# Patient Record
Sex: MALE | Race: WHITE | NOT HISPANIC OR LATINO | Employment: OTHER | URBAN - METROPOLITAN AREA
[De-identification: names, ages, dates, MRNs, and addresses within clinical notes are randomized per-mention and may not be internally consistent; named-entity substitution may affect disease eponyms.]

---

## 2017-11-30 ENCOUNTER — OFFICE VISIT (OUTPATIENT)
Dept: ORTHOPEDICS | Facility: CLINIC | Age: 47
End: 2017-11-30
Payer: COMMERCIAL

## 2017-11-30 VITALS
HEIGHT: 70 IN | SYSTOLIC BLOOD PRESSURE: 123 MMHG | WEIGHT: 190 LBS | DIASTOLIC BLOOD PRESSURE: 81 MMHG | BODY MASS INDEX: 27.2 KG/M2

## 2017-11-30 DIAGNOSIS — M25.552 LEFT HIP PAIN: Primary | ICD-10-CM

## 2017-11-30 PROCEDURE — 99203 OFFICE O/P NEW LOW 30 MIN: CPT | Performed by: FAMILY MEDICINE

## 2017-11-30 RX ORDER — TAMSULOSIN HYDROCHLORIDE 0.4 MG/1
0.4 CAPSULE ORAL DAILY
COMMUNITY
Start: 2017-02-08 | End: 2019-10-02

## 2017-11-30 NOTE — MR AVS SNAPSHOT
"              After Visit Summary   11/30/2017    Rigo Joyner    MRN: [id number]           Patient Information     Date Of Birth          1970        Visit Information        Provider Department      11/30/2017 10:40 AM Delfino Hernandez, DO [facility]        Today's Diagnoses     Left hip pain    -  1      Care Instructions    Assessment:  1. Left hip pain        Plan:  Discussed the assessment with the patient.  Rehab: [facility] - [phone].  Recommend scheduling with Mary Dinh or Susana Joseph.  Follow up: 6 - 8 weeks after starting physical therapy if not improving            Follow-ups after your visit        Additional Services     PHYSICAL THERAPY REFERRAL       *This therapy referral will be filtered to a centralized scheduling office at [facility] and the patient will receive a call to schedule an appointment at a [facility] location most convenient for them. *     [facility] provides Physical Therapy evaluation and treatment and many specialty services across the [facility] system.  If requesting a specialty program, please choose from the list below.    If you have not heard from the scheduling office within 2 business days, please call [phone] for all locations, with the exception of [place], please call [phone].  Treatment: Evaluation & Treatment  Special Instructions/Modalities: per therapist evaluation  Special Programs: None    Please be aware that coverage of these services is subject to the terms and limitations of your health insurance plan.  Call member services at your health plan with any benefit or coverage questions.      **Note to Provider:  If you are referring outside of [facility] for the therapy appointment, please list the name of the location in the \"special instructions\" above, print the referral and give to the patient to schedule the appointment.      " "            Who to contact     If you have questions or need follow up information about today's clinic visit or your schedule please contact [facility] directly at [phone].  Normal or non-critical lab and imaging results will be communicated to you by MyChart, letter or phone within 4 business days after the clinic has received the results. If you do not hear from us within 7 days, please contact the clinic through MyChart or phone. If you have a critical or abnormal lab result, we will notify you by phone as soon as possible.  Submit refill requests through [facility] or call your pharmacy and they will forward the refill request to us. Please allow 3 business days for your refill to be completed.          Additional Information About Your Visit        [facility] Information     [facility] lets you send messages to your doctor, view your test results, renew your prescriptions, schedule appointments and more. To sign up, go to [email or web address] . Click on \"Log in\" on the left side of the screen, which will take you to the Welcome page. Then click on \"Sign up Now\" on the right side of the page.     You will be asked to enter the access code listed below, as well as some personal information. Please follow the directions to create your username and password.     Your access code is: [email or web address]  Expires: 2018 11:37 AM     Your access code will  in 90 days. If you need help or a new code, please call your [facility] clinic or [phone].        Care EveryWhere ID     This is your Care EveryWhere ID. This could be used by other organizations to access your [facility] medical records  [id number]        Your Vitals Were     Height BMI (Body Mass Index)                5' 9.5\" (1.765 m) 27.66 kg/m2           Blood Pressure from Last 3 Encounters:   17 123/81   16 122/77    Weight from Last 3 Encounters:   17 190 lb (86.2 kg)              We Performed the " Following     PHYSICAL THERAPY REFERRAL          Today's Medication Changes          These changes are accurate as of: 11/30/17 11:37 AM.  If you have any questions, ask your nurse or doctor.               Stop taking these medicines if you haven't already. Please contact your care team if you have questions.     predniSONE 20 MG tablet   Commonly known as:  DELTASONE   Stopped by:  Delfino Hernandez DO                    Primary Care Provider Office Phone # Fax #    [facility] [phone]       [address] [place] [id number]        Equal Access to Services     [facility]: Hadii chano mejia hadasho Soomaali, waaxda luqadaha, qaybta kaalmada adeeneida, rut contreras . So [facility] [phone].    ATENCIÓN: Si habla español, tiene a deluna disposición servicios gratuitos de asistencia lingüística. [facility] [phone].    We comply with applicable federal civil rights laws and [place] laws. We do not discriminate on the basis of race, color, national origin, age, disability, sex, sexual orientation, or gender identity.            Thank you!     Thank you for choosing [facility]  for your care. Our goal is always to provide you with excellent care. Hearing back from our patients is one way we can continue to improve our services. Please take a few minutes to complete the written survey that you may receive in the mail after your visit with us. Thank you!             Your Updated Medication List - Protect others around you: Learn how to safely use, store and throw away your medicines at www.disposemymeds.org.          This list is accurate as of: 11/30/17 11:37 AM.  Always use your most recent med list.                   Brand Name Dispense Instructions for use Diagnosis    tamsulosin 0.4 MG capsule    FLOMAX     Take 0.4 mg by mouth

## 2017-11-30 NOTE — LETTER
"    2017         RE: Rigo Joyner  [address] [place] [address]        Dear Colleague,    Thank you for referring your patient, Rigo Joyner, to the [facility]. Please see a copy of my visit note below.    Rigo Joyner  :  1970  DOS: 2017  MRN: [id number]    Sports Medicine Clinic Visit    PCP: rBi, [facility]    Rigo Joyner is a 47 year old male who is seen as a self referral presenting with chronic left hip pain.    Injury: Chronic left hip pain over the last 30+ years, pain worse over the last ~ 1 year.  Pain located over left deep lateral and anterior hip, radiating to anterior thigh. Additional Features:  Positive: popping and weakness.  Symptoms are better with Ibuprofen and Rest.  Symptoms are worse with: prolonged sitting/driving, going from sit to stand, wearing his gun belt.  Other evaluation and/or treatments so far consists of: Ibuprofen, Rest [facility]  [facility] completed[facility] - negative for fracture and OA per patient.  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  Has noted intermittent pain since that time.    Social History: works as [employer] for [employer]     Review of Systems  Musculoskeletal: as above  Remainder of review of systems is negative including constitutional, CV, pulmonary, GI, Skin and Neurologic except as noted in HPI or medical history.    No past medical history on file.  No past surgical history on file.    Objective  /81  Ht 5' 9.5\" (1.765 m)  Wt 190 lb (86.2 kg)  BMI 27.66 kg/m2    General: healthy, alert and in no distress    HEENT: no scleral icterus or conjunctival erythema   Skin: no suspicious lesions or rash. No jaundice.   CV: regular rhythm by palpation, 2+ distal pulses, no pedal edema    Resp: " "normal respiratory effort without conversational dyspnea   Psych: normal mood and affect    Gait: nonantalgic, appropriate coordination and balance   Neuro: normal light touch sensory exam of the extremities. Motor strength as noted below     Right hip exam    Inspection:        no edema or ecchymosis in hip area    ROM:       Flexion 130       internal rotation 15      external rotation 30      Range of motion limited by pain, mild, anterior hip    Strength:        flexion 4/5       extension 5/5       abduction 5/5       adduction 5/5    Tender:        Anterior hip joint mild    Non Tender:        remainder of hip area       illiac crest       ASIS       pubis    Sensation:        grossly intact in hip and thigh    Skin:       well perfused       capillary refill brisk    Special Tests:        neg (-) BRADLEY       positive (+) FADIR       Negative scour       neg (-) Malachi       Neg SLR and slump    Radiology  No imaging to review today, per patient [facility]\"    Assessment:  1. Left hip pain        Plan:  Discussed the assessment with the patient.  Follow up: prn based on relief  Start PT for presumed mild hip OA vs compensatory musculoskeletal dysfunction  Consider MR vs diagnostic CSI in future based on relief of sx  Obtain outside imaging if possible for review  PT ordered, HEP and acitivity modification principles reviewed  We discussed modified progressive pain-free activity as tolerated  Home handouts provided and supportive care reviewed  All questions were answered today  Contact us with additional questions or concerns  Signs and sx of concern reviewed      Delfino Hernandez DO, BRITTANI  Primary Care Sports Medicine  [facility]             Disclaimer: This note consists of symbols derived from keyboarding, dictation and/or voice recognition software. As a result, there may be errors in the script that have gone undetected. Please consider this when " interpreting information found in this chart.      Again, thank you for allowing me to participate in the care of your patient.        Sincerely,        Delfino Hernandez, DO

## 2017-11-30 NOTE — PROGRESS NOTES
"Rigo Joyner  :  1970  DOS: 2017  MRN: [id number]    Sports Medicine Clinic Visit    [facility]    Rigo Joyner is a 47 year old male who is seen as a self referral presenting with chronic left hip pain.    Injury: Chronic left hip pain over the last 30+ years, pain worse over the last ~ 1 year.  Pain located over left deep lateral and anterior hip, radiating to anterior thigh. Additional Features:  Positive: popping and weakness.  Symptoms are better with Ibuprofen and Rest.  Symptoms are worse with: prolonged sitting/driving, going from sit to stand, wearing his gun belt.  Other evaluation and/or treatments so far consists of: Ibuprofen, Rest and [facility].  Recent imaging completed: [facility] - negative for fracture and OA per patient.  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  Has noted intermittent pain since that time.    Social History: works as [employer] for [employer]     Review of Systems  Musculoskeletal: as above  Remainder of review of systems is negative including constitutional, CV, pulmonary, GI, Skin and Neurologic except as noted in HPI or medical history.    No past medical history on file.  No past surgical history on file.    Objective  /81  Ht 5' 9.5\" (1.765 m)  Wt 190 lb (86.2 kg)  BMI 27.66 kg/m2    General: healthy, alert and in no distress    HEENT: no scleral icterus or conjunctival erythema   Skin: no suspicious lesions or rash. No jaundice.   CV: regular rhythm by palpation, 2+ distal pulses, no pedal edema    Resp: normal respiratory effort without conversational dyspnea   Psych: normal mood and affect    Gait: nonantalgic, appropriate coordination and balance   Neuro: normal light touch sensory exam of the extremities. Motor strength as noted below     Right hip exam    Inspection:     " "   no edema or ecchymosis in hip area    ROM:       Flexion 130       internal rotation 15      external rotation 30      Range of motion limited by pain, mild, anterior hip    Strength:        flexion 4/5       extension 5/5       abduction 5/5       adduction 5/5    Tender:        Anterior hip joint mild    Non Tender:        remainder of hip area       illiac crest       ASIS       pubis    Sensation:        grossly intact in hip and thigh    Skin:       well perfused       capillary refill brisk    Special Tests:        neg (-) BRADLEY       positive (+) FADIR       Negative scour       neg (-) Malachi       Neg SLR and slump    Radiology  No imaging to review today, per patient [facility] \"[facility]\"    Assessment:  1. Left hip pain        Plan:  Discussed the assessment with the patient.  Follow up: prn based on relief  Start PT for presumed mild hip OA vs compensatory musculoskeletal dysfunction  Consider MR vs diagnostic CSI in future based on relief of sx  Obtain outside imaging if possible for review  PT ordered, HEP and acitivity modification principles reviewed  We discussed modified progressive pain-free activity as tolerated  Home handouts provided and supportive care reviewed  All questions were answered today  Contact us with additional questions or concerns  Signs and sx of concern reviewed      Delfino Hernandez DO, BRITTANI  Primary Care Sports Medicine  [facility]             Disclaimer: This note consists of symbols derived from keyboarding, dictation and/or voice recognition software. As a result, there may be errors in the script that have gone undetected. Please consider this when interpreting information found in this chart.    "

## 2017-11-30 NOTE — PATIENT INSTRUCTIONS
Assessment:  1. Left hip pain        Plan:  Discussed the assessment with the patient.  Rehab[facility] [phone].  Recommend scheduling with Mary Dinh or Susana Joseph.  Follow up: 6 - 8 weeks after starting physical therapy if not improving

## 2017-12-11 ENCOUNTER — HOSPITAL ENCOUNTER (OUTPATIENT)
Dept: PHYSICAL THERAPY | Facility: CLINIC | Age: 47
Setting detail: THERAPIES SERIES
End: 2017-12-11
Attending: FAMILY MEDICINE
Payer: COMMERCIAL

## 2017-12-11 PROCEDURE — 97110 THERAPEUTIC EXERCISES: CPT | Mod: GP | Performed by: PHYSICAL THERAPIST

## 2017-12-11 PROCEDURE — 97161 PT EVAL LOW COMPLEX 20 MIN: CPT | Mod: GP | Performed by: PHYSICAL THERAPIST

## 2017-12-11 PROCEDURE — 40000718 ZZHC STATISTIC PT DEPARTMENT ORTHO VISIT: Performed by: PHYSICAL THERAPIST

## 2017-12-11 NOTE — PROGRESS NOTES
PHYSICAL THERAPY INITIAL EVALUATION  12/11/17 0800   General Information   Type of Visit Initial OP Ortho PT Evaluation   Start of Care Date 12/11/17   Referring Physician Dr. Hernandez   Patient/Family Goals Statement pain management    Orders Evaluate and Treat   Date of Order 11/30/17   Insurance Type Health [employer]   Insurance Comments/Visits Authorized no limits   Medical Diagnosis Left hip pain    Surgical/Medical history reviewed Yes   Precautions/Limitations no known precautions/limitations   Body Part(s)   Body Part(s) Hip   Presentation and Etiology   Pertinent history of current problem (include personal factors and/or comorbidities that impact the POC) Patient reports pain in hip since since the . [facility] but [facility]'t tell him much. Prolonged sitting makes it worse.    Impairments A. Pain;F. Decreased strength and endurance;J. Burning;K. Numbness;L. Tingling   Functional Limitations perform activities of daily living;perform required work activities;perform desired leisure / sports activities   Symptom Location anterior-lateral left hip    How/Where did it occur With repetition/overuse   Chronicity Chronic   Pain rating (0-10 point scale) Best (/10);Worst (/10)   Best (/10) 3   Worst (/10) 8   Pain quality B. Dull;C. Aching;D. Burning;E. Shooting   Frequency of pain/symptoms A. Constant   Pain/symptoms exacerbated by A. Sitting;E. Rest;G. Certain positions   Pain/symptoms eased by B. Walking;E. Changing positions;I. OTC medication(s)   Progression of symptoms since onset: Worsened   Prior Level of Function   Prior Level of Function-Mobility independent   Prior Level of Function-ADLs independent   Current Level of Function   Patient role/employment history A. Employed   Employment Comments [employer]    Current equipment-Gait/Locomotion None   Fall Risk Screen   Fall screen completed by PT   Have you fallen 2 or more times in the past year? No   Have you fallen and had an injury in  the past year? No   Is patient a fall risk? No   Functional Scales   Functional Scales Other   Other Scales  LEFS: 60/80   Hip Objective Findings   Side (if bilateral, select both right and left) Right;Left   Gait/Locomotion normal   Balance/Proprioception (Single Leg Stance) decreased on the L, increased sway   Hip ROM Comments '   Lumbar ROM Flexion-fingertips to ankles w/mild inc pain in hip, Extension-50% stiffness, SB fingertips to superior pole of patella mild discomfort L hip but worse with L SB   Pelvic Screen - supine to sit, - SI provocation testing    Femoral Nerve Stretch Test -   Gluteal Tendopathy Test -   Resisted Hip Adduction Test -   Straight Leg Raise Test -   Scour Test -   BRADLEY Test + L   FADIR Test + L   Palpation tender L hip flexor, TFL, QL   Right Hip Flexion PROM WNL   Right Hip ER PROM 45   Right Hip IR PROM 30   Right Hip Ext PROM WNL   Right Hip Flexion Strength 5/5   Right Hip Abduction Strength 4+/5   Right Hip IR Strength 5/5   Right Hip ER Strength 5/5   Right Knee Flexion Strength 5/5   Right Knee Extension Strength 5/5   Byron Flexibility Test + B   Obers/ITB Flexibility mild tight L   Right Hamstring Flexibility mod tight   Right Piriformis Flexibility mild tight   Right Prone Quad Flexibility mod tight   Left Hip Flexion PROM WNL, mild pain   Left Hip ER PROM 45   Left Hip IR PROM 20, pain   Left Hip Ext PROM mildly limited compared to L and causes pain from thigh radiating up to groin   Left Hip Flexion Strength 5/5   Left Hip Abduction Strength 4/5   Left Hip IR Strength 5/5, discomfort    Left Hip ER Strength 5/5   Left Knee Flexion Strength 5/5   Left Knee Extension Strength 5/5   Left Hamstring Flexibility mod tight   Left Piriformis Flexibility mod tight, pain    Left Prone Quad Flexibility mod tight   Planned Therapy Interventions   Planned Therapy Interventions balance training;joint mobilization;manual therapy;neuromuscular re-education;ROM;strengthening;stretching    Clinical Impression   Criteria for Skilled Therapeutic Interventions Met yes, treatment indicated   PT Diagnosis L hip pain   Influenced by the following impairments pain, decreased ROM, decreased strength, decreased flexibility   Functional limitations due to impairments prolonged sitting, rolling in bed, lifting   Clinical Presentation Stable/Uncomplicated   Clinical Presentation Rationale decent ROM and low pain    Clinical Decision Making (Complexity) Low complexity   Therapy Frequency 1 time/week   Predicted Duration of Therapy Intervention (days/wks) 4 weeks, 1x every other week for 4 weeks   Risk & Benefits of therapy have been explained Yes   Patient, Family & other staff in agreement with plan of care Yes   Clinical Impression Comments Patient presenting with signs and symptoms consistent with possible mild OA.   Education Assessment   Preferred Learning Style Listening;Demonstration;Pictures/video   Barriers to Learning No barriers   ORTHO GOALS   PT Ortho Eval Goals 1;2;3;4   Ortho Goal 1   Goal Identifier 1   Goal Description Patient will be able to sit for 1 hour with only minimal difficulty.   Target Date 02/05/18   Ortho Goal 2   Goal Identifier 2   Goal Description Patient will be able to roll in bed with only minimal difficulty.   Target Date 02/05/18   Ortho Goal 3   Goal Identifier 3   Goal Description Patient will report no difficulty with ascending/descending stairs.   Target Date 02/05/18   Ortho Goal 4   Goal Identifier 4   Goal Description Patient will be independent and compliant with HEP to aid functional recovery.   Target Date 02/05/18   Total Evaluation Time   Total Evaluation Time 30       Please contact me with any questions or concerns.  Thank you for your referral.    Susana Joseph, PT, DPT, OCS  Physical Therapist, Orthopedic Certified Specialist  [facility]  [phone]

## 2017-12-18 ENCOUNTER — HOSPITAL ENCOUNTER (OUTPATIENT)
Dept: PHYSICAL THERAPY | Facility: CLINIC | Age: 47
Setting detail: THERAPIES SERIES
End: 2017-12-18
Attending: FAMILY MEDICINE
Payer: COMMERCIAL

## 2017-12-18 PROCEDURE — 40000718 ZZHC STATISTIC PT DEPARTMENT ORTHO VISIT: Performed by: PHYSICAL THERAPIST

## 2017-12-18 PROCEDURE — 97140 MANUAL THERAPY 1/> REGIONS: CPT | Mod: GP | Performed by: PHYSICAL THERAPIST

## 2017-12-18 PROCEDURE — 97110 THERAPEUTIC EXERCISES: CPT | Mod: GP | Performed by: PHYSICAL THERAPIST

## 2018-01-02 ENCOUNTER — TELEPHONE (OUTPATIENT)
Dept: ORTHOPEDICS | Facility: CLINIC | Age: 48
End: 2018-01-02

## 2018-01-02 ENCOUNTER — HOSPITAL ENCOUNTER (OUTPATIENT)
Dept: PHYSICAL THERAPY | Facility: CLINIC | Age: 48
Setting detail: THERAPIES SERIES
End: 2018-01-02
Attending: FAMILY MEDICINE
Payer: COMMERCIAL

## 2018-01-02 DIAGNOSIS — M25.552 LEFT HIP PAIN: Primary | ICD-10-CM

## 2018-01-02 PROCEDURE — 97110 THERAPEUTIC EXERCISES: CPT | Mod: GP | Performed by: PHYSICAL THERAPIST

## 2018-01-02 PROCEDURE — 40000718 ZZHC STATISTIC PT DEPARTMENT ORTHO VISIT: Performed by: PHYSICAL THERAPIST

## 2018-01-02 NOTE — TELEPHONE ENCOUNTER
Received message from patient's physical therapist the he is having only mild relief of left hip pain with physical therapy.  Patient is desiring to pursue MRI at this time to rule out any further structural issue.  MRI order pended.  Dr Hernandez please advise.    Rolly Astudillo ATC

## 2018-01-02 NOTE — TELEPHONE ENCOUNTER
Spoke to patient and notified that MRI order completed.  Advanced imaging is done by appointment. Some insurance require a prior authorization to be completed which may delay the time until you are able to schedule your appointment. You should be receiving a call from the scheduling department, if you have not heard from them in 24-48 hours.   Please call [place] [place] [facility] [place]: [phone] to schedule your Left hip MRI.  Depending on your availability you can usually schedule within the next 1-2 days.    The clinic will call you with results, if you have not heard from the clinic within 3-4 days following your MRI please contact us at the number listed below.     Rolly Astudillo ATC

## 2018-01-04 ENCOUNTER — HOSPITAL ENCOUNTER (OUTPATIENT)
Dept: MRI IMAGING | Facility: CLINIC | Age: 48
Discharge: HOME | End: 2018-01-04
Attending: FAMILY MEDICINE | Admitting: FAMILY MEDICINE
Payer: COMMERCIAL

## 2018-01-04 DIAGNOSIS — M25.552 LEFT HIP PAIN: ICD-10-CM

## 2018-01-04 PROCEDURE — 73721 MRI JNT OF LWR EXTRE W/O DYE: CPT | Mod: LT

## 2018-01-08 ENCOUNTER — TELEPHONE (OUTPATIENT)
Dept: ORTHOPEDICS | Facility: CLINIC | Age: 48
End: 2018-01-08

## 2018-01-09 ENCOUNTER — HOSPITAL ENCOUNTER (OUTPATIENT)
Dept: PHYSICAL THERAPY | Facility: CLINIC | Age: 48
Setting detail: THERAPIES SERIES
End: 2018-01-09
Attending: FAMILY MEDICINE
Payer: COMMERCIAL

## 2018-01-09 PROCEDURE — 97110 THERAPEUTIC EXERCISES: CPT | Mod: GP | Performed by: PHYSICAL THERAPIST

## 2018-01-09 PROCEDURE — 40000718 ZZHC STATISTIC PT DEPARTMENT ORTHO VISIT: Performed by: PHYSICAL THERAPIST

## 2018-01-09 NOTE — TELEPHONE ENCOUNTER
Called to discuss MR results.  Signs of YAMILKA which will be reviewed with him.  No AVN, no stress fracture, no significant OA, though YAMILKA can predispose to early OA.  Can try CSI to prove pain is coming from hip joint and assess as a tx to calm inflammation, vs continued PT.      Will continue to try to reach

## 2018-01-17 ENCOUNTER — HOSPITAL ENCOUNTER (OUTPATIENT)
Dept: PHYSICAL THERAPY | Facility: CLINIC | Age: 48
Setting detail: THERAPIES SERIES
End: 2018-01-17
Attending: FAMILY MEDICINE
Payer: COMMERCIAL

## 2018-01-17 PROCEDURE — 40000718 ZZHC STATISTIC PT DEPARTMENT ORTHO VISIT: Performed by: PHYSICAL THERAPIST

## 2018-01-17 PROCEDURE — 97110 THERAPEUTIC EXERCISES: CPT | Mod: GP | Performed by: PHYSICAL THERAPIST

## 2018-01-25 ENCOUNTER — OFFICE VISIT (OUTPATIENT)
Dept: ORTHOPEDICS | Facility: CLINIC | Age: 48
End: 2018-01-25
Payer: COMMERCIAL

## 2018-01-25 VITALS — HEIGHT: 70 IN | BODY MASS INDEX: 27.35 KG/M2 | WEIGHT: 191 LBS

## 2018-01-25 DIAGNOSIS — M25.552 LEFT HIP PAIN: Primary | ICD-10-CM

## 2018-01-25 PROCEDURE — 99213 OFFICE O/P EST LOW 20 MIN: CPT | Performed by: FAMILY MEDICINE

## 2018-01-25 NOTE — LETTER
2018         RE: Rigo Joyner  [id number]  [address]  [facility] [id number]        Dear Colleague,    Thank you for referring your patient, Rigo Joyner, to the [facility]. Please see a copy of my visit note below.    Rigo Joyner  :  1970  DOS: 2018  MRN: [id number]    Sports Medicine Clinic Visit    PCP: Bri, [facility]    Rigo Joyner is a 47 year old male who is seen as a self referral presenting with chronic left hip pain.    Injury: Chronic left hip pain over the last 30+ years, pain worse over the last ~ 1 year.  Pain located over left deep lateral and anterior hip, radiating to anterior thigh. Additional Features:  Positive: popping and weakness.  Symptoms are better with Ibuprofen and Rest.  Symptoms are worse with: prolonged sitting/driving, going from sit to stand, wearing his gun belt.  Other evaluation and/or treatments so far consists of: Ibuprofen, Rest [facility]  [facility] completed[facility] - negative for fracture and OA per patient.  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  Has noted intermittent pain since that time.    Social History: works as [employer] for [employer]     Interim History - 2018  Since last visit on 2018 patient has mild-moderate left hip pain.  Notes intermittent relief with physical therapy.  Continues have discomfort mainly over anterior and lateral hip.  Here to review MRI results and discussion injection options.  No interim injury.       Review of Systems  Musculoskeletal: as above  Remainder of review of systems is negative including constitutional, CV, pulmonary, GI, Skin and Neurologic except as noted in HPI or medical history.    Past Medical History:   Diagnosis Date     Hyperlipemia      No past surgical  "history on file.    Objective  Ht 5' 9.5\" (1.765 m)  Wt 191 lb (86.6 kg)  BMI 27.8 kg/m2    General: healthy, alert and in no distress    HEENT: no scleral icterus or conjunctival erythema   Skin: no suspicious lesions or rash. No jaundice.   CV: regular rhythm by palpation, 2+ distal pulses, no pedal edema    Resp: normal respiratory effort without conversational dyspnea   Psych: normal mood and affect    Gait: nonantalgic, appropriate coordination and balance   Neuro: normal light touch sensory exam of the extremities. Motor strength as noted below     Right hip exam    Inspection:        no edema or ecchymosis in hip area    ROM:       Flexion 130       internal rotation 15      external rotation 30      Range of motion limited by pain, mild, anterior hip    Strength:        flexion 4/5       extension 5/5       abduction 5/5       adduction 5/5    Tender:        Anterior hip joint minimal      TFL, mild    Non Tender:        remainder of hip area       illiac crest       ASIS       pubis    Sensation:        grossly intact in hip and thigh    Skin:       well perfused       capillary refill brisk    Special Tests:        neg (-) BRADLEY       positive (+) FADIR, improved       Negative scour       neg (-) Malachi       Neg SLR and slump    Radiology  Results for orders placed or performed during the hospital encounter of 01/04/18   MR Hip Left w/o Contrast    Narrative    MR HIP LEFT WITHOUT CONTRAST 1/4/2018 9:07 AM    HISTORY:  Left hip pain.    TECHNIQUE:  Multiplanar, multisequence without contrast.    FINDINGS:   Osseous and Cartilaginous Structures:  No fracture or destructive bone  lesion.  No femoral head osteonecrosis. Mild subchondral cystic change  of the left anterior acetabulum, which could relate to subtle  underlying chondromalacia. There is a left femoral neck synovial  herniation pit. These have been described as a normal variant, but  also in association with femoroacetabular " impingement.    Acetabular Labrum: No juxtaacetabular cyst.  No obvious labral tear is  appreciated, allowing for the large FOV technique.  If indicated  clinically, MR arthrography would be considered the study of choice in  this regard.    Hip joint space:  No significant overall joint effusion.     Trochanteric and Iliopsoas Bursae: Trace right iliopsoas bursitis.    Common Hamstring Tendon: No evidence of tear or significant  tendinosis.    Additional Findings:  The gluteus medius and minimus tendons appear  unremarkable. Incidentally noted colonic diverticulosis.      Impression    IMPRESSION:  1. Mild left acetabular subchondral cystic change, which could relate  to subtle underlying chondromalacia. Left femoral neck synovial  herniation pit. These have been described as a normal variant, but  also in association with femoroacetabular impingement.  2. Additional findings discussed above.    MALLORY TURNER MD       Assessment:  1. Left hip pain        Plan:  Discussed the assessment with the patient.  Follow up: prn based on relief  Continue HEP for mild hip OA/YAMILKA with compensatory musculoskeletal dysfunction  Reviewed MR images and report today with patient  Consider diagnostic/therapeutic CSI in future for labile or worsening pain, but will try to hold off if possible  Low impact activity, wt loss strategies reviewed  We discussed modified progressive pain-free activity as tolerated  Home handouts provided and supportive care reviewed  All questions were answered today  Contact us with additional questions or concerns  Signs and sx of concern reviewed      Delfino Hernandez DO, BRITTANI  Primary Care Sports Medicine  [facility]             Disclaimer: This note consists of symbols derived from keyboarding, dictation and/or voice recognition software. As a result, there may be errors in the script that have gone undetected. Please consider this when interpreting information found in this  chart.      Again, thank you for allowing me to participate in the care of your patient.        Sincerely,        Delfino Hernandez, DO

## 2018-01-25 NOTE — PROGRESS NOTES
"Rigo Joyner  :  1970  DOS: 2018  MRN: [id number]    Sports Medicine Clinic Visit    PCP: Bri, [facility]    Rigo Joyner is a 47 year old male who is seen as a self referral presenting with chronic left hip pain.    Injury: Chronic left hip pain over the last 30+ years, pain worse over the last ~ 1 year.  Pain located over left deep lateral and anterior hip, radiating to anterior thigh. Additional Features:  Positive: popping and weakness.  Symptoms are better with Ibuprofen and Rest.  Symptoms are worse with: prolonged sitting/driving, going from sit to stand, wearing his gun belt.  Other evaluation and/or treatments so far consists of: Ibuprofen, Rest and [facility].  [facility] completed: [facility] - negative for fracture and OA per patient.  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  Has noted intermittent pain since that time.    Social History: works as [employer] for [employer]     Interim History - 2018  Since last visit on 2018 patient has mild-moderate left hip pain.  Notes intermittent relief with physical therapy.  Continues have discomfort mainly over anterior and lateral hip.  Here to review MRI results and discussion injection options.  No interim injury.       Review of Systems  Musculoskeletal: as above  Remainder of review of systems is negative including constitutional, CV, pulmonary, GI, Skin and Neurologic except as noted in HPI or medical history.    Past Medical History:   Diagnosis Date     Hyperlipemia      No past surgical history on file.    Objective  Ht 5' 9.5\" (1.765 m)  Wt 191 lb (86.6 kg)  BMI 27.8 kg/m2    General: healthy, alert and in no distress    HEENT: no scleral icterus or conjunctival erythema   Skin: no suspicious lesions or rash. No jaundice.   CV: regular rhythm by " palpation, 2+ distal pulses, no pedal edema    Resp: normal respiratory effort without conversational dyspnea   Psych: normal mood and affect    Gait: nonantalgic, appropriate coordination and balance   Neuro: normal light touch sensory exam of the extremities. Motor strength as noted below     Right hip exam    Inspection:        no edema or ecchymosis in hip area    ROM:       Flexion 130       internal rotation 15      external rotation 30      Range of motion limited by pain, mild, anterior hip    Strength:        flexion 4/5       extension 5/5       abduction 5/5       adduction 5/5    Tender:        Anterior hip joint minimal      TFL, mild    Non Tender:        remainder of hip area       illiac crest       ASIS       pubis    Sensation:        grossly intact in hip and thigh    Skin:       well perfused       capillary refill brisk    Special Tests:        neg (-) BRADLEY       positive (+) FADIR, improved       Negative scour       neg (-) Malachi       Neg SLR and slump    Radiology  Results for orders placed or performed during the hospital encounter of 01/04/18   MR Hip Left w/o Contrast    Narrative    MR HIP LEFT WITHOUT CONTRAST 1/4/2018 9:07 AM    HISTORY:  Left hip pain.    TECHNIQUE:  Multiplanar, multisequence without contrast.    FINDINGS:   Osseous and Cartilaginous Structures:  No fracture or destructive bone  lesion.  No femoral head osteonecrosis. Mild subchondral cystic change  of the left anterior acetabulum, which could relate to subtle  underlying chondromalacia. There is a left femoral neck synovial  herniation pit. These have been described as a normal variant, but  also in association with femoroacetabular impingement.    Acetabular Labrum: No juxtaacetabular cyst.  No obvious labral tear is  appreciated, allowing for the large FOV technique.  If indicated  clinically, MR arthrography would be considered the study of choice in  this regard.    Hip joint space:  No significant overall joint  effusion.     Trochanteric and Iliopsoas Bursae: Trace right iliopsoas bursitis.    Common Hamstring Tendon: No evidence of tear or significant  tendinosis.    Additional Findings:  The gluteus medius and minimus tendons appear  unremarkable. Incidentally noted colonic diverticulosis.      Impression    IMPRESSION:  1. Mild left acetabular subchondral cystic change, which could relate  to subtle underlying chondromalacia. Left femoral neck synovial  herniation pit. These have been described as a normal variant, but  also in association with femoroacetabular impingement.  2. Additional findings discussed above.    MALLORY TURNER MD       Assessment:  1. Left hip pain        Plan:  Discussed the assessment with the patient.  Follow up: prn based on relief  Continue HEP for mild hip OA/YAMILKA with compensatory musculoskeletal dysfunction  Reviewed MR images and report today with patient  Consider diagnostic/therapeutic CSI in future for labile or worsening pain, but will try to hold off if possible  Low impact activity, wt loss strategies reviewed  We discussed modified progressive pain-free activity as tolerated  Home handouts provided and supportive care reviewed  All questions were answered today  Contact us with additional questions or concerns  Signs and sx of concern reviewed      Delfino Hernandez DO, BRITTANI  Primary Care Sports Medicine  [facility]             Disclaimer: This note consists of symbols derived from keyboarding, dictation and/or voice recognition software. As a result, there may be errors in the script that have gone undetected. Please consider this when interpreting information found in this chart.

## 2018-01-25 NOTE — MR AVS SNAPSHOT
"              After Visit Summary   2018    Rigo Joyner    MRN: [id number]           Patient Information     Date Of Birth          1970        Visit Information        Provider Department      2018 8:00 AM Delfino Hernandez,  [facility]        Today's Diagnoses     Left hip pain    -  1       Follow-ups after your visit        Who to contact     If you have questions or need follow up information about today's clinic visit or your schedule please contact [facility] directly at [phone].  Normal or non-critical lab and imaging results will be communicated to you by Roomishhart, letter or phone within 4 business days after the clinic has received the results. If you do not hear from us within 7 days, please contact the clinic through Roomishhart or phone. If you have a critical or abnormal lab result, we will notify you by phone as soon as possible.  Submit refill requests through Netvibes or call your pharmacy and they will forward the refill request to us. Please allow 3 business days for your refill to be completed.          Additional Information About Your Visit        MyChart Information     Netvibes lets you send messages to your doctor, view your test results, renew your prescriptions, schedule appointments and more. To sign up, go to [email or web address] . Click on \"Log in\" on the left side of the screen, which will take you to the Welcome page. Then click on \"Sign up Now\" on the right side of the page.     You will be asked to enter the access code listed below, as well as some personal information. Please follow the directions to create your username and password.     Your access code is: [email or web address]  Expires: 2018 11:37 AM     Your access code will  in 90 days. If you need help or a new code, please call your [place] clinic or [phone].        Care EveryWhere ID     This is your Care EveryWhere " "ID. This could be used by other organizations to access your [facility] medical records  [id number]        Your Vitals Were     Height BMI (Body Mass Index)                5' 9.5\" (1.765 m) 27.8 kg/m2           Blood Pressure from Last 3 Encounters:   11/30/17 123/81   09/02/16 122/77    Weight from Last 3 Encounters:   01/25/18 191 lb (86.6 kg)   11/30/17 190 lb (86.2 kg)              Today, you had the following     No orders found for display       Primary Care Provider Office Phone # Fax #    [facility] [phone]       [address] [facility] [address]        Equal Access to Services     GABRIEL FLORES : Kymberly Camarnea, waaisf cavazos, qaybta kaalmada lenorayada, rut talavera. So [facility] [phone].    ATENCIÓN: Si habla español, tiene a deluna disposición servicios gratuitos de asistencia lingüística. [facility] [phone].    We comply with applicable federal civil rights laws and [place] laws. We do not discriminate on the basis of race, color, national origin, age, disability, sex, sexual orientation, or gender identity.            Thank you!     Thank you for choosing [facility]  for your care. Our goal is always to provide you with excellent care. Hearing back from our patients is one way we can continue to improve our services. Please take a few minutes to complete the written survey that you may receive in the mail after your visit with us. Thank you!             Your Updated Medication List - Protect others around you: Learn how to safely use, store and throw away your medicines at www.disposemymeds.org.          This list is accurate as of 1/25/18  8:49 AM.  Always use your most recent med list.                   Brand Name Dispense Instructions for use Diagnosis    tamsulosin 0.4 MG capsule    FLOMAX     Take 0.4 mg by mouth          "

## 2018-04-09 ENCOUNTER — APPOINTMENT (OUTPATIENT)
Dept: CT IMAGING | Facility: CLINIC | Age: 48
End: 2018-04-09
Attending: EMERGENCY MEDICINE
Payer: COMMERCIAL

## 2018-04-09 ENCOUNTER — HOSPITAL ENCOUNTER (EMERGENCY)
Facility: CLINIC | Age: 48
Discharge: HOME | End: 2018-04-09
Attending: EMERGENCY MEDICINE | Admitting: EMERGENCY MEDICINE
Payer: COMMERCIAL

## 2018-04-09 VITALS
OXYGEN SATURATION: 94 % | BODY MASS INDEX: 28.14 KG/M2 | DIASTOLIC BLOOD PRESSURE: 101 MMHG | RESPIRATION RATE: 18 BRPM | SYSTOLIC BLOOD PRESSURE: 132 MMHG | TEMPERATURE: 97.9 F | HEIGHT: 69 IN | WEIGHT: 190 LBS

## 2018-04-09 DIAGNOSIS — K57.92 ACUTE DIVERTICULITIS: ICD-10-CM

## 2018-04-09 LAB
ALBUMIN SERPL-MCNC: 4 G/DL (ref 3.4–5)
ALBUMIN UR-MCNC: NEGATIVE MG/DL
ALP SERPL-CCNC: 93 U/L (ref 40–150)
ALT SERPL W P-5'-P-CCNC: 23 U/L (ref 0–70)
ANION GAP SERPL CALCULATED.3IONS-SCNC: 9 MMOL/L (ref 3–14)
APPEARANCE UR: CLEAR
AST SERPL W P-5'-P-CCNC: 7 U/L (ref 0–45)
BASOPHILS # BLD AUTO: 0 10E9/L (ref 0–0.2)
BASOPHILS NFR BLD AUTO: 0.2 %
BILIRUB SERPL-MCNC: 1.4 MG/DL (ref 0.2–1.3)
BILIRUB UR QL STRIP: NEGATIVE
BUN SERPL-MCNC: 12 MG/DL (ref 7–30)
CALCIUM SERPL-MCNC: 8.5 MG/DL (ref 8.5–10.1)
CHLORIDE SERPL-SCNC: 103 MMOL/L (ref 94–109)
CO2 SERPL-SCNC: 23 MMOL/L (ref 20–32)
COLOR UR AUTO: NORMAL
CREAT SERPL-MCNC: 0.72 MG/DL (ref 0.66–1.25)
CRP SERPL-MCNC: 44.5 MG/L (ref 0–8)
DIFFERENTIAL METHOD BLD: ABNORMAL
EOSINOPHIL # BLD AUTO: 0 10E9/L (ref 0–0.7)
EOSINOPHIL NFR BLD AUTO: 0 %
ERYTHROCYTE [DISTWIDTH] IN BLOOD BY AUTOMATED COUNT: 12.1 % (ref 10–15)
GFR SERPL CREATININE-BSD FRML MDRD: >90 ML/MIN/1.7M2
GLUCOSE SERPL-MCNC: 137 MG/DL (ref 70–99)
GLUCOSE UR STRIP-MCNC: NEGATIVE MG/DL
HCT VFR BLD AUTO: 44.7 % (ref 40–53)
HGB BLD-MCNC: 15.8 G/DL (ref 13.3–17.7)
HGB UR QL STRIP: NEGATIVE
IMM GRANULOCYTES # BLD: 0.1 10E9/L (ref 0–0.4)
IMM GRANULOCYTES NFR BLD: 0.3 %
KETONES UR STRIP-MCNC: NEGATIVE MG/DL
LEUKOCYTE ESTERASE UR QL STRIP: NEGATIVE
LIPASE SERPL-CCNC: 132 U/L (ref 73–393)
LYMPHOCYTES # BLD AUTO: 1.2 10E9/L (ref 0.8–5.3)
LYMPHOCYTES NFR BLD AUTO: 6.9 %
MCH RBC QN AUTO: 32.3 PG (ref 26.5–33)
MCHC RBC AUTO-ENTMCNC: 35.3 G/DL (ref 31.5–36.5)
MCV RBC AUTO: 91 FL (ref 78–100)
MONOCYTES # BLD AUTO: 0.9 10E9/L (ref 0–1.3)
MONOCYTES NFR BLD AUTO: 5.2 %
NEUTROPHILS # BLD AUTO: 14.9 10E9/L (ref 1.6–8.3)
NEUTROPHILS NFR BLD AUTO: 87.4 %
NITRATE UR QL: NEGATIVE
PH UR STRIP: 6 PH (ref 5–7)
PLATELET # BLD AUTO: 264 10E9/L (ref 150–450)
POTASSIUM SERPL-SCNC: 3.5 MMOL/L (ref 3.4–5.3)
PROT SERPL-MCNC: 8 G/DL (ref 6.8–8.8)
RBC # BLD AUTO: 4.89 10E12/L (ref 4.4–5.9)
SODIUM SERPL-SCNC: 135 MMOL/L (ref 133–144)
SOURCE: NORMAL
SP GR UR STRIP: 1 (ref 1–1.03)
UROBILINOGEN UR STRIP-MCNC: 0 MG/DL (ref 0–2)
WBC # BLD AUTO: 17 10E9/L (ref 4–11)

## 2018-04-09 PROCEDURE — 83690 ASSAY OF LIPASE: CPT | Performed by: EMERGENCY MEDICINE

## 2018-04-09 PROCEDURE — 74177 CT ABD & PELVIS W/CONTRAST: CPT

## 2018-04-09 PROCEDURE — 85025 COMPLETE CBC W/AUTO DIFF WBC: CPT | Performed by: EMERGENCY MEDICINE

## 2018-04-09 PROCEDURE — 99285 EMERGENCY DEPT VISIT HI MDM: CPT | Mod: 25 | Performed by: EMERGENCY MEDICINE

## 2018-04-09 PROCEDURE — 86140 C-REACTIVE PROTEIN: CPT | Performed by: EMERGENCY MEDICINE

## 2018-04-09 PROCEDURE — 25000128 H RX IP 250 OP 636: Performed by: EMERGENCY MEDICINE

## 2018-04-09 PROCEDURE — 81003 URINALYSIS AUTO W/O SCOPE: CPT | Performed by: EMERGENCY MEDICINE

## 2018-04-09 PROCEDURE — 80053 COMPREHEN METABOLIC PANEL: CPT | Performed by: EMERGENCY MEDICINE

## 2018-04-09 PROCEDURE — 25000125 ZZHC RX 250: Performed by: EMERGENCY MEDICINE

## 2018-04-09 PROCEDURE — 99284 EMERGENCY DEPT VISIT MOD MDM: CPT | Mod: Z6 | Performed by: EMERGENCY MEDICINE

## 2018-04-09 RX ORDER — CIPROFLOXACIN 500 MG/1
500 TABLET, FILM COATED ORAL 2 TIMES DAILY
Qty: 20 TABLET | Refills: 0 | Status: SHIPPED | OUTPATIENT
Start: 2018-04-09 | End: 2018-04-19

## 2018-04-09 RX ORDER — METRONIDAZOLE 500 MG/1
500 TABLET ORAL 3 TIMES DAILY
Qty: 30 TABLET | Refills: 0 | Status: SHIPPED | OUTPATIENT
Start: 2018-04-09 | End: 2018-04-19

## 2018-04-09 RX ORDER — IOPAMIDOL 755 MG/ML
93 INJECTION, SOLUTION INTRAVASCULAR ONCE
Status: COMPLETED | OUTPATIENT
Start: 2018-04-09 | End: 2018-04-09

## 2018-04-09 RX ADMIN — IOPAMIDOL 93 ML: 755 INJECTION, SOLUTION INTRAVENOUS at 11:19

## 2018-04-09 RX ADMIN — SODIUM CHLORIDE 63 ML: 9 INJECTION, SOLUTION INTRAVENOUS at 11:19

## 2018-04-09 NOTE — ED AVS SNAPSHOT
[facility] Emergency Department    [address] [address]    Phone:  [phone]    Fax:  [phone]                                       Rigo Joyner   MRN: [id number]    Department:  [facility] Emergency Department   Date of Visit:  4/9/2018           Patient Information     Date Of Birth          1970        Your diagnoses for this visit were:     Acute diverticulitis        You were seen by Jovi Navarrete DO.        Discharge Instructions       Cipro 5 mg twice daily for 10 days  Metronidazole 500 mg 3 times daily ×10 days        Discharge Instructions for Diverticulitis  You have been diagnosed with diverticulitis. This is a condition in which small pouches form in your colon (large intestine) and become inflamed or infected. Follow the guidelines below for home care.  As you recover  Tips for recovery include:    Eat a low-fiber diet. Your healthcare provider may advise a liquid diet. This gives your bowel a chance to rest so that it can recover.    Foods to include: flake cereal, mashed potatoes, pancakes, waffles, pasta, white bread, rice, applesauce, bananas, eggs, fish, poultry, tofu, and well-cooked vegetables    Take your medicines as directed. Do not stop taking the medicines, even if you feel better.    Monitor your temperature and report any rising temperature to your healthcare provider.    Take antibiotics exactly as directed. Do not miss any and keep taking them even if you feel better.     Drink 6 to 8 glasses of water every day, unless directed otherwise.    Use a heating pad or hot water bottle to reduce abdominal cramping or pain.  Preventing diverticulitis in the future  Tips for prevention include:    Eat a high-fiber diet. Fiber adds bulk to the stool so that it passes through the large intestine more easily.    Keep drinking 6 to 8 glasses of water every day, unless directed otherwise.    Begin an exercise program. Ask your healthcare  provider how to get started. You can benefit from simple activities such as walking or gardening.    Treat diarrhea with a bland diet. Start with liquids only, then slowly add fiber over time.    Watch for changes in your bowel movements (constipation to diarrhea).    Avoid constipation with fiber and add a stool softener if needed.     Get plenty of rest and sleep.  Follow-up care  Make a follow-up appointment as directed by our staff.  When to call your healthcare provider  Call your healthcare provider immediately if you have any of the following:    Fever of 100.4 F (38.0 C) or higher, or as directed by your healthcare provider    Chills    Severe cramps in the belly, most commonly the lower left side    Tenderness in the belly, most commonly the lower left side    Nausea and vomiting    Bleeding from your rectum   Date Last Reviewed: 7/1/2016 2000-2017 [employer]. [address] [place] [address]. All rights reserved. This information is not intended as a substitute for professional medical care. Always follow your healthcare professional's instructions.          24 Hour Appointment Hotline       To make an appointment at any [facility] (formerly [facility]), call [phone] ([phone]). If you don't have a family doctor or clinic, we will help you find one. [facility] are conveniently located to serve the needs of you and your family.             Review of your medicines      START taking        Dose / Directions Last dose taken    ciprofloxacin 500 MG tablet   Commonly known as:  CIPRO   Dose:  500 mg   Quantity:  20 tablet        Take 1 tablet (500 mg) by mouth 2 times daily for 10 days   Refills:  0        metroNIDAZOLE 500 MG tablet   Commonly known as:  FLAGYL   Dose:  500 mg   Quantity:  30 tablet        Take 1 tablet (500 mg) by mouth 3 times daily for 10 days   Refills:  0          Our records show that you are taking the medicines listed below. If these are incorrect, please  call your family doctor or clinic.        Dose / Directions Last dose taken    tamsulosin 0.4 MG capsule   Commonly known as:  FLOMAX   Dose:  0.4 mg        Take 0.4 mg by mouth daily   Refills:  0                Prescriptions were sent or printed at these locations (2 Prescriptions)                   [facility] - [place], [place] - [address] AT [address]   [address], [facility] [address]    Telephone:  [phone]   Fax:  [phone]   Hours:                  E-Prescribed (2 of 2)         ciprofloxacin (CIPRO) 500 MG tablet               metroNIDAZOLE (FLAGYL) 500 MG tablet                Procedures and tests performed during your visit     CBC with platelets differential    CRP inflammation    CT Abdomen Pelvis w Contrast    Comprehensive metabolic panel    Give 20 ounces of water 15 minutes before CT of abdomen    Lipase    UA reflex to Microscopic and Culture      Orders Needing Specimen Collection     None      Pending Results     No orders found from 4/7/2018 to 4/10/2018.            Pending Culture Results     No orders found from 4/7/2018 to 4/10/2018.            Pending Results Instructions     If you had any lab results that were not finalized at the time of your Discharge, you can call the ED Lab Result RN at [phone]. You will be contacted by this team for any positive Lab results or changes in treatment. The nurses are available 7 days a week from 10A to 6:30P.  You can leave a message 24 hours per day and they will return your call.        Test Results From Your Hospital Stay        4/9/2018 10:09 AM      Component Results     Component Value Ref Range & Units Status    WBC 17.0 (H) 4.0 - 11.0 10e9/L Final    RBC Count 4.89 4.4 - 5.9 10e12/L Final    Hemoglobin 15.8 13.3 - 17.7 g/dL Final    Hematocrit 44.7 40.0 - 53.0 % Final    MCV 91 78 - 100 fl Final    MCH 32.3 26.5 - 33.0 pg Final    MCHC 35.3 31.5 - 36.5 g/dL Final    RDW 12.1 10.0 -  15.0 % Final    Platelet Count 264 150 - 450 10e9/L Final    Diff Method Automated Method  Final    % Neutrophils 87.4 % Final    % Lymphocytes 6.9 % Final    % Monocytes 5.2 % Final    % Eosinophils 0.0 % Final    % Basophils 0.2 % Final    % Immature Granulocytes 0.3 % Final    Absolute Neutrophil 14.9 (H) 1.6 - 8.3 10e9/L Final    Absolute Lymphocytes 1.2 0.8 - 5.3 10e9/L Final    Absolute Monocytes 0.9 0.0 - 1.3 10e9/L Final    Absolute Eosinophils 0.0 0.0 - 0.7 10e9/L Final    Absolute Basophils 0.0 0.0 - 0.2 10e9/L Final    Abs Immature Granulocytes 0.1 0 - 0.4 10e9/L Final         4/9/2018 10:29 AM      Component Results     Component Value Ref Range & Units Status    Sodium 135 133 - 144 mmol/L Final    Potassium 3.5 3.4 - 5.3 mmol/L Final    Chloride 103 94 - 109 mmol/L Final    Carbon Dioxide 23 20 - 32 mmol/L Final    Anion Gap 9 3 - 14 mmol/L Final    Glucose 137 (H) 70 - 99 mg/dL Final    Urea Nitrogen 12 7 - 30 mg/dL Final    Creatinine 0.72 0.66 - 1.25 mg/dL Final    GFR Estimate >90 >60 mL/min/1.7m2 Final    Non  GFR Calc    GFR Estimate If Black >90 >60 mL/min/1.7m2 Final    African American GFR Calc    Calcium 8.5 8.5 - 10.1 mg/dL Final    Bilirubin Total 1.4 (H) 0.2 - 1.3 mg/dL Final    Albumin 4.0 3.4 - 5.0 g/dL Final    Protein Total 8.0 6.8 - 8.8 g/dL Final    Alkaline Phosphatase 93 40 - 150 U/L Final    ALT 23 0 - 70 U/L Final    AST 7 0 - 45 U/L Final         4/9/2018 10:28 AM      Component Results     Component Value Ref Range & Units Status    Lipase 132 73 - 393 U/L Final         4/9/2018 10:09 AM      Component Results     Component Value Ref Range & Units Status    Color Urine Straw  Final    Appearance Urine Clear  Final    Glucose Urine Negative NEG^Negative mg/dL Final    Bilirubin Urine Negative NEG^Negative Final    Ketones Urine Negative NEG^Negative mg/dL Final    Specific Gravity Urine 1.003 1.003 - 1.035 Final    Blood Urine Negative NEG^Negative Final    pH  Urine 6.0 5.0 - 7.0 pH Final    Protein Albumin Urine Negative NEG^Negative mg/dL Final    Urobilinogen mg/dL 0.0 0.0 - 2.0 mg/dL Final    Nitrite Urine Negative NEG^Negative Final    Leukocyte Esterase Urine Negative NEG^Negative Final    Source Midstream Urine  Final         4/9/2018 11:33 AM      Component Results     Component Value Ref Range & Units Status    CRP Inflammation 44.5 (H) 0.0 - 8.0 mg/L Final         4/9/2018 11:57 AM      Narrative     CT ABDOMEN AND PELVIS WITH CONTRAST  4/9/2018 11:24 AM     HISTORY:  Generalized abdominal pain, leukocytosis, bloody mucousy  stools.    TECHNIQUE: Axial images from the lung bases to the symphysis are  performed with additional coronal reformatted images. 93 mL of Isovue  370 are given intravenously.  Radiation dose for this scan was reduced  using automated exposure control, adjustment of the mA and/or kV  according to patient size, or iterative reconstruction technique.     FINDINGS:  The lung bases are clear with only mild dependent  atelectasis.    Abdomen: The liver, spleen, gallbladder, pancreas, adrenal glands and  kidneys are unremarkable. No hydronephrosis or obvious renal calculi.  No enlarged abdominal lymph nodes. The bowel is normal in caliber  without obstruction. Focal colonic wall thickening is noted in the  sigmoid region with a focally inflamed diverticulum on image 67 of  series 2 consistent with acute diverticulitis. The more proximal colon  and small bowel are within normal limits. Appendix is normal. No  associated abscess or free intraperitoneal air. Patient has an  incidental circumaortic left renal vein. Aorta is unremarkable.    Pelvis: The bladder, prostate and rectum are unremarkable. No enlarged  pelvic lymph nodes or free fluid. Bone window examination is within  normal limits.        Impression     IMPRESSION: Acute diverticulitis sigmoid colon. No bowel obstruction.  No abscess or free intraperitoneal air.    AMILCAR AVITIA MD     "            Thank you for choosing [facility]       Thank you for choosing [facility] for your care. Our goal is always to provide you with excellent care. Hearing back from our patients is one way we can continue to improve our services. Please take a few minutes to complete the written survey that you may receive in the mail after you visit with us. Thank you!        [employer] Information     [employer] lets you send messages to your doctor, view your test results, renew your prescriptions, schedule appointments and more. To sign up, go to [email or web address] . Click on \"Log in\" on the left side of the screen, which will take you to the Welcome page. Then click on \"Sign up Now\" on the right side of the page.     You will be asked to enter the access code listed below, as well as some personal information. Please follow the directions to create your username and password.     Your access code is: [email or web address]  Expires: 2018 12:08 PM     Your access code will  in 90 days. If you need help or a new code, please call your [facility] clinic or [phone].        Care EveryWhere ID     This is your Care EveryWhere ID. This could be used by other organizations to access your [facility] medical records  [id number]        Equal Access to Services     GABRIEL FLORES : Kymberly garciao Nicol, waaxda luqadaha, qaybta kaalmada adechinyada, rut talavera. So [facility] [phone].    ATENCIÓN: Si habla español, tiene a deluna disposición servicios gratuitos de asistencia lingüística. Llame al [phone].    We comply with applicable federal civil rights laws and [place] laws. We do not discriminate on the basis of race, color, national origin, age, disability, sex, sexual orientation, or gender identity.            After Visit Summary       This is your record. Keep this with you and show to your community pharmacist(s) and doctor(s) at your next visit.                  "

## 2018-04-09 NOTE — DISCHARGE INSTRUCTIONS
Cipro 5 mg twice daily for 10 days  Metronidazole 500 mg 3 times daily ×10 days        Discharge Instructions for Diverticulitis  You have been diagnosed with diverticulitis. This is a condition in which small pouches form in your colon (large intestine) and become inflamed or infected. Follow the guidelines below for home care.  As you recover  Tips for recovery include:    Eat a low-fiber diet. Your healthcare provider may advise a liquid diet. This gives your bowel a chance to rest so that it can recover.    Foods to include: flake cereal, mashed potatoes, pancakes, waffles, pasta, white bread, rice, applesauce, bananas, eggs, fish, poultry, tofu, and well-cooked vegetables    Take your medicines as directed. Do not stop taking the medicines, even if you feel better.    Monitor your temperature and report any rising temperature to your healthcare provider.    Take antibiotics exactly as directed. Do not miss any and keep taking them even if you feel better.     Drink 6 to 8 glasses of water every day, unless directed otherwise.    Use a heating pad or hot water bottle to reduce abdominal cramping or pain.  Preventing diverticulitis in the future  Tips for prevention include:    Eat a high-fiber diet. Fiber adds bulk to the stool so that it passes through the large intestine more easily.    Keep drinking 6 to 8 glasses of water every day, unless directed otherwise.    Begin an exercise program. Ask your healthcare provider how to get started. You can benefit from simple activities such as walking or gardening.    Treat diarrhea with a bland diet. Start with liquids only, then slowly add fiber over time.    Watch for changes in your bowel movements (constipation to diarrhea).    Avoid constipation with fiber and add a stool softener if needed.     Get plenty of rest and sleep.  Follow-up care  Make a follow-up appointment as directed by our staff.  When to call your healthcare provider  Call your healthcare  provider immediately if you have any of the following:    Fever of 100.4 F (38.0 C) or higher, or as directed by your healthcare provider    Chills    Severe cramps in the belly, most commonly the lower left side    Tenderness in the belly, most commonly the lower left side    Nausea and vomiting    Bleeding from your rectum   Date Last Reviewed: 7/1/2016 2000-2017 [employer]. [address] [place] [address], [place] [address]. All rights reserved. This information is not intended as a substitute for professional medical care. Always follow your healthcare professional's instructions.

## 2018-04-09 NOTE — LETTER
April 9, 2018      To Whom It May Concern:      Rigo Joyner was seen in our Emergency Department today, 04/09/18.  Patient was medically advised to not return to work until Friday, April 13.  When he returns he may return without any work restrictions.      Sincerely,        Jovi Navarrete Dr. [facility] ED Staff Physician

## 2018-04-09 NOTE — ED NOTES
Pt c/o periumbilical discomfort with bloody,mucous stool - states had hemorrhoid clipping a few weeks ago - states has been a little constipated although normal BM yesterday evening. Patient denies any vomiting although admits to some nausea. Patient does feel distended and bowel sounds are high pitched and minimal.

## 2018-04-09 NOTE — ED PROVIDER NOTES
History     Chief Complaint   Patient presents with     Abdominal Pain     did eat out yesterday and is worried it was bad food, he's not sure     Constipation     normal BM yesterday     HPI  Rigo Joyner is a 47 year old male presents with generalized abdominal discomfort, recent constipation transitioning to recent stools described as mucus-like with blood.  Patient reports a normal colonoscopy approximately 1 month ago with exception of internal hemorrhoids.  2 weeks ago underwent single banding for internal hemorrhoid.  Has had no rectal irritation, pain or bleeding from the rectum.  Reports the patient since undergoing his internal hemorrhoid procedure.  Now having stools that he describes as containing mucous and blood along with small clots.  Describes blood is bright red.  Abdominal discomfort 5/10 intensity.  Only questionable food was [employer] consumed a few days ago.  No travel.   Denies fever or chills.  Has had no bleeding per rectum(blood only),      Problem List:    There are no active problems to display for this patient.       Past Medical History:    Past Medical History:   Diagnosis Date     Hyperlipemia        Past Surgical History:    No past surgical history on file.    Family History:    Family History   Problem Relation Age of Onset     Coronary Artery Disease Mother      Hypertension Mother      Depression Mother      Thyroid Disease Mother      Coronary Artery Disease Father      Colon Cancer Father      Thyroid Disease Father        Social History:  Marital Status:  Single [1]  Social History   Substance Use Topics     Smoking status: Never Smoker     Smokeless tobacco: Former User     Types: Chew     Alcohol use Not on file        Medications:      tamsulosin (FLOMAX) 0.4 MG capsule         Review of Systems  All pertinent positives and negatives are documented in the HPI.  All others reviewed and are negative .    Physical Exam   BP: (!) 141/98  Heart Rate: 110  Temp: 97.9  F  "(36.6  C)  Resp: 18  Height: 175.3 cm (5' 9\")  Weight: 86.2 kg (190 lb)  SpO2: 97 %      Physical Exam  Vital signs reviewed  He appears mildly anxious  Skin color is normal with no jaundice  Mucous membranes moist  Abdomen is somewhat firm on examination.  Bowel sounds are present and hyperactive.  Generalized discomfort on palpation with discomfort actually being low but more right sided than left sided during deep palpation.  There is no guarding or rebound.  No palpable mass or hernia.  Rectal examination revealed normal external rectum with no hemorrhoids visible.  Normal sphincter tone.  The rectal canal was not tender.  Did not feel any bands on internal hemorrhoids that are still retained.  There was no blood on the examination glove and there was no stool in the rectum.  Did not appear to have any source for bleeding coming from the rectum.    ED Course     ED Course     Procedures                   Results for orders placed or performed during the hospital encounter of 04/09/18 (from the past 24 hour(s))   UA reflex to Microscopic and Culture   Result Value Ref Range    Color Urine Straw     Appearance Urine Clear     Glucose Urine Negative NEG^Negative mg/dL    Bilirubin Urine Negative NEG^Negative    Ketones Urine Negative NEG^Negative mg/dL    Specific Gravity Urine 1.003 1.003 - 1.035    Blood Urine Negative NEG^Negative    pH Urine 6.0 5.0 - 7.0 pH    Protein Albumin Urine Negative NEG^Negative mg/dL    Urobilinogen mg/dL 0.0 0.0 - 2.0 mg/dL    Nitrite Urine Negative NEG^Negative    Leukocyte Esterase Urine Negative NEG^Negative    Source Midstream Urine    CBC with platelets differential   Result Value Ref Range    WBC 17.0 (H) 4.0 - 11.0 10e9/L    RBC Count 4.89 4.4 - 5.9 10e12/L    Hemoglobin 15.8 13.3 - 17.7 g/dL    Hematocrit 44.7 40.0 - 53.0 %    MCV 91 78 - 100 fl    MCH 32.3 26.5 - 33.0 pg    MCHC 35.3 31.5 - 36.5 g/dL    RDW 12.1 10.0 - 15.0 %    Platelet Count 264 150 - 450 10e9/L    Diff " Method Automated Method     % Neutrophils 87.4 %    % Lymphocytes 6.9 %    % Monocytes 5.2 %    % Eosinophils 0.0 %    % Basophils 0.2 %    % Immature Granulocytes 0.3 %    Absolute Neutrophil 14.9 (H) 1.6 - 8.3 10e9/L    Absolute Lymphocytes 1.2 0.8 - 5.3 10e9/L    Absolute Monocytes 0.9 0.0 - 1.3 10e9/L    Absolute Eosinophils 0.0 0.0 - 0.7 10e9/L    Absolute Basophils 0.0 0.0 - 0.2 10e9/L    Abs Immature Granulocytes 0.1 0 - 0.4 10e9/L   Comprehensive metabolic panel   Result Value Ref Range    Sodium 135 133 - 144 mmol/L    Potassium 3.5 3.4 - 5.3 mmol/L    Chloride 103 94 - 109 mmol/L    Carbon Dioxide 23 20 - 32 mmol/L    Anion Gap 9 3 - 14 mmol/L    Glucose 137 (H) 70 - 99 mg/dL    Urea Nitrogen 12 7 - 30 mg/dL    Creatinine 0.72 0.66 - 1.25 mg/dL    GFR Estimate >90 >60 mL/min/1.7m2    GFR Estimate If Black >90 >60 mL/min/1.7m2    Calcium 8.5 8.5 - 10.1 mg/dL    Bilirubin Total 1.4 (H) 0.2 - 1.3 mg/dL    Albumin 4.0 3.4 - 5.0 g/dL    Protein Total 8.0 6.8 - 8.8 g/dL    Alkaline Phosphatase 93 40 - 150 U/L    ALT 23 0 - 70 U/L    AST 7 0 - 45 U/L   Lipase   Result Value Ref Range    Lipase 132 73 - 393 U/L       Medications - No data to display    Assessments & Plan (with Medical Decision Making) 47-year-old male presents with abdominal pain, blood in mucus stools for less than 24 hours.  Preceding this he had constipation.  2 weeks ago had internal hemorrhoid banded and 4 weeks ago had a normal colonoscopy.  States he ate questionable food a few days ago from [employer] restaurant.  Reports he has had no blood per rectum after his internal hemorrhoid banding.  Reports stool being more mucousy with blood tinged throughout the stool.  Still believes he is constipated.  Examination of the abdomen noted to be slightly distended and firm though no acute abdominal findings.  Somewhat hyperactive bowel sounds pain that was generalized slightly more right than left-sided.   Differential diagnosis includes nonspecific  colitis, diverticulitis.  Unlikely that this is coming from internal hemorrhoid and that he typically would not have abdominal pain with internal hemorrhoid bleeding and examination which showed blood on the examination glove coming from the rectal canal.  11:50 AM  CT scan confirms acute sigmoid diverticulitis.  No complicating factors such as abscess or perforation.  Patient does not appear systemically ill and therefore does not need IV antibiotics or hospitalization.  Initiate outpatient treatment with ciprofloxacin and metronidazole.       I have reviewed the nursing notes.    I have reviewed the findings, diagnosis, plan and need for follow up with the patient.      New Prescriptions    No medications on file       Final diagnoses:   Acute diverticulitis       4/9/2018   [facility] EMERGENCY DEPARTMENT     Jovi Navarrete DO  04/09/18 1201

## 2018-04-09 NOTE — ED AVS SNAPSHOT
[facility] Emergency Department    [address] [address]    Phone:  [phone]    Fax:  [phone]                                       Rigo Joyner   MRN: [id number]    Department:  [facility] Emergency Department   Date of Visit:  4/9/2018           After Visit Summary Signature Page     I have received my discharge instructions, and my questions have been answered. I have discussed any challenges I see with this plan with the nurse or doctor.    ..........................................................................................................................................  Patient/Patient Representative Signature      ..........................................................................................................................................  Patient Representative Print Name and Relationship to Patient    ..................................................               ................................................  Date                                            Time    ..........................................................................................................................................  Reviewed by Signature/Title    ...................................................              ..............................................  Date                                                            Time

## 2018-05-07 NOTE — ADDENDUM NOTE
Encounter addended by: Susana Joseph PT on: 5/7/2018  2:02 PM<BR>     Actions taken: Episode resolved

## 2018-05-07 NOTE — ADDENDUM NOTE
Encounter addended by: Susana Joseph, PT on: 5/7/2018  2:02 PM<BR>     Actions taken: Sign clinical note, Flowsheet accepted

## 2018-05-07 NOTE — PROGRESS NOTES
PHYSICAL THERAPY DISCHARGE NOTE  01/17/18 1100   Signing Clinician's Name / Credentials   Signing clinician's name / credentials Susana Joseph, PT, DPT, OCS   Session Number   Session Number 5 HP   Progress Note/Recertification   Progress Note Due Date 02/05/18   Adult Goals   PT Ortho Eval Goals 1;2;3;4   Ortho Goal 1   Goal Identifier 1   Goal Description Patient will be able to sit for 1 hour with only minimal difficulty.   Target Date 02/05/18   Ortho Goal 2   Goal Identifier 2   Goal Description Patient will be able to roll in bed with only minimal difficulty.   Target Date 02/05/18   Ortho Goal 3   Goal Identifier 3   Goal Description Patient will report no difficulty with ascending/descending stairs.   Target Date 02/05/18   Ortho Goal 4   Goal Identifier 4   Goal Description Patient will be independent and compliant with HEP to aid functional recovery.   Target Date 02/05/18   Subjective Report   Subjective Report Patient reports that the exercises do help, but still always has the discomfort.   Objective Measures   Objective Measures Objective Measure 1   Objective Measure 1   Objective Measure Palpation   Details very tender outside portion of L ASIS and iliac crest at TFL origin   Treatment Interventions   Interventions Therapeutic Procedure/Exercise;Manual Therapy   Therapeutic Procedure/exercise   Minutes 30   Skilled Intervention HEP instruction, flexibility , strength to decrease pain and improve function   Patient Response better at doing the exercises this past week but still requiring cuing   Treatment Detail prog SL clamshell to addition of RTB x 20 B, supine adductor stretch 30' x 2 (butterfly position), SL ITB stretch leg hanging over bed 30' x 2 pt reports this ex feels the best, figure 4 with lumbar rotation for hip extenal rotators stretch 30' x 2 B cuing for which direction to rotate legs,  kneeling HF stretch cuing to keep foot underneath knee and keep trunk straight, single leg bridge  3' holds x 10 B .   Education   Learner Patient   Readiness Acceptance   Method Booklet/handout;Explanation;Demonstration   Response Verbalizes Understanding;Demonstrates Understanding   Plan   Home program as above, needs to be more consistent with HEP   Plan for next session f/u with Dr. Hernandez regarding MRI results and possible injection, continue PT ex at home, f/u with PT if needed   Total Session Time   Timed Code Treatment Minutes 30   Total Treatment Time (sum of timed and untimed services) 30, te2       Please contact me with any questions or concerns.  Thank you for your referral.    Susana Joseph, PT, DPT, OCS  Physical Therapist, Orthopedic Certified Specialist  [facility]  [phone]

## 2018-07-27 ENCOUNTER — HOSPITAL ENCOUNTER (EMERGENCY)
Facility: CLINIC | Age: 48
Discharge: HOME | End: 2018-07-27
Attending: EMERGENCY MEDICINE | Admitting: EMERGENCY MEDICINE
Payer: COMMERCIAL

## 2018-07-27 VITALS
TEMPERATURE: 98.1 F | HEIGHT: 69 IN | RESPIRATION RATE: 16 BRPM | WEIGHT: 210 LBS | DIASTOLIC BLOOD PRESSURE: 70 MMHG | SYSTOLIC BLOOD PRESSURE: 113 MMHG | OXYGEN SATURATION: 96 % | BODY MASS INDEX: 31.1 KG/M2

## 2018-07-27 DIAGNOSIS — R10.32 ABDOMINAL PAIN, LEFT LOWER QUADRANT: ICD-10-CM

## 2018-07-27 PROBLEM — K57.32 SIGMOID DIVERTICULITIS: Status: ACTIVE | Noted: 2018-07-27

## 2018-07-27 LAB
ALBUMIN SERPL-MCNC: 4.1 G/DL (ref 3.4–5)
ALP SERPL-CCNC: 74 U/L (ref 40–150)
ALT SERPL W P-5'-P-CCNC: 40 U/L (ref 0–70)
ANION GAP SERPL CALCULATED.3IONS-SCNC: 7 MMOL/L (ref 3–14)
AST SERPL W P-5'-P-CCNC: 18 U/L (ref 0–45)
BASOPHILS # BLD AUTO: 0.1 10E9/L (ref 0–0.2)
BASOPHILS NFR BLD AUTO: 1 %
BILIRUB SERPL-MCNC: 0.8 MG/DL (ref 0.2–1.3)
BUN SERPL-MCNC: 11 MG/DL (ref 7–30)
CALCIUM SERPL-MCNC: 8.7 MG/DL (ref 8.5–10.1)
CHLORIDE SERPL-SCNC: 109 MMOL/L (ref 94–109)
CO2 SERPL-SCNC: 24 MMOL/L (ref 20–32)
CREAT SERPL-MCNC: 0.8 MG/DL (ref 0.66–1.25)
DIFFERENTIAL METHOD BLD: NORMAL
EOSINOPHIL # BLD AUTO: 0.1 10E9/L (ref 0–0.7)
EOSINOPHIL NFR BLD AUTO: 1.8 %
ERYTHROCYTE [DISTWIDTH] IN BLOOD BY AUTOMATED COUNT: 11.7 % (ref 10–15)
GFR SERPL CREATININE-BSD FRML MDRD: >90 ML/MIN/1.7M2
GLUCOSE SERPL-MCNC: 102 MG/DL (ref 70–99)
HCT VFR BLD AUTO: 47.3 % (ref 40–53)
HGB BLD-MCNC: 16.3 G/DL (ref 13.3–17.7)
IMM GRANULOCYTES # BLD: 0 10E9/L (ref 0–0.4)
IMM GRANULOCYTES NFR BLD: 0.1 %
LIPASE SERPL-CCNC: 251 U/L (ref 73–393)
LYMPHOCYTES # BLD AUTO: 2 10E9/L (ref 0.8–5.3)
LYMPHOCYTES NFR BLD AUTO: 30.5 %
MCH RBC QN AUTO: 32.1 PG (ref 26.5–33)
MCHC RBC AUTO-ENTMCNC: 34.5 G/DL (ref 31.5–36.5)
MCV RBC AUTO: 93 FL (ref 78–100)
MONOCYTES # BLD AUTO: 0.6 10E9/L (ref 0–1.3)
MONOCYTES NFR BLD AUTO: 8.7 %
NEUTROPHILS # BLD AUTO: 3.9 10E9/L (ref 1.6–8.3)
NEUTROPHILS NFR BLD AUTO: 57.9 %
NRBC # BLD AUTO: 0 10*3/UL
NRBC BLD AUTO-RTO: 0 /100
PLATELET # BLD AUTO: 285 10E9/L (ref 150–450)
POTASSIUM SERPL-SCNC: 3.8 MMOL/L (ref 3.4–5.3)
PROT SERPL-MCNC: 8 G/DL (ref 6.8–8.8)
RBC # BLD AUTO: 5.07 10E12/L (ref 4.4–5.9)
SODIUM SERPL-SCNC: 140 MMOL/L (ref 133–144)
WBC # BLD AUTO: 6.7 10E9/L (ref 4–11)

## 2018-07-27 PROCEDURE — 99284 EMERGENCY DEPT VISIT MOD MDM: CPT | Mod: Z6 | Performed by: EMERGENCY MEDICINE

## 2018-07-27 PROCEDURE — 85025 COMPLETE CBC W/AUTO DIFF WBC: CPT | Performed by: EMERGENCY MEDICINE

## 2018-07-27 PROCEDURE — 99284 EMERGENCY DEPT VISIT MOD MDM: CPT

## 2018-07-27 PROCEDURE — 83690 ASSAY OF LIPASE: CPT | Performed by: EMERGENCY MEDICINE

## 2018-07-27 PROCEDURE — 80053 COMPREHEN METABOLIC PANEL: CPT | Performed by: EMERGENCY MEDICINE

## 2018-07-27 RX ORDER — METRONIDAZOLE 500 MG/1
500 TABLET ORAL 3 TIMES DAILY
Qty: 30 TABLET | Refills: 0 | Status: SHIPPED | OUTPATIENT
Start: 2018-07-27 | End: 2018-08-06

## 2018-07-27 RX ORDER — CIPROFLOXACIN 500 MG/1
500 TABLET, FILM COATED ORAL 2 TIMES DAILY
Qty: 20 TABLET | Refills: 0 | Status: SHIPPED | OUTPATIENT
Start: 2018-07-27 | End: 2018-08-06

## 2018-07-27 NOTE — ED NOTES
Pt states for the last several weeks he wakes up with low abd pain that is relieved after having a bowel movement.  The last few days pain has increased and bowel movements are not relieving the pain.  No n/v/d.  No fevers.  Pt has a hx of diverticulitis.

## 2018-07-27 NOTE — ED PROVIDER NOTES
History     Chief Complaint   Patient presents with     Abdominal Pain     abd pain increasing over past few weeks. noticed more in am, no diarrhea no blood in stool. hx diverticulitis     HPI  Rigo Joyner is a 47 year old male with hx of sigmoid diverticulitis in April 2018 with recurrence of similar lower abd pain in the past 2 weeks. Pain worse in the past 2 days. Burning pain was insidious in onset, had been intermittent  and improved after BMs in the morning, but  in the past 24 hours pain has become constant.  Pain is mild to moderate in severity, is nonradiating, exacerbated by movement.  Mild pain relief with OTC analgesics.  No constipation or diarrhea.  Last bowel movement this morning was normal.  This morning bowel movement did not improve pain and he came to the ED for evaluation.  No signs or symptoms of GI bleeding.  No fever chills.  No UTI signs or symptoms, hematuria or back or flank pain.  No other acute complaints or concerns.    Previous Records Reviewed:  CT ABDOMEN AND PELVIS WITH CONTRAST  4/9/2018 11:24 AM      HISTORY:  Generalized abdominal pain, leukocytosis, bloody mucousy stools.      IMPRESSION: Acute diverticulitis sigmoid colon. No bowel obstruction. No abscess or free intraperitoneal air.       Problem List: Sigmoid diverticulitis  Patient Active Problem List    Diagnosis Date Noted     Sigmoid diverticulitis 07/27/2018     Priority: Medium        Past Medical History:    Past Medical History:   Diagnosis Date     Hyperlipemia        Past Surgical History:    No past surgical history on file.    Family History:    Family History   Problem Relation Age of Onset     Coronary Artery Disease Mother      Hypertension Mother      Depression Mother      Thyroid Disease Mother      Coronary Artery Disease Father      Colon Cancer Father      Thyroid Disease Father        Social History:  Marital Status:  Single [1]  Social History   Substance Use Topics     Smoking status: Never  "Smoker     Smokeless tobacco: Former User     Types: Chew     Alcohol use Not on file        Medications:      ciprofloxacin (CIPRO) 500 MG tablet   metroNIDAZOLE (FLAGYL) 500 MG tablet   tamsulosin (FLOMAX) 0.4 MG capsule     Allergies:   No Known Allergies    Review of Systems  As mentioned above in the history present illness.  All other systems were reviewed and are negative.    Physical Exam   BP: (!) 138/94  Heart Rate: 78  Temp: 98.1  F (36.7  C)  Resp: 16  Height: 175.3 cm (5' 9\")  Weight: 95.3 kg (210 lb)  SpO2: 93 %      Physical Exam   Constitutional: He is oriented to person, place, and time. He appears well-developed and well-nourished. No distress.   HENT:   Head: Normocephalic and atraumatic.   Mouth/Throat: Oropharynx is clear and moist.   Eyes: Conjunctivae and EOM are normal. No scleral icterus.   Neck: Normal range of motion. Neck supple. No tracheal deviation present.   Cardiovascular: Normal rate, regular rhythm, normal heart sounds and intact distal pulses.    Pulmonary/Chest: Effort normal and breath sounds normal. No respiratory distress.   Abdominal: Soft. Normal appearance and bowel sounds are normal. He exhibits no distension, no abdominal bruit, no pulsatile midline mass and no mass. There is tenderness ( Mild lower mid and left lower quadrant abdominal pain). There is no rigidity, no rebound, no guarding, no CVA tenderness, no tenderness at McBurney's point and negative Hill's sign.       Musculoskeletal: Normal range of motion. He exhibits no edema or tenderness.   Neurological: He is alert and oriented to person, place, and time. No cranial nerve deficit. Coordination normal.   Skin: Skin is warm and dry. No rash noted. He is not diaphoretic. No erythema. No pallor.   Psychiatric: He has a normal mood and affect. His behavior is normal.   Nursing note and vitals reviewed.      ED Course     ED Course     Procedures                   Results for orders placed or performed during the " hospital encounter of 07/27/18 (from the past 24 hour(s))   CBC with platelets, differential   Result Value Ref Range    WBC 6.7 4.0 - 11.0 10e9/L    RBC Count 5.07 4.4 - 5.9 10e12/L    Hemoglobin 16.3 13.3 - 17.7 g/dL    Hematocrit 47.3 40.0 - 53.0 %    MCV 93 78 - 100 fl    MCH 32.1 26.5 - 33.0 pg    MCHC 34.5 31.5 - 36.5 g/dL    RDW 11.7 10.0 - 15.0 %    Platelet Count 285 150 - 450 10e9/L    Diff Method Automated Method     % Neutrophils 57.9 %    % Lymphocytes 30.5 %    % Monocytes 8.7 %    % Eosinophils 1.8 %    % Basophils 1.0 %    % Immature Granulocytes 0.1 %    Nucleated RBCs 0 0 /100    Absolute Neutrophil 3.9 1.6 - 8.3 10e9/L    Absolute Lymphocytes 2.0 0.8 - 5.3 10e9/L    Absolute Monocytes 0.6 0.0 - 1.3 10e9/L    Absolute Eosinophils 0.1 0.0 - 0.7 10e9/L    Absolute Basophils 0.1 0.0 - 0.2 10e9/L    Abs Immature Granulocytes 0.0 0 - 0.4 10e9/L    Absolute Nucleated RBC 0.0        Medications - No data to display      After discussion of the risks and benefits of CT scanning he wished to defer CT scanning and avoid radiation exposure.    He initially consented to abdominal films, but then later decided that he would like to defer these as well.    Assessments & Plan (with Medical Decision Making)   47-year-old male with CT confirmed sigmoid diverticulitis in April 2018, several months ago, with recurrence of similar lower abdominal pain primarily in the left lower quadrant approximately 2 weeks ago.  Abdomen is benign, nonsurgical and he has no fever, elevation of WBC or signs or symptoms of GI bleeding.  We discussed imaging evaluation he elected to defer this, which appears clinically acceptable at present time.  Will treat presumptively for recurrent sigmoid diverticulitis with plan for him to return for reevaluation if symptoms fail to improve over the next 48 hours, sooner if symptoms worsen or if he develops fever or signs or symptoms of GI bleeding.  I recommended he follow-up with his  gastroenterologist to have follow-up colonoscopy or flex sig study.  He declined Rx for an opiate analgesic and will continue to use NSAID as needed.  He was prescribed ciprofloxacin and Flagyl which worked well for previous CT documented sigmoid diverticulitis in April 2018, his only other episode of this.    I have reviewed the nursing notes.    I have reviewed the findings, diagnosis, plan and need for follow up with the patient.    New Prescriptions    CIPROFLOXACIN (CIPRO) 500 MG TABLET    Take 1 tablet (500 mg) by mouth 2 times daily for 10 days    METRONIDAZOLE (FLAGYL) 500 MG TABLET    Take 1 tablet (500 mg) by mouth 3 times daily for 10 days       Final diagnoses:   Abdominal pain, left lower quadrant - Presumed secondary to diverticulitis       7/27/2018   [facility] EMERGENCY DEPARTMENT          Rigo Wall MD  07/27/18 4353

## 2018-07-27 NOTE — ED AVS SNAPSHOT
[facility] Emergency Department    [address] [address]    Phone:  [phone]    Fax:  [phone]                                       Rigo Joyner   MRN: [id number]    Department:  [facility] Emergency Department   Date of Visit:  7/27/2018           After Visit Summary Signature Page     I have received my discharge instructions, and my questions have been answered. I have discussed any challenges I see with this plan with the nurse or doctor.    ..........................................................................................................................................  Patient/Patient Representative Signature      ..........................................................................................................................................  Patient Representative Print Name and Relationship to Patient    ..................................................               ................................................  Date                                            Time    ..........................................................................................................................................  Reviewed by Signature/Title    ...................................................              ..............................................  Date                                                            Time

## 2018-07-27 NOTE — ED AVS SNAPSHOT
[facility] Emergency Department    [address] [address]    Phone:  [phone]    Fax:  [phone]                                       Rigo Joyner   MRN: [id number]    Department:  [facility] Emergency Department   Date of Visit:  7/27/2018           Patient Information     Date Of Birth          1970        Your diagnoses for this visit were:     Abdominal pain, left lower quadrant Presumed secondary to diverticulitis       You were seen by Rigo Wall MD.      Follow-up Information     Schedule an appointment as soon as possible for a visit with Your GI specialist.        Follow up with [facility] Emergency Department.    Specialty:  EMERGENCY MEDICINE    Why:  If symptoms worsen    Contact information:    [address] [place] [address] [phone]    Additional information:    The medical center is located at   [address]. (between [address] in [place], [place] miles north   of [place]).      Discharge References/Attachments     DIVERTICULOSIS AND DIVERTICULITIS, UNDERSTANDING (ENGLISH)    DIVERTICULITIS, DISCHARGE INSTRUCTIONS FOR (ENGLISH)      24 Hour Appointment Hotline       To make an appointment at any [facility] clinic, call [phone] ([phone]). If you don't have a family doctor or clinic, we will help you find one. [facility] clinics are conveniently located to serve the needs of you and your family.             Review of your medicines      START taking        Dose / Directions Last dose taken    ciprofloxacin 500 MG tablet   Commonly known as:  CIPRO   Dose:  500 mg   Quantity:  20 tablet        Take 1 tablet (500 mg) by mouth 2 times daily for 10 days   Refills:  0        metroNIDAZOLE 500 MG tablet   Commonly known as:  FLAGYL   Dose:  500 mg   Quantity:  30 tablet        Take 1 tablet (500 mg) by mouth 3 times daily for 10 days   Refills:  0          Our records show that you are taking the  medicines listed below. If these are incorrect, please call your family doctor or clinic.        Dose / Directions Last dose taken    tamsulosin 0.4 MG capsule   Commonly known as:  FLOMAX   Dose:  0.4 mg        Take 0.4 mg by mouth daily   Refills:  0                Prescriptions were sent or printed at these locations (2 Prescriptions)                   [id number] - [place], [place] - [address]   [address], [facility] [address]    Telephone:  [phone]   Fax:  [phone]   Hours:                  E-Prescribed (2 of 2)         ciprofloxacin (CIPRO) 500 MG tablet               metroNIDAZOLE (FLAGYL) 500 MG tablet                Procedures and tests performed during your visit     CBC with platelets, differential    Comprehensive metabolic panel    Lipase      Orders Needing Specimen Collection     None      Pending Results     Date and Time Order Name Status Description    7/27/2018 0800 Lipase In process     7/27/2018 0800 Comprehensive metabolic panel In process             Pending Culture Results     No orders found from 7/25/2018 to 7/28/2018.            Pending Results Instructions     If you had any lab results that were not finalized at the time of your Discharge, you can call the ED Lab Result RN at [phone]. You will be contacted by this team for any positive Lab results or changes in treatment. The nurses are available 7 days a week from 10A to 6:30P.  You can leave a message 24 hours per day and they will return your call.        Test Results From Your Hospital Stay        7/27/2018  9:31 AM      Component Results     Component Value Ref Range & Units Status    WBC 6.7 4.0 - 11.0 10e9/L Final    RBC Count 5.07 4.4 - 5.9 10e12/L Final    Hemoglobin 16.3 13.3 - 17.7 g/dL Final    Hematocrit 47.3 40.0 - 53.0 % Final    MCV 93 78 - 100 fl Final    MCH 32.1 26.5 - 33.0 pg Final    MCHC 34.5 31.5 - 36.5 g/dL Final    RDW 11.7 10.0 - 15.0  "% Final    Platelet Count 285 150 - 450 10e9/L Final    Diff Method Automated Method  Final    % Neutrophils 57.9 % Final    % Lymphocytes 30.5 % Final    % Monocytes 8.7 % Final    % Eosinophils 1.8 % Final    % Basophils 1.0 % Final    % Immature Granulocytes 0.1 % Final    Nucleated RBCs 0 0 /100 Final    Absolute Neutrophil 3.9 1.6 - 8.3 10e9/L Final    Absolute Lymphocytes 2.0 0.8 - 5.3 10e9/L Final    Absolute Monocytes 0.6 0.0 - 1.3 10e9/L Final    Absolute Eosinophils 0.1 0.0 - 0.7 10e9/L Final    Absolute Basophils 0.1 0.0 - 0.2 10e9/L Final    Abs Immature Granulocytes 0.0 0 - 0.4 10e9/L Final    Absolute Nucleated RBC 0.0  Final         2018  9:28 AM         2018  9:28 AM                Thank you for choosing [facility]       Thank you for choosing [facility] for your care. Our goal is always to provide you with excellent care. Hearing back from our patients is one way we can continue to improve our services. Please take a few minutes to complete the written survey that you may receive in the mail after you visit with us. Thank you!        [employer] Information     [employer] lets you send messages to your doctor, view your test results, renew your prescriptions, schedule appointments and more. To sign up, go to [email or web address] . Click on \"Log in\" on the left side of the screen, which will take you to the Welcome page. Then click on \"Sign up Now\" on the right side of the page.     You will be asked to enter the access code listed below, as well as some personal information. Please follow the directions to create your username and password.     Your access code is: [email or web address]  Expires: 10/25/2018  9:42 AM     Your access code will  in 90 days. If you need help or a new code, please call your [facility] clinic or [phone].        Care EveryWhere ID     This is your Care EveryWhere ID. This could be used by other organizations to access your [facility] medical records  [id number]      "   Equal Access to Services     GABRIEL FLORES : Kymberly Camarena, vicente cavazos, [address] lawrence. So [facility] [phone].    ATENCIÓN: Si habla español, tiene a deluna disposición servicios gratuitos de asistencia lingüística. Llame al [phone].    We comply with applicable federal civil rights laws and [place] laws. We do not discriminate on the basis of race, color, national origin, age, disability, sex, sexual orientation, or gender identity.            After Visit Summary       This is your record. Keep this with you and show to your community pharmacist(s) and doctor(s) at your next visit.

## 2018-10-02 ENCOUNTER — HOSPITAL ENCOUNTER (EMERGENCY)
Facility: CLINIC | Age: 48
Discharge: HOME | End: 2018-10-02
Attending: PHYSICIAN ASSISTANT | Admitting: PHYSICIAN ASSISTANT
Payer: COMMERCIAL

## 2018-10-02 ENCOUNTER — APPOINTMENT (OUTPATIENT)
Dept: ULTRASOUND IMAGING | Facility: CLINIC | Age: 48
End: 2018-10-02
Attending: PHYSICIAN ASSISTANT
Payer: COMMERCIAL

## 2018-10-02 VITALS
WEIGHT: 210 LBS | DIASTOLIC BLOOD PRESSURE: 77 MMHG | TEMPERATURE: 98.2 F | OXYGEN SATURATION: 97 % | HEIGHT: 68 IN | HEART RATE: 94 BPM | SYSTOLIC BLOOD PRESSURE: 112 MMHG | BODY MASS INDEX: 31.83 KG/M2

## 2018-10-02 DIAGNOSIS — R10.11 ABDOMINAL PAIN, RIGHT UPPER QUADRANT: ICD-10-CM

## 2018-10-02 LAB
ALBUMIN SERPL-MCNC: 3.9 G/DL (ref 3.4–5)
ALBUMIN UR-MCNC: NEGATIVE MG/DL
ALP SERPL-CCNC: 62 U/L (ref 40–150)
ALT SERPL W P-5'-P-CCNC: 46 U/L (ref 0–70)
ANION GAP SERPL CALCULATED.3IONS-SCNC: 6 MMOL/L (ref 3–14)
APPEARANCE UR: ABNORMAL
AST SERPL W P-5'-P-CCNC: 13 U/L (ref 0–45)
BACTERIA #/AREA URNS HPF: ABNORMAL /HPF
BASOPHILS # BLD AUTO: 0.1 10E9/L (ref 0–0.2)
BASOPHILS NFR BLD AUTO: 0.9 %
BILIRUB SERPL-MCNC: 0.6 MG/DL (ref 0.2–1.3)
BILIRUB UR QL STRIP: NEGATIVE
BUN SERPL-MCNC: 14 MG/DL (ref 7–30)
CALCIUM SERPL-MCNC: 8.6 MG/DL (ref 8.5–10.1)
CHLORIDE SERPL-SCNC: 108 MMOL/L (ref 94–109)
CO2 SERPL-SCNC: 29 MMOL/L (ref 20–32)
COLOR UR AUTO: YELLOW
CREAT SERPL-MCNC: 0.96 MG/DL (ref 0.66–1.25)
DIFFERENTIAL METHOD BLD: NORMAL
EOSINOPHIL # BLD AUTO: 0 10E9/L (ref 0–0.7)
EOSINOPHIL NFR BLD AUTO: 0.2 %
ERYTHROCYTE [DISTWIDTH] IN BLOOD BY AUTOMATED COUNT: 11.7 % (ref 10–15)
GFR SERPL CREATININE-BSD FRML MDRD: 83 ML/MIN/1.7M2
GLUCOSE SERPL-MCNC: 109 MG/DL (ref 70–99)
GLUCOSE UR STRIP-MCNC: NEGATIVE MG/DL
HCT VFR BLD AUTO: 46 % (ref 40–53)
HGB BLD-MCNC: 15.8 G/DL (ref 13.3–17.7)
HGB UR QL STRIP: NEGATIVE
IMM GRANULOCYTES # BLD: 0 10E9/L (ref 0–0.4)
IMM GRANULOCYTES NFR BLD: 0.2 %
KETONES UR STRIP-MCNC: NEGATIVE MG/DL
LEUKOCYTE ESTERASE UR QL STRIP: NEGATIVE
LIPASE SERPL-CCNC: 181 U/L (ref 73–393)
LYMPHOCYTES # BLD AUTO: 1.9 10E9/L (ref 0.8–5.3)
LYMPHOCYTES NFR BLD AUTO: 33.7 %
MCH RBC QN AUTO: 32.5 PG (ref 26.5–33)
MCHC RBC AUTO-ENTMCNC: 34.3 G/DL (ref 31.5–36.5)
MCV RBC AUTO: 95 FL (ref 78–100)
MONOCYTES # BLD AUTO: 0.3 10E9/L (ref 0–1.3)
MONOCYTES NFR BLD AUTO: 5.8 %
NEUTROPHILS # BLD AUTO: 3.4 10E9/L (ref 1.6–8.3)
NEUTROPHILS NFR BLD AUTO: 59.2 %
NITRATE UR QL: NEGATIVE
NRBC # BLD AUTO: 0 10*3/UL
NRBC BLD AUTO-RTO: 0 /100
PH UR STRIP: 7 PH (ref 5–7)
PLATELET # BLD AUTO: 275 10E9/L (ref 150–450)
POTASSIUM SERPL-SCNC: 3.8 MMOL/L (ref 3.4–5.3)
PROT SERPL-MCNC: 7.8 G/DL (ref 6.8–8.8)
RBC # BLD AUTO: 4.86 10E12/L (ref 4.4–5.9)
RBC #/AREA URNS AUTO: 0 /HPF (ref 0–2)
SODIUM SERPL-SCNC: 143 MMOL/L (ref 133–144)
SOURCE: ABNORMAL
SP GR UR STRIP: 1.02 (ref 1–1.03)
UROBILINOGEN UR STRIP-MCNC: 0 MG/DL (ref 0–2)
WBC # BLD AUTO: 5.7 10E9/L (ref 4–11)
WBC #/AREA URNS AUTO: 0 /HPF (ref 0–5)

## 2018-10-02 PROCEDURE — 99284 EMERGENCY DEPT VISIT MOD MDM: CPT | Mod: Z6 | Performed by: PHYSICIAN ASSISTANT

## 2018-10-02 PROCEDURE — 99285 EMERGENCY DEPT VISIT HI MDM: CPT | Mod: 25 | Performed by: PHYSICIAN ASSISTANT

## 2018-10-02 PROCEDURE — 76705 ECHO EXAM OF ABDOMEN: CPT

## 2018-10-02 PROCEDURE — 83690 ASSAY OF LIPASE: CPT | Performed by: FAMILY MEDICINE

## 2018-10-02 PROCEDURE — 25000125 ZZHC RX 250: Performed by: PHYSICIAN ASSISTANT

## 2018-10-02 PROCEDURE — 25000132 ZZH RX MED GY IP 250 OP 250 PS 637: Performed by: PHYSICIAN ASSISTANT

## 2018-10-02 PROCEDURE — 96374 THER/PROPH/DIAG INJ IV PUSH: CPT | Performed by: PHYSICIAN ASSISTANT

## 2018-10-02 PROCEDURE — 80053 COMPREHEN METABOLIC PANEL: CPT | Performed by: FAMILY MEDICINE

## 2018-10-02 PROCEDURE — 25000128 H RX IP 250 OP 636: Performed by: PHYSICIAN ASSISTANT

## 2018-10-02 PROCEDURE — 85025 COMPLETE CBC W/AUTO DIFF WBC: CPT | Performed by: FAMILY MEDICINE

## 2018-10-02 PROCEDURE — 81001 URINALYSIS AUTO W/SCOPE: CPT | Performed by: PHYSICIAN ASSISTANT

## 2018-10-02 RX ORDER — ONDANSETRON 2 MG/ML
4 INJECTION INTRAMUSCULAR; INTRAVENOUS ONCE
Status: COMPLETED | OUTPATIENT
Start: 2018-10-02 | End: 2018-10-02

## 2018-10-02 RX ADMIN — ONDANSETRON 4 MG: 2 INJECTION INTRAMUSCULAR; INTRAVENOUS at 10:47

## 2018-10-02 RX ADMIN — LIDOCAINE HYDROCHLORIDE 30 ML: 20 SOLUTION ORAL; TOPICAL at 10:46

## 2018-10-02 NOTE — ED NOTES
Pt presents to the ER with c/o abd pain.    Pt reports the pain to be R sided and epigastric in nature.  He is nauseated without emesis and afebrile.  Pt has a h/o diverticulitis April 2018, states this pain is somewhat different - he also states at that time he had diarrhea with mucous and blood which he doesn't currently have.  Pt returned to the ER in August and was treated for diverticulitis but states no imaging was done.  Pt finished the anbx but has cont to have this abd pain.  Pt has a f/u apt with [place] [facility] 10/17/18 for a sigmoidoscopy.  He is in no acute distress currently.

## 2018-10-02 NOTE — ED AVS SNAPSHOT
[facility] Emergency Department    [address] [address]    Phone:  [phone]    Fax:  [phone]                                       Rigo Joyner   MRN: [id number]    Department:  [facility] Emergency Department   Date of Visit:  10/2/2018           Patient Information     Date Of Birth          1970        Your diagnoses for this visit were:     Abdominal pain, right upper quadrant        You were seen by Dai Hightower PA-C.      Follow-up Information     Follow up with [facility], [facility] In 1 week.    Why:  for recheck or sooner if new or worsening symptoms     Contact information:    [facility] [address]  [phone]          Discharge Instructions       Zantac 150 mg twice daily for the next ten days.  Follow up with PCP for recheck within the next week or return to ER sooner if new or worsening symptoms develop     24 Hour Appointment Hotline       To make an appointment at any [facility], call [phone] ([phone]). If you don't have a family doctor or clinic, we will help you find one. [facility] are conveniently located to serve the needs of you and your family.             Review of your medicines      Our records show that you are taking the medicines listed below. If these are incorrect, please call your family doctor or clinic.        Dose / Directions Last dose taken    tamsulosin 0.4 MG capsule   Commonly known as:  FLOMAX   Dose:  0.4 mg        Take 0.4 mg by mouth daily   Refills:  0                Procedures and tests performed during your visit     CBC with platelets differential    Comprehensive metabolic panel    Lipase    Peripheral IV catheter    UA reflex to Microscopic    US Abdomen Limited      Orders Needing Specimen Collection     None      Pending Results     No orders found from 9/30/2018 to 10/3/2018.            Pending Culture Results     No orders found from 9/30/2018 to 10/3/2018.             Pending Results Instructions     If you had any lab results that were not finalized at the time of your Discharge, you can call the ED Lab Result RN at [phone]. You will be contacted by this team for any positive Lab results or changes in treatment. The nurses are available 7 days a week from 10A to 6:30P.  You can leave a message 24 hours per day and they will return your call.        Test Results From Your Hospital Stay        10/2/2018  9:38 AM      Component Results     Component Value Ref Range & Units Status    Sodium 143 133 - 144 mmol/L Final    Potassium 3.8 3.4 - 5.3 mmol/L Final    Chloride 108 94 - 109 mmol/L Final    Carbon Dioxide 29 20 - 32 mmol/L Final    Anion Gap 6 3 - 14 mmol/L Final    Glucose 109 (H) 70 - 99 mg/dL Final    Urea Nitrogen 14 7 - 30 mg/dL Final    Creatinine 0.96 0.66 - 1.25 mg/dL Final    GFR Estimate 83 >60 mL/min/1.7m2 Final    Non  GFR Calc    GFR Estimate If Black >90 >60 mL/min/1.7m2 Final    African American GFR Calc    Calcium 8.6 8.5 - 10.1 mg/dL Final    Bilirubin Total 0.6 0.2 - 1.3 mg/dL Final    Albumin 3.9 3.4 - 5.0 g/dL Final    Protein Total 7.8 6.8 - 8.8 g/dL Final    Alkaline Phosphatase 62 40 - 150 U/L Final    ALT 46 0 - 70 U/L Final    AST 13 0 - 45 U/L Final         10/2/2018  9:17 AM      Component Results     Component Value Ref Range & Units Status    WBC 5.7 4.0 - 11.0 10e9/L Final    RBC Count 4.86 4.4 - 5.9 10e12/L Final    Hemoglobin 15.8 13.3 - 17.7 g/dL Final    Hematocrit 46.0 40.0 - 53.0 % Final    MCV 95 78 - 100 fl Final    MCH 32.5 26.5 - 33.0 pg Final    MCHC 34.3 31.5 - 36.5 g/dL Final    RDW 11.7 10.0 - 15.0 % Final    Platelet Count 275 150 - 450 10e9/L Final    Diff Method Automated Method  Final    % Neutrophils 59.2 % Final    % Lymphocytes 33.7 % Final    % Monocytes 5.8 % Final    % Eosinophils 0.2 % Final    % Basophils 0.9 % Final    % Immature Granulocytes 0.2 % Final    Nucleated RBCs 0 0 /100 Final     Absolute Neutrophil 3.4 1.6 - 8.3 10e9/L Final    Absolute Lymphocytes 1.9 0.8 - 5.3 10e9/L Final    Absolute Monocytes 0.3 0.0 - 1.3 10e9/L Final    Absolute Eosinophils 0.0 0.0 - 0.7 10e9/L Final    Absolute Basophils 0.1 0.0 - 0.2 10e9/L Final    Abs Immature Granulocytes 0.0 0 - 0.4 10e9/L Final    Absolute Nucleated RBC 0.0  Final         10/2/2018  9:36 AM      Component Results     Component Value Ref Range & Units Status    Lipase 181 73 - 393 U/L Final         10/2/2018 10:40 AM      Component Results     Component Value Ref Range & Units Status    Color Urine Yellow  Final    Appearance Urine Slightly Cloudy  Final    Glucose Urine Negative NEG^Negative mg/dL Final    Bilirubin Urine Negative NEG^Negative Final    Ketones Urine Negative NEG^Negative mg/dL Final    Specific Gravity Urine 1.017 1.003 - 1.035 Final    Blood Urine Negative NEG^Negative Final    pH Urine 7.0 5.0 - 7.0 pH Final    Protein Albumin Urine Negative NEG^Negative mg/dL Final    Urobilinogen mg/dL 0.0 0.0 - 2.0 mg/dL Final    Nitrite Urine Negative NEG^Negative Final    Leukocyte Esterase Urine Negative NEG^Negative Final    Source Unspecified Urine  Final    RBC Urine 0 0 - 2 /HPF Final    WBC Urine 0 0 - 5 /HPF Final    Bacteria Urine Few (A) NEG^Negative /HPF Final         10/2/2018 11:53 AM      Narrative     LIMITED ABDOMINAL (RIGHT UPPER QUADRANT) ULTRASOUND  10/2/2018 11:40  AM    HISTORY: Right upper quadrant pain.    COMPARISON: None.    FINDINGS: The liver is normal in size. It demonstrates normal  echogenicity without focal lesions. The gallbladder is contracted,  likely due to the patient eating at 7:30 AM. While the gallbladder  wall measures 0.4 cm in thickness, this is likely due to the  nondistended state of the gallbladder rather than pathologic  thickening. No gallstone is seen and no pericholecystic fluid is  identified. The common bile duct is normal measuring 0.4 cm in  diameter. The visualized portion of the  "pancreas is normal. The right  kidney is normal with no hydronephrosis or abnormal mass.        Impression     IMPRESSION: Unremarkable right upper quadrant ultrasound. The  gallbladder is contracted, likely due to a recent meal.    WAQAS HAQ MD                Thank you for choosing [facility]       Thank you for choosing [facility] for your care. Our goal is always to provide you with excellent care. Hearing back from our patients is one way we can continue to improve our services. Please take a few minutes to complete the written survey that you may receive in the mail after you visit with us. Thank you!        [employer] Information     [employer] lets you send messages to your doctor, view your test results, renew your prescriptions, schedule appointments and more. To sign up, go to [email or web address] . Click on \"Log in\" on the left side of the screen, which will take you to the Welcome page. Then click on \"Sign up Now\" on the right side of the page.     You will be asked to enter the access code listed below, as well as some personal information. Please follow the directions to create your username and password.     Your access code is: [email or web address]  Expires: 10/25/2018  9:42 AM     Your access code will  in 90 days. If you need help or a new code, please call your [facility] clinic or [phone].        Care EveryWhere ID     This is your Care EveryWhere ID. This could be used by other organizations to access your [facility] medical records  [id number]        Equal Access to Services     GABRIEL FLORES : Hadii chano Camarena, waaxda luqadaha, qaybta kaalmada adechinyada, rut talavera. So [facility] [phone].    ATENCIÓN: Si habla español, tiene a deluna disposición servicios gratuitos de asistencia lingüística. Llame al [phone].    We comply with applicable federal civil rights laws and [place] laws. We do not discriminate on the basis of race, color, national " origin, age, disability, sex, sexual orientation, or gender identity.            After Visit Summary       This is your record. Keep this with you and show to your community pharmacist(s) and doctor(s) at your next visit.

## 2018-10-02 NOTE — ED AVS SNAPSHOT
[facility] Emergency Department    [address] [address]    Phone:  [phone]    Fax:  [phone]                                       Rigo Joyner   MRN: [id number]    Department:  [facility] Emergency Department   Date of Visit:  10/2/2018           After Visit Summary Signature Page     I have received my discharge instructions, and my questions have been answered. I have discussed any challenges I see with this plan with the nurse or doctor.    ..........................................................................................................................................  Patient/Patient Representative Signature      ..........................................................................................................................................  Patient Representative Print Name and Relationship to Patient    ..................................................               ................................................  Date                                   Time    ..........................................................................................................................................  Reviewed by Signature/Title    ...................................................              ..............................................  Date                                               Time          22EPIC Rev 08/18

## 2018-10-02 NOTE — ED PROVIDER NOTES
History     Chief Complaint   Patient presents with     Abdominal Pain     HPI  Rigo Joyner is a 47 year old male who comes to the emergency department with concern over right upper quadrant of the which been ongoing for approximately last month.  He describes pain as burning, throbbing and radiates to the mid part of his abdomen.  He has had some associated nausea.  He denies any actual vomiting.  He has not had any fever, chills, myalgias, cough, chest pain, dyspnea, wheezing, melena, hematochezia, urinary complaints.  He has attempted to treat intermittently with ibuprofen with improvement of his symptoms.  He also states that pain is typically relieved after he has a bowel movement in the morning.  It is exacerbated by sitting straight upright.  It does not seem to change with eating or drinking.  Incidentally patient was diagnosed with sigmoid diverticulitis in April and in August of this year and was treated with antibiotics after developing similar symptoms which are different that what he currently has.  His past medical history is significant for slight BPH per his report and history of internal hemorrhoids treated with banding procedure.  He denies any other intra-abdominal surgery.  He denies frequent NSAID use, however states he has taken several times weekly over the last month.  He has infrequent alcohol use.      Problem List:    Patient Active Problem List    Diagnosis Date Noted     Sigmoid diverticulitis 07/27/2018     Priority: Medium        Past Medical History:    Past Medical History:   Diagnosis Date     Hyperlipemia        Past Surgical History:    No past surgical history on file.    Family History:    Family History   Problem Relation Age of Onset     Coronary Artery Disease Mother      Hypertension Mother      Depression Mother      Thyroid Disease Mother      Coronary Artery Disease Father      Colon Cancer Father      Thyroid Disease Father        Social History:  Marital  "Status:  Single [1]  Social History   Substance Use Topics     Smoking status: Never Smoker     Smokeless tobacco: Former User     Types: Chew     Alcohol use Not on file        Medications:      tamsulosin (FLOMAX) 0.4 MG capsule     Review of Systems   Constitutional: Negative for activity change, appetite change, chills and fever.   HENT: Negative for congestion, ear pain and sore throat.    Respiratory: Negative for cough, shortness of breath and wheezing.    Cardiovascular: Negative for chest pain, palpitations and leg swelling.   Gastrointestinal: Positive for abdominal pain and nausea. Negative for anal bleeding, blood in stool, constipation, diarrhea and vomiting.   Genitourinary: Negative for dysuria, flank pain, frequency, hematuria and urgency.   Musculoskeletal: Negative for back pain and neck pain.   Skin: Negative for color change, rash and wound.   Neurological: Negative for dizziness, weakness, light-headedness, numbness and headaches.   All other systems reviewed and are negative.  Physical Exam   BP: 137/82  Pulse: 94  Temp: 98.2  F (36.8  C)  Height: 172.7 cm (5' 8\")  Weight: 95.3 kg (210 lb)  SpO2: 98 %    Physical Exam  Constitutional: He is oriented to person, place, and time. He appears well-developed and well-nourished. No distress.   HENT:   Head: Normocephalic and atraumatic.   Mouth/Throat: No oropharyngeal exudate.   Eyes: Conjunctivae and EOM are normal. Pupils are equal, round, and reactive to light.   Cardiovascular: Normal rate, regular rhythm and normal heart sounds.  Exam reveals no gallop and no friction rub.    No murmur heard.  Pulmonary/Chest: Effort normal and breath sounds normal. No respiratory distress. He has no wheezes. He has no rales.   Abdominal: Soft. Bowel sounds are normal. He exhibits no mass. There is tenderness (right upper quadrant and epigastric region). There is no rebound, no guarding, no CVA tenderness and negative Hill's sign.   Neurological: He is alert " and oriented to person, place, and time.   Skin: Skin is warm and dry. No rash noted. No erythema.   Psychiatric: He has a normal mood and affect.   ED Course     ED Course     Procedures        Critical Care time:  none        Results for orders placed or performed during the hospital encounter of 10/02/18 (from the past 24 hour(s))   Comprehensive metabolic panel   Result Value Ref Range    Sodium 143 133 - 144 mmol/L    Potassium 3.8 3.4 - 5.3 mmol/L    Chloride 108 94 - 109 mmol/L    Carbon Dioxide 29 20 - 32 mmol/L    Anion Gap 6 3 - 14 mmol/L    Glucose 109 (H) 70 - 99 mg/dL    Urea Nitrogen 14 7 - 30 mg/dL    Creatinine 0.96 0.66 - 1.25 mg/dL    GFR Estimate 83 >60 mL/min/1.7m2    GFR Estimate If Black >90 >60 mL/min/1.7m2    Calcium 8.6 8.5 - 10.1 mg/dL    Bilirubin Total 0.6 0.2 - 1.3 mg/dL    Albumin 3.9 3.4 - 5.0 g/dL    Protein Total 7.8 6.8 - 8.8 g/dL    Alkaline Phosphatase 62 40 - 150 U/L    ALT 46 0 - 70 U/L    AST 13 0 - 45 U/L   CBC with platelets differential   Result Value Ref Range    WBC 5.7 4.0 - 11.0 10e9/L    RBC Count 4.86 4.4 - 5.9 10e12/L    Hemoglobin 15.8 13.3 - 17.7 g/dL    Hematocrit 46.0 40.0 - 53.0 %    MCV 95 78 - 100 fl    MCH 32.5 26.5 - 33.0 pg    MCHC 34.3 31.5 - 36.5 g/dL    RDW 11.7 10.0 - 15.0 %    Platelet Count 275 150 - 450 10e9/L    Diff Method Automated Method     % Neutrophils 59.2 %    % Lymphocytes 33.7 %    % Monocytes 5.8 %    % Eosinophils 0.2 %    % Basophils 0.9 %    % Immature Granulocytes 0.2 %    Nucleated RBCs 0 0 /100    Absolute Neutrophil 3.4 1.6 - 8.3 10e9/L    Absolute Lymphocytes 1.9 0.8 - 5.3 10e9/L    Absolute Monocytes 0.3 0.0 - 1.3 10e9/L    Absolute Eosinophils 0.0 0.0 - 0.7 10e9/L    Absolute Basophils 0.1 0.0 - 0.2 10e9/L    Abs Immature Granulocytes 0.0 0 - 0.4 10e9/L    Absolute Nucleated RBC 0.0    Lipase   Result Value Ref Range    Lipase 181 73 - 393 U/L     Results for orders placed or performed during the hospital encounter of 10/02/18    US Abdomen Limited    Narrative    LIMITED ABDOMINAL (RIGHT UPPER QUADRANT) ULTRASOUND  10/2/2018 11:40  AM    HISTORY: Right upper quadrant pain.    COMPARISON: None.    FINDINGS: The liver is normal in size. It demonstrates normal  echogenicity without focal lesions. The gallbladder is contracted,  likely due to the patient eating at 7:30 AM. While the gallbladder  wall measures 0.4 cm in thickness, this is likely due to the  nondistended state of the gallbladder rather than pathologic  thickening. No gallstone is seen and no pericholecystic fluid is  identified. The common bile duct is normal measuring 0.4 cm in  diameter. The visualized portion of the pancreas is normal. The right  kidney is normal with no hydronephrosis or abnormal mass.      Impression    IMPRESSION: Unremarkable right upper quadrant ultrasound. The  gallbladder is contracted, likely due to a recent meal.    WAQAS HAQ MD     Medications - No data to display    Assessments & Plan (with Medical Decision Making)     I have reviewed the nursing notes.    I have reviewed the findings, diagnosis, plan and need for follow up with the patient.     Discharge Medication List as of 10/2/2018 12:17 PM        Final diagnoses:   Abdominal pain, right upper quadrant     47-year-old male presents to the emergency department with concern over 1 month history of intermittent right upper quadrant/epigastric abdominal pain accompanied by nausea.  Patient had stable vital signs upon arrival.  Physical exam findings as described above were significant for mild tenderness palpation in the epigastric and right upper quadrant without rebound, guarding, organomegaly and normal bowel sounds.  He did have evaluation including CBC, CMP, lipase, urinalysis which were all within normal limits.  I discussed possibility of gallbladder contributing to symptoms and patient agreed to proceed with ultrasound which did not demonstrate any evidence of acute  cholecystitis but stated gallbladder was contracted likely due to recent meal, would still consider possibility of biliary colic, may pursue HIDA scan in future.  He did tolerate GI cocktail with some improvement of his symptoms.  Differential for his symptoms would include GERD/esophageal reflux, gastritis, lower concern for PUD.  He did not have any evidence of pancreatitis, pyelonephritis, nephrolithiasis.  I do not suspect right lower lobe pneumonia, PE, MI/ACS.  Patient was discharged home stable with instructions to follow up with PCP for recheck within the next week. Worrisome reasons to return to the ER sooner discussed.      Disclaimer: This note consists of symbols derived from keyboarding, dictation, and/or voice recognition software. As a result, there may be errors in the script that have gone undetected.  Please consider this when interpreting information found in the chart.    10/2/2018   [facility] EMERGENCY DEPARTMENT     Dai Hightower PA-C  10/04/18 [id number]

## 2018-10-02 NOTE — DISCHARGE INSTRUCTIONS
Zantac 150 mg twice daily for the next ten days.  Follow up with PCP for recheck within the next week or return to ER sooner if new or worsening symptoms develop

## 2019-02-19 ENCOUNTER — OFFICE VISIT (OUTPATIENT)
Dept: ORTHOPEDICS | Facility: CLINIC | Age: 49
End: 2019-02-19
Payer: OTHER MISCELLANEOUS

## 2019-02-19 ENCOUNTER — ANCILLARY PROCEDURE (OUTPATIENT)
Dept: GENERAL RADIOLOGY | Facility: CLINIC | Age: 49
End: 2019-02-19
Attending: FAMILY MEDICINE
Payer: COMMERCIAL

## 2019-02-19 VITALS
WEIGHT: 216 LBS | BODY MASS INDEX: 32.74 KG/M2 | SYSTOLIC BLOOD PRESSURE: 137 MMHG | DIASTOLIC BLOOD PRESSURE: 86 MMHG | HEIGHT: 68 IN
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
## 2019-02-19 DIAGNOSIS — M25.552 LEFT HIP PAIN: Primary | ICD-10-CM

## 2019-02-19 DIAGNOSIS — M25.552 LEFT HIP PAIN: ICD-10-CM

## 2019-02-19 DIAGNOSIS — G57.12 MERALGIA PARESTHETICA OF LEFT SIDE: ICD-10-CM

## 2019-02-19 PROCEDURE — 73502 X-RAY EXAM HIP UNI 2-3 VIEWS: CPT

## 2019-02-19 PROCEDURE — 99203 OFFICE O/P NEW LOW 30 MIN: CPT | Mod: 25 | Performed by: FAMILY MEDICINE

## 2019-02-19 PROCEDURE — 64450 NJX AA&/STRD OTHER PN/BRANCH: CPT | Mod: LT | Performed by: FAMILY MEDICINE

## 2019-02-19 RX ORDER — TRIAMCINOLONE ACETONIDE 40 MG/ML
40 INJECTION, SUSPENSION INTRA-ARTICULAR; INTRAMUSCULAR ONCE
Qty: 1 ML | Refills: 0 | OUTPATIENT
Start: 2019-02-19 | End: 2019-02-19

## 2019-02-19 ASSESSMENT — MIFFLIN-ST. JEOR: SCORE: 1814.27

## 2019-02-19 NOTE — PROGRESS NOTES
"Rigo Joyner  :  1970  DOS: 2019  MRN: [id number]    Sports Medicine Clinic Visit    PCP: Bri, [facility]    Rigo Joyner is a 48 year old male who is seen as a self referral presenting with acute left hip pain.    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  Hip pain is significantly worse since that time.  Pain located over left deep anterior, posterior, and lateral hip, radiating to thigh and SI joint.  Reports constant radiating, burning pain to anterior thigh/knee.  Additional Features:  Positive: grinding and weakness.  Symptoms are better with Rest.  Symptoms are worse with: sitting on left buttocks, going from sit to stand, wearing gun belt, lying on left hip.  Other evaluation and/or treatments so far consists of: Tylenol, Ibuprofen, Rest and PCP/Occupation Med consult, work restrictions.  Recent imaging completed: No recent imaging completed, MRI completed in 2018.  Prior History of related problems: H/o chronic left hip pain over the last ~ 3+ years.  Pain is significantly worse since his work injury.  Notes that he moderate relief with physical therapy in 2017/2018.    Social History: works [employer]    Review of Systems  Musculoskeletal: as above  Remainder of review of systems is negative including constitutional, CV, pulmonary, GI, Skin and Neurologic except as noted in HPI or medical history.    Past Medical History:   Diagnosis Date     Hyperlipemia      No past surgical history on file.    Objective  /86   Ht 1.727 m (5' 8\")   Wt 98 kg (216 lb)   BMI 32.84 kg/m      General: healthy, alert and in no distress    HEENT: no scleral icterus or conjunctival erythema   Skin: no suspicious lesions or rash. No jaundice.   CV: regular rhythm by palpation, 2+ distal pulses, no pedal edema    Resp: normal respiratory effort without " conversational dyspnea   Psych: normal mood and affect    Gait: mildly antalgic, appropriate coordination and balance   Neuro: normal light touch sensory exam of the extremities. Motor strength as noted below     Left hip exam    Inspection:        no edema or ecchymosis in hip area    ROM:       Decreased active and passive ROM in flexion due to pain    Strength:        flexion 5/5       extension 5/5       abduction 5/5       adduction 5/5    Tender:        ASIS       greater trochanter       SI joint       Over the LFCN just medial to the ASIS    Non Tender:        remainder of hip area       illiac crest       pubis    Sensation:        grossly intact in hip and thigh    Skin:       well perfused       capillary refill brisk    Special Tests:        neg (-) BRADLEY       positive (+) FADIR       positive (+) scour       neg (-) Malachi      Sports Medicine Clinic Procedure   Ultrasound Guided Left Lateral Femoral Cutaneous Nerve Block   The left thigh was prepped and draped in a sterile manner.  Ultrasound identification of lateral femoral cutaneous nerve in both long and short axis.  The location of adjacent neurovascular structures were noted.  The probe was placed in short axis to the right side thigh.  A 2 inch 25 gauge needle was placed under ultrasound guidance into the right thigh.  A mixture of 2 ml's 1% lidocaine, 2 ml's 0.5% bupivacaine, and 1 ml kenalog (40mg/ml) was injected without difficulty on the short axis view around the lateral femoral cutaneous nerve and surrounding tissue.  The needle was removed and there was good hemostasis without complications.  There was ultrasound documentation of needle placement and injection.  Pre-procedure pain 6/10. Post-procedure pain 1/10 for his lateral/anterior thigh pain.  Continued to have pain over the TFL to palpation     Radiology  Recent Results (from the past 744 hour(s))   XR Pelvis w Hip Left 1 View    Narrative    XR PELVIS AND HIP LEFT 1 VIEW 2/19/2019  9:14 AM    COMPARISON: MRI dated 1/4/2018.    HISTORY: LEFT hip pain.      Impression    IMPRESSION: No fractures are seen. Joint spaces are preserved.    EDUARD BLAS MD     Prior MR imaging reviewed in detail again today    Assessment:  1. Left hip pain    2. Work related injury    3. Meralgia paresthetica of left side        Plan:  Discussed the assessment with the patient.  Follow up: 2 weeks  Trial of guided LFCN block/hydrodissection today  Consider hip joint injection in future if minimal relief, or if LFCN sx improve but deeper pain persists  Reviewed old imaging  Letter for work provided  Agree with Pt as recommended from PCP  Advised no tool belt for work, limiting positions that increase pain  XR images independently visualized and reviewed with patient today in clinic  Expectations and goals of CSI and nerve block/hydrodissection reviewed  Often 2-3 days for steroid effect, and can take up to two weeks for maximum effect  We discussed modified progressive pain-free activity as tolerated  Do not overuse in first two weeks if feeling better due to concern for vulnerability while steroid is working  Supportive care reviewed  All questions were answered today  Contact us with additional questions or concerns  Signs and sx of concern reviewed      Delfino Hernandez DO, CAKULDEEP  Primary Care Sports Medicine  [facility]       Copy:  Dr Martinez Deshpande MD          Disclaimer: This note consists of symbols derived from keyboarding, dictation and/or voice recognition software. As a result, there may be errors in the script that have gone undetected. Please consider this when interpreting information found in this chart.

## 2019-02-19 NOTE — LETTER
"    2019         RE: Rigo Joyner  [address] [facility] [place] [address]        Dear Colleague,    Thank you for referring your patient, Rigo Joyner, to the [facility]. Please see a copy of my visit note below.    Rigo Joyner  :  1970  DOS: 2019  MRN: [id number]    Sports Medicine Clinic Visit    PCP: Bri, [facility]    Rigo Joyner is a 48 year old male who is seen as a self referral presenting with acute left hip pain.    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  Hip pain is significantly worse since that time.  Pain located over left deep anterior, posterior, and lateral hip, radiating to thigh and SI joint.  Reports constant radiating, burning pain to anterior thigh/knee.  Additional Features:  Positive: grinding and weakness.  Symptoms are better with Rest.  Symptoms are worse with: sitting on left buttocks, going from sit to stand, wearing gun belt, lying on left hip.  Other evaluation and/or treatments so far consists of: Tylenol, Ibuprofen, Rest and PCP/Occupation Med consult, work restrictions.  Recent imaging completed: No recent imaging completed, MRI completed in 2018.  Prior History of related problems: H/o chronic left hip pain over the last ~ 3+ years.  Pain is significantly worse since his work injury.  Notes that he moderate relief with physical therapy in 2017/2018.    Social History: works [employer]    Review of Systems  Musculoskeletal: as above  Remainder of review of systems is negative including constitutional, CV, pulmonary, GI, Skin and Neurologic except as noted in HPI or medical history.    Past Medical History:   Diagnosis Date     Hyperlipemia      No past surgical history on file.    Objective  /86   Ht 1.727 m (5' 8\")   Wt 98 kg (216 lb)   BMI 32.84 " kg/m       General: healthy, alert and in no distress    HEENT: no scleral icterus or conjunctival erythema   Skin: no suspicious lesions or rash. No jaundice.   CV: regular rhythm by palpation, 2+ distal pulses, no pedal edema    Resp: normal respiratory effort without conversational dyspnea   Psych: normal mood and affect    Gait: mildly antalgic, appropriate coordination and balance   Neuro: normal light touch sensory exam of the extremities. Motor strength as noted below     Left hip exam    Inspection:        no edema or ecchymosis in hip area    ROM:       Decreased active and passive ROM in flexion due to pain    Strength:        flexion 5/5       extension 5/5       abduction 5/5       adduction 5/5    Tender:        ASIS       greater trochanter       SI joint       Over the LFCN just medial to the ASIS    Non Tender:        remainder of hip area       illiac crest       pubis    Sensation:        grossly intact in hip and thigh    Skin:       well perfused       capillary refill brisk    Special Tests:        neg (-) BRADLEY       positive (+) FADIR       positive (+) scour       neg (-) Malachi      Sports Medicine Clinic Procedure   Ultrasound Guided Left Lateral Femoral Cutaneous Nerve Block   The left thigh was prepped and draped in a sterile manner.  Ultrasound identification of lateral femoral cutaneous nerve in both long and short axis.  The location of adjacent neurovascular structures were noted.  The probe was placed in short axis to the right side thigh.  A 2 inch 25 gauge needle was placed under ultrasound guidance into the right thigh.  A mixture of 2 ml's 1% lidocaine, 2 ml's 0.5% bupivacaine, and 1 ml kenalog (40mg/ml) was injected without difficulty on the short axis view around the lateral femoral cutaneous nerve and surrounding tissue.  The needle was removed and there was good hemostasis without complications.  There was ultrasound documentation of needle placement and injection.   Pre-procedure pain 6/10. Post-procedure pain 1/10 for his lateral/anterior thigh pain.  Continued to have pain over the TFL to palpation     Radiology  Recent Results (from the past 744 hour(s))   XR Pelvis w Hip Left 1 View    Narrative    XR PELVIS AND HIP LEFT 1 VIEW 2/19/2019 9:14 AM    COMPARISON: MRI dated 1/4/2018.    HISTORY: LEFT hip pain.      Impression    IMPRESSION: No fractures are seen. Joint spaces are preserved.    EDUARD BLAS MD     Prior MR imaging reviewed in detail again today    Assessment:  1. Left hip pain    2. Work related injury    3. Meralgia paresthetica of left side        Plan:  Discussed the assessment with the patient.  Follow up: 2 weeks  Trial of guided LFCN block/hydrodissection today  Consider hip joint injection in future if minimal relief, or if LFCN sx improve but deeper pain persists  Reviewed old imaging  Letter for work provided  Agree with Pt as recommended from PCP  Advised no tool belt for work, limiting positions that increase pain  XR images independently visualized and reviewed with patient today in clinic  Expectations and goals of CSI and nerve block/hydrodissection reviewed  Often 2-3 days for steroid effect, and can take up to two weeks for maximum effect  We discussed modified progressive pain-free activity as tolerated  Do not overuse in first two weeks if feeling better due to concern for vulnerability while steroid is working  Supportive care reviewed  All questions were answered today  Contact us with additional questions or concerns  Signs and sx of concern reviewed      Delfino Hernandez DO, BRITTANI  Primary Care Sports Medicine  [facility]       Copy:  Dr Martinez Deshpande MD          Disclaimer: This note consists of symbols derived from keyboarding, dictation and/or voice recognition software. As a result, there may be errors in the script that have gone undetected. Please consider this when interpreting information found in this  chart.    Again, thank you for allowing me to participate in the care of your patient.        Sincerely,        Delfino Hernandez, DO

## 2019-02-19 NOTE — LETTER
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   He has had hip pain in the past, and this chronic pain was worsened with the injury.  In addition, he is displaying signs of compression of the lateral femoral cutaneous nerve, or Meralgia Paresthetica.  We discussed physical therapy and not using a tool/gun belt at this time as primary treatments for Meralgia Paresthetica.  We also discussed a trial of a diagnostic LFCN block under US guidance, and we will try this today.  For his acute worsening of chronic hip joint pain, I also recommend physical therapy, and consideration of a hip joint injection based on the results of the nerve block procedure today, physical therapy, and decreasing pressure on the LFCN nerve.  It is extremely important that he does not wear a gun/tool belt at this time to treat meralgia paresthetica.  Avoiding deep hip flexion with prolonged sitting is also recommended, please allow him to adjust work positions as needed to avoid pain.  These recommendations are outlined appropriately in the workability form provided for Dr Deshpande, and I agree they should be in place until 3/18/2019.  Updated recommendations will be provided at his next follow up in 2 weeks.  He should be medically excused from work for today.      Delfino Wells, , CAKULDEEP  Primary Care Sports Medicine  [facility]

## 2019-02-19 NOTE — LETTER
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   He has had hip pain in the past, and this chronic pain was worsened with the injury.  In addition, he is displaying signs of compression of the lateral femoral cutaneous nerve, or Meralgia Paresthetica.  We discussed physical therapy and not using a tool/gun belt at this time as primary treatments for Meralgia Paresthetica.  We also discussed a trial of a diagnostic LFCN block under US guidance, and we will try this today.  For his acute worsening of chronic hip joint pain, I also recommend physical therapy, and consideration of a hip joint injection based on the results of the nerve block procedure today, physical therapy, and decreasing pressure on the LFCN nerve.  It is extremely important that he does not wear a gun/tool belt at this time to treat meralgia paresthetica.  Avoiding deep hip flexion with prolonged sitting is also recommended, please allow him to adjust work positions as needed to avoid pain.  These recommendations are outlined appropriately in the workability form provided for Dr Deshpande, and I agree they should be in place until 3/18/2019.  Updated recommendations will be provided at his next follow up in 2 weeks.  He should be medically from work for today.      Delfino Wells, , CAQ  Primary Care Sports Medicine  [facility]

## 2019-03-05 ENCOUNTER — OFFICE VISIT (OUTPATIENT)
Dept: ORTHOPEDICS | Facility: CLINIC | Age: 49
End: 2019-03-05
Payer: OTHER MISCELLANEOUS

## 2019-03-05 VITALS
WEIGHT: 216 LBS | BODY MASS INDEX: 32.74 KG/M2 | SYSTOLIC BLOOD PRESSURE: 125 MMHG | HEIGHT: 68 IN | DIASTOLIC BLOOD PRESSURE: 86 MMHG

## 2019-03-05 DIAGNOSIS — M25.552 LEFT HIP PAIN: Primary | ICD-10-CM
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
## 2019-03-05 DIAGNOSIS — G57.12 MERALGIA PARESTHETICA OF LEFT SIDE: ICD-10-CM

## 2019-03-05 PROCEDURE — 99213 OFFICE O/P EST LOW 20 MIN: CPT | Mod: 25 | Performed by: FAMILY MEDICINE

## 2019-03-05 PROCEDURE — 20611 DRAIN/INJ JOINT/BURSA W/US: CPT | Mod: LT | Performed by: FAMILY MEDICINE

## 2019-03-05 RX ORDER — TRIAMCINOLONE ACETONIDE 40 MG/ML
40 INJECTION, SUSPENSION INTRA-ARTICULAR; INTRAMUSCULAR
Status: DISCONTINUED | OUTPATIENT
Start: 2019-03-05 | End: 2022-06-02

## 2019-03-05 RX ORDER — ROPIVACAINE HYDROCHLORIDE 5 MG/ML
3 INJECTION, SOLUTION EPIDURAL; INFILTRATION; PERINEURAL
Status: DISCONTINUED | OUTPATIENT
Start: 2019-03-05 | End: 2022-06-02

## 2019-03-05 RX ADMIN — TRIAMCINOLONE ACETONIDE 40 MG: 40 INJECTION, SUSPENSION INTRA-ARTICULAR; INTRAMUSCULAR at 09:00

## 2019-03-05 RX ADMIN — ROPIVACAINE HYDROCHLORIDE 3 ML: 5 INJECTION, SOLUTION EPIDURAL; INFILTRATION; PERINEURAL at 09:00

## 2019-03-05 ASSESSMENT — MIFFLIN-ST. JEOR: SCORE: 1814.27

## 2019-03-05 NOTE — LETTER
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   He has had hip pain in the past, and this chronic pain was worsened with the injury.  In addition, he is still displaying mild signs of compression of the lateral femoral cutaneous nerve, or Meralgia Paresthetica.  We discussed physical therapy and not using a tool/gun belt at this time as primary treatments for Meralgia Paresthetica.  We tried a diagnostic LFCN block under US guidance, and this gave brief relief from the burning pain in his anterior thigh.  For his acute worsening of chronic hip joint pain, I recommend continuing physical therapy, and we will try an US guided hip joint injection today.  It is extremely important that he does not wear a gun/tool belt at this time to treat meralgia paresthetica.  Avoiding deep hip flexion with prolonged sitting is also recommended, please allow him to adjust work positions as needed to avoid pain.  These recommendations are outlined appropriately in the prior workability form provided for Dr Deshpande, and I agree they should be in place until 3/26/2019.  Updated recommendations will be provided at his next follow up in 3 weeks.  He should be medically excused from work for today.      Delfino Wells, DO, CAQ  Primary Care Sports Medicine  [facility]

## 2019-03-05 NOTE — PROGRESS NOTES
Rigo Joyner  :  1970  DOS: 19  MRN: [id number]    Sports Medicine Clinic Visit    PCP: Bri, [facility]    Rigo Joyner is a 48 year old male who is seen as a self referral presenting with acute left hip pain.    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  Hip pain is significantly worse since that time.  Pain located over left deep anterior, posterior, and lateral hip, radiating to thigh and SI joint.  Reports constant radiating, burning pain to anterior thigh/knee.  Additional Features:  Positive: grinding and weakness.  Symptoms are better with Rest.  Symptoms are worse with: sitting on left buttocks, going from sit to stand, wearing gun belt, lying on left hip.  Other evaluation and/or treatments so far consists of: Tylenol, Ibuprofen, Rest and PCP/Occupation Med consult, work restrictions.  Recent imaging completed: No recent imaging completed, MRI completed in 2018.  Prior History of related problems: H/o chronic left hip pain over the last ~ 3+ years.  Pain is significantly worse since his work injury.  Notes that he moderate relief with physical therapy in 2017/.    Social History: works [employer]    Interim History - 2019  Since last visit on 2019 patient has moderate radiating left hip pain.  Left LFCN steroid injection completed on  provided moderate relief.  Continues to have radiating aching sensation to anterior thigh and knee.  Notes some relief with left leg/hip traction at physical therapy.  Continues to have pain with prolonged sitting/driving.  No new injury in the interim.    Review of Systems  Musculoskeletal: as above  Remainder of review of systems is negative including constitutional, CV, pulmonary, GI, Skin and Neurologic except as noted in HPI or medical history.    Past Medical History:   Diagnosis Date  "    Hyperlipemia      No past surgical history on file.    Objective  /86   Ht 1.727 m (5' 8\")   Wt 98 kg (216 lb)   BMI 32.84 kg/m      General: healthy, alert and in no distress    HEENT: no scleral icterus or conjunctival erythema   Skin: no suspicious lesions or rash. No jaundice.   CV: regular rhythm by palpation, 2+ distal pulses, no pedal edema    Resp: normal respiratory effort without conversational dyspnea   Psych: normal mood and affect    Gait: mildly antalgic, appropriate coordination and balance   Neuro: normal light touch sensory exam of the extremities. Motor strength as noted below     Left hip exam    Inspection:        no edema or ecchymosis in hip area    ROM:       Decreased active and passive ROM in flexion due to pain, mildly improved    Strength:        flexion 5/5       extension 5/5       abduction 5/5       adduction 5/5    Tender:        ASIS       greater trochanter       SI joint       Over the LFCN just medial to the ASIS improved, mild    Non Tender:        remainder of hip area       illiac crest       pubis    Sensation:        grossly intact in hip and thigh    Skin:       well perfused       capillary refill brisk    Special Tests:        neg (-) BRADLEY       positive (+) FADIR       positive (+) scour       neg (-) Malachi     Radiology  Recent Results (from the past 744 hour(s))   XR Pelvis w Hip Left 1 View    Narrative    XR PELVIS AND HIP LEFT 1 VIEW 2/19/2019 9:14 AM    COMPARISON: MRI dated 1/4/2018.    HISTORY: LEFT hip pain.      Impression    IMPRESSION: No fractures are seen. Joint spaces are preserved.    EDUARD BLAS MD     Prior MR imaging reviewed in detail again today    Large Joint Injection/Arthocentesis: L hip joint  Date/Time: 3/5/2019 9:00 AM  Performed by: Delfino Hernandez DO  Authorized by: Delfino Hernandez DO     Indications:  Pain and diagnostic evaluation  Needle Size:  22 G  Guidance: ultrasound    Approach:  Anterior  Location:  " Hip  Site:  L hip joint  Medications:  40 mg triamcinolone 40 MG/ML; 3 mL ropivacaine 5 MG/ML  Outcome:  Tolerated well, no immediate complications  Procedure discussed: discussed risks, benefits, and alternatives    Consent Given by:  Patient  Prep: patient was prepped and draped in usual sterile fashion     4 ml's of 1% lidocaine was used as local anesthetic prior to injection          Assessment:  1. Left hip pain    2. Work related injury    3. Meralgia paresthetica of left side        Plan:  Discussed the assessment with the patient.  Follow up: 3 weeks  Trial of guided LFCN block/hydrodissection was helpful temporarily for the anterior and lateral thigh pain, still less than previous  Most focal pain seems more related to the hip joint, trial of CSI in this location today  Reviewed old imaging again today  Letter for work updated  Continue with PT  Advised no tool belt for work, limiting positions that increase pain  XR images independently visualized and reviewed with patient today in clinic  Expectations and goals of CSI and nerve block/hydrodissection reviewed  Often 2-3 days for steroid effect, and can take up to two weeks for maximum effect  We discussed modified progressive pain-free activity as tolerated  Do not overuse in first two weeks if feeling better due to concern for vulnerability while steroid is working  Supportive care reviewed  All questions were answered today  Contact us with additional questions or concerns  Signs and sx of concern reviewed      Delfino Hernandez DO, CAQ  Primary Care Sports Medicine  [facility]       Copy:  Dr Martinez Deshpande MD          Disclaimer: This note consists of symbols derived from keyboarding, dictation and/or voice recognition software. As a result, there may be errors in the script that have gone undetected. Please consider this when interpreting information found in this chart.

## 2019-03-05 NOTE — LETTER
3/5/2019         RE: Rigo Joyner  [id number]  [facility] [id number]        Dear Colleague,    Thank you for referring your patient, Rigo Joyner, to the [facility]. Please see a copy of my visit note below.    Rigo Joyner  :  1970  DOS: 19  MRN: [id number]    Sports Medicine Clinic Visit    PCP: Bri, [facility]    Rigo Joyner is a 48 year old male who is seen as a self referral presenting with acute left hip pain.    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  Hip pain is significantly worse since that time.  Pain located over left deep anterior, posterior, and lateral hip, radiating to thigh and SI joint.  Reports constant radiating, burning pain to anterior thigh/knee.  Additional Features:  Positive: grinding and weakness.  Symptoms are better with Rest.  Symptoms are worse with: sitting on left buttocks, going from sit to stand, wearing gun belt, lying on left hip.  Other evaluation and/or treatments so far consists of: Tylenol, Ibuprofen, Rest and PCP/Occupation Med consult, work restrictions.  Recent imaging completed: No recent imaging completed, MRI completed in 2018.  Prior History of related problems: H/o chronic left hip pain over the last ~ 3+ years.  Pain is significantly worse since his work injury.  Notes that he moderate relief with physical therapy in 2017/.    Social History: works [employer]    Interim History - 2019  Since last visit on 2019 patient has moderate radiating left hip pain.  Left LFCN steroid injection completed on  provided moderate relief.  Continues to have radiating aching sensation to anterior thigh and knee.  Notes some relief with left leg/hip traction at physical therapy.  Continues to have pain with prolonged sitting/driving.   "No new injury in the interim.    Review of Systems  Musculoskeletal: as above  Remainder of review of systems is negative including constitutional, CV, pulmonary, GI, Skin and Neurologic except as noted in HPI or medical history.    Past Medical History:   Diagnosis Date     Hyperlipemia      No past surgical history on file.    Objective  /86   Ht 1.727 m (5' 8\")   Wt 98 kg (216 lb)   BMI 32.84 kg/m       General: healthy, alert and in no distress    HEENT: no scleral icterus or conjunctival erythema   Skin: no suspicious lesions or rash. No jaundice.   CV: regular rhythm by palpation, 2+ distal pulses, no pedal edema    Resp: normal respiratory effort without conversational dyspnea   Psych: normal mood and affect    Gait: mildly antalgic, appropriate coordination and balance   Neuro: normal light touch sensory exam of the extremities. Motor strength as noted below     Left hip exam    Inspection:        no edema or ecchymosis in hip area    ROM:       Decreased active and passive ROM in flexion due to pain, mildly improved    Strength:        flexion 5/5       extension 5/5       abduction 5/5       adduction 5/5    Tender:        ASIS       greater trochanter       SI joint       Over the LFCN just medial to the ASIS improved, mild    Non Tender:        remainder of hip area       illiac crest       pubis    Sensation:        grossly intact in hip and thigh    Skin:       well perfused       capillary refill brisk    Special Tests:        neg (-) BRADLEY       positive (+) FADIR       positive (+) scour       neg (-) Malachi     Radiology  Recent Results (from the past 744 hour(s))   XR Pelvis w Hip Left 1 View    Narrative    XR PELVIS AND HIP LEFT 1 VIEW 2/19/2019 9:14 AM    COMPARISON: MRI dated 1/4/2018.    HISTORY: LEFT hip pain.      Impression    IMPRESSION: No fractures are seen. Joint spaces are preserved.    EDUARD BLAS MD     Prior MR imaging reviewed in detail again today    Large Joint " Injection/Arthocentesis: L hip joint  Date/Time: 3/5/2019 9:00 AM  Performed by: Delfino Hernandez DO  Authorized by: Delfino Hernandez DO     Indications:  Pain and diagnostic evaluation  Needle Size:  22 G  Guidance: ultrasound    Approach:  Anterior  Location:  Hip  Site:  L hip joint  Medications:  40 mg triamcinolone 40 MG/ML; 3 mL ropivacaine 5 MG/ML  Outcome:  Tolerated well, no immediate complications  Procedure discussed: discussed risks, benefits, and alternatives    Consent Given by:  Patient  Prep: patient was prepped and draped in usual sterile fashion     4 ml's of 1% lidocaine was used as local anesthetic prior to injection          Assessment:  1. Left hip pain    2. Work related injury    3. Meralgia paresthetica of left side        Plan:  Discussed the assessment with the patient.  Follow up: 3 weeks  Trial of guided LFCN block/hydrodissection was helpful temporarily for the anterior and lateral thigh pain, still less than previous  Most focal pain seems more related to the hip joint, trial of CSI in this location today  Reviewed old imaging again today  Letter for work updated  Continue with PT  Advised no tool belt for work, limiting positions that increase pain  XR images independently visualized and reviewed with patient today in clinic  Expectations and goals of CSI and nerve block/hydrodissection reviewed  Often 2-3 days for steroid effect, and can take up to two weeks for maximum effect  We discussed modified progressive pain-free activity as tolerated  Do not overuse in first two weeks if feeling better due to concern for vulnerability while steroid is working  Supportive care reviewed  All questions were answered today  Contact us with additional questions or concerns  Signs and sx of concern reviewed      Delfino Hernandez DO, CAQ  Primary Care Sports Medicine  [facility]       Copy:  Dr Martinez Deshpande MD          Disclaimer: This note consists of symbols  derived from keyboarding, dictation and/or voice recognition software. As a result, there may be errors in the script that have gone undetected. Please consider this when interpreting information found in this chart.    Again, thank you for allowing me to participate in the care of your patient.        Sincerely,        Delfino Hernandez, DO

## 2019-03-05 NOTE — LETTER
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   He has had hip pain in the past, and this chronic pain was worsened with the injury.  In addition, he is still displaying mild signs of compression of the lateral femoral cutaneous nerve, or Meralgia Paresthetica.  We discussed physical therapy and not using a tool/gun belt at this time as primary treatments for Meralgia Paresthetica.  We tried a diagnostic LFCN block under US guidance, and this gave brief relief from the burning pain in his anterior thigh.  For his acute worsening of chronic hip joint pain, I recommend continuing physical therapy, and we will try an US guided hip joint injection today.  It is extremely important that he does not wear a gun/tool belt at this time to treat meralgia paresthetica.  Avoiding deep hip flexion with prolonged sitting is also recommended, please allow him to adjust work positions as needed to avoid pain.  These recommendations are outlined appropriately in the prior workability form provided for Dr Deshpande, and I agree they should be in place until 3/26/2019.  Updated recommendations will be provided at his next follow up in 3 weeks.        Delfino Wells DO, BRITTANI  Primary Care Sports Medicine  [facility]

## 2019-03-21 ENCOUNTER — TELEPHONE (OUTPATIENT)
Dept: ORTHOPEDICS | Facility: CLINIC | Age: 49
End: 2019-03-21

## 2019-03-26 ENCOUNTER — OFFICE VISIT (OUTPATIENT)
Dept: ORTHOPEDICS | Facility: CLINIC | Age: 49
End: 2019-03-26
Payer: OTHER MISCELLANEOUS

## 2019-03-26 VITALS
SYSTOLIC BLOOD PRESSURE: 111 MMHG | BODY MASS INDEX: 32.58 KG/M2 | WEIGHT: 215 LBS | HEIGHT: 68 IN | DIASTOLIC BLOOD PRESSURE: 75 MMHG

## 2019-03-26 DIAGNOSIS — M25.552 LEFT HIP PAIN: Primary | ICD-10-CM

## 2019-03-26 DIAGNOSIS — G57.12 MERALGIA PARESTHETICA OF LEFT SIDE: ICD-10-CM

## 2019-03-26 DIAGNOSIS — M94.252 CHONDROMALACIA OF HIP, LEFT: ICD-10-CM

## 2019-03-26 DIAGNOSIS — M25.852 FEMOROACETABULAR IMPINGEMENT OF LEFT HIP: ICD-10-CM
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
## 2019-03-26 PROCEDURE — 99214 OFFICE O/P EST MOD 30 MIN: CPT | Performed by: FAMILY MEDICINE

## 2019-03-26 ASSESSMENT — MIFFLIN-ST. JEOR: SCORE: 1809.73

## 2019-03-26 NOTE — LETTER
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   He has had hip pain in the past, and this chronic pain was worsened with the injury.  In addition, he is still displaying mild signs of compression of the lateral femoral cutaneous nerve, or Meralgia Paresthetica.  We discussed ongoing physical therapy and not using a tool/gun belt at this time as primary treatments for Meralgia Paresthetica.  We tried a diagnostic LFCN block under US guidance, and this gave brief relief from the burning pain in his anterior thigh.  For his ongoing flare of hip joint pain, I recommend continuing physical therapy.  US guided hip injection gave modest, temporarily relief.  Avoiding deep hip flexion with prolonged sitting is still recommended, please allow him to adjust work positions as needed to avoid pain.  I am also sending him for a consultation with an orthopedic surgeon to further discuss diagnostic and treatment options.  Updated recommendations will be provided at his next follow up in 4 weeks.        Delfino Wells DO, CAKULDEEP  Primary Care Sports Medicine  [facility]

## 2019-03-26 NOTE — LETTER
3/26/2019         RE: Rigo Joyner  [id number]  [facility] [address]        Dear Colleague,    Thank you for referring your patient, Rigo Joyner, to the [facility]. Please see a copy of my visit note below.    Rigo Joyner  :  1970  DOS: 19  MRN: [id number]    Sports Medicine Clinic Visit    PCP: Bri, [facility]    Rigo Joyner is a 48 year old male who is seen as a self referral presenting with acute left hip pain.    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  Hip pain is significantly worse since that time.  Pain located over left deep anterior, posterior, and lateral hip, radiating to thigh and SI joint.  Reports constant radiating, burning pain to anterior thigh/knee.  Additional Features:  Positive: grinding and weakness.  Symptoms are better with Rest.  Symptoms are worse with: sitting on left buttocks, going from sit to stand, wearing gun belt, lying on left hip.  Other evaluation and/or treatments so far consists of: Tylenol, Ibuprofen, Rest and PCP/Occupation Med consult, work restrictions.  Recent imaging completed: No recent imaging completed, MRI completed in 2018.  Prior History of related problems: H/o chronic left hip pain over the last ~ 3+ years.  Pain is significantly worse since his work injury.  Notes that he moderate relief with physical therapy in 2017/.    Social History: works [employer]    Interim History - 2019  Since last visit on 2019 patient has moderate radiating left hip pain.  Left LFCN steroid injection completed on  provided moderate relief.  Continues to have radiating aching sensation to anterior thigh and knee.  Notes some relief with left leg/hip traction at physical therapy.  Continues to have pain with prolonged sitting/driving.   "No new injury in the interim.    Interim History - March 26, 2019  Since last visit on 3/5/2019 patient has moderate left hip pain.  Left hip intra-articular injection completed on 3/5 provided only mild relief for ~ 1 - 2 weeks.  Patient notes that he continues to have intermittent left groin pain, notes constant radiating pain/numbness to anterior thigh.  Patient is also now having pain in left buttocks and left lower lumbar spine.  No new injury in the interim.    Review of Systems  Musculoskeletal: as above  Remainder of review of systems is negative including constitutional, CV, pulmonary, GI, Skin and Neurologic except as noted in HPI or medical history.    Past Medical History:   Diagnosis Date     Hyperlipemia      No past surgical history on file.    Objective  /75   Ht 1.727 m (5' 8\")   Wt 97.5 kg (215 lb)   BMI 32.69 kg/m       General: healthy, alert and in no distress    HEENT: no scleral icterus or conjunctival erythema   Skin: no suspicious lesions or rash. No jaundice.   CV: regular rhythm by palpation, 2+ distal pulses, no pedal edema    Resp: normal respiratory effort without conversational dyspnea   Psych: normal mood and affect    Gait: mildly antalgic, appropriate coordination and balance   Neuro: normal light touch sensory exam of the extremities. Motor strength as noted below     Left hip exam    Inspection:        no edema or ecchymosis in hip area    ROM:       Decreased active and passive ROM in flexion due to pain, mildly improved overall    Strength:        flexion 5/5       extension 5/5       abduction 5/5       adduction 5/5    Tender:        ASIS mild       greater trochanter mild       SI joint mild on left       Over the LFCN just medial to the ASIS improved but still present    Non Tender:        remainder of hip area       illiac crest       pubis    Sensation:        grossly intact in hip and thigh    Skin:       well perfused       capillary refill brisk    Special " Tests:        neg (-) BRADLEY       positive (+) FADIR       positive (+) scour, mild, improved compared to previous       neg (-) Malachi     Radiology  Recent Results (from the past 744 hour(s))   XR Pelvis w Hip Left 1 View    Narrative    XR PELVIS AND HIP LEFT 1 VIEW 2/19/2019 9:14 AM    COMPARISON: MRI dated 1/4/2018.    HISTORY: LEFT hip pain.      Impression    IMPRESSION: No fractures are seen. Joint spaces are preserved.    EDUARD BLAS MD     Prior MR imaging reviewed in detail again today    Procedures  Assessment:  1. Left hip pain    2. Work related injury    3. Meralgia paresthetica of left side    4. Femoroacetabular impingement of left hip    5. Chondromalacia of hip, left        Plan:  Discussed the assessment with the patient.  Follow up: 4 weeks, prn, based on outcome of ortho surgery referral  Trial of guided LFCN block/hydrodissection was helpful temporarily for the anterior and lateral thigh pain, still less than previous, and intermittent  Most focal pain still seems more related to the hip joint, trial of CSI in this location was modestly and temporarily helpful over 1st week  Reviewed prior imaging again today  Ortho referral placed for further evaluation given plateau in progress and ongoing significant sx requiring work accommodations  Consider occ med referral if needed in the future if more permanent restrictions are needed  Letter for work updated  Continue with PT  Advised no tool belt for work, limiting positions that increase pain  XR and MR images independently visualized and reviewed with patient today in clinic  Continue PT/HEP as directed  Supportive care reviewed  All questions were answered today  Contact us with additional questions or concerns  Signs and sx of concern reviewed      Delfino Hernandez DO, CAKULDEEP  Primary Care Sports Medicine  [facility]       Time spent in one-on-one evaluation and discussion with patient regarding nature of problem, course, prior  treatments, and therapeutic options, at least 50% of which was spent in counseling and coordination of care: 29 minutes      Copy:  Dr Martinez Deshpande MD          Disclaimer: This note consists of symbols derived from keyboarding, dictation and/or voice recognition software. As a result, there may be errors in the script that have gone undetected. Please consider this when interpreting information found in this chart.    Again, thank you for allowing me to participate in the care of your patient.        Sincerely,        Delfino Hernandez, DO

## 2019-03-26 NOTE — PROGRESS NOTES
Rigo Joyner  :  1970  DOS: 19  MRN: [id number]    Sports Medicine Clinic Visit    PCP: Bri, [facility]    Rigo Joyner is a 48 year old male who is seen as a self referral presenting with acute left hip pain.    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  Hip pain is significantly worse since that time.  Pain located over left deep anterior, posterior, and lateral hip, radiating to thigh and SI joint.  Reports constant radiating, burning pain to anterior thigh/knee.  Additional Features:  Positive: grinding and weakness.  Symptoms are better with Rest.  Symptoms are worse with: sitting on left buttocks, going from sit to stand, wearing gun belt, lying on left hip.  Other evaluation and/or treatments so far consists of: Tylenol, Ibuprofen, Rest and PCP/Occupation Med consult, work restrictions.  Recent imaging completed: No recent imaging completed, MRI completed in 2018.  Prior History of related problems: H/o chronic left hip pain over the last ~ 3+ years.  Pain is significantly worse since his work injury.  Notes that he moderate relief with physical therapy in 2017/.    Social History: works [employer]    Interim History - 2019  Since last visit on 2019 patient has moderate radiating left hip pain.  Left LFCN steroid injection completed on  provided moderate relief.  Continues to have radiating aching sensation to anterior thigh and knee.  Notes some relief with left leg/hip traction at physical therapy.  Continues to have pain with prolonged sitting/driving.  No new injury in the interim.    Interim History - 2019  Since last visit on 3/5/2019 patient has moderate left hip pain.  Left hip intra-articular injection completed on 3/5 provided only mild relief for ~ 1 - 2 weeks.  Patient notes that he continues to have  "intermittent left groin pain, notes constant radiating pain/numbness to anterior thigh.  Patient is also now having pain in left buttocks and left lower lumbar spine.  No new injury in the interim.    Review of Systems  Musculoskeletal: as above  Remainder of review of systems is negative including constitutional, CV, pulmonary, GI, Skin and Neurologic except as noted in HPI or medical history.    Past Medical History:   Diagnosis Date     Hyperlipemia      No past surgical history on file.    Objective  /75   Ht 1.727 m (5' 8\")   Wt 97.5 kg (215 lb)   BMI 32.69 kg/m      General: healthy, alert and in no distress    HEENT: no scleral icterus or conjunctival erythema   Skin: no suspicious lesions or rash. No jaundice.   CV: regular rhythm by palpation, 2+ distal pulses, no pedal edema    Resp: normal respiratory effort without conversational dyspnea   Psych: normal mood and affect    Gait: mildly antalgic, appropriate coordination and balance   Neuro: normal light touch sensory exam of the extremities. Motor strength as noted below     Left hip exam    Inspection:        no edema or ecchymosis in hip area    ROM:       Decreased active and passive ROM in flexion due to pain, mildly improved overall    Strength:        flexion 5/5       extension 5/5       abduction 5/5       adduction 5/5    Tender:        ASIS mild       greater trochanter mild       SI joint mild on left       Over the LFCN just medial to the ASIS improved but still present    Non Tender:        remainder of hip area       illiac crest       pubis    Sensation:        grossly intact in hip and thigh    Skin:       well perfused       capillary refill brisk    Special Tests:        neg (-) BRADLEY       positive (+) FADIR       positive (+) scour, mild, improved compared to previous       neg (-) Malachi     Radiology  Recent Results (from the past 744 hour(s))   XR Pelvis w Hip Left 1 View    Narrative    XR PELVIS AND HIP LEFT 1 VIEW " 2/19/2019 9:14 AM    COMPARISON: MRI dated 1/4/2018.    HISTORY: LEFT hip pain.      Impression    IMPRESSION: No fractures are seen. Joint spaces are preserved.    EDUARD BLAS MD     Prior MR imaging reviewed in detail again today    Procedures  Assessment:  1. Left hip pain    2. Work related injury    3. Meralgia paresthetica of left side    4. Femoroacetabular impingement of left hip    5. Chondromalacia of hip, left        Plan:  Discussed the assessment with the patient.  Follow up: 4 weeks, prn, based on outcome of ortho surgery referral  Trial of guided LFCN block/hydrodissection was helpful temporarily for the anterior and lateral thigh pain, still less than previous, and intermittent  Most focal pain still seems more related to the hip joint, trial of CSI in this location was modestly and temporarily helpful over 1st week  Reviewed prior imaging again today  Ortho referral placed for further evaluation given plateau in progress and ongoing significant sx requiring work accommodations  Consider occ med referral if needed in the future if more permanent restrictions are needed  Letter for work updated  Continue with PT  Advised no tool belt for work, limiting positions that increase pain  XR and MR images independently visualized and reviewed with patient today in clinic  Continue PT/HEP as directed  Supportive care reviewed  All questions were answered today  Contact us with additional questions or concerns  Signs and sx of concern reviewed      Delfino Hernandez DO, CAQ  Primary Care Sports Medicine  [facility]       Time spent in one-on-one evaluation and discussion with patient regarding nature of problem, course, prior treatments, and therapeutic options, at least 50% of which was spent in counseling and coordination of care: 29 minutes      Copy:  Dr Martinez Deshpande MD          Disclaimer: This note consists of symbols derived from keyboarding, dictation and/or voice recognition  software. As a result, there may be errors in the script that have gone undetected. Please consider this when interpreting information found in this chart.

## 2019-04-04 ENCOUNTER — TELEPHONE (OUTPATIENT)
Dept: ORTHOPEDICS | Facility: CLINIC | Age: 49
End: 2019-04-04

## 2019-04-04 NOTE — TELEPHONE ENCOUNTER
Rigo Joyner is a 48 year old male who calls with questions regarding disability pension forms that he left with Dr Hernandez to review last week. Discussed that with patient that I was unsure, if Dr Hernandez was going to complete form at this time, may be more appropriate for Ortho Surgeon - Dr Prasad's team to complete, if moving forward with total hip arthroplasty.  Patient has repeat CT scan scheduled for later this week at direction of Dr Prasad.  I was unable to locate form at [place], possible the Dr Hernandez has at this time.  Advised that Dr Hernandez is currently out of office.  Will notify patient on 4/9/19 if form was going to be completed.  Patient requests that if form is not completed that be left at [facility]  to .  Patient expressed understanding and had no further questions.    Patient expecting call back: YES      Preferred contact number:  [phone] (home)   Can we leave a detailed message on this number: YES     Rolly Astudillo ATC

## 2019-04-09 NOTE — TELEPHONE ENCOUNTER
Spoke with Dr Hernandez.  Will not be completing forms at this time.  Forms placed at [facility]  for patient to .    Spoke to patient discussed above.  He had no further questions.    Rolly Astudillo ATC

## 2019-04-24 ENCOUNTER — HOSPITAL ENCOUNTER (OUTPATIENT)
Dept: GENERAL RADIOLOGY | Facility: CLINIC | Age: 49
Discharge: HOME | End: 2019-04-24
Attending: ORTHOPAEDIC SURGERY | Admitting: ORTHOPAEDIC SURGERY
Payer: OTHER MISCELLANEOUS

## 2019-04-24 ENCOUNTER — HOSPITAL ENCOUNTER (OUTPATIENT)
Dept: MRI IMAGING | Facility: CLINIC | Age: 49
End: 2019-04-24
Attending: ORTHOPAEDIC SURGERY
Payer: OTHER MISCELLANEOUS

## 2019-04-24 DIAGNOSIS — M25.559 HIP PAIN: ICD-10-CM

## 2019-04-24 PROCEDURE — A9579 GAD-BASE MR CONTRAST NOS,1ML: HCPCS | Performed by: RADIOLOGY

## 2019-04-24 PROCEDURE — 25000125 ZZHC RX 250: Performed by: RADIOLOGY

## 2019-04-24 PROCEDURE — 73722 MRI JOINT OF LWR EXTR W/DYE: CPT | Mod: LT

## 2019-04-24 PROCEDURE — 25000128 H RX IP 250 OP 636: Performed by: RADIOLOGY

## 2019-04-24 PROCEDURE — 25500064 ZZH RX 255 OP 636: Performed by: RADIOLOGY

## 2019-04-24 PROCEDURE — 27211110 XR HIP CONTRAST CT/MR INJECTION

## 2019-04-24 RX ORDER — EPINEPHRINE 1 MG/ML
0.1 INJECTION, SOLUTION, CONCENTRATE INTRAVENOUS ONCE
Status: COMPLETED | OUTPATIENT
Start: 2019-04-24 | End: 2019-04-24

## 2019-04-24 RX ORDER — ACYCLOVIR 200 MG/1
10 CAPSULE ORAL ONCE
Status: DISCONTINUED | OUTPATIENT
Start: 2019-04-24 | End: 2019-04-24 | Stop reason: CLARIF

## 2019-04-24 RX ORDER — LIDOCAINE HYDROCHLORIDE 10 MG/ML
5 INJECTION, SOLUTION EPIDURAL; INFILTRATION; INTRACAUDAL; PERINEURAL ONCE
Status: COMPLETED | OUTPATIENT
Start: 2019-04-24 | End: 2019-04-24

## 2019-04-24 RX ORDER — IOPAMIDOL 408 MG/ML
10 INJECTION, SOLUTION INTRATHECAL ONCE
Status: COMPLETED | OUTPATIENT
Start: 2019-04-24 | End: 2019-04-24

## 2019-04-24 RX ADMIN — GADOPENTETATE DIMEGLUMINE 0.05 ML: 469.01 INJECTION INTRAVENOUS at 14:32

## 2019-04-24 RX ADMIN — EPINEPHRINE 0.05 MG: 1 INJECTION, SOLUTION, CONCENTRATE INTRAVENOUS at 14:33

## 2019-04-24 RX ADMIN — LIDOCAINE HYDROCHLORIDE 5 ML: 10 INJECTION, SOLUTION EPIDURAL; INFILTRATION; INTRACAUDAL; PERINEURAL at 14:43

## 2019-04-24 RX ADMIN — IOPAMIDOL 1 ML: 408 INJECTION, SOLUTION INTRATHECAL at 14:32

## 2019-04-24 NOTE — PROGRESS NOTES
RADIOLOGY PROCEDURE NOTE  Patient name: Rigo Joyner  MRN: [id number]  : 1970    Pre-procedure diagnosis: Pain.  Post-procedure diagnosis: Same    Procedure Date/Time: 2019  2:23 PM  Procedure: Left hip intraarticular ASHLYN injection.  Estimated blood loss: None  Specimen(s) collected with description:  None.  The patient tolerated the procedure well with no immediate complications.    See imaging dictation for procedural details.    Provider name: Arsenio Baez  Assistant(s):None

## 2019-07-24 ENCOUNTER — HOSPITAL ENCOUNTER (EMERGENCY)
Facility: CLINIC | Age: 49
Discharge: HOME | End: 2019-07-24
Attending: FAMILY MEDICINE | Admitting: FAMILY MEDICINE
Payer: COMMERCIAL

## 2019-07-24 ENCOUNTER — APPOINTMENT (OUTPATIENT)
Dept: CT IMAGING | Facility: CLINIC | Age: 49
End: 2019-07-24
Attending: FAMILY MEDICINE
Payer: COMMERCIAL

## 2019-07-24 VITALS
OXYGEN SATURATION: 95 % | BODY MASS INDEX: 32.69 KG/M2 | TEMPERATURE: 97.7 F | SYSTOLIC BLOOD PRESSURE: 134 MMHG | RESPIRATION RATE: 18 BRPM | HEART RATE: 71 BPM | HEIGHT: 68 IN | DIASTOLIC BLOOD PRESSURE: 90 MMHG

## 2019-07-24 DIAGNOSIS — K57.32 DIVERTICULITIS OF COLON: ICD-10-CM

## 2019-07-24 LAB
ALBUMIN SERPL-MCNC: 3.9 G/DL (ref 3.4–5)
ALBUMIN UR-MCNC: NEGATIVE MG/DL
ALP SERPL-CCNC: 78 U/L (ref 40–150)
ALT SERPL W P-5'-P-CCNC: 31 U/L (ref 0–70)
ANION GAP SERPL CALCULATED.3IONS-SCNC: 6 MMOL/L (ref 3–14)
APPEARANCE UR: CLEAR
AST SERPL W P-5'-P-CCNC: 21 U/L (ref 0–45)
BASOPHILS # BLD AUTO: 0.1 10E9/L (ref 0–0.2)
BASOPHILS NFR BLD AUTO: 0.7 %
BILIRUB DIRECT SERPL-MCNC: 0.1 MG/DL (ref 0–0.2)
BILIRUB SERPL-MCNC: 0.7 MG/DL (ref 0.2–1.3)
BILIRUB UR QL STRIP: NEGATIVE
BUN SERPL-MCNC: 15 MG/DL (ref 7–30)
CALCIUM SERPL-MCNC: 8.4 MG/DL (ref 8.5–10.1)
CHLORIDE SERPL-SCNC: 108 MMOL/L (ref 94–109)
CO2 SERPL-SCNC: 28 MMOL/L (ref 20–32)
COLOR UR AUTO: YELLOW
CREAT SERPL-MCNC: 0.74 MG/DL (ref 0.66–1.25)
DIFFERENTIAL METHOD BLD: NORMAL
EOSINOPHIL # BLD AUTO: 0 10E9/L (ref 0–0.7)
EOSINOPHIL NFR BLD AUTO: 0.1 %
ERYTHROCYTE [DISTWIDTH] IN BLOOD BY AUTOMATED COUNT: 11.7 % (ref 10–15)
GFR SERPL CREATININE-BSD FRML MDRD: >90 ML/MIN/{1.73_M2}
GLUCOSE SERPL-MCNC: 105 MG/DL (ref 70–99)
GLUCOSE UR STRIP-MCNC: NEGATIVE MG/DL
HCT VFR BLD AUTO: 45.1 % (ref 40–53)
HGB BLD-MCNC: 14.9 G/DL (ref 13.3–17.7)
HGB UR QL STRIP: NEGATIVE
IMM GRANULOCYTES # BLD: 0 10E9/L (ref 0–0.4)
IMM GRANULOCYTES NFR BLD: 0.2 %
KETONES UR STRIP-MCNC: NEGATIVE MG/DL
LEUKOCYTE ESTERASE UR QL STRIP: NEGATIVE
LIPASE SERPL-CCNC: 119 U/L (ref 73–393)
LYMPHOCYTES # BLD AUTO: 1.6 10E9/L (ref 0.8–5.3)
LYMPHOCYTES NFR BLD AUTO: 15.9 %
MCH RBC QN AUTO: 31.9 PG (ref 26.5–33)
MCHC RBC AUTO-ENTMCNC: 33 G/DL (ref 31.5–36.5)
MCV RBC AUTO: 97 FL (ref 78–100)
MONOCYTES # BLD AUTO: 0.6 10E9/L (ref 0–1.3)
MONOCYTES NFR BLD AUTO: 5.7 %
NEUTROPHILS # BLD AUTO: 7.8 10E9/L (ref 1.6–8.3)
NEUTROPHILS NFR BLD AUTO: 77.4 %
NITRATE UR QL: NEGATIVE
NRBC # BLD AUTO: 0 10*3/UL
NRBC BLD AUTO-RTO: 0 /100
PH UR STRIP: 7 PH (ref 5–7)
PLATELET # BLD AUTO: 278 10E9/L (ref 150–450)
POTASSIUM SERPL-SCNC: 3.9 MMOL/L (ref 3.4–5.3)
PROT SERPL-MCNC: 7.2 G/DL (ref 6.8–8.8)
RBC # BLD AUTO: 4.67 10E12/L (ref 4.4–5.9)
SODIUM SERPL-SCNC: 142 MMOL/L (ref 133–144)
SOURCE: NORMAL
SP GR UR STRIP: 1.01 (ref 1–1.03)
UROBILINOGEN UR STRIP-MCNC: 0 MG/DL (ref 0–2)
WBC # BLD AUTO: 10 10E9/L (ref 4–11)

## 2019-07-24 PROCEDURE — 80076 HEPATIC FUNCTION PANEL: CPT | Performed by: FAMILY MEDICINE

## 2019-07-24 PROCEDURE — 74177 CT ABD & PELVIS W/CONTRAST: CPT

## 2019-07-24 PROCEDURE — 99284 EMERGENCY DEPT VISIT MOD MDM: CPT | Mod: Z6 | Performed by: FAMILY MEDICINE

## 2019-07-24 PROCEDURE — 85025 COMPLETE CBC W/AUTO DIFF WBC: CPT | Performed by: FAMILY MEDICINE

## 2019-07-24 PROCEDURE — 99285 EMERGENCY DEPT VISIT HI MDM: CPT | Mod: 25 | Performed by: FAMILY MEDICINE

## 2019-07-24 PROCEDURE — 81003 URINALYSIS AUTO W/O SCOPE: CPT | Performed by: FAMILY MEDICINE

## 2019-07-24 PROCEDURE — 80048 BASIC METABOLIC PNL TOTAL CA: CPT | Performed by: FAMILY MEDICINE

## 2019-07-24 PROCEDURE — 25000125 ZZHC RX 250: Performed by: FAMILY MEDICINE

## 2019-07-24 PROCEDURE — 83690 ASSAY OF LIPASE: CPT | Performed by: FAMILY MEDICINE

## 2019-07-24 PROCEDURE — 25000128 H RX IP 250 OP 636: Performed by: FAMILY MEDICINE

## 2019-07-24 RX ORDER — IOPAMIDOL 755 MG/ML
100 INJECTION, SOLUTION INTRAVASCULAR ONCE
Status: COMPLETED | OUTPATIENT
Start: 2019-07-24 | End: 2019-07-24

## 2019-07-24 RX ORDER — METRONIDAZOLE 500 MG/1
500 TABLET ORAL 3 TIMES DAILY
Qty: 42 TABLET | Refills: 0 | Status: SHIPPED | OUTPATIENT
Start: 2019-07-24 | End: 2019-10-02

## 2019-07-24 RX ORDER — CIPROFLOXACIN 500 MG/1
500 TABLET, FILM COATED ORAL 2 TIMES DAILY
Qty: 28 TABLET | Refills: 0 | Status: SHIPPED | OUTPATIENT
Start: 2019-07-24 | End: 2019-10-02

## 2019-07-24 RX ADMIN — IOPAMIDOL 100 ML: 755 INJECTION, SOLUTION INTRAVENOUS at 09:47

## 2019-07-24 RX ADMIN — SODIUM CHLORIDE 66 ML: 9 INJECTION, SOLUTION INTRAVENOUS at 09:47

## 2019-07-24 ASSESSMENT — ENCOUNTER SYMPTOMS
SHORTNESS OF BREATH: 0
DIARRHEA: 0
SORE THROAT: 0
SINUS PRESSURE: 0
NAUSEA: 1
FEVER: 0
COUGH: 0
BLOOD IN STOOL: 0
PALPITATIONS: 0
CHILLS: 0
DIAPHORESIS: 0
HEADACHES: 0
FREQUENCY: 0
DYSURIA: 0
CONSTIPATION: 0
VOMITING: 0
WHEEZING: 0
ABDOMINAL PAIN: 1

## 2019-07-24 NOTE — ED PROVIDER NOTES
History     Chief Complaint   Patient presents with     Abdominal Pain     lower middle abdominal pain started 0100 today.     HPI  Rigo Joyner is a 48 year old male who presents with left lower quadrant abdominal pain and history of prior diverticulitis.  Also status post cholecystectomy.  Onset of symptoms overnight, and worse after 1 AM.  Does have a history of chronic left lower quadrant abdominal discomfort which is typically relieved by his morning bowel movement.  Did take milk of magnesia this morning and had some relief of his pain after stooling but it was minimal.  No blood in the stool or black tarry stools.  Mild nausea without vomiting.  No fever.  No abdominal injury.  History of BPH with some double voiding and other urinary obstructive symptoms but no acute change.      Allergies:  No Known Allergies    Problem List:    Patient Active Problem List    Diagnosis Date Noted     Sigmoid diverticulitis 07/27/2018     Priority: Medium        Past Medical History:    Past Medical History:   Diagnosis Date     Hyperlipemia        Past Surgical History:    No past surgical history on file.    Family History:    Family History   Problem Relation Age of Onset     Coronary Artery Disease Mother      Hypertension Mother      Depression Mother      Thyroid Disease Mother      Coronary Artery Disease Father      Colon Cancer Father      Thyroid Disease Father        Social History:  Marital Status:  Single [1]  Social History     Tobacco Use     Smoking status: Never Smoker     Smokeless tobacco: Former User     Types: Chew   Substance Use Topics     Alcohol use: Not on file     Drug use: Not on file        Medications:      tamsulosin (FLOMAX) 0.4 MG capsule         Review of Systems   Constitutional: Negative for chills, diaphoresis and fever.   HENT: Negative for ear pain, sinus pressure and sore throat.    Eyes: Negative for visual disturbance.   Respiratory: Negative for cough, shortness of breath  "and wheezing.    Cardiovascular: Negative for chest pain and palpitations.   Gastrointestinal: Positive for abdominal pain and nausea. Negative for blood in stool, constipation, diarrhea and vomiting.   Genitourinary: Negative for dysuria, frequency and urgency.   Skin: Negative for rash.   Neurological: Negative for headaches.   All other systems reviewed and are negative.      Physical Exam   BP: 144/90  Pulse: 75  Temp: 97.7  F (36.5  C)  Resp: 18  Height: 172.7 cm (5' 8\")  SpO2: 97 %      Physical Exam   Constitutional: He appears distressed.   HENT:   Mouth/Throat: Oropharynx is clear and moist.   Neck: Neck supple.   Cardiovascular: Normal rate, regular rhythm and normal heart sounds. Exam reveals no friction rub.   No murmur heard.  Pulmonary/Chest: Effort normal and breath sounds normal. No stridor. No respiratory distress. He has no wheezes.   Abdominal: Soft. Bowel sounds are normal. He exhibits no distension and no mass. There is no hepatosplenomegaly. There is tenderness in the left lower quadrant. There is guarding. There is no rebound.   Musculoskeletal: He exhibits no edema.   Neurological: He exhibits normal muscle tone.   Skin: No rash noted. No pallor.       ED Course        Procedures               Critical Care time:  none               Results for orders placed or performed during the hospital encounter of 07/24/19 (from the past 24 hour(s))   CBC with platelets differential   Result Value Ref Range    WBC 10.0 4.0 - 11.0 10e9/L    RBC Count 4.67 4.4 - 5.9 10e12/L    Hemoglobin 14.9 13.3 - 17.7 g/dL    Hematocrit 45.1 40.0 - 53.0 %    MCV 97 78 - 100 fl    MCH 31.9 26.5 - 33.0 pg    MCHC 33.0 31.5 - 36.5 g/dL    RDW 11.7 10.0 - 15.0 %    Platelet Count 278 150 - 450 10e9/L    Diff Method Automated Method     % Neutrophils 77.4 %    % Lymphocytes 15.9 %    % Monocytes 5.7 %    % Eosinophils 0.1 %    % Basophils 0.7 %    % Immature Granulocytes 0.2 %    Nucleated RBCs 0 0 /100    Absolute " Neutrophil 7.8 1.6 - 8.3 10e9/L    Absolute Lymphocytes 1.6 0.8 - 5.3 10e9/L    Absolute Monocytes 0.6 0.0 - 1.3 10e9/L    Absolute Eosinophils 0.0 0.0 - 0.7 10e9/L    Absolute Basophils 0.1 0.0 - 0.2 10e9/L    Abs Immature Granulocytes 0.0 0 - 0.4 10e9/L    Absolute Nucleated RBC 0.0    Basic metabolic panel   Result Value Ref Range    Sodium 142 133 - 144 mmol/L    Potassium 3.9 3.4 - 5.3 mmol/L    Chloride 108 94 - 109 mmol/L    Carbon Dioxide 28 20 - 32 mmol/L    Anion Gap 6 3 - 14 mmol/L    Glucose 105 (H) 70 - 99 mg/dL    Urea Nitrogen 15 7 - 30 mg/dL    Creatinine 0.74 0.66 - 1.25 mg/dL    GFR Estimate >90 >60 mL/min/[1.73_m2]    GFR Estimate If Black >90 >60 mL/min/[1.73_m2]    Calcium 8.4 (L) 8.5 - 10.1 mg/dL   Hepatic panel   Result Value Ref Range    Bilirubin Direct 0.1 0.0 - 0.2 mg/dL    Bilirubin Total 0.7 0.2 - 1.3 mg/dL    Albumin 3.9 3.4 - 5.0 g/dL    Protein Total 7.2 6.8 - 8.8 g/dL    Alkaline Phosphatase 78 40 - 150 U/L    ALT 31 0 - 70 U/L    AST 21 0 - 45 U/L   Lipase   Result Value Ref Range    Lipase 119 73 - 393 U/L   UA reflex to Microscopic   Result Value Ref Range    Color Urine Yellow     Appearance Urine Clear     Glucose Urine Negative NEG^Negative mg/dL    Bilirubin Urine Negative NEG^Negative    Ketones Urine Negative NEG^Negative mg/dL    Specific Gravity Urine 1.013 1.003 - 1.035    Blood Urine Negative NEG^Negative    pH Urine 7.0 5.0 - 7.0 pH    Protein Albumin Urine Negative NEG^Negative mg/dL    Urobilinogen mg/dL 0.0 0.0 - 2.0 mg/dL    Nitrite Urine Negative NEG^Negative    Leukocyte Esterase Urine Negative NEG^Negative    Source Midstream Urine    CT Abdomen Pelvis w Contrast    Narrative    CT ABDOMEN/PELVIS WITH CONTRAST July 24, 2019 9:59 AM     HISTORY: Left lower quadrant abdominal pain and history of  diverticulitis.     COMPARISON: CT abdomen/pelvis 4/9/2018.    TECHNIQUE: Axial images from the lung bases to the symphysis are  performed with additional coronal  reformatted images. 100 mL of Isovue  370 are given intravenously. Radiation dose for this scan was reduced  using automated exposure control, adjustment of the mA and/or kV  according to patient size, or iterative reconstruction technique.    FINDINGS: The lung bases are clear with mild atelectasis at the  posterior costophrenic angles.    Abdomen: Cholecystectomy changes. The liver, spleen, pancreas, adrenal  glands and kidneys are unremarkable. No hydronephrosis or renal  calculi. No enlarged abdominal lymph nodes. Patient has an incidental  circumaortic left renal vein. The bowel is normal in caliber without  obstruction or appendicitis. Focal colonic wall thickening is noted in  the sigmoid region with adjacent inflammation consistent with acute  diverticulitis best seen on series 2, image 66. No associated abscess  or free intraperitoneal air.    Pelvis: The bladder, prostate and rectum are unremarkable. No enlarged  pelvic lymph nodes or free fluid. Bone window examination is within  normal limits.      Impression    IMPRESSION:  1. Acute diverticulitis. No associated abscess or free intraperitoneal  air.  2. Prior cholecystectomy.    AMILCAR AVITIA MD       Medications - No data to display    Assessments & Plan (with Medical Decision Making)     MDM: Rigo Joyner is a 48 year old male who presents with a history of diverticulitis in the past and with recurrent left lower quadrant abdominal pain onset at 1 AM this morning.  He presented with persistent pain not relieved with bowel movement and with tenderness palpation left lower quadrant today without systemic symptoms and with normal vital signs including afebrile.  His CT of the abdomen demonstrates a diverticulitis of the sigmoid without perforation without abscess.  His laboratory testing is reassuring.  Precautions are given for return will discharge home with Flagyl and Cipro with instructions as below.  He has had a sigmoidoscopy with GI after his  last diverticulitis 1 year ago.  Follow-up with primary provider    I have reviewed the nursing notes.    I have reviewed the findings, diagnosis, plan and need for follow up with the patient.             Medication List      There are no discharge medications for this visit.         Final diagnoses:   Diverticulitis of colon - take cipro and flagyl for 14 days. take if muscle, nerve, tendon pain, stop and return. return for fever, increasing pain. stay hydrated.  avoid high residue food       7/24/2019   [facility] EMERGENCY DEPARTMENT     Delfino Palomo MD  07/24/19 1111

## 2019-07-24 NOTE — ED AVS SNAPSHOT
[facility]  [address] [address]  Phone:  [phone]  Fax:  [phone]                                    Rigo Joyner   MRN: [id number]    Department:  [facility]   Date of Visit:  7/24/2019           After Visit Summary Signature Page    I have received my discharge instructions, and my questions have been answered. I have discussed any challenges I see with this plan with the nurse or doctor.    ..........................................................................................................................................  Patient/Patient Representative Signature      ..........................................................................................................................................  Patient Representative Print Name and Relationship to Patient    ..................................................               ................................................  Date                                   Time    ..........................................................................................................................................  Reviewed by Signature/Title    ...................................................              ..............................................  Date                                               Time          22EPIC Rev 08/18

## 2019-07-24 NOTE — DISCHARGE INSTRUCTIONS
ICD-10-CM    1. Diverticulitis of colon K57.32     take cipro and flagyl for 14 days. take if muscle, nerve, tendon pain, stop and return. return for fever, increasing pain. stay hydrated.  avoid high residue food

## 2019-07-24 NOTE — LETTER
July 24, 2019      To Whom It May Concern:      Rigo Joyner was seen in our Emergency Department today, 07/24/19.  I expect his condition to improve over the next 5 days.  He may return to work/school when improved.    Sincerely,        Delfino Palomo MD

## 2019-07-24 NOTE — ED TRIAGE NOTES
Pt here with lower mid abdominal pain started 0100 this am. Pt reports hx of diverticulitis. Pt reports eating pistachios this past week which he hasn't eating in 1 year.

## 2019-10-02 ENCOUNTER — OFFICE VISIT (OUTPATIENT)
Dept: SURGERY | Facility: CLINIC | Age: 49
End: 2019-10-02
Payer: COMMERCIAL

## 2019-10-02 VITALS
WEIGHT: 210 LBS | HEART RATE: 69 BPM | SYSTOLIC BLOOD PRESSURE: 143 MMHG | HEIGHT: 68 IN | DIASTOLIC BLOOD PRESSURE: 52 MMHG | BODY MASS INDEX: 31.83 KG/M2 | TEMPERATURE: 97.6 F

## 2019-10-02 DIAGNOSIS — K57.92 DIVERTICULITIS: Primary | ICD-10-CM

## 2019-10-02 PROCEDURE — 99203 OFFICE O/P NEW LOW 30 MIN: CPT | Performed by: SURGERY

## 2019-10-02 RX ORDER — ATORVASTATIN CALCIUM 10 MG/1
10 TABLET, FILM COATED ORAL
COMMUNITY
Start: 2019-02-18 | End: 2023-06-30

## 2019-10-02 RX ORDER — NAPROXEN 500 MG/1
500 TABLET ORAL
COMMUNITY
Start: 2019-03-18 | End: 2021-06-10

## 2019-10-02 ASSESSMENT — MIFFLIN-ST. JEOR: SCORE: 1787.05

## 2019-10-02 NOTE — NURSING NOTE
"Initial BP (!) 143/52 (BP Location: Right arm, Patient Position: Sitting, Cuff Size: Adult Regular)   Pulse 69   Temp 97.6  F (36.4  C) (Oral)   Ht 1.727 m (5' 8\")   Wt 95.3 kg (210 lb)   BMI 31.93 kg/m   Estimated body mass index is 31.93 kg/m  as calculated from the following:    Height as of this encounter: 1.727 m (5' 8\").    Weight as of this encounter: 95.3 kg (210 lb). .    Teresa Ghosh MA    "

## 2019-10-02 NOTE — LETTER
10/2/2019         RE: Rigo Joyner  [address] [facility] [address]        Dear Colleague,    Thank you for referring your patient, Rigo Joyner, to the [facility]. Please see a copy of my visit note below.    48-year-old male seen in clinic having had 2 episodes of diverticulitis in the past 18 months.  Patient was seen in the emergency room both times and started on oral Cipro and Flagyl which he tolerated.  He reports the pain has gone away.  He had a recent colonoscopy which showed scattered diverticula throughout the colon.  He reports normal bowel movements and takes Metamucil daily.  He also has occasional mid abdominal pain crampy in nature that can come on at any time.  This usually happens in the morning.  Sometimes the pain is worse after bowel movements.  But this subsides.    Patient Active Problem List   Diagnosis     Sigmoid diverticulitis       Past Medical History:   Diagnosis Date     Hyperlipemia        History reviewed. No pertinent surgical history.    Family History   Problem Relation Age of Onset     Coronary Artery Disease Mother      Hypertension Mother      Depression Mother      Thyroid Disease Mother      Coronary Artery Disease Father      Colon Cancer Father      Thyroid Disease Father        Social History     Tobacco Use     Smoking status: Never Smoker     Smokeless tobacco: Former User     Types: Chew   Substance Use Topics     Alcohol use: Not on file        History   Drug Use Not on file       Current Outpatient Medications   Medication Sig Dispense Refill     aspirin (ASA) 81 MG tablet        atorvastatin (LIPITOR) 10 MG tablet Take 10 mg by mouth       naproxen (NAPROSYN) 500 MG tablet Take 500 mg by mouth         No Known Allergies     CBC  Recent Labs   Lab Test 07/24/19  0806   WBC 10.0   RBC 4.67   HGB 14.9   HCT 45.1   MCV 97   MCH 31.9   MCHC 33.0   RDW 11.7          BMP  Recent Labs   Lab Test 07/24/19  0806     "  POTASSIUM 3.9   JOSE 8.4*   CHLORIDE 108   CO2 28   BUN 15   CR 0.74   *       LFTs  Recent Labs   Lab Test 07/24/19  0806   PROTTOTAL 7.2   ALBUMIN 3.9   BILITOTAL 0.7   ALKPHOS 78   AST 21   ALT 31     ROS  Constitutional - Denies fevers, weight loss, malaise, lethargy  Neuro - Denies tremors or seizures  Pulmon - Denies SOB, dyspnea, hemoptysis, chronic cough or use of an inhaler  CV - Denies CP, SOB, lower extremity edema, difficulty w/ stairs, has never used NTG  GI - Denies hematemesis, BRBPR, melena, chronic diarrhea or epigastric pain   - Denies hematuria, difficulty voiding, h/o STDs  Hematology - Denies blood clotting disorders, chronic anemias  Dermatology - No melanomas or skin cancers  Rheumatology - No h/o RA  Pysch - Denies depression, bipolar d/o or schizophrenia    Exam:BP (!) 143/52 (BP Location: Right arm, Patient Position: Sitting, Cuff Size: Adult Regular)   Pulse 69   Temp 97.6  F (36.4  C) (Oral)   Ht 1.727 m (5' 8\")   Wt 95.3 kg (210 lb)   BMI 31.93 kg/m       General - Alert and Oriented X4, NAD, well nourished  HEENT - Normocephalic, atraumatic, PERRLA, Nose midline, Throat without lesions  Neck - supple, no LAD, Thyroid normal, Carotids without bruits  Lungs - Clear to auscultation bilaterally with good inspiratory effort, no tactile fremitus  CV - Heart RRR, no lift's, thrills, murmurs, rubs, or gallops. Carotid, radial, and femoral pulses 2+ bilaterally  Abdomen - Soft, non-tender, +BS, no hepatosplenomegaly, no palpable masses  Neuro - Full ROM, Strength 5/5 and major muscle groups, sensation intact  Extremities - No cyanosis, clubbing or edema    A/P: 48-year-old male with several episodes of acute diverticulitis.  The pathophysiology of diverticulitis formation and the importance of fiber in the diet.  Fortunately, this patient is already on a high-fiber diet.  I explained that at this time we do not recommend surgery to remove that segment of colon as he is not having " that many attacks and each 1 can be managed with oral antibiotics.  Currently, risks of surgery outweigh any potential benefit.  Advised patient to keep on his high-fiber diet and return to clinic if he has increasing frequency of symptoms.    Casper Staley MD     Again, thank you for allowing me to participate in the care of your patient.        Sincerely,        Casper Staley MD

## 2019-10-02 NOTE — PROGRESS NOTES
48-year-old male seen in clinic having had 2 episodes of diverticulitis in the past 18 months.  Patient was seen in the emergency room both times and started on oral Cipro and Flagyl which he tolerated.  He reports the pain has gone away.  He had a recent colonoscopy which showed scattered diverticula throughout the colon.  He reports normal bowel movements and takes Metamucil daily.  He also has occasional mid abdominal pain crampy in nature that can come on at any time.  This usually happens in the morning.  Sometimes the pain is worse after bowel movements.  But this subsides.    Patient Active Problem List   Diagnosis     Sigmoid diverticulitis       Past Medical History:   Diagnosis Date     Hyperlipemia        History reviewed. No pertinent surgical history.    Family History   Problem Relation Age of Onset     Coronary Artery Disease Mother      Hypertension Mother      Depression Mother      Thyroid Disease Mother      Coronary Artery Disease Father      Colon Cancer Father      Thyroid Disease Father        Social History     Tobacco Use     Smoking status: Never Smoker     Smokeless tobacco: Former User     Types: Chew   Substance Use Topics     Alcohol use: Not on file        History   Drug Use Not on file       Current Outpatient Medications   Medication Sig Dispense Refill     aspirin (ASA) 81 MG tablet        atorvastatin (LIPITOR) 10 MG tablet Take 10 mg by mouth       naproxen (NAPROSYN) 500 MG tablet Take 500 mg by mouth         No Known Allergies     CBC  Recent Labs   Lab Test 07/24/19  0806   WBC 10.0   RBC 4.67   HGB 14.9   HCT 45.1   MCV 97   MCH 31.9   MCHC 33.0   RDW 11.7          BMP  Recent Labs   Lab Test 07/24/19  0806      POTASSIUM 3.9   JOSE 8.4*   CHLORIDE 108   CO2 28   BUN 15   CR 0.74   *       LFTs  Recent Labs   Lab Test 07/24/19  0806   PROTTOTAL 7.2   ALBUMIN 3.9   BILITOTAL 0.7   ALKPHOS 78   AST 21   ALT 31     ROS  Constitutional - Denies fevers, weight  "loss, malaise, lethargy  Neuro - Denies tremors or seizures  Pulmon - Denies SOB, dyspnea, hemoptysis, chronic cough or use of an inhaler  CV - Denies CP, SOB, lower extremity edema, difficulty w/ stairs, has never used NTG  GI - Denies hematemesis, BRBPR, melena, chronic diarrhea or epigastric pain   - Denies hematuria, difficulty voiding, h/o STDs  Hematology - Denies blood clotting disorders, chronic anemias  Dermatology - No melanomas or skin cancers  Rheumatology - No h/o RA  Pysch - Denies depression, bipolar d/o or schizophrenia    Exam:BP (!) 143/52 (BP Location: Right arm, Patient Position: Sitting, Cuff Size: Adult Regular)   Pulse 69   Temp 97.6  F (36.4  C) (Oral)   Ht 1.727 m (5' 8\")   Wt 95.3 kg (210 lb)   BMI 31.93 kg/m      General - Alert and Oriented X4, NAD, well nourished  HEENT - Normocephalic, atraumatic, PERRLA, Nose midline, Throat without lesions  Neck - supple, no LAD, Thyroid normal, Carotids without bruits  Lungs - Clear to auscultation bilaterally with good inspiratory effort, no tactile fremitus  CV - Heart RRR, no lift's, thrills, murmurs, rubs, or gallops. Carotid, radial, and femoral pulses 2+ bilaterally  Abdomen - Soft, non-tender, +BS, no hepatosplenomegaly, no palpable masses  Neuro - Full ROM, Strength 5/5 and major muscle groups, sensation intact  Extremities - No cyanosis, clubbing or edema    A/P: 48-year-old male with several episodes of acute diverticulitis.  The pathophysiology of diverticulitis formation and the importance of fiber in the diet.  Fortunately, this patient is already on a high-fiber diet.  I explained that at this time we do not recommend surgery to remove that segment of colon as he is not having that many attacks and each 1 can be managed with oral antibiotics.  Currently, risks of surgery outweigh any potential benefit.  Advised patient to keep on his high-fiber diet and return to clinic if he has increasing frequency of symptoms.    Casper Staley " MD

## 2019-11-04 ENCOUNTER — HEALTH MAINTENANCE LETTER (OUTPATIENT)
Age: 49
End: 2019-11-04

## 2020-11-22 ENCOUNTER — HEALTH MAINTENANCE LETTER (OUTPATIENT)
Age: 50
End: 2020-11-22

## 2021-01-07 ENCOUNTER — HOSPITAL ENCOUNTER (EMERGENCY)
Facility: CLINIC | Age: 51
Discharge: HOME | End: 2021-01-07
Attending: PHYSICIAN ASSISTANT | Admitting: PHYSICIAN ASSISTANT
Payer: COMMERCIAL

## 2021-01-07 ENCOUNTER — APPOINTMENT (OUTPATIENT)
Dept: CT IMAGING | Facility: CLINIC | Age: 51
End: 2021-01-07
Attending: PHYSICIAN ASSISTANT
Payer: COMMERCIAL

## 2021-01-07 VITALS
OXYGEN SATURATION: 96 % | RESPIRATION RATE: 18 BRPM | HEART RATE: 73 BPM | DIASTOLIC BLOOD PRESSURE: 89 MMHG | SYSTOLIC BLOOD PRESSURE: 135 MMHG | TEMPERATURE: 98.9 F

## 2021-01-07 DIAGNOSIS — K57.32 SIGMOID DIVERTICULITIS: ICD-10-CM

## 2021-01-07 LAB
ALBUMIN SERPL-MCNC: 4.2 G/DL (ref 3.4–5)
ALP SERPL-CCNC: 90 U/L (ref 40–150)
ALT SERPL W P-5'-P-CCNC: 32 U/L (ref 0–70)
ANION GAP SERPL CALCULATED.3IONS-SCNC: 3 MMOL/L (ref 3–14)
AST SERPL W P-5'-P-CCNC: 13 U/L (ref 0–45)
BASOPHILS # BLD AUTO: 0.1 10E9/L (ref 0–0.2)
BASOPHILS NFR BLD AUTO: 0.7 %
BILIRUB SERPL-MCNC: 0.4 MG/DL (ref 0.2–1.3)
BUN SERPL-MCNC: 14 MG/DL (ref 7–30)
CALCIUM SERPL-MCNC: 9.2 MG/DL (ref 8.5–10.1)
CHLORIDE SERPL-SCNC: 108 MMOL/L (ref 94–109)
CO2 SERPL-SCNC: 28 MMOL/L (ref 20–32)
CREAT SERPL-MCNC: 0.77 MG/DL (ref 0.66–1.25)
DIFFERENTIAL METHOD BLD: ABNORMAL
EOSINOPHIL # BLD AUTO: 0 10E9/L (ref 0–0.7)
EOSINOPHIL NFR BLD AUTO: 0.1 %
ERYTHROCYTE [DISTWIDTH] IN BLOOD BY AUTOMATED COUNT: 11.8 % (ref 10–15)
GFR SERPL CREATININE-BSD FRML MDRD: >90 ML/MIN/{1.73_M2}
GLUCOSE SERPL-MCNC: 94 MG/DL (ref 70–99)
HCT VFR BLD AUTO: 48.7 % (ref 40–53)
HGB BLD-MCNC: 16.4 G/DL (ref 13.3–17.7)
IMM GRANULOCYTES # BLD: 0 10E9/L (ref 0–0.4)
IMM GRANULOCYTES NFR BLD: 0.2 %
LYMPHOCYTES # BLD AUTO: 2.8 10E9/L (ref 0.8–5.3)
LYMPHOCYTES NFR BLD AUTO: 23.9 %
MCH RBC QN AUTO: 31.7 PG (ref 26.5–33)
MCHC RBC AUTO-ENTMCNC: 33.7 G/DL (ref 31.5–36.5)
MCV RBC AUTO: 94 FL (ref 78–100)
MONOCYTES # BLD AUTO: 0.8 10E9/L (ref 0–1.3)
MONOCYTES NFR BLD AUTO: 7.1 %
NEUTROPHILS # BLD AUTO: 7.9 10E9/L (ref 1.6–8.3)
NEUTROPHILS NFR BLD AUTO: 68 %
NRBC # BLD AUTO: 0 10*3/UL
NRBC BLD AUTO-RTO: 0 /100
PLATELET # BLD AUTO: 287 10E9/L (ref 150–450)
POTASSIUM SERPL-SCNC: 3.8 MMOL/L (ref 3.4–5.3)
PROT SERPL-MCNC: 8 G/DL (ref 6.8–8.8)
RBC # BLD AUTO: 5.18 10E12/L (ref 4.4–5.9)
SODIUM SERPL-SCNC: 139 MMOL/L (ref 133–144)
WBC # BLD AUTO: 11.6 10E9/L (ref 4–11)

## 2021-01-07 PROCEDURE — 80053 COMPREHEN METABOLIC PANEL: CPT | Performed by: PHYSICIAN ASSISTANT

## 2021-01-07 PROCEDURE — 250N000011 HC RX IP 250 OP 636: Performed by: PHYSICIAN ASSISTANT

## 2021-01-07 PROCEDURE — 99285 EMERGENCY DEPT VISIT HI MDM: CPT | Mod: 25 | Performed by: PHYSICIAN ASSISTANT

## 2021-01-07 PROCEDURE — 96374 THER/PROPH/DIAG INJ IV PUSH: CPT | Mod: 59 | Performed by: PHYSICIAN ASSISTANT

## 2021-01-07 PROCEDURE — 250N000009 HC RX 250: Performed by: PHYSICIAN ASSISTANT

## 2021-01-07 PROCEDURE — 74177 CT ABD & PELVIS W/CONTRAST: CPT

## 2021-01-07 PROCEDURE — 99283 EMERGENCY DEPT VISIT LOW MDM: CPT | Performed by: PHYSICIAN ASSISTANT

## 2021-01-07 PROCEDURE — 85025 COMPLETE CBC W/AUTO DIFF WBC: CPT | Performed by: PHYSICIAN ASSISTANT

## 2021-01-07 RX ORDER — IOPAMIDOL 755 MG/ML
100 INJECTION, SOLUTION INTRAVASCULAR ONCE
Status: COMPLETED | OUTPATIENT
Start: 2021-01-07 | End: 2021-01-07

## 2021-01-07 RX ORDER — METRONIDAZOLE 500 MG/1
500 TABLET ORAL 3 TIMES DAILY
Qty: 30 TABLET | Refills: 0 | Status: SHIPPED | OUTPATIENT
Start: 2021-01-07 | End: 2021-01-17

## 2021-01-07 RX ORDER — ONDANSETRON 2 MG/ML
4 INJECTION INTRAMUSCULAR; INTRAVENOUS EVERY 30 MIN PRN
Status: DISCONTINUED | OUTPATIENT
Start: 2021-01-07 | End: 2021-01-07 | Stop reason: HOSPADM

## 2021-01-07 RX ORDER — CIPROFLOXACIN 500 MG/1
500 TABLET, FILM COATED ORAL 2 TIMES DAILY
Qty: 20 TABLET | Refills: 0 | Status: SHIPPED | OUTPATIENT
Start: 2021-01-07 | End: 2021-01-17

## 2021-01-07 RX ADMIN — ONDANSETRON 4 MG: 2 INJECTION INTRAMUSCULAR; INTRAVENOUS at 18:53

## 2021-01-07 RX ADMIN — SODIUM CHLORIDE 66 ML: 9 INJECTION, SOLUTION INTRAVENOUS at 19:05

## 2021-01-07 RX ADMIN — IOPAMIDOL 100 ML: 755 INJECTION, SOLUTION INTRAVENOUS at 19:05

## 2021-01-07 ASSESSMENT — ENCOUNTER SYMPTOMS
ABDOMINAL PAIN: 1
MUSCULOSKELETAL NEGATIVE: 1
BLOOD IN STOOL: 1
CONSTITUTIONAL NEGATIVE: 1

## 2021-01-07 NOTE — ED NOTES
Pt presents to ED with bowel concerns that started this am. Hx of diverticulitis, this feels similar. Notes he had some constipation, today had been having lots of bowel movements throughout the day. Increasing rectal bleeding throughout the day. Also notes some pus with his last bowel movement this afternoon.

## 2021-01-07 NOTE — ED TRIAGE NOTES
Patient had recent  Spinal fusion by  [facility]  Ortho was at  [facility]   Still having some symptoms  approx  3 month check up

## 2021-01-07 NOTE — ED PROVIDER NOTES
History     Chief Complaint   Patient presents with     Rectal Bleeding     Patient has HX of diverticulum        Flank Pain     left  groin and flank pain       HPI  Rigo Joyner is a 50 year old male who presents with complaints of possible diverticulitis.  Patient states he developed blood in his stool today as well as noticing pus in his stool which he states are both consistent with his past episodes of diverticulitis.  He has had some very mild discomfort to his left lower abdomen/groin region but denies any significant abdominal pain.  He is also noticed some urinary frequency and suprapubic abdominal pressure earlier in the day.  Patient states he has had 3 prior episodes of uncomplicated diverticulitis.  Pt states he has had a colonoscopy in the past that appeared otherwise clear.  Patient has history of cholecystectomy but no other abdominal surgeries.  Denies fevers, chills, vomiting, diarrhea, dysuria, or hematuria.      Allergies:  No Known Allergies    Problem List:    Patient Active Problem List    Diagnosis Date Noted     Sigmoid diverticulitis 07/27/2018     Priority: Medium        Past Medical History:    Past Medical History:   Diagnosis Date     Hyperlipemia        Past Surgical History:    No past surgical history on file.    Family History:    Family History   Problem Relation Age of Onset     Coronary Artery Disease Mother      Hypertension Mother      Depression Mother      Thyroid Disease Mother      Coronary Artery Disease Father      Colon Cancer Father      Thyroid Disease Father        Social History:  Marital Status:   [2]  Social History     Tobacco Use     Smoking status: Never Smoker     Smokeless tobacco: Former User     Types: Chew   Substance Use Topics     Alcohol use: Not on file     Drug use: Not on file        Medications:         ciprofloxacin (CIPRO) 500 MG tablet       metroNIDAZOLE (FLAGYL) 500 MG tablet       aspirin (ASA) 81 MG tablet       atorvastatin  (LIPITOR) 10 MG tablet       naproxen (NAPROSYN) 500 MG tablet          Review of Systems   Constitutional: Negative.    Gastrointestinal: Positive for abdominal pain and blood in stool.   Genitourinary: Positive for frequency. Negative for dysuria and hematuria.   Musculoskeletal: Negative.    Skin: Negative.    All other systems reviewed and are negative.      Physical Exam   BP: (!) 145/97  Pulse: 73  Temp: 98.9  F (37.2  C)  Resp: 18  SpO2: 96 %      Physical Exam  Constitutional:       General: He is not in acute distress.     Appearance: Normal appearance. He is well-developed. He is not ill-appearing, toxic-appearing or diaphoretic.   HENT:      Head: Normocephalic and atraumatic.      Nose: Nose normal.      Mouth/Throat:      Mouth: Mucous membranes are moist.   Eyes:      Conjunctiva/sclera: Conjunctivae normal.   Neck:      Musculoskeletal: Normal range of motion and neck supple.   Cardiovascular:      Rate and Rhythm: Normal rate and regular rhythm.      Pulses: Normal pulses.   Pulmonary:      Effort: Pulmonary effort is normal.      Breath sounds: Normal breath sounds.   Abdominal:      General: There is no distension.      Palpations: Abdomen is soft.      Tenderness: There is abdominal tenderness in the left lower quadrant. There is no right CVA tenderness, left CVA tenderness, guarding or rebound.   Musculoskeletal: Normal range of motion.   Skin:     General: Skin is warm and dry.      Capillary Refill: Capillary refill takes less than 2 seconds.   Neurological:      General: No focal deficit present.      Mental Status: He is alert and oriented to person, place, and time.      Sensory: Sensation is intact.      Motor: Motor function is intact.         ED Course        Procedures      Results for orders placed or performed during the hospital encounter of 01/07/21 (from the past 24 hour(s))   CBC with platelets differential   Result Value Ref Range    WBC 11.6 (H) 4.0 - 11.0 10e9/L    RBC Count  5.18 4.4 - 5.9 10e12/L    Hemoglobin 16.4 13.3 - 17.7 g/dL    Hematocrit 48.7 40.0 - 53.0 %    MCV 94 78 - 100 fl    MCH 31.7 26.5 - 33.0 pg    MCHC 33.7 31.5 - 36.5 g/dL    RDW 11.8 10.0 - 15.0 %    Platelet Count 287 150 - 450 10e9/L    Diff Method Automated Method     % Neutrophils 68.0 %    % Lymphocytes 23.9 %    % Monocytes 7.1 %    % Eosinophils 0.1 %    % Basophils 0.7 %    % Immature Granulocytes 0.2 %    Nucleated RBCs 0 0 /100    Absolute Neutrophil 7.9 1.6 - 8.3 10e9/L    Absolute Lymphocytes 2.8 0.8 - 5.3 10e9/L    Absolute Monocytes 0.8 0.0 - 1.3 10e9/L    Absolute Eosinophils 0.0 0.0 - 0.7 10e9/L    Absolute Basophils 0.1 0.0 - 0.2 10e9/L    Abs Immature Granulocytes 0.0 0 - 0.4 10e9/L    Absolute Nucleated RBC 0.0    Comprehensive metabolic panel   Result Value Ref Range    Sodium 139 133 - 144 mmol/L    Potassium 3.8 3.4 - 5.3 mmol/L    Chloride 108 94 - 109 mmol/L    Carbon Dioxide 28 20 - 32 mmol/L    Anion Gap 3 3 - 14 mmol/L    Glucose 94 70 - 99 mg/dL    Urea Nitrogen 14 7 - 30 mg/dL    Creatinine 0.77 0.66 - 1.25 mg/dL    GFR Estimate >90 >60 mL/min/[1.73_m2]    GFR Estimate If Black >90 >60 mL/min/[1.73_m2]    Calcium 9.2 8.5 - 10.1 mg/dL    Bilirubin Total 0.4 0.2 - 1.3 mg/dL    Albumin 4.2 3.4 - 5.0 g/dL    Protein Total 8.0 6.8 - 8.8 g/dL    Alkaline Phosphatase 90 40 - 150 U/L    ALT 32 0 - 70 U/L    AST 13 0 - 45 U/L   CT Abdomen Pelvis w Contrast    Narrative    EXAM: CT ABDOMEN PELVIS W CONTRAST  LOCATION: [facility]  DATE/TIME: 1/7/2021 7:03 PM    INDICATION: Blood in stool.  COMPARISON: 07/24/2019.  TECHNIQUE: CT scan of the abdomen and pelvis was performed following injection of IV contrast. Multiplanar reformats were obtained. Dose reduction techniques were used.  CONTRAST: 100 mL Isovue 370    FINDINGS:   LOWER CHEST: Dependent atelectasis.    HEPATOBILIARY: Cholecystectomy.    PANCREAS: Normal.    SPLEEN: Normal.    ADRENAL GLANDS: Normal.    KIDNEYS/BLADDER:  Normal.    BOWEL: Mild changes of acute diverticulitis involving the sigmoid colon in a similar location to the prior exam. No abscess.    LYMPH NODES: Normal.    VASCULATURE: Unremarkable.    PELVIC ORGANS: Normal.    MUSCULOSKELETAL: Postsurgical changes in the lumbar spine.      Impression    IMPRESSION:   1.  Acute uncomplicated sigmoid diverticulitis.       Medications   iopamidol (ISOVUE-370) solution 100 mL (100 mLs Intravenous Given 1/7/21 1905)   sodium chloride 0.9 % bag 500mL for CT scan flush use (66 mLs Intravenous Given 1/7/21 1905)       Assessments & Plan (with Medical Decision Making)     Pt is a 50 year old male who presents with complaints of possible diverticulitis.  Patient states he developed blood in his stool today as well as noticing pus in his stool which he states are both consistent with his past episodes of diverticulitis.  He has had some very mild discomfort to his left lower abdomen/groin region but denies any significant abdominal pain.  He is also noticed some urinary frequency and suprapubic abdominal pressure earlier in the day.  Patient states he has had 3 prior episodes of uncomplicated diverticulitis.  Pt states he has had a colonoscopy in the past that appeared otherwise clear.  Patient has history of cholecystectomy but no other abdominal surgeries.      Pt is afebrile on arrival.  Exam as above.  White count of 11,600.  Normal hemoglobin.  Comprehensive metabolic panel is unremarkable.  CT abd/pelvis shows acute uncomplicated sigmoid diverticulitis without evidence of abscess or perforation.  Discussed results with patient.  Pt is tolerating oral intake without difficulties.  He appears non-toxic.  Will treat as outpatient.  Return precautions were reviewed.  Hand-outs were provided.    Patient was sent with Ciprofloxacin and Flagyl and was instructed to follow-up with PCP in 3-5 days for continued care and management or sooner if new or worsening symptoms.  He is to return  to the ED for persistent and/or worsening symptoms.  Patient expressed understanding of the diagnosis and plan and was discharged home in good condition.    I have reviewed the nursing notes.    I have reviewed the findings, diagnosis, plan and need for follow up with the patient.    Discharge Medication List as of 1/7/2021  8:12 PM      START taking these medications    Details   ciprofloxacin (CIPRO) 500 MG tablet Take 1 tablet (500 mg) by mouth 2 times daily for 10 days, Disp-20 tablet, R-0, E-Prescribe      metroNIDAZOLE (FLAGYL) 500 MG tablet Take 1 tablet (500 mg) by mouth 3 times daily for 10 days, Disp-30 tablet, R-0, E-PrescribeEat yogurt or cottage cheese daily to prevent diarrhea that can be caused by taking this antibiotic.             Final diagnoses:   Sigmoid diverticulitis       1/7/2021   [facility] EMERGENCY DEPT      Disclaimer:  This note consists of symbols derived from keyboarding, dictation and/or voice recognition software.  As a result, there may be errors in the script that have gone undetected.  Please consider this when interpreting information found in this chart.     Susan Foster PA-C  01/08/21 8091

## 2021-01-07 NOTE — LETTER
January 7, 2021      To Whom It May Concern:      Rigo LOZANO Ar was seen in our Emergency Department today, 01/07/21.  Please excuse from work.  Thank you.      Sincerely,        Susan Foster PA-C

## 2021-01-07 NOTE — ED AVS SNAPSHOT
[facility] Emergency Dept  [address] [facility] [address]  Phone: [phone]  Fax: [phone]                                    Rigo Joyner   MRN: [id number]    Department: [facility] Emergency Dept   Date of Visit: 1/7/2021           After Visit Summary Signature Page    I have received my discharge instructions, and my questions have been answered. I have discussed any challenges I see with this plan with the nurse or doctor.    ..........................................................................................................................................  Patient/Patient Representative Signature      ..........................................................................................................................................  Patient Representative Print Name and Relationship to Patient    ..................................................               ................................................  Date                                   Time    ..........................................................................................................................................  Reviewed by Signature/Title    ...................................................              ..............................................  Date                                               Time          22EPIC Rev 08/18

## 2021-01-08 ASSESSMENT — ENCOUNTER SYMPTOMS
HEMATURIA: 0
FREQUENCY: 1
DYSURIA: 0

## 2021-03-21 ENCOUNTER — HOSPITAL ENCOUNTER (EMERGENCY)
Facility: CLINIC | Age: 51
Discharge: HOME | End: 2021-03-21
Attending: NURSE PRACTITIONER | Admitting: NURSE PRACTITIONER
Payer: COMMERCIAL

## 2021-03-21 VITALS
OXYGEN SATURATION: 98 % | BODY MASS INDEX: 31.93 KG/M2 | SYSTOLIC BLOOD PRESSURE: 126 MMHG | RESPIRATION RATE: 18 BRPM | WEIGHT: 210 LBS | DIASTOLIC BLOOD PRESSURE: 84 MMHG | HEART RATE: 85 BPM | TEMPERATURE: 97.9 F

## 2021-03-21 DIAGNOSIS — R10.32 ABDOMINAL PAIN, LEFT LOWER QUADRANT: ICD-10-CM

## 2021-03-21 PROCEDURE — 99282 EMERGENCY DEPT VISIT SF MDM: CPT | Performed by: EMERGENCY MEDICINE

## 2021-03-21 PROCEDURE — 99283 EMERGENCY DEPT VISIT LOW MDM: CPT | Performed by: EMERGENCY MEDICINE

## 2021-03-21 RX ORDER — METRONIDAZOLE 500 MG/1
500 TABLET ORAL 3 TIMES DAILY
Qty: 21 TABLET | Refills: 0 | Status: SHIPPED | OUTPATIENT
Start: 2021-03-21 | End: 2021-03-28

## 2021-03-21 RX ORDER — CIPROFLOXACIN 500 MG/1
500 TABLET, FILM COATED ORAL 2 TIMES DAILY
Qty: 14 TABLET | Refills: 0 | Status: SHIPPED | OUTPATIENT
Start: 2021-03-21 | End: 2021-03-28

## 2021-03-21 NOTE — ED PROVIDER NOTES
History     Chief Complaint   Patient presents with     Abdominal Pain     Pt here with pain in the abdomen and has a hx of diverticulitis and has seen a surgeon for it and states that he has a flare up now.     HPI  Rigo Joyner is a 50 year old male who presents with left lower quadrant pain that began over the last 24 hours.  No inciting trauma or strain.  Discomfort is reminiscent of prior episodes of diverticulitis.  Denies nausea vomiting fever malaise anorexia.  Tells me that he urinates a little more frequently over the last 24 hours denies actual dysuria or gross hematuria.  No prior abdominal surgery.  History of sigmoid diverticulitis, last episode January this year.    Allergies:  No Known Allergies    Problem List:    Patient Active Problem List    Diagnosis Date Noted     Sigmoid diverticulitis 07/27/2018     Priority: Medium        Past Medical History:    Past Medical History:   Diagnosis Date     Hyperlipemia        Past Surgical History:    History reviewed. No pertinent surgical history.    Family History:    Family History   Problem Relation Age of Onset     Coronary Artery Disease Mother      Hypertension Mother      Depression Mother      Thyroid Disease Mother      Coronary Artery Disease Father      Colon Cancer Father      Thyroid Disease Father        Social History:  Marital Status:   [2]  Social History     Tobacco Use     Smoking status: Never Smoker     Smokeless tobacco: Former User     Types: Chew   Substance Use Topics     Alcohol use: None     Drug use: None        Medications:    ciprofloxacin (CIPRO) 500 MG tablet  metroNIDAZOLE (FLAGYL) 500 MG tablet  aspirin (ASA) 81 MG tablet  atorvastatin (LIPITOR) 10 MG tablet  naproxen (NAPROSYN) 500 MG tablet          Review of Systems  All other systems reviewed and are negative.    Physical Exam   BP: 126/84  Pulse: 85  Temp: 97.9  F (36.6  C)  Resp: 18  Weight: 95.3 kg (210 lb)  SpO2: 98 %      Physical Exam  Nontoxic  appearing no respiratory distress alert and oriented ×3  Head atraumatic normocephalic   Neck supple full active painless range of motion  Lungs clear to auscultation  Heart regular no murmur  Abdomen soft mild tenderness suprapubic and left lower quadrant without guarding or rebound, abdomen obese  Strength and sensation grossly intact throughout the extremities, gait and station normal  Speech is fluent, good eye contact, thought processes are rational      ED Course        Procedures               Critical Care time:  none               No results found for this or any previous visit (from the past 24 hour(s)).    Medications - No data to display    Assessments & Plan (with Medical Decision Making)  50-year-old male left lower quadrant pain and tenderness history diverticulitis.  Requests no work-up.  Discussed risks of same patient expressed understanding.  Prescription for Cipro and Flagyl.  Follow-up primary care.  Return criteria reviewed     I have reviewed the nursing notes.    I have reviewed the findings, diagnosis, plan and need for follow up with the patient.          Discharge Medication List as of 3/21/2021 10:47 AM      START taking these medications    Details   ciprofloxacin (CIPRO) 500 MG tablet Take 1 tablet (500 mg) by mouth 2 times daily for 7 days, Disp-14 tablet, R-0, E-Prescribe      metroNIDAZOLE (FLAGYL) 500 MG tablet Take 1 tablet (500 mg) by mouth 3 times daily for 7 days, Disp-21 tablet, R-0, E-Prescribe             Final diagnoses:   Abdominal pain, left lower quadrant       3/21/2021   [facility] EMERGENCY DEPT     Delfino Navarrete MD  03/21/21 1311

## 2021-03-21 NOTE — DISCHARGE INSTRUCTIONS
Tylenol and ibuprofen for discomfort    Drink plenty of fluids    Cipro and Flagyl as prescribed    Return here for progressive pain, fever, vomiting or any other concern

## 2021-06-10 ENCOUNTER — HOSPITAL ENCOUNTER (EMERGENCY)
Facility: CLINIC | Age: 51
Discharge: HOME | End: 2021-06-10
Attending: EMERGENCY MEDICINE | Admitting: EMERGENCY MEDICINE
Payer: COMMERCIAL

## 2021-06-10 VITALS
OXYGEN SATURATION: 97 % | HEART RATE: 64 BPM | HEIGHT: 69 IN | TEMPERATURE: 97.9 F | BODY MASS INDEX: 31.1 KG/M2 | RESPIRATION RATE: 18 BRPM | WEIGHT: 210 LBS | DIASTOLIC BLOOD PRESSURE: 85 MMHG | SYSTOLIC BLOOD PRESSURE: 129 MMHG

## 2021-06-10 DIAGNOSIS — K57.92 ACUTE DIVERTICULITIS: ICD-10-CM

## 2021-06-10 LAB
ALBUMIN SERPL-MCNC: 3.8 G/DL (ref 3.4–5)
ALP SERPL-CCNC: 70 U/L (ref 40–150)
ALT SERPL W P-5'-P-CCNC: 26 U/L (ref 0–70)
ANION GAP SERPL CALCULATED.3IONS-SCNC: 5 MMOL/L (ref 3–14)
AST SERPL W P-5'-P-CCNC: 17 U/L (ref 0–45)
BASOPHILS # BLD AUTO: 0.1 10E9/L (ref 0–0.2)
BASOPHILS NFR BLD AUTO: 1.4 %
BILIRUB SERPL-MCNC: 0.5 MG/DL (ref 0.2–1.3)
BUN SERPL-MCNC: 9 MG/DL (ref 7–30)
CALCIUM SERPL-MCNC: 8.4 MG/DL (ref 8.5–10.1)
CHLORIDE SERPL-SCNC: 110 MMOL/L (ref 94–109)
CO2 SERPL-SCNC: 28 MMOL/L (ref 20–32)
CREAT SERPL-MCNC: 0.8 MG/DL (ref 0.66–1.25)
DIFFERENTIAL METHOD BLD: NORMAL
EOSINOPHIL # BLD AUTO: 0.1 10E9/L (ref 0–0.7)
EOSINOPHIL NFR BLD AUTO: 2.8 %
ERYTHROCYTE [DISTWIDTH] IN BLOOD BY AUTOMATED COUNT: 12.2 % (ref 10–15)
GFR SERPL CREATININE-BSD FRML MDRD: >90 ML/MIN/{1.73_M2}
GLUCOSE SERPL-MCNC: 118 MG/DL (ref 70–99)
HCT VFR BLD AUTO: 44.6 % (ref 40–53)
HGB BLD-MCNC: 15.2 G/DL (ref 13.3–17.7)
IMM GRANULOCYTES # BLD: 0 10E9/L (ref 0–0.4)
IMM GRANULOCYTES NFR BLD: 0.2 %
LABORATORY COMMENT REPORT: NORMAL
LACTATE BLD-SCNC: 1.6 MMOL/L (ref 0.7–2)
LYMPHOCYTES # BLD AUTO: 1.3 10E9/L (ref 0.8–5.3)
LYMPHOCYTES NFR BLD AUTO: 26 %
MCH RBC QN AUTO: 32.7 PG (ref 26.5–33)
MCHC RBC AUTO-ENTMCNC: 34.1 G/DL (ref 31.5–36.5)
MCV RBC AUTO: 96 FL (ref 78–100)
MONOCYTES # BLD AUTO: 0.5 10E9/L (ref 0–1.3)
MONOCYTES NFR BLD AUTO: 10.2 %
NEUTROPHILS # BLD AUTO: 3 10E9/L (ref 1.6–8.3)
NEUTROPHILS NFR BLD AUTO: 59.4 %
NRBC # BLD AUTO: 0 10*3/UL
NRBC BLD AUTO-RTO: 0 /100
PLATELET # BLD AUTO: 223 10E9/L (ref 150–450)
POTASSIUM SERPL-SCNC: 3.8 MMOL/L (ref 3.4–5.3)
PROT SERPL-MCNC: 6.9 G/DL (ref 6.8–8.8)
RBC # BLD AUTO: 4.65 10E12/L (ref 4.4–5.9)
SARS-COV-2 RNA RESP QL NAA+PROBE: NEGATIVE
SODIUM SERPL-SCNC: 143 MMOL/L (ref 133–144)
SPECIMEN SOURCE: NORMAL
WBC # BLD AUTO: 5 10E9/L (ref 4–11)

## 2021-06-10 PROCEDURE — 83605 ASSAY OF LACTIC ACID: CPT | Performed by: EMERGENCY MEDICINE

## 2021-06-10 PROCEDURE — 99283 EMERGENCY DEPT VISIT LOW MDM: CPT | Performed by: EMERGENCY MEDICINE

## 2021-06-10 PROCEDURE — C9803 HOPD COVID-19 SPEC COLLECT: HCPCS | Performed by: EMERGENCY MEDICINE

## 2021-06-10 PROCEDURE — 85025 COMPLETE CBC W/AUTO DIFF WBC: CPT | Performed by: EMERGENCY MEDICINE

## 2021-06-10 PROCEDURE — 80053 COMPREHEN METABOLIC PANEL: CPT | Performed by: EMERGENCY MEDICINE

## 2021-06-10 PROCEDURE — 87635 SARS-COV-2 COVID-19 AMP PRB: CPT | Performed by: EMERGENCY MEDICINE

## 2021-06-10 RX ORDER — GABAPENTIN 300 MG/1
300 CAPSULE ORAL 3 TIMES DAILY
COMMUNITY
End: 2021-07-20

## 2021-06-10 RX ORDER — IOPAMIDOL 755 MG/ML
100 INJECTION, SOLUTION INTRAVASCULAR ONCE
Status: DISCONTINUED | OUTPATIENT
Start: 2021-06-10 | End: 2021-06-10

## 2021-06-10 ASSESSMENT — MIFFLIN-ST. JEOR: SCORE: 1792.93

## 2021-06-10 NOTE — DISCHARGE INSTRUCTIONS
Return to the emergency department for fevers, repeated vomiting, worsening symptoms, or other concerns.  Otherwise follow-up with your clinic if not feeling better in the next 2 to 3 days.

## 2021-07-03 ENCOUNTER — TRANSFERRED RECORDS (OUTPATIENT)
Dept: HEALTH INFORMATION MANAGEMENT | Facility: CLINIC | Age: 51
End: 2021-07-03
Payer: COMMERCIAL

## 2021-07-04 ENCOUNTER — HOSPITAL ENCOUNTER (EMERGENCY)
Facility: CLINIC | Age: 51
Discharge: HOME | End: 2021-07-04
Attending: EMERGENCY MEDICINE | Admitting: EMERGENCY MEDICINE
Payer: COMMERCIAL

## 2021-07-04 VITALS
DIASTOLIC BLOOD PRESSURE: 88 MMHG | SYSTOLIC BLOOD PRESSURE: 126 MMHG | TEMPERATURE: 97.6 F | HEART RATE: 67 BPM | OXYGEN SATURATION: 99 % | HEIGHT: 69 IN | BODY MASS INDEX: 31.1 KG/M2 | RESPIRATION RATE: 16 BRPM | WEIGHT: 210 LBS

## 2021-07-04 DIAGNOSIS — K57.92 ACUTE DIVERTICULITIS: ICD-10-CM

## 2021-07-04 LAB
ALBUMIN SERPL-MCNC: 3.7 G/DL (ref 3.4–5)
ALP SERPL-CCNC: 60 U/L (ref 40–150)
ALT SERPL W P-5'-P-CCNC: 24 U/L (ref 0–70)
ANION GAP SERPL CALCULATED.3IONS-SCNC: 6 MMOL/L (ref 3–14)
AST SERPL W P-5'-P-CCNC: 10 U/L (ref 0–45)
BASOPHILS # BLD AUTO: 0.1 10E9/L (ref 0–0.2)
BASOPHILS NFR BLD AUTO: 1 %
BILIRUB SERPL-MCNC: 0.7 MG/DL (ref 0.2–1.3)
BUN SERPL-MCNC: 14 MG/DL (ref 7–30)
CALCIUM SERPL-MCNC: 8.6 MG/DL (ref 8.5–10.1)
CHLORIDE SERPL-SCNC: 107 MMOL/L (ref 94–109)
CO2 SERPL-SCNC: 28 MMOL/L (ref 20–32)
CREAT SERPL-MCNC: 0.84 MG/DL (ref 0.66–1.25)
DIFFERENTIAL METHOD BLD: NORMAL
EOSINOPHIL # BLD AUTO: 0 10E9/L (ref 0–0.7)
EOSINOPHIL NFR BLD AUTO: 0.1 %
ERYTHROCYTE [DISTWIDTH] IN BLOOD BY AUTOMATED COUNT: 12.6 % (ref 10–15)
GFR SERPL CREATININE-BSD FRML MDRD: >90 ML/MIN/{1.73_M2}
GLUCOSE SERPL-MCNC: 116 MG/DL (ref 70–99)
HCT VFR BLD AUTO: 48.3 % (ref 40–53)
HGB BLD-MCNC: 16.2 G/DL (ref 13.3–17.7)
IMM GRANULOCYTES # BLD: 0 10E9/L (ref 0–0.4)
IMM GRANULOCYTES NFR BLD: 0.5 %
LYMPHOCYTES # BLD AUTO: 3.7 10E9/L (ref 0.8–5.3)
LYMPHOCYTES NFR BLD AUTO: 44.1 %
MCH RBC QN AUTO: 32.3 PG (ref 26.5–33)
MCHC RBC AUTO-ENTMCNC: 33.5 G/DL (ref 31.5–36.5)
MCV RBC AUTO: 96 FL (ref 78–100)
MONOCYTES # BLD AUTO: 0.5 10E9/L (ref 0–1.3)
MONOCYTES NFR BLD AUTO: 6.3 %
NEUTROPHILS # BLD AUTO: 4 10E9/L (ref 1.6–8.3)
NEUTROPHILS NFR BLD AUTO: 48 %
NRBC # BLD AUTO: 0 10*3/UL
NRBC BLD AUTO-RTO: 0 /100
PLATELET # BLD AUTO: 289 10E9/L (ref 150–450)
POTASSIUM SERPL-SCNC: 3.8 MMOL/L (ref 3.4–5.3)
PROT SERPL-MCNC: 7 G/DL (ref 6.8–8.8)
RBC # BLD AUTO: 5.02 10E12/L (ref 4.4–5.9)
SODIUM SERPL-SCNC: 141 MMOL/L (ref 133–144)
WBC # BLD AUTO: 8.3 10E9/L (ref 4–11)

## 2021-07-04 PROCEDURE — 85025 COMPLETE CBC W/AUTO DIFF WBC: CPT | Performed by: EMERGENCY MEDICINE

## 2021-07-04 PROCEDURE — 99284 EMERGENCY DEPT VISIT MOD MDM: CPT | Performed by: EMERGENCY MEDICINE

## 2021-07-04 PROCEDURE — 99283 EMERGENCY DEPT VISIT LOW MDM: CPT | Performed by: EMERGENCY MEDICINE

## 2021-07-04 PROCEDURE — 80053 COMPREHEN METABOLIC PANEL: CPT | Performed by: EMERGENCY MEDICINE

## 2021-07-04 RX ORDER — METRONIDAZOLE 500 MG/1
500 TABLET ORAL 2 TIMES DAILY
Qty: 14 TABLET | Refills: 0 | Status: SHIPPED | OUTPATIENT
Start: 2021-07-04 | End: 2021-07-04

## 2021-07-04 RX ORDER — CIPROFLOXACIN 500 MG/1
500 TABLET, FILM COATED ORAL 2 TIMES DAILY
Qty: 14 TABLET | Refills: 0 | Status: SHIPPED | OUTPATIENT
Start: 2021-07-04 | End: 2022-04-07

## 2021-07-04 RX ORDER — METRONIDAZOLE 500 MG/1
500 TABLET ORAL 2 TIMES DAILY
Qty: 14 TABLET | Refills: 0 | Status: SHIPPED | OUTPATIENT
Start: 2021-07-04 | End: 2022-04-07

## 2021-07-04 RX ORDER — CIPROFLOXACIN 500 MG/1
500 TABLET, FILM COATED ORAL 2 TIMES DAILY
Qty: 14 TABLET | Refills: 0 | Status: SHIPPED | OUTPATIENT
Start: 2021-07-04 | End: 2021-07-04

## 2021-07-04 ASSESSMENT — MIFFLIN-ST. JEOR: SCORE: 1792.93

## 2021-07-04 NOTE — DISCHARGE INSTRUCTIONS
Take the antibiotics as prescribed.  Return to the emergency department for worsening symptoms, difficulty breathing, repeated vomiting, or other concerns.  Otherwise follow-up in clinic for recheck if not better over the next 5 to 6 days.

## 2021-07-04 NOTE — ED PROVIDER NOTES
"  History     Chief Complaint   Patient presents with     Abdominal Pain     HPI  Rigo Joyner is a 50 year old male with a history of prior diverticulitis who presents for left lower quadrant abdominal pain consistent with his prior episodes of diverticulitis.  Pain is mild to moderate, burning in the suprapubic region and left lower quadrant.  No fever, chills, nausea, vomiting, diarrhea, bloody stools, dysuria, urinary frequency, or rash.    Allergies:  No Known Allergies    Problem List:    Patient Active Problem List    Diagnosis Date Noted     Sigmoid diverticulitis 07/27/2018     Priority: Medium        Past Medical History:    Past Medical History:   Diagnosis Date     Hyperlipemia        Past Surgical History:    No past surgical history on file.    Family History:    Family History   Problem Relation Age of Onset     Coronary Artery Disease Mother      Hypertension Mother      Depression Mother      Thyroid Disease Mother      Coronary Artery Disease Father      Colon Cancer Father      Thyroid Disease Father        Social History:  Marital Status:   [2]  Social History     Tobacco Use     Smoking status: Never Smoker     Smokeless tobacco: Former User     Types: Chew   Substance Use Topics     Alcohol use: Not on file     Drug use: Not on file        Medications:    ciprofloxacin (CIPRO) 500 MG tablet  metroNIDAZOLE (FLAGYL) 500 MG tablet  aspirin (ASA) 81 MG tablet  atorvastatin (LIPITOR) 10 MG tablet  gabapentin (NEURONTIN) 300 MG capsule          Review of Systems  Pertinent positives and negatives listed in the HPI, all other systems reviewed and are negative.    Physical Exam   BP: (!) 131/96  Pulse: 72  Temp: 97.6  F (36.4  C)  Resp: 16  Height: 175.3 cm (5' 9\")  Weight: 95.3 kg (210 lb)  SpO2: 99 %      Physical Exam  Vitals signs and nursing note reviewed.   Constitutional:       General: He is in acute distress.      Appearance: He is well-developed. He is not diaphoretic.   HENT: "      Head: Normocephalic and atraumatic.      Right Ear: External ear normal.      Left Ear: External ear normal.      Nose: Nose normal.   Eyes:      General: No scleral icterus.     Conjunctiva/sclera: Conjunctivae normal.   Neck:      Musculoskeletal: Normal range of motion.   Cardiovascular:      Rate and Rhythm: Normal rate and regular rhythm.   Pulmonary:      Effort: Pulmonary effort is normal. No respiratory distress.      Breath sounds: No stridor.   Abdominal:      General: There is no distension.      Palpations: Abdomen is soft.      Tenderness: There is abdominal tenderness in the suprapubic area. There is no guarding or rebound.   Skin:     General: Skin is warm and dry.   Neurological:      Mental Status: He is alert and oriented to person, place, and time.   Psychiatric:         Behavior: Behavior normal.         ED Course        Procedures               Critical Care time:  none               Results for orders placed or performed during the hospital encounter of 07/04/21 (from the past 24 hour(s))   CBC with platelets differential   Result Value Ref Range    WBC 8.3 4.0 - 11.0 10e9/L    RBC Count 5.02 4.4 - 5.9 10e12/L    Hemoglobin 16.2 13.3 - 17.7 g/dL    Hematocrit 48.3 40.0 - 53.0 %    MCV 96 78 - 100 fl    MCH 32.3 26.5 - 33.0 pg    MCHC 33.5 31.5 - 36.5 g/dL    RDW 12.6 10.0 - 15.0 %    Platelet Count 289 150 - 450 10e9/L    Diff Method Automated Method     % Neutrophils 48.0 %    % Lymphocytes 44.1 %    % Monocytes 6.3 %    % Eosinophils 0.1 %    % Basophils 1.0 %    % Immature Granulocytes 0.5 %    Nucleated RBCs 0 0 /100    Absolute Neutrophil 4.0 1.6 - 8.3 10e9/L    Absolute Lymphocytes 3.7 0.8 - 5.3 10e9/L    Absolute Monocytes 0.5 0.0 - 1.3 10e9/L    Absolute Eosinophils 0.0 0.0 - 0.7 10e9/L    Absolute Basophils 0.1 0.0 - 0.2 10e9/L    Abs Immature Granulocytes 0.0 0 - 0.4 10e9/L    Absolute Nucleated RBC 0.0    Comprehensive metabolic panel   Result Value Ref Range    Sodium 141 133 -  144 mmol/L    Potassium 3.8 3.4 - 5.3 mmol/L    Chloride 107 94 - 109 mmol/L    Carbon Dioxide PENDING 20 - 32 mmol/L    Anion Gap PENDING 3 - 14 mmol/L    Glucose PENDING 70 - 99 mg/dL    Urea Nitrogen PENDING 7 - 30 mg/dL    Creatinine PENDING 0.66 - 1.25 mg/dL    GFR Estimate PENDING >60 mL/min/[1.73_m2]    GFR Estimate If Black PENDING >60 mL/min/[1.73_m2]    Calcium PENDING 8.5 - 10.1 mg/dL    Bilirubin Total PENDING 0.2 - 1.3 mg/dL    Albumin PENDING 3.4 - 5.0 g/dL    Protein Total PENDING 6.8 - 8.8 g/dL    Alkaline Phosphatase PENDING 40 - 150 U/L    ALT PENDING 0 - 70 U/L    AST PENDING 0 - 45 U/L       Medications - No data to display    Assessments & Plan (with Medical Decision Making)   50-year-old male who presents for suprapubic abdominal pain consistent with prior episodes of diverticulitis.  Blood pressure is 126/88, temperature is 36.4  C, heart 67, SPO2 is 99% on room air.  White blood cell count is 8.3 which is reassuring and hemoglobin 16.2.  No signs of anemia.  Abdominal exam is benign and not concerning for an acute surgical process such as obstruction or perforated diverticula or abscess.  We discussed treating with antibiotics empirically and avoiding the CT scan the patient felt comfortable with this and was started on ciprofloxacin and metronidazole as he feels the Augmentin does not treat his symptoms as effectively as this.  He was discharged with 7 days course of these antibiotics and told to return if he has worsening symptoms or other concerns, otherwise follow-up in clinic.  The patient is in agreement to this plan.    I have reviewed the nursing notes.    I have reviewed the findings, diagnosis, plan and need for follow up with the patient.       Discharge Medication List as of 7/4/2021  6:10 AM      START taking these medications    Details   ciprofloxacin (CIPRO) 500 MG tablet Take 1 tablet (500 mg) by mouth 2 times daily for 7 days, Disp-14 tablet, R-0, E-Prescribe       metroNIDAZOLE (FLAGYL) 500 MG tablet Take 1 tablet (500 mg) by mouth 2 times daily for 7 days, Disp-14 tablet, R-0, E-PrescribeEat yogurt or cottage cheese daily to prevent diarrhea that can be caused by taking this medication.             Final diagnoses:   Acute diverticulitis       7/4/2021   [facility] EMERGENCY DEPT     Darian Bai MD  07/04/21 0606

## 2021-07-04 NOTE — ED TRIAGE NOTES
Pt reports 2 day history of abdominal pain. Long standing hx of diverticulitis and pt states this feels the same as other episodes he's had. Is doctoring at [facility] and has follow up appointment July 16 th to discuss surgery.

## 2021-07-20 ENCOUNTER — APPOINTMENT (OUTPATIENT)
Dept: CT IMAGING | Facility: CLINIC | Age: 51
End: 2021-07-20
Attending: EMERGENCY MEDICINE
Payer: COMMERCIAL

## 2021-07-20 ENCOUNTER — HOSPITAL ENCOUNTER (EMERGENCY)
Facility: CLINIC | Age: 51
Discharge: HOME | End: 2021-07-20
Attending: EMERGENCY MEDICINE | Admitting: EMERGENCY MEDICINE
Payer: COMMERCIAL

## 2021-07-20 VITALS
WEIGHT: 210 LBS | HEART RATE: 73 BPM | TEMPERATURE: 97.8 F | DIASTOLIC BLOOD PRESSURE: 77 MMHG | RESPIRATION RATE: 16 BRPM | SYSTOLIC BLOOD PRESSURE: 126 MMHG | OXYGEN SATURATION: 94 % | BODY MASS INDEX: 31.01 KG/M2

## 2021-07-20 DIAGNOSIS — R10.32 ABDOMINAL PAIN, LEFT LOWER QUADRANT: ICD-10-CM

## 2021-07-20 LAB
ALBUMIN SERPL-MCNC: 4 G/DL (ref 3.4–5)
ALP SERPL-CCNC: 48 U/L (ref 40–150)
ALT SERPL W P-5'-P-CCNC: 52 U/L (ref 0–70)
ANION GAP SERPL CALCULATED.3IONS-SCNC: 2 MMOL/L (ref 3–14)
AST SERPL W P-5'-P-CCNC: 34 U/L (ref 0–45)
BASOPHILS # BLD AUTO: 0.1 10E3/UL (ref 0–0.2)
BASOPHILS NFR BLD AUTO: 1 %
BILIRUB SERPL-MCNC: 1 MG/DL (ref 0.2–1.3)
BUN SERPL-MCNC: 10 MG/DL (ref 7–30)
CALCIUM SERPL-MCNC: 8.6 MG/DL (ref 8.5–10.1)
CHLORIDE BLD-SCNC: 109 MMOL/L (ref 94–109)
CO2 SERPL-SCNC: 31 MMOL/L (ref 20–32)
CREAT SERPL-MCNC: 0.91 MG/DL (ref 0.66–1.25)
EOSINOPHIL # BLD AUTO: 0 10E3/UL (ref 0–0.7)
EOSINOPHIL NFR BLD AUTO: 0 %
ERYTHROCYTE [DISTWIDTH] IN BLOOD BY AUTOMATED COUNT: 11.9 % (ref 10–15)
GFR SERPL CREATININE-BSD FRML MDRD: >90 ML/MIN/1.73M2
GLUCOSE BLD-MCNC: 91 MG/DL (ref 70–99)
HCT VFR BLD AUTO: 44.8 % (ref 40–53)
HGB BLD-MCNC: 15.2 G/DL (ref 13.3–17.7)
IMM GRANULOCYTES # BLD: 0 10E3/UL
IMM GRANULOCYTES NFR BLD: 0 %
LYMPHOCYTES # BLD AUTO: 2.6 10E3/UL (ref 0.8–5.3)
LYMPHOCYTES NFR BLD AUTO: 48 %
MCH RBC QN AUTO: 32.4 PG (ref 26.5–33)
MCHC RBC AUTO-ENTMCNC: 33.9 G/DL (ref 31.5–36.5)
MCV RBC AUTO: 96 FL (ref 78–100)
MONOCYTES # BLD AUTO: 0.5 10E3/UL (ref 0–1.3)
MONOCYTES NFR BLD AUTO: 8 %
NEUTROPHILS # BLD AUTO: 2.4 10E3/UL (ref 1.6–8.3)
NEUTROPHILS NFR BLD AUTO: 43 %
NRBC # BLD AUTO: 0 10E3/UL
NRBC BLD AUTO-RTO: 0 /100
PLATELET # BLD AUTO: 283 10E3/UL (ref 150–450)
POTASSIUM BLD-SCNC: 3.7 MMOL/L (ref 3.4–5.3)
PROT SERPL-MCNC: 7 G/DL (ref 6.8–8.8)
RBC # BLD AUTO: 4.69 10E6/UL (ref 4.4–5.9)
SODIUM SERPL-SCNC: 142 MMOL/L (ref 133–144)
WBC # BLD AUTO: 5.6 10E3/UL (ref 4–11)

## 2021-07-20 PROCEDURE — 250N000011 HC RX IP 250 OP 636: Performed by: EMERGENCY MEDICINE

## 2021-07-20 PROCEDURE — 74177 CT ABD & PELVIS W/CONTRAST: CPT

## 2021-07-20 PROCEDURE — 36592 COLLECT BLOOD FROM PICC: CPT | Performed by: EMERGENCY MEDICINE

## 2021-07-20 PROCEDURE — 80053 COMPREHEN METABOLIC PANEL: CPT | Performed by: EMERGENCY MEDICINE

## 2021-07-20 PROCEDURE — 99284 EMERGENCY DEPT VISIT MOD MDM: CPT | Performed by: EMERGENCY MEDICINE

## 2021-07-20 PROCEDURE — 250N000009 HC RX 250: Performed by: EMERGENCY MEDICINE

## 2021-07-20 PROCEDURE — 85004 AUTOMATED DIFF WBC COUNT: CPT | Performed by: EMERGENCY MEDICINE

## 2021-07-20 PROCEDURE — 99285 EMERGENCY DEPT VISIT HI MDM: CPT | Mod: 25 | Performed by: EMERGENCY MEDICINE

## 2021-07-20 PROCEDURE — 96374 THER/PROPH/DIAG INJ IV PUSH: CPT | Mod: 59 | Performed by: EMERGENCY MEDICINE

## 2021-07-20 PROCEDURE — 36415 COLL VENOUS BLD VENIPUNCTURE: CPT | Performed by: EMERGENCY MEDICINE

## 2021-07-20 RX ORDER — ACETAMINOPHEN 500 MG
1000 TABLET ORAL EVERY 6 HOURS PRN
COMMUNITY
Start: 2020-10-01

## 2021-07-20 RX ORDER — POLYETHYLENE GLYCOL 3350 17 G/17G
POWDER, FOR SOLUTION ORAL
COMMUNITY
Start: 2021-07-16 | End: 2022-04-07

## 2021-07-20 RX ORDER — NEOMYCIN SULFATE 500 MG/1
TABLET ORAL
COMMUNITY
Start: 2021-07-16 | End: 2022-04-07

## 2021-07-20 RX ORDER — ONDANSETRON 4 MG/1
TABLET, FILM COATED ORAL
COMMUNITY
Start: 2021-07-16 | End: 2022-04-07

## 2021-07-20 RX ORDER — BISACODYL 5 MG
TABLET, DELAYED RELEASE (ENTERIC COATED) ORAL
COMMUNITY
Start: 2021-07-16 | End: 2022-04-07

## 2021-07-20 RX ORDER — IOPAMIDOL 755 MG/ML
100 INJECTION, SOLUTION INTRAVASCULAR ONCE
Status: COMPLETED | OUTPATIENT
Start: 2021-07-20 | End: 2021-07-20

## 2021-07-20 RX ORDER — FLUTICASONE PROPIONATE 50 MCG
1 SPRAY, SUSPENSION (ML) NASAL DAILY PRN
COMMUNITY

## 2021-07-20 RX ORDER — ONDANSETRON 2 MG/ML
4 INJECTION INTRAMUSCULAR; INTRAVENOUS ONCE
Status: COMPLETED | OUTPATIENT
Start: 2021-07-20 | End: 2021-07-20

## 2021-07-20 RX ADMIN — SODIUM CHLORIDE 75 ML: 9 INJECTION, SOLUTION INTRAVENOUS at 19:17

## 2021-07-20 RX ADMIN — ONDANSETRON 4 MG: 2 INJECTION INTRAMUSCULAR; INTRAVENOUS at 19:10

## 2021-07-20 RX ADMIN — IOPAMIDOL 100 ML: 755 INJECTION, SOLUTION INTRAVENOUS at 19:16

## 2021-07-20 ASSESSMENT — ENCOUNTER SYMPTOMS
NAUSEA: 1
SORE THROAT: 0
DIARRHEA: 0
MYALGIAS: 0
SHORTNESS OF BREATH: 0
ABDOMINAL PAIN: 1
DYSURIA: 0
FEVER: 0
COUGH: 0
CHILLS: 0
VOMITING: 0

## 2021-07-20 NOTE — ED PROVIDER NOTES
History     Chief Complaint   Patient presents with     Abdominal Pain     diverticulitis flare     HPI  Rigo Joyner is a 50 year old male with history significant for diverticulitis, hemorrhoids, status post laparoscopic cholecystectomy, who presents emergency department for evaluation of abdominal pain.  Patient says his pain in his left lower quadrant feels similar to prior flares of diverticulitis.  Patient was treated with Cipro and Flagyl in early June of this year and most recently on the fourth of this month.  With a 7-day course of Cipro and Flagyl.  He had an appointment with a surgeon at the [facility] with plans for a bowel resection this coming month who restarted him on a 7-day course of Cipro and Flagyl.  Patient says his symptoms have not really worsened but they have not improved.  He is concerned because the event lingering that something more sinister may be going on.  He declined CT scans on his last 2 emergency department visits due to concern for radiation as well as expense.  Denies any fevers, chills, vomiting or diarrhea.  Occasionally has nausea.  Pain is described as a burning, nonradiating, mild to moderate severity, seems to be worse in the morning and sometimes improves with bowel movement.    The patient's PMHx, Surgical Hx, Allergies, and Medications were all reviewed with the patient.    Allergies:  No Known Allergies    Problem List:    Patient Active Problem List    Diagnosis Date Noted     Sigmoid diverticulitis 07/27/2018     Priority: Medium        Past Medical History:    Past Medical History:   Diagnosis Date     Hyperlipemia        Past Surgical History:    No past surgical history on file.    Family History:    Family History   Problem Relation Age of Onset     Coronary Artery Disease Mother      Hypertension Mother      Depression Mother      Thyroid Disease Mother      Coronary Artery Disease Father      Colon Cancer Father      Thyroid Disease Father         Social History:  Marital Status:   [2]  Social History     Tobacco Use     Smoking status: Never Smoker     Smokeless tobacco: Former User     Types: Chew   Substance Use Topics     Alcohol use: Not on file     Drug use: Not on file        Medications:    acetaminophen (TYLENOL) 500 MG tablet  aspirin (ASA) 81 MG tablet  atorvastatin (LIPITOR) 10 MG tablet  ciprofloxacin (CIPRO) 500 MG tablet  fluticasone (FLONASE) 50 MCG/ACT nasal spray  metroNIDAZOLE (FLAGYL) 500 MG tablet  psyllium (METAMUCIL/KONSYL) 58.6 % powder  bisacodyl (DULCOLAX) 5 MG EC tablet  neomycin (MYCIFRADIN) 500 MG tablet  ondansetron (ZOFRAN) 4 MG tablet  polyethylene glycol (MIRALAX) 17 GM/Dose powder          Review of Systems   Constitutional: Negative for chills and fever.   HENT: Negative for sore throat.    Eyes: Negative for visual disturbance.   Respiratory: Negative for cough and shortness of breath.    Cardiovascular: Negative for chest pain.   Gastrointestinal: Positive for abdominal pain and nausea. Negative for diarrhea and vomiting.   Genitourinary: Negative for dysuria.   Musculoskeletal: Negative for myalgias.   Skin: Negative for rash.   Neurological: Negative for syncope.       Physical Exam   BP: 119/71  Pulse: 86  Temp: 97.8  F (36.6  C)  Resp: 16  Weight: 95.3 kg (210 lb)  SpO2: 97 %    Physical Exam  GEN: Awake, alert, and cooperative.  Appears distressed but nontoxic  HENT: MMM. External ears and nose normal bilaterally.  EYES: EOM intact. Conjunctiva clear. No discharge.   NECK: Symmetric, freely mobile.   CV : Regular rate and rhythm.  PULM: Normal effort. No wheezes, rales, or rhonchi bilaterally.  ABD: Soft nondistended.  Mild left lower quadrant tenderness to palpation.  No involuntary guarding or rebound tenderness.  NEURO: Normal speech. Following commands. CN II-XII grossly intact. Answering questions and interacting appropriately.   EXT: No gross deformity. Warm and well perfused.  INT: Warm. No  diaphoresis. Normal color.        ED Course        Procedures          Critical Care time:  none               Results for orders placed or performed during the hospital encounter of 07/20/21 (from the past 24 hour(s))   CBC with platelets differential    Narrative    The following orders were created for panel order CBC with platelets differential.  Procedure                               Abnormality         Status                     ---------                               -----------         ------                     CBC with platelets and d...[[id number]]                      Final result                 Please view results for these tests on the individual orders.   Comprehensive metabolic panel   Result Value Ref Range    Sodium 142 133 - 144 mmol/L    Potassium 3.7 3.4 - 5.3 mmol/L    Chloride 109 94 - 109 mmol/L    Carbon Dioxide (CO2) 31 20 - 32 mmol/L    Anion Gap 2 (L) 3 - 14 mmol/L    Urea Nitrogen 10 7 - 30 mg/dL    Creatinine 0.91 0.66 - 1.25 mg/dL    Calcium 8.6 8.5 - 10.1 mg/dL    Glucose 91 70 - 99 mg/dL    Alkaline Phosphatase 48 40 - 150 U/L    AST 34 0 - 45 U/L    ALT 52 0 - 70 U/L    Protein Total 7.0 6.8 - 8.8 g/dL    Albumin 4.0 3.4 - 5.0 g/dL    Bilirubin Total 1.0 0.2 - 1.3 mg/dL    GFR Estimate >90 >60 mL/min/1.73m2   CBC with platelets and differential   Result Value Ref Range    WBC Count 5.6 4.0 - 11.0 10e3/uL    RBC Count 4.69 4.40 - 5.90 10e6/uL    Hemoglobin 15.2 13.3 - 17.7 g/dL    Hematocrit 44.8 40.0 - 53.0 %    MCV 96 78 - 100 fL    MCH 32.4 26.5 - 33.0 pg    MCHC 33.9 31.5 - 36.5 g/dL    RDW 11.9 10.0 - 15.0 %    Platelet Count 283 150 - 450 10e3/uL    % Neutrophils 43 %    % Lymphocytes 48 %    % Monocytes 8 %    % Eosinophils 0 %    % Basophils 1 %    % Immature Granulocytes 0 %    NRBCs per 100 WBC 0 <1 /100    Absolute Neutrophils 2.4 1.6 - 8.3 10e3/uL    Absolute Lymphocytes 2.6 0.8 - 5.3 10e3/uL    Absolute Monocytes 0.5 0.0 - 1.3 10e3/uL    Absolute Eosinophils 0.0 0.0 -  0.7 10e3/uL    Absolute Basophils 0.1 0.0 - 0.2 10e3/uL    Absolute Immature Granulocytes 0.0 <=0.0 10e3/uL    Absolute NRBCs 0.0 10e3/uL   CT Abdomen Pelvis w Contrast    Narrative    EXAM: CT ABDOMEN PELVIS W CONTRAST  LOCATION: [facility]   DATE/TIME: 7/20/2021 7:14 PM    INDICATION: Left lower quadrant pain.  COMPARISON: 01/07/2021 and older studies.  TECHNIQUE: CT scan of the abdomen and pelvis was performed following injection of IV contrast. Multiplanar reformats were obtained. Dose reduction techniques were used.  CONTRAST: 100mL Isovue-370    FINDINGS:   LOWER CHEST: Minimal dependent density.    HEPATOBILIARY: Prior cholecystectomy. Mild, diffuse fatty infiltration of the liver.    PANCREAS: Normal.    SPLEEN: Normal.    ADRENAL GLANDS: Normal.    KIDNEYS/BLADDER: Normal.    BOWEL: Redundant colon with large stool burden in the right and transverse colon. Scattered descending and sigmoid diverticuli. No inflammatory changes or free fluid. Previously noted inflammation about the proximal sigmoid colon has resolved. Appendix   is normal.    LYMPH NODES: Normal.    VASCULATURE: Circumaortic left renal vein.    PELVIC ORGANS: Normal.    MUSCULOSKELETAL: There is a very small umbilical and left inguinal hernias containing only fat.5 L3-L4 posterior lumbar spinal fusion apparatus.      Impression    IMPRESSION:   1.  No abnormalities are seen to explain pain.  2.  Interval resolution of the previously noted mild diverticulitis.  3.  Scattered, distal colonic diverticulosis.  4.  Hepatic steatosis.  5.  Prior cholecystectomy.       Medications   ondansetron (ZOFRAN) injection 4 mg (4 mg Intravenous Given 7/20/21 1910)   iopamidol (ISOVUE-370) solution 100 mL (100 mLs Intravenous Given 7/20/21 1916)   sodium chloride 0.9 % bag 500mL for CT scan flush use (75 mLs As instructed Given 7/20/21 1917)       Assessments & Plan (with Medical Decision Making)   50 year old male with past medical  history significant for sigmoid diverticulitis, with continued LLQ discomfort in setting of recent treatment with Cipro/Flagyl.  Patient has been on multiple courses of antibiotics over the past few months.  No reported acute change in pain but rather symptoms not completely resolving.  Has not had CT scan on most recent ED admissions as he is concerned about radiation.  Examination today with reassuring abdominal exam and abdomen was nontender.  Patient is very concerned about complication related to his diverticular disease.  Lab work was obtained and CBCs were normal.  CMP was grossly normal.  CT scan of abdomen and pelvis without abnormalities to explain his pain.  There is interval resolution of previously noted mild diverticulitis.  Scattered distal colonic diverticulosis, hepatic steatosis and prior cholecystectomy.  Patient was offered pain medication on the emergency department however declined.  He did request Zofran prior to the CT scan as he says the contrast sometimes makes him feel nauseated.  He tolerated the scan without difficulty.  There was a very small per umbilical hernia and left inguinal hernias containing only fat noted.  Unlikely to be the cause of his symptoms.  This incidental finding was mentioned to him.  He did have specific questions related to management which I deferred to his surgeon.    Given his reassuring evaluation, I feel he is appropriate to discharge the emergency department.  He notes that he has seen a surgeon at the [facility] who is planning on a partial colectomy.  I am unsure of the specific details.  I recommend that he follows up with his physician at the [facility] as previously planned.  ED return precautions discussed.    I have reviewed the nursing notes.         New Prescriptions    No medications on file       Final diagnoses:   Abdominal pain, left lower quadrant     Joseph Cabral MD    7/20/2021   [facility] EMERGENCY DEPT    Disclaimer:  This note consists of words and symbols derived from keyboarding and dictation using voice recognition software.  As a result, there may be errors that have gone undetected.  Please consider this when interpreting information found in this note.             Joseph Cabral MD  07/22/21 0906

## 2021-07-21 NOTE — DISCHARGE INSTRUCTIONS
Your CT scan was reassuring and no signs of acute diverticulitis or complications of diverticulitis.    Keep your existing appointment with the [facility] for follow-up.    If you develop worsening pain, persistent nausea vomiting, fever, or other new concerning symptoms, you should return to the emergency department for further evaluation and treatment.

## 2021-09-13 ENCOUNTER — TRANSFERRED RECORDS (OUTPATIENT)
Dept: HEALTH INFORMATION MANAGEMENT | Facility: CLINIC | Age: 51
End: 2021-09-13
Payer: COMMERCIAL

## 2021-09-19 ENCOUNTER — HEALTH MAINTENANCE LETTER (OUTPATIENT)
Age: 51
End: 2021-09-19

## 2021-09-29 ENCOUNTER — TRANSFERRED RECORDS (OUTPATIENT)
Dept: HEALTH INFORMATION MANAGEMENT | Facility: CLINIC | Age: 51
End: 2021-09-29
Payer: COMMERCIAL

## 2021-11-08 ENCOUNTER — TRANSFERRED RECORDS (OUTPATIENT)
Dept: HEALTH INFORMATION MANAGEMENT | Facility: CLINIC | Age: 51
End: 2021-11-08
Payer: COMMERCIAL

## 2021-12-09 ENCOUNTER — TRANSFERRED RECORDS (OUTPATIENT)
Dept: HEALTH INFORMATION MANAGEMENT | Facility: CLINIC | Age: 51
End: 2021-12-09
Payer: COMMERCIAL

## 2021-12-14 ENCOUNTER — TRANSFERRED RECORDS (OUTPATIENT)
Dept: HEALTH INFORMATION MANAGEMENT | Facility: CLINIC | Age: 51
End: 2021-12-14
Payer: COMMERCIAL

## 2022-01-08 ENCOUNTER — HEALTH MAINTENANCE LETTER (OUTPATIENT)
Age: 52
End: 2022-01-08

## 2022-04-04 ENCOUNTER — MEDICAL CORRESPONDENCE (OUTPATIENT)
Dept: NEUROSURGERY | Facility: CLINIC | Age: 52
End: 2022-04-04
Payer: COMMERCIAL

## 2022-04-07 ENCOUNTER — LAB (OUTPATIENT)
Dept: LAB | Facility: HOSPITAL | Age: 52
End: 2022-04-07
Payer: COMMERCIAL

## 2022-04-07 ENCOUNTER — OFFICE VISIT (OUTPATIENT)
Dept: NEUROSURGERY | Facility: CLINIC | Age: 52
End: 2022-04-07
Payer: COMMERCIAL

## 2022-04-07 ENCOUNTER — MYC MEDICAL ADVICE (OUTPATIENT)
Dept: NEUROSURGERY | Facility: CLINIC | Age: 52
End: 2022-04-07

## 2022-04-07 VITALS
WEIGHT: 210 LBS | DIASTOLIC BLOOD PRESSURE: 52 MMHG | HEIGHT: 69 IN | OXYGEN SATURATION: 94 % | BODY MASS INDEX: 31.1 KG/M2 | HEART RATE: 63 BPM | SYSTOLIC BLOOD PRESSURE: 146 MMHG

## 2022-04-07 DIAGNOSIS — M54.12 CERVICAL RADICULOPATHY: Primary | ICD-10-CM

## 2022-04-07 DIAGNOSIS — M54.12 BRACHIAL NEURITIS: Primary | ICD-10-CM

## 2022-04-07 PROCEDURE — 99204 OFFICE O/P NEW MOD 45 MIN: CPT | Performed by: SURGERY

## 2022-04-07 PROCEDURE — 82306 VITAMIN D 25 HYDROXY: CPT

## 2022-04-07 PROCEDURE — 36415 COLL VENOUS BLD VENIPUNCTURE: CPT

## 2022-04-07 RX ORDER — DULOXETIN HYDROCHLORIDE 60 MG/1
60 CAPSULE, DELAYED RELEASE ORAL DAILY
COMMUNITY
End: 2022-06-02

## 2022-04-07 RX ORDER — PREGABALIN 50 MG/1
50 CAPSULE ORAL 3 TIMES DAILY
COMMUNITY
End: 2022-05-19

## 2022-04-07 NOTE — LETTER
4/7/2022         RE: Rigo Joyner  [id number] [id number]        Dear Colleague,    Thank you for referring your patient, Rigo Joyner, to the [facility]. Please see a copy of my visit note below.    Error duplicate      NEUROSURGERY CONSULTATION NOTE    CONSULTATION ASSESSMENT AND PLAN:    50 yo male who has a h/o L3-4 instrumented fusion 1 year ago for spondylolisthesis with Dr Tobias with ongoing anterior thigh pain and to a lesser extent low back pain.  Reviewed patient's lumbar CT which shows no interbody fusion as well as no posterior lateral bone fusion at his level of his previous instrumented fusion.  He also did not appear to have any significant residual stenosis at this level.  Recommend vitamin D levels as well as a DEXA given failed fusion.  Also recommend starting a bone stimulator. Consider lower extremity EMG if no relief.     In regards to his cervical spine, upper extremity EMG of his left arm showed a mild left carpal tunnel syndrome without any significant cervical radiculopathy.  Given the pattern of his upper extremity symptoms difficult to say if it is the C5, C6, and/or C7 nerve root involved.  Imaging of his cervical spine shows severe foraminal narrowing on the left at cervical 4-5, cervical 5-6, and cervical 6-7.  Recommend diagnostic and potentially therapeutic left cervical 4-5 followed by left cervical 6-7 transforaminal epidural steroid injection.  He will follow up after transforaminal epidural steroid injection. We discussed possible surgery pending result of TFESI.     I spent more than 45 minutes in this apt, examining the pt, reviewing the scans, reviewing notes from chart, discussing treatment options with risks and benefits and coordinating care.     Sandrita Purdy MD      HPI:  50 yo male who has a h/o L3-4 instrumented fusion 1 year ago for spondylolisthesis with Dr Tobias. Leg pain came back in  anterior thigh pain L>R. Currently less back pain. Denies leg weakness, bowel or bladder dysfunction, gait imbalance, paresthesias in lower extremity.     He also more recently developed neck and left arm symptoms. Shoulder pain on left which radiates down his arm to his wrist. [facility] and then EMG. No significant relief with epidural. Also has started physical therapy which has improved his neck stiffness but not his radicular am pain. Sporadic numbness and tingling down the arm as well. No arm weakness currently. Recently started a trial of lyrica. On Cymbalta.      Past medical history:  BPH without obstruction/lower urinary tract symptoms     Diverticulitis     HLD (hyperlipidemia)     Lower back injury     No Significant Past Medical History     Osteoarthritis     Past surgical history:    HIP ARTHROSCOPY W/ LABRAL DEBRIDEMENT Left     hip labral tear Right 11/2021     LAPAROSCOPIC CHOLECYSTECTOMY 11/01/2018     LUMBAR FUSION 09/29/2020   L3-4     RIGHT COLECTOMY 2021       REVIEW OF SYSTEMS:  Denies history of a anesthetic reactions.     MEDICATIONS:  Current Outpatient Medications   Medication Sig Dispense Refill     acetaminophen (TYLENOL) 500 MG tablet Take 500 mg by mouth every 6 hours as needed       aspirin (ASA) 81 MG tablet Take 81 mg by mouth daily        atorvastatin (LIPITOR) 10 MG tablet Take 10 mg by mouth daily        DULoxetine (CYMBALTA) 60 MG capsule Take 60 mg by mouth daily       fluticasone (FLONASE) 50 MCG/ACT nasal spray Spray 1 spray in nostril daily as needed       pregabalin (LYRICA) 50 MG capsule Take 50 mg by mouth 3 times daily       psyllium (METAMUCIL/KONSYL) 58.6 % powder Take 2 Tablespoonful by mouth daily           ALLERGIES/SENSITIVITIES:     No Known Allergies    PERTINENT SOCIAL HISTORY:   Social History     Socioeconomic History     Marital status:      Spouse name: None     Number of children: None     Years of education: None  "    Highest education level: None   Occupational History     None   Tobacco Use     Smoking status: Never Smoker     Smokeless tobacco: Former User     Types: Chew   Substance and Sexual Activity     Alcohol use: Never     Drug use: Never     Sexual activity: None   Other Topics Concern     Parent/sibling w/ CABG, MI or angioplasty before 65F 55M? Not Asked   Social History Narrative     None     Social Determinants of Health     Financial Resource Strain: Not on file   Food Insecurity: Not on file   Transportation Needs: Not on file   Physical Activity: Not on file   Stress: Not on file   Social Connections: Not on file   Intimate Partner Violence: Not on file   Housing Stability: Not on file         FAMILY HISTORY:  Family History   Problem Relation Age of Onset     Coronary Artery Disease Mother      Hypertension Mother      Depression Mother      Thyroid Disease Mother      Coronary Artery Disease Father      Colon Cancer Father      Thyroid Disease Father         PHYSICAL EXAM:   Constitutional: BP (!) 146/52   Pulse 63   Ht 5' 9\" (1.753 m)   Wt 210 lb (95.3 kg)   SpO2 94%   BMI 31.01 kg/m       Mental Status: A & O in no acute distress.  Affect is appropriate.  Speech is fluent.  Recent and remote memory are intact.  Attention span and concentration are normal.     Motor: Normal bulk and tone all muscle groups of upper and lower extremities.    Strength: full strength x 4      Sensory: Sensation intact bilaterally to light touch.     Coordination:  Heel/toe/tandem gait intact.  Normal gait and station.     Reflexes: supinator, biceps, triceps, knee/ ankle jerk intact. No martinez's/babinski/ clonus.    IMAGING:  I personally reviewed all radiographic images         Cc:   Semmler, Steven Duane               Again, thank you for allowing me to participate in the care of your patient.        Sincerely,        Sandrita Purdy MD    "

## 2022-04-07 NOTE — NURSING NOTE
Neurosurgery consultation was requested by:    Pain: neck   Radicular Pain is present: neck and left arm   Lhermitte sign: no   Motor complaints: left arm   Sensory complaints: no   Gait and balance issues: no   Bowel or bladder issues: no   Duration of SX is: September of last year   The symptoms are worse with: movement and regular activities   The symptoms are better with: heat ice and medication   Injury: no   Severity is: mild   Patient has tried the following conservative measures: injection and pt   NDI score is : 44%  Julio Cesar Davies MA

## 2022-04-08 DIAGNOSIS — S32.009K PSEUDOARTHROSIS OF LUMBAR SPINE: Primary | ICD-10-CM

## 2022-04-08 LAB — DEPRECATED CALCIDIOL+CALCIFEROL SERPL-MC: 17 UG/L (ref 20–75)

## 2022-04-11 ENCOUNTER — TELEPHONE (OUTPATIENT)
Dept: NEUROSURGERY | Facility: CLINIC | Age: 52
End: 2022-04-11
Payer: COMMERCIAL

## 2022-04-11 DIAGNOSIS — S32.009K PSEUDOARTHROSIS OF LUMBAR SPINE: Primary | ICD-10-CM

## 2022-04-11 NOTE — PROGRESS NOTES
NEUROSURGERY CONSULTATION NOTE    CONSULTATION ASSESSMENT AND PLAN:    50 yo male who has a h/o L3-4 instrumented fusion 1 year ago for spondylolisthesis with Dr Tobias with ongoing anterior thigh pain and to a lesser extent low back pain.  Reviewed patient's lumbar CT which shows no interbody fusion as well as no posterior lateral bone fusion at his level of his previous instrumented fusion.  He also did not appear to have any significant residual stenosis at this level.  Recommend vitamin D levels as well as a DEXA given failed fusion.  Also recommend starting a bone stimulator. Consider lower extremity EMG if no relief.     In regards to his cervical spine, upper extremity EMG of his left arm showed a mild left carpal tunnel syndrome without any significant cervical radiculopathy.  Given the pattern of his upper extremity symptoms difficult to say if it is the C5, C6, and/or C7 nerve root involved.  Imaging of his cervical spine shows severe foraminal narrowing on the left at cervical 4-5, cervical 5-6, and cervical 6-7.  Recommend diagnostic and potentially therapeutic left cervical 4-5 followed by left cervical 6-7 transforaminal epidural steroid injection.  He will follow up after transforaminal epidural steroid injection. We discussed possible surgery pending result of TFESI.     I spent more than 45 minutes in this apt, examining the pt, reviewing the scans, reviewing notes from chart, discussing treatment options with risks and benefits and coordinating care.     Sandrita Purdy MD      HPI:  50 yo male who has a h/o L3-4 instrumented fusion 1 year ago for spondylolisthesis with Dr Tobias. Leg pain came back in anterior thigh pain L>R. Currently less back pain. Denies leg weakness, bowel or bladder dysfunction, gait imbalance, paresthesias in lower extremity.     He also more recently developed neck and left arm symptoms. Shoulder pain on left which radiates down his arm to his wrist. He underwent an  [facility] at C7-T1 and then EMG. No significant relief with epidural. Also has started physical therapy which has improved his neck stiffness but not his radicular am pain. Sporadic numbness and tingling down the arm as well. No arm weakness currently. Recently started a trial of lyrica. On Cymbalta.      Past medical history:  BPH without obstruction/lower urinary tract symptoms     Diverticulitis     HLD (hyperlipidemia)     Lower back injury     No Significant Past Medical History     Osteoarthritis     Past surgical history:    HIP ARTHROSCOPY W/ LABRAL DEBRIDEMENT Left     hip labral tear Right 11/2021     LAPAROSCOPIC CHOLECYSTECTOMY 11/01/2018     LUMBAR FUSION 09/29/2020   L3-4     RIGHT COLECTOMY 2021       REVIEW OF SYSTEMS:  Denies history of a anesthetic reactions.     MEDICATIONS:  Current Outpatient Medications   Medication Sig Dispense Refill     acetaminophen (TYLENOL) 500 MG tablet Take 500 mg by mouth every 6 hours as needed       aspirin (ASA) 81 MG tablet Take 81 mg by mouth daily        atorvastatin (LIPITOR) 10 MG tablet Take 10 mg by mouth daily        DULoxetine (CYMBALTA) 60 MG capsule Take 60 mg by mouth daily       fluticasone (FLONASE) 50 MCG/ACT nasal spray Spray 1 spray in nostril daily as needed       pregabalin (LYRICA) 50 MG capsule Take 50 mg by mouth 3 times daily       psyllium (METAMUCIL/KONSYL) 58.6 % powder Take 2 Tablespoonful by mouth daily           ALLERGIES/SENSITIVITIES:     No Known Allergies    PERTINENT SOCIAL HISTORY:   Social History     Socioeconomic History     Marital status:      Spouse name: None     Number of children: None     Years of education: None     Highest education level: None   Occupational History     None   Tobacco Use     Smoking status: Never Smoker     Smokeless tobacco: Former User     Types: Chew   Substance and Sexual Activity     Alcohol use: Never     Drug use: Never     Sexual activity: None   Other Topics Concern      "Parent/sibling w/ CABG, MI or angioplasty before 65F 55M? Not Asked   Social History Narrative     None     Social Determinants of Health     Financial Resource Strain: Not on file   Food Insecurity: Not on file   Transportation Needs: Not on file   Physical Activity: Not on file   Stress: Not on file   Social Connections: Not on file   Intimate Partner Violence: Not on file   Housing Stability: Not on file         FAMILY HISTORY:  Family History   Problem Relation Age of Onset     Coronary Artery Disease Mother      Hypertension Mother      Depression Mother      Thyroid Disease Mother      Coronary Artery Disease Father      Colon Cancer Father      Thyroid Disease Father         PHYSICAL EXAM:   Constitutional: BP (!) 146/52   Pulse 63   Ht 5' 9\" (1.753 m)   Wt 210 lb (95.3 kg)   SpO2 94%   BMI 31.01 kg/m       Mental Status: A & O in no acute distress.  Affect is appropriate.  Speech is fluent.  Recent and remote memory are intact.  Attention span and concentration are normal.     Motor: Normal bulk and tone all muscle groups of upper and lower extremities.    Strength: full strength x 4      Sensory: Sensation intact bilaterally to light touch.     Coordination:  Heel/toe/tandem gait intact.  Normal gait and station.     Reflexes: supinator, biceps, triceps, knee/ ankle jerk intact. No martinez's/babinski/ clonus.    IMAGING:  I personally reviewed all radiographic images         Cc:   Semmler, Steven Duane           "

## 2022-04-11 NOTE — TELEPHONE ENCOUNTER
Called and let Ari know that his vitamin D level came back 17 which is very low.  He has started taking Vit D3 and he will do a Vit D recheck after he is supplemented.  Mira Rios RN, CNRN

## 2022-04-13 NOTE — PROGRESS NOTES
ASSESSMENT: Rigo Joyner is a 51 year old male with past medical history significant for BPH, diverticulitis, hyperlipidemia who presents today for new patient evaluation of 2 areas of pain.  1.  Left neck pain with radiation into the left upper extremity with associated numbness and tingling.  My review of an MRI cervical spine from [facility] dated September 29, 2021 shows multilevel foraminal stenosis which is moderate to severe bilaterally C6-7, severe bilaterally C5-6, moderate to severe right and severe left C4-5. EMG left upper extremity showed mild left carpal tunnel syndrome and no radiculopathy.  Patient had a C7-T1 interlaminar epidural steroid injection November 8, 2021 with minimal improvement.  He is scheduled for a left C4-5 transforaminal epidural steroid injection tomorrow and then a left C6-7 transforaminal epidural steroid injection May 5, 2022 which was recommended by Dr. Purdy.  Plan is for consideration for disc replacement surgery.  2.  Chronic left low back pain with radiation into the left greater than right lower extremity with associated numbness and tingling patient has a history of a left L3-4 instrumented fusion September 2020 with Dr. Tobias.  He has ongoing neuropathic pain down the legs.  My review of the CT lumbar spine from [facility] dated December 9, 2021 shows interbody and instrumented posterior fusion L3-4.  There is no bridging bone seen anteriorly and no posterior fusion mass.  No evidence of hardware loosening.  At L5-S1 there is disc degeneration with a right paracentral disc herniation abutting the traversing S1 nerve roots.  At L4-5 there is a disc bulge without neural impingement.  Leg pain currently follows an L3/L4 pattern.  He had an EMG left lower extremity was normal.  Patient has seen Dr. Purdy who recommended a bone growth stimulator and possible redo fusion if fusion fails.  -Patient is neurologically intact on exam.      PLAN:  A shared  decision making model was used.  The patient's values and choices were respected.  The following represents what was discussed and decided upon by the physician assistant and the patient.      1.  DIAGNOSTIC TESTS:    -I reviewed the CT lumbar spine [facility] December 9, 2021.  -I reviewed the CT cervical spine [facility] December 9, 2021.  -I reviewed the MRI cervical spine from [facility] September 29, 2021.  -I reviewed the EMG left upper extremity [facility] September 13, 2021.  -I also reviewed the report of the MRI right hip from [facility] dated July 3, 2021 which shows a labral tear.  -I reviewed the EMG left lower extremity from [facility] from December 14, 2021 which was normal.    2.  PHYSICAL THERAPY: Patient has had extensive physical therapy.  He completed physical therapy for his neck in January 2022 at [facility].  He completed physical therapy for his lower back in 2021 at [facility].  He does home exercises.    3.  MEDICATIONS:    -Patient had been taking Lyrica 50 mg twice daily with no improvement.  He is weaning off of the medication and can stop it.  He does not wish to pursue a higher dose of Lyrica due to concern for side effects.  -I did prescribe gabapentin.  He had tried this for short period of time postoperatively but he states his nerve pain was actually not as severe then so he stopped taking it.  I provided a prescription for 300 mg titrating up to 900 mg 3 times daily.  -Patient will continue on Cymbalta 60 mg daily for now.  This is somewhat helpful.  We could titrate his dose up to 120 mg if needed.  -Patient takes Tylenol and ibuprofen which are somewhat helpful.  -Patient indicated he would like to avoid opiates.    4.  INTERVENTIONS:    -Patient is scheduled for a left C4-5 transforaminal epidural steroid injection April 15, 2022.  -Patient scheduled for left C6-7 transforaminal epidural steroid injection May 5, 2022.    -In regards to  the low back pain we could consider bilateral L3-4 transforaminal epidural steroid injections.   I would want to discuss this with Dr. Purdy first to make sure she is comfortable with him getting epidural steroid injections while he is using a bone growth stimulator.    -Patient had a C7-T1 interlaminar epidural steroid injection November 8, 2021 at [facility] with slight improvement.  -Patient had a left carpal tunnel injection at [facility] with only 2 weeks of relief.  -Patient had left shoulder injections x2 at [facility] with temporary improvement.      -Patient had bilateral L4-5 transforaminal epidural steroid injections March 16, 2022 at [facility] With no improvement.  -Patient had bilateral L5-S1 transforaminal epidural steroid injections March 31, 2022 at [facility] with no improvement.    5.  PATIENT EDUCATION: Patient is in agreement the above plan.  All questions were answered.    6.  FOLLOW-UP:   Patient will follow up with me in 4 weeks to see how he is responding to the gabapentin.  If he has questions or concerns in the meantime, he should not hesitate to call.        SUBJECTIVE:  Rigo Joyner  Is a 51 year old male who presents today for new patient evaluation of neck pain with radiation into the left upper extremity and low back pain with radiation into left groin right lower extremity.    He has had a long history of intermittent pain in his left outer hip starting in his 20s when he was in the . Following a Worker's Compensation injury in 2019 while working as a  he had severe pain down the anterior left thigh. After lengthy evaluation he had left hip labral tear surgery in 2019. He felt this helped his hip pain some. Unfortunately he noticed increasing pain in his lower back and was seen by Dr. Tobias at [facility]. After failing to get relief through conservative treatments he underwent a posterior interbody fusion at L3-4 in September  2020. This did help his back pain quite a bit, unfortunately more pain into the entirety of the left leg slowly crept back up in about 1 year after surgery he began to experience pain in the right leg as well.  He was found to have a labral tear in the right hip and underwent right hip surgery in November 2021 which helped somewhat with the right hip pain.  Unfortunately, low back and bilateral leg pain persists.  He was seen at [place] [facility] and had bilateral L4-5 transforaminal epidural steroid injections followed by bilateral L5-S1 transforaminal epidural steroid injections last month with no improvement.  He just saw Dr. Purdy to get another opinion on his lumbar spine.  She states that he was the first doctor who told him he has pseudoarthrosis.  She has recommended bone growth stimulator.    Patient planes of left low back pain.  He states he gets a throbbing pain across the low back.  The left-sided low back feels sore.  Sometimes he gets numbness on the left low back and buttock.  He then describes nerve type pain that radiates down the anterior thighs to the knees.  On the left sometimes it extends into the anterior shin.  He states it feels like pins-and-needles and burning.  He denies weakness.  Back pain is aggravated with standing, walking, increase activity.  Lying flat helps alleviate the pain somewhat but he states the pain is constant.  He denies loss of bowel or bladder control.  Denies recent fevers.    Additionally he has into his left arm that he has noticed while preparing for his lumbar spine surgery. He admits to doing substantial yard work and having left shoulder pain just prior to his lumbar fusion. The shoulder pain settled down then developed pain in his left wrist in July 2021. He is seen both shoulder orthopedics and an specialist through [facility]. EMG testing pleated showed a mild amount of carpal tunnel syndrome.  He had a left carpal tunnel injection which only  "provided relief for about 2 weeks.  He had 2 left shoulder injections with temporary relief of his pain.  Due to the ongoing pain an MRI cervical spine was obtained and it showed multilevel foraminal stenosis.  He underwent a C7-T1 interlaminar epidural steroid injection November 8, 2021 which only \"took the edge off.\"  Due to the ongoing pain he was seen by a surgeon.  He was initially offered a 3 level fusion but did not feel comfortable with that plan so he sought out a second opinion.  He saw Dr. Salas at [facility] who recommended two-level disc replacement at C5-6 and C6-7.  He got a third opinion from Dr. Purdy who recommended trying transforaminal epidural steroid injections at C4-5 and C6-7 first.  He plans to pursue this.    Patient complains of left neck pain.  Pain radiates to left shoulder, down the left medial bicep area and into the left ulnar forearm, stopping at the wrist.  He has numbness and tingling in the left forearm.  He denies weakness.  Neck pain is aggravated movement and alleviated with lying flat.    Patient completed physical therapy for his neck January 2022.  He completed physical therapy for his lower back early 2021.  He still does home exercises but feels that some of the exercises exacerbate his pain.  He has had chiropractic treatment.  He just had his last chiropractic treatment yesterday which made his pain worse so he does not plan to return.  Please see above for injection history.  He is currently taking Lyrica 50 mg daily.  He had tried 50 mg twice daily and it was not helpful so he wanted to wean off the medication.  He was concerned about side effects at higher doses.  He is taking Cymbalta 60 mg daily which helps somewhat.  He takes Tylenol and ibuprofen which are helpful.        Current Outpatient Medications   Medication     acetaminophen (TYLENOL) 500 MG tablet     aspirin (ASA) 81 MG tablet     atorvastatin (LIPITOR) 10 MG tablet     DULoxetine (CYMBALTA) 60 MG " capsule     fluticasone (FLONASE) 50 MCG/ACT nasal spray     pregabalin (LYRICA) 50 MG capsule     psyllium (METAMUCIL/KONSYL) 58.6 % powder     Current Facility-Administered Medications   Medication     ropivacaine (NAROPIN) injection 3 mL     triamcinolone (KENALOG-40) injection 40 mg       No Known Allergies    Past Medical History:   . Date     BPH without obstruction/lower urinary tract symptoms     Diverticulitis     HLD (hyperlipidemia)     Lower back injury     No Significant Past Medical History     Osteoarthritis       Past Surgical History:   . Laterality Date     CHOLECYSTECTOMY 2018     HIP ARTHROSCOPY W/ LABRAL DEBRIDEMENT Left     hip labral tear Right 11/2021     LAPAROSCOPIC CHOLECYSTECTOMY 11/01/2018     LUMBAR FUSION 09/29/2020   L3-4     RIGHT COLECTOMY 2021         Family History   Problem Relation Age of Onset     Coronary Artery Disease Mother      Hypertension Mother      Depression Mother      Thyroid Disease Mother      Coronary Artery Disease Father      Colon Cancer Father      Thyroid Disease Father        Social history: Patient is .  He is retired.  He denies tobacco, alcohol, illicit drug use.    ROS: Positive for joint pain, muscle pain, muscle fatigue.  Specifically negative for dysphagia, imbalance, fine motor skill difficulties, bowel/bladder dysfunction, fevers,chills, appetite changes, unexplained weight loss.   Otherwise 13 systems reviewed are negative.  Please see the patient's intake questionnaire from today for details.      OBJECTIVE:  PHYSICAL EXAMINATION:  CONSTITUTIONAL:  Vital signs as above.  No acute distress.  The patient is well nourished and well groomed.  PSYCHIATRIC:  The patient is awake, alert, oriented to person, place, time and answering questions appropriately with clear speech.    HEENT:  Normocephalic, atraumatic.  Sclera clear.    SKIN:  Skin over the face, bilateral upper extremities, and neck is clean, dry, intact without rashes.  LYMPH NODES:   No palpable or tender anterior/posterior cervical, submandibular, or supraclavicular lymph nodes.    MUSCLE STRENGTH:  5/5 strength for the bilateral shoulder abductors, elbow flexors/extensors, wrist extensors, finger flexors/abductors, hip flexors, knee flexors/extensors, ankle dorsi/plantar flexors, EHL.  NEURO:  CN III-XII are grossly intact.  1-2+ symmetric biceps, brachioradialis, triceps, patellar, and Achilles reflexes bilaterally.  Sensation to light touch is intact bilateral upper and lower extremities throughout.  Negative Gill's bilaterally.  Negative Babinski's bilaterally.  No ankle clonus.  VASCULAR:  2/4 radial pulses bilaterally.  Warm upper limbs bilaterally.  Capillary refill in the upper extremities is less than 1 second.  MUSCULOSKELETAL: Tender to palpation left cervical paraspinous muscles and left upper trapezius muscle with hypertonicity.  No significant tenderness palpation bilateral lower lumbar paraspinous muscles.  Lumbar flexion intact.  Lumbar extension mildly restricted.    RESULTS:    I reviewed the MRI cervical spine from [facility] dated September 29, 2021.  At C6-7 there is a small left paracentral disc protrusion with mild overall spinal canal stenosis and moderate to severe bilateral foraminal stenosis.  At C5-6 there is a disc osteophyte complex with mild to moderate spinal canal stenosis and severe bilateral foraminal stenosis.  At C4-5 there is a disc osteophyte complex with mild spinal canal stenosis, moderate to severe right, severe left foraminal stenosis.  Please see report for further details.    I reviewed the CT cervical spine from [facility] dated December 9, 2021.  This shows disc degeneration C4-5 through C6-7.  There is mild to moderate spinal canal stenosis C4-5 C5-6.  There is foraminal stenosis at multiple levels severe on the left C4-5, C5-6, C6-7.    I reviewed the EMG from [facility] dated September 13, 2021.  This shows mild left  median mononeuropathy.  No evidence for radiculopathy, plexopathy.    I reviewed the CT lumbar spine from [facility] dated December 9, 2021.  This shows interbody and instrumented posterior spinal fusion L3-4 with no bridging bone seen anteriorly and no posterior fusion masses.  No hardware loosening.  At L5-S1 there is a right paracentral disc herniation abutting the traversing S1 nerve roots.  At L4-5 there is a dorsal annular bulge but no neural compromise.    I reviewed the report of an MRI right hip from [facility] dated ~third, 2021.  This shows a 1 cm segment of linear tearing involving the far superior aspect of the anterosuperior labrum.    I reviewed the report of an EMG left lower from [facility] dated December 14, 2021 which was normal.

## 2022-04-14 ENCOUNTER — OFFICE VISIT (OUTPATIENT)
Dept: PHYSICAL MEDICINE AND REHAB | Facility: CLINIC | Age: 52
End: 2022-04-14
Payer: COMMERCIAL

## 2022-04-14 VITALS
DIASTOLIC BLOOD PRESSURE: 76 MMHG | BODY MASS INDEX: 32.15 KG/M2 | HEART RATE: 85 BPM | SYSTOLIC BLOOD PRESSURE: 137 MMHG | HEIGHT: 69 IN | TEMPERATURE: 98.2 F | WEIGHT: 217.1 LBS

## 2022-04-14 DIAGNOSIS — Z98.1 HISTORY OF LUMBAR FUSION: ICD-10-CM

## 2022-04-14 DIAGNOSIS — M54.12 CERVICAL RADICULITIS: ICD-10-CM

## 2022-04-14 DIAGNOSIS — M54.16 LUMBAR RADICULITIS: Primary | ICD-10-CM

## 2022-04-14 DIAGNOSIS — S32.009K LUMBAR PSEUDOARTHROSIS: ICD-10-CM

## 2022-04-14 PROCEDURE — 99205 OFFICE O/P NEW HI 60 MIN: CPT | Performed by: PHYSICIAN ASSISTANT

## 2022-04-14 RX ORDER — GABAPENTIN 300 MG/1
CAPSULE ORAL
Qty: 270 CAPSULE | Refills: 1 | Status: SHIPPED | OUTPATIENT
Start: 2022-04-14 | End: 2022-06-27

## 2022-04-14 ASSESSMENT — PAIN SCALES - GENERAL: PAINLEVEL: MODERATE PAIN (4)

## 2022-04-14 NOTE — LETTER
4/14/2022         RE: Rigo Joyner  [id number] [facility] [id number]        Dear Colleague,    Thank you for referring your patient, Rigo Joyner, to the [facility]. Please see a copy of my visit note below.    ASSESSMENT: Rigo Joyner is a 51 year old male with past medical history significant for BPH, diverticulitis, hyperlipidemia who presents today for new patient evaluation of 2 areas of pain.  1.  Left neck pain with radiation into the left upper extremity with associated numbness and tingling.  My review of an MRI cervical spine from [facility] dated September 29, 2021 shows multilevel foraminal stenosis which is moderate to severe bilaterally C6-7, severe bilaterally C5-6, moderate to severe right and severe left C4-5. EMG left upper extremity showed mild left carpal tunnel syndrome and no radiculopathy.  Patient had a C7-T1 interlaminar epidural steroid injection November 8, 2021 with minimal improvement.  He is scheduled for a left C4-5 transforaminal epidural steroid injection tomorrow and then a left C6-7 transforaminal epidural steroid injection May 5, 2022 which was recommended by Dr. Purdy.  Plan is for consideration for disc replacement surgery.  2.  Chronic left low back pain with radiation into the left greater than right lower extremity with associated numbness and tingling patient has a history of a left L3-4 instrumented fusion September 2020 with Dr. Tobias.  He has ongoing neuropathic pain down the legs.  My review of the CT lumbar spine from [facility] dated December 9, 2021 shows interbody and instrumented posterior fusion L3-4.  There is no bridging bone seen anteriorly and no posterior fusion mass.  No evidence of hardware loosening.  At L5-S1 there is disc degeneration with a right paracentral disc herniation abutting the traversing S1 nerve roots.  At L4-5 there is a disc bulge without neural  impingement.  Leg pain currently follows an L3/L4 pattern.  He had an EMG left lower extremity was normal.  Patient has seen Dr. Purdy who recommended a bone growth stimulator and possible redo fusion if fusion fails.  -Patient is neurologically intact on exam.      PLAN:  A shared decision making model was used.  The patient's values and choices were respected.  The following represents what was discussed and decided upon by the physician assistant and the patient.      1.  DIAGNOSTIC TESTS:    -I reviewed the CT lumbar spine [facility] December 9, 2021.  -I reviewed the CT cervical spine [facility] December 9, 2021.  -I reviewed the MRI cervical spine from [facility] September 29, 2021.  -I reviewed the EMG left upper extremity [facility] September 13, 2021.  -I also reviewed the report of the MRI right hip from [facility] dated July 3, 2021 which shows a labral tear.  -I reviewed the EMG left lower extremity from [facility] from December 14, 2021 which was normal.    2.  PHYSICAL THERAPY: Patient has had extensive physical therapy.  He completed physical therapy for his neck in January 2022 at [facility].  He completed physical therapy for his lower back in 2021 at [facility].  He does home exercises.    3.  MEDICATIONS:    -Patient had been taking Lyrica 50 mg twice daily with no improvement.  He is weaning off of the medication and can stop it.  He does not wish to pursue a higher dose of Lyrica due to concern for side effects.  -I did prescribe gabapentin.  He had tried this for short period of time postoperatively but he states his nerve pain was actually not as severe then so he stopped taking it.  I provided a prescription for 300 mg titrating up to 900 mg 3 times daily.  -Patient will continue on Cymbalta 60 mg daily for now.  This is somewhat helpful.  We could titrate his dose up to 120 mg if needed.  -Patient takes Tylenol and ibuprofen which are somewhat  helpful.  -Patient indicated he would like to avoid opiates.    4.  INTERVENTIONS:    -Patient is scheduled for a left C4-5 transforaminal epidural steroid injection April 15, 2022.  -Patient scheduled for left C6-7 transforaminal epidural steroid injection May 5, 2022.    -In regards to the low back pain we could consider bilateral L3-4 transforaminal epidural steroid injections.   I would want to discuss this with Dr. Purdy first to make sure she is comfortable with him getting epidural steroid injections while he is using a bone growth stimulator.    -Patient had a C7-T1 interlaminar epidural steroid injection November 8, 2021 at [facility] with slight improvement.  -Patient had a left carpal tunnel injection at [facility] with only 2 weeks of relief.  -Patient had left shoulder injections x2 at [facility] with temporary improvement.      -Patient had bilateral L4-5 transforaminal epidural steroid injections March 16, 2022 at [facility] With no improvement.  -Patient had bilateral L5-S1 transforaminal epidural steroid injections March 31, 2022 at [facility] with no improvement.    5.  PATIENT EDUCATION: Patient is in agreement the above plan.  All questions were answered.    6.  FOLLOW-UP:   Patient will follow up with me in 4 weeks to see how he is responding to the gabapentin.  If he has questions or concerns in the meantime, he should not hesitate to call.        SUBJECTIVE:  Rigo Joyner  Is a 51 year old male who presents today for new patient evaluation of neck pain with radiation into the left upper extremity and low back pain with radiation into left groin right lower extremity.    He has had a long history of intermittent pain in his left outer hip starting in his 20s when he was in the . Following a Worker's Compensation injury in 2019 while working as a  he had severe pain down the anterior left thigh. After lengthy evaluation he had left hip labral tear  surgery in 2019. He felt this helped his hip pain some. Unfortunately he noticed increasing pain in his lower back and was seen by Dr. Tobias at [facility]. After failing to get relief through conservative treatments he underwent a posterior interbody fusion at L3-4 in September 2020. This did help his back pain quite a bit, unfortunately more pain into the entirety of the left leg slowly crept back up in about 1 year after surgery he began to experience pain in the right leg as well.  He was found to have a labral tear in the right hip and underwent right hip surgery in November 2021 which helped somewhat with the right hip pain.  Unfortunately, low back and bilateral leg pain persists.  He was seen at [facility] and had bilateral L4-5 transforaminal epidural steroid injections followed by bilateral L5-S1 transforaminal epidural steroid injections last month with no improvement.  He just saw Dr. Purdy to get another opinion on his lumbar spine.  She states that he was the first doctor who told him he has pseudoarthrosis.  She has recommended bone growth stimulator.    Patient planes of left low back pain.  He states he gets a throbbing pain across the low back.  The left-sided low back feels sore.  Sometimes he gets numbness on the left low back and buttock.  He then describes nerve type pain that radiates down the anterior thighs to the knees.  On the left sometimes it extends into the anterior shin.  He states it feels like pins-and-needles and burning.  He denies weakness.  Back pain is aggravated with standing, walking, increase activity.  Lying flat helps alleviate the pain somewhat but he states the pain is constant.  He denies loss of bowel or bladder control.  Denies recent fevers.    Additionally he has into his left arm that he has noticed while preparing for his lumbar spine surgery. He admits to doing substantial yard work and having left shoulder pain just prior to his lumbar fusion.  "The shoulder pain settled down then developed pain in his left wrist in July 2021. He is seen both shoulder orthopedics and an specialist through [facility]. EMG testing pleated showed a mild amount of carpal tunnel syndrome.  He had a left carpal tunnel injection which only provided relief for about 2 weeks.  He had 2 left shoulder injections with temporary relief of his pain.  Due to the ongoing pain an MRI cervical spine was obtained and it showed multilevel foraminal stenosis.  He underwent a C7-T1 interlaminar epidural steroid injection November 8, 2021 which only \"took the edge off.\"  Due to the ongoing pain he was seen by a surgeon.  He was initially offered a 3 level fusion but did not feel comfortable with that plan so he sought out a second opinion.  He saw Dr. Salas at [facility] who recommended two-level disc replacement at C5-6 and C6-7.  He got a third opinion from Dr. Purdy who recommended trying transforaminal epidural steroid injections at C4-5 and C6-7 first.  He plans to pursue this.    Patient complains of left neck pain.  Pain radiates to left shoulder, down the left medial bicep area and into the left ulnar forearm, stopping at the wrist.  He has numbness and tingling in the left forearm.  He denies weakness.  Neck pain is aggravated movement and alleviated with lying flat.    Patient completed physical therapy for his neck January 2022.  He completed physical therapy for his lower back early 2021.  He still does home exercises but feels that some of the exercises exacerbate his pain.  He has had chiropractic treatment.  He just had his last chiropractic treatment yesterday which made his pain worse so he does not plan to return.  Please see above for injection history.  He is currently taking Lyrica 50 mg daily.  He had tried 50 mg twice daily and it was not helpful so he wanted to wean off the medication.  He was concerned about side effects at higher doses.  He is taking " Cymbalta 60 mg daily which helps somewhat.  He takes Tylenol and ibuprofen which are helpful.        Current Outpatient Medications   Medication     acetaminophen (TYLENOL) 500 MG tablet     aspirin (ASA) 81 MG tablet     atorvastatin (LIPITOR) 10 MG tablet     DULoxetine (CYMBALTA) 60 MG capsule     fluticasone (FLONASE) 50 MCG/ACT nasal spray     pregabalin (LYRICA) 50 MG capsule     psyllium (METAMUCIL/KONSYL) 58.6 % powder     Current Facility-Administered Medications   Medication     ropivacaine (NAROPIN) injection 3 mL     triamcinolone (KENALOG-40) injection 40 mg       No Known Allergies    Past Medical History:   . Date     BPH without obstruction/lower urinary tract symptoms     Diverticulitis     HLD (hyperlipidemia)     Lower back injury     No Significant Past Medical History     Osteoarthritis       Past Surgical History:   . Laterality Date     CHOLECYSTECTOMY 2018     HIP ARTHROSCOPY W/ LABRAL DEBRIDEMENT Left     hip labral tear Right 11/2021     LAPAROSCOPIC CHOLECYSTECTOMY 11/01/2018     LUMBAR FUSION 09/29/2020   L3-4     RIGHT COLECTOMY 2021         Family History   Problem Relation Age of Onset     Coronary Artery Disease Mother      Hypertension Mother      Depression Mother      Thyroid Disease Mother      Coronary Artery Disease Father      Colon Cancer Father      Thyroid Disease Father        Social history: Patient is .  He is retired.  He denies tobacco, alcohol, illicit drug use.    ROS: Positive for joint pain, muscle pain, muscle fatigue.  Specifically negative for dysphagia, imbalance, fine motor skill difficulties, bowel/bladder dysfunction, fevers,chills, appetite changes, unexplained weight loss.   Otherwise 13 systems reviewed are negative.  Please see the patient's intake questionnaire from today for details.      OBJECTIVE:  PHYSICAL EXAMINATION:  CONSTITUTIONAL:  Vital signs as above.  No acute distress.  The patient is well nourished and well groomed.  PSYCHIATRIC:   The patient is awake, alert, oriented to person, place, time and answering questions appropriately with clear speech.    HEENT:  Normocephalic, atraumatic.  Sclera clear.    SKIN:  Skin over the face, bilateral upper extremities, and neck is clean, dry, intact without rashes.  LYMPH NODES:  No palpable or tender anterior/posterior cervical, submandibular, or supraclavicular lymph nodes.    MUSCLE STRENGTH:  5/5 strength for the bilateral shoulder abductors, elbow flexors/extensors, wrist extensors, finger flexors/abductors, hip flexors, knee flexors/extensors, ankle dorsi/plantar flexors, EHL.  NEURO:  CN III-XII are grossly intact.  1-2+ symmetric biceps, brachioradialis, triceps, patellar, and Achilles reflexes bilaterally.  Sensation to light touch is intact bilateral upper and lower extremities throughout.  Negative Gill's bilaterally.  Negative Babinski's bilaterally.  No ankle clonus.  VASCULAR:  2/4 radial pulses bilaterally.  Warm upper limbs bilaterally.  Capillary refill in the upper extremities is less than 1 second.  MUSCULOSKELETAL: Tender to palpation left cervical paraspinous muscles and left upper trapezius muscle with hypertonicity.  No significant tenderness palpation bilateral lower lumbar paraspinous muscles.  Lumbar flexion intact.  Lumbar extension mildly restricted.    RESULTS:    I reviewed the MRI cervical spine from [facility] dated September 29, 2021.  At C6-7 there is a small left paracentral disc protrusion with mild overall spinal canal stenosis and moderate to severe bilateral foraminal stenosis.  At C5-6 there is a disc osteophyte complex with mild to moderate spinal canal stenosis and severe bilateral foraminal stenosis.  At C4-5 there is a disc osteophyte complex with mild spinal canal stenosis, moderate to severe right, severe left foraminal stenosis.  Please see report for further details.    I reviewed the CT cervical spine from [facility] dated December 9, 2021.   This shows disc degeneration C4-5 through C6-7.  There is mild to moderate spinal canal stenosis C4-5 C5-6.  There is foraminal stenosis at multiple levels severe on the left C4-5, C5-6, C6-7.    I reviewed the EMG from [facility] dated September 13, 2021.  This shows mild left median mononeuropathy.  No evidence for radiculopathy, plexopathy.    I reviewed the CT lumbar spine from [facility] dated December 9, 2021.  This shows interbody and instrumented posterior spinal fusion L3-4 with no bridging bone seen anteriorly and no posterior fusion masses.  No hardware loosening.  At L5-S1 there is a right paracentral disc herniation abutting the traversing S1 nerve roots.  At L4-5 there is a dorsal annular bulge but no neural compromise.    I reviewed the report of an MRI right hip from [facility] dated ~third, 2021.  This shows a 1 cm segment of linear tearing involving the far superior aspect of the anterosuperior labrum.    I reviewed the report of an EMG left lower from [facility] dated December 14, 2021 which was normal.      Again, thank you for allowing me to participate in the care of your patient.        Sincerely,        Dayanna Vogt PA-C

## 2022-04-14 NOTE — PATIENT INSTRUCTIONS
Prescribed Gabapentin today, 300 mg tablets, to be titrated up to 3 tablets 3 times a day as tolerated for your nerve pain. Please follow Gabapentin dosing chart below.    Gabapentin 300mg Dosing Chart    DATE  MORNING AFTERNOON BEDTIME    Day 1 0 0 1    Day 2 0 0 1    Day 3 0 0 1    Day 4 1 0 1    Day 5 1 0 1    Day 6 1 0 1    Day 7 1 1 1    Day 8 1 1 1    Day 9 1 1 1    Day 10 1 1 2    Day 11 1 1 2    Day 12 1 1 2    Day 13 2 1 2    Day 14 2 1 2    Day 15 2 1 2    Day 16 2 2 2    Day 17 2 2 2    Day 18 2 2 2    Day 19 2 2 3    Day 20 2 2 3    Day 21 2 2 3    Day 22 3 2 3    Day 23 3 2 3    Day 24 3 2 3    Day 25 3 3 3    Day 26 3 3 3    Day 27 3 3 3     Continue medication, taking 3 capsules three times daily  Please call if you have any questions regarding how to take your medication      Stop Lyrica.    Continue duloxetine.

## 2022-04-15 ENCOUNTER — RADIOLOGY INJECTION OFFICE VISIT (OUTPATIENT)
Dept: PHYSICAL MEDICINE AND REHAB | Facility: CLINIC | Age: 52
End: 2022-04-15
Attending: SURGERY
Payer: COMMERCIAL

## 2022-04-15 VITALS
OXYGEN SATURATION: 96 % | TEMPERATURE: 98.3 F | SYSTOLIC BLOOD PRESSURE: 122 MMHG | DIASTOLIC BLOOD PRESSURE: 82 MMHG | HEART RATE: 75 BPM

## 2022-04-15 DIAGNOSIS — M54.12 CERVICAL RADICULOPATHY: ICD-10-CM

## 2022-04-15 PROCEDURE — 64479 NJX AA&/STRD TFRM EPI C/T 1: CPT | Mod: LT | Performed by: PHYSICAL MEDICINE & REHABILITATION

## 2022-04-15 RX ORDER — DEXAMETHASONE SODIUM PHOSPHATE 10 MG/ML
INJECTION, SOLUTION INTRAMUSCULAR; INTRAVENOUS
Status: COMPLETED | OUTPATIENT
Start: 2022-04-15 | End: 2022-04-15

## 2022-04-15 RX ORDER — LIDOCAINE HYDROCHLORIDE 10 MG/ML
INJECTION, SOLUTION EPIDURAL; INFILTRATION; INTRACAUDAL; PERINEURAL
Status: COMPLETED | OUTPATIENT
Start: 2022-04-15 | End: 2022-04-15

## 2022-04-15 RX ADMIN — LIDOCAINE HYDROCHLORIDE 2 ML: 10 INJECTION, SOLUTION EPIDURAL; INFILTRATION; INTRACAUDAL; PERINEURAL at 09:52

## 2022-04-15 RX ADMIN — DEXAMETHASONE SODIUM PHOSPHATE 10 MG: 10 INJECTION, SOLUTION INTRAMUSCULAR; INTRAVENOUS at 09:53

## 2022-04-15 ASSESSMENT — PAIN SCALES - GENERAL
PAINLEVEL: MODERATE PAIN (4)
PAINLEVEL: NO PAIN (1)

## 2022-04-15 NOTE — PATIENT INSTRUCTIONS
Follow-up visit in 2 weeks for injection.      DISCHARGE INSTRUCTIONS    During office hours (8:00 a.m.- 4:00 p.m.) questions or concerns may be answered  by calling Spine Center Navigation Nurses at  [phone].  Messages received after hours will be returned the following business day.      In the case of an emergency, please dial 911 or seek assistance at the nearest Emergency Room/Urgent Care facility.     All Patients:    You may experience an increase in your symptoms for the first 2 days (It may take anywhere between 2 days- 2 weeks for the steroid to have maximum effect).    You may use ice on the injection site, as frequently as 20 minutes each hour if needed.    You may take your pain medicine.    You may continue taking your regular medication after your injection. If you have had a Medial Branch Block you may resume pain medication once your pain diary is completed.    You may shower. No swimming, tub bath or hot tub for 48 hours.  You may remove your bandaid/bandage as soon as you are home.    You may resume light activities, as tolerated.    Resume your usual diet as tolerated.    It is strongly advised that you do not drive for 1-3 hours post injection.    If you have had oral sedation:  Do not drive for 8 hours post injection.      If you have had IV sedation:  Do not drive for 24 hours post injection.  Do not operate hazardous machinery or make important personal/business decisions for 24 hours.      POSSIBLE STEROID SIDE EFFECTS (If steroid/cortisone was used for your procedure)    -If you experience these symptoms, it should only last for a short period    Swelling of the legs              Skin redness (flushing)     Mouth (oral) irritation   Blood sugar (glucose) levels            Sweats                    Mood changes  Headache  Sleeplessness  Weakened immune system for up to 14 days, which could increase the risk of rashid the COVID-19 virus and/or experiencing more severe symptoms of  the disease, if exposed.  Decreased effectiveness of the flu vaccine if given within 2 weeks of the steroid.         POSSIBLE PROCEDURE SIDE EFFECTS  -Call the Spine Center if you are concerned  Increased Pain           Increased numbness/tingling      Nausea/Vomiting          Bruising/bleeding at site      Redness or swelling                                              Difficulty walking      Weakness           Fever greater than 100.5    *In the event of a severe headache after an epidural steroid injection that is relieved by lying down, please call the [facility] to speak with a clinical staff member*

## 2022-04-26 ENCOUNTER — ANCILLARY PROCEDURE (OUTPATIENT)
Dept: BONE DENSITY | Facility: CLINIC | Age: 52
End: 2022-04-26
Attending: SURGERY
Payer: COMMERCIAL

## 2022-04-26 DIAGNOSIS — M54.12 CERVICAL RADICULOPATHY: ICD-10-CM

## 2022-04-26 PROCEDURE — 77080 DXA BONE DENSITY AXIAL: CPT | Mod: TC | Performed by: RADIOLOGY

## 2022-05-04 ENCOUNTER — LAB (OUTPATIENT)
Dept: LAB | Facility: HOSPITAL | Age: 52
End: 2022-05-04
Payer: COMMERCIAL

## 2022-05-04 DIAGNOSIS — S32.009A: Primary | ICD-10-CM

## 2022-05-04 LAB — DEPRECATED CALCIDIOL+CALCIFEROL SERPL-MC: 34 UG/L (ref 20–75)

## 2022-05-04 PROCEDURE — 82306 VITAMIN D 25 HYDROXY: CPT

## 2022-05-04 PROCEDURE — 36415 COLL VENOUS BLD VENIPUNCTURE: CPT

## 2022-05-05 ENCOUNTER — RADIOLOGY INJECTION OFFICE VISIT (OUTPATIENT)
Dept: PHYSICAL MEDICINE AND REHAB | Facility: CLINIC | Age: 52
End: 2022-05-05
Attending: SURGERY
Payer: COMMERCIAL

## 2022-05-05 VITALS
SYSTOLIC BLOOD PRESSURE: 112 MMHG | DIASTOLIC BLOOD PRESSURE: 62 MMHG | TEMPERATURE: 98.5 F | OXYGEN SATURATION: 95 % | HEART RATE: 73 BPM

## 2022-05-05 DIAGNOSIS — M54.12 CERVICAL RADICULOPATHY: ICD-10-CM

## 2022-05-05 PROCEDURE — 64479 NJX AA&/STRD TFRM EPI C/T 1: CPT | Mod: LT | Performed by: PHYSICAL MEDICINE & REHABILITATION

## 2022-05-05 RX ORDER — DEXAMETHASONE SODIUM PHOSPHATE 10 MG/ML
INJECTION, SOLUTION INTRAMUSCULAR; INTRAVENOUS
Status: COMPLETED | OUTPATIENT
Start: 2022-05-05 | End: 2022-05-05

## 2022-05-05 RX ORDER — LIDOCAINE HYDROCHLORIDE 10 MG/ML
INJECTION, SOLUTION EPIDURAL; INFILTRATION; INTRACAUDAL; PERINEURAL
Status: COMPLETED | OUTPATIENT
Start: 2022-05-05 | End: 2022-05-05

## 2022-05-05 RX ADMIN — LIDOCAINE HYDROCHLORIDE 1 ML: 10 INJECTION, SOLUTION EPIDURAL; INFILTRATION; INTRACAUDAL; PERINEURAL at 10:17

## 2022-05-05 RX ADMIN — DEXAMETHASONE SODIUM PHOSPHATE 10 MG: 10 INJECTION, SOLUTION INTRAMUSCULAR; INTRAVENOUS at 10:17

## 2022-05-05 ASSESSMENT — PAIN SCALES - GENERAL
PAINLEVEL: MILD PAIN (2)
PAINLEVEL: MODERATE PAIN (4)

## 2022-05-05 NOTE — PATIENT INSTRUCTIONS
DISCHARGE INSTRUCTIONS    During office hours (8:00 a.m.- 4:00 p.m.) questions or concerns may be answered  by calling Sp[facility] Center Navigation Nurses at  [phone].  Messages received after hours will be returned the following business day.      In the case of an emergency, please dial 911 or seek assistance at the nearest Emergency Room/Urgent Care facility.     All Patients:  You may experience an increase in your symptoms for the first 2 days (It may take anywhere between 2 days- 2 weeks for the steroid to have maximum effect).    You may use ice on the injection site, as frequently as 20 minutes each hour if needed.    You may take your pain medicine.    You may continue taking your regular medication.    You may shower. No swimming, tub bath or hot tub for 48 hours.  You may remove your bandaid/bandage as soon as you are home.    You may resume light activities, as tolerated.    Resume your usual diet as tolerated.    It is strongly advised that you do not drive for 1-3 hours post injection.    If you have had oral sedation:  Do not drive for 8 hours post injection.      If you have had IV sedation:  Do not drive for 24 hours post injection.  Do not operate hazardous machinery or make important personal/business decisions for 24 hours.    POSSIBLE STEROID SIDE EFFECTS (If steroid/cortisone was used for your procedure)    -If you experience these symptoms, it should only last for a short period    Swelling of the legs              Skin redness (flushing)     Mouth (oral) irritation   Blood sugar (glucose) levels            Sweats                   Mood changes  Headache  Weakened immune system for up to 14 days, which could increase the risk of rashid the COVID-19 virus and/or experiencing more severe symptoms of the disease, if exposed.         POSSIBLE PROCEDURE SIDE EFFECTS    -Call the Spine Center if you are concerned    Increased Pain           Increased numbness/tingling      Nausea/Vomiting           Bruising/bleeding at site      Redness or swelling                                              Difficulty walking      Weakness          Fever greater than 100.5    *In the event of a severe headache after an epidural steroid injection that is relieved by lying down, please call the [facility] to speak with a clinical staff member*

## 2022-05-18 NOTE — PROGRESS NOTES
Assessment:   Rigo Joyner is a 51 year old y.o. male with past medical history significant for BPH, diverticulitis, hyperlipidemia  who presents today for follow-up regarding 2 areas of pain.  1.  Left neck pain with radiation into the left upper extremity with associated numbness and tingling.  My review of an MRI cervical spine from [facility] dated September 29, 2021 shows multilevel foraminal stenosis which is moderate to severe bilaterally C6-7, severe bilaterally C5-6, moderate to severe right and severe left C4-5. EMG left upper extremity showed mild left carpal tunnel syndrome and no radiculopathy.    -Patient had a C7-T1 interlaminar epidural steroid injection November 8, 2021 with minimal improvement.    - Patient is status post a left C4-5 transforaminal epidural steroid injection April 15, 2022 which provided 30 to 40% relief of his pain.  - Patient then had a left C6-7 transforaminal epidural steroid injection May 5, 2022 which provided 50% relief of his pain.  - Patient notes that no injection has been helpful for his wrist pain and he continues to have numbness and tingling in his hand at night.  He is concerned his carpal tunnel syndrome may have worsened.  2.  Chronic left low back pain with radiation into the left greater than right lower extremity with associated numbness and tingling patient has a history of a left L3-4 instrumented fusion September 2020 with Dr. Tobias.  He has ongoing neuropathic pain down the legs.  My review of the CT lumbar spine from [facility] dated December 9, 2021 shows interbody and instrumented posterior fusion L3-4.  There is no bridging bone seen anteriorly and no posterior fusion mass.  No evidence of hardware loosening.  At L5-S1 there is disc degeneration with a right paracentral disc herniation abutting the traversing S1 nerve roots.  At L4-5 there is a disc bulge without neural impingement.  Leg pain currently follows an L3/L4 pattern.  He  had an EMG left lower extremity was normal.  Patient has seen Dr. Purdy who recommended a bone growth stimulator and possible redo fusion if fusion fails.  Patient notes some improvement in his back and leg pain since wearing the bone growth stimulator.       Plan:     A shared decision making plan was used.  The patient's values and choices were respected.  The following represents what was discussed and decided upon by the physician assistant and the patient.      1.  DIAGNOSTIC TESTS:    -I reviewed the CT lumbar spine [facility] December 9, 2021.  -I reviewed the CT cervical spine [facility] December 9, 2021.  -I reviewed the MRI cervical spine from [facility] September 29, 2021.  -I reviewed the EMG left upper extremity [facility] September 13, 2021.  -I also reviewed the report of the MRI right hip from [facility] dated July 3, 2021 which shows a labral tear.  -I reviewed the EMG left lower extremity from [facility] from December 14, 2021 which was normal.  - I ordered an updated EMG left upper extremity due to persistent hand/wrist symptoms.    2.  PHYSICAL THERAPY: Patient has had extensive physical therapy.  He completed physical therapy for his neck in January 2022 at [facility].  He completed physical therapy for his lower back in 2021 at [facility].  He does home exercises.    3.  MEDICATIONS:    -Lyrica was not helpful.  -Patient will continue on gabapentin 6 mg 3 times daily for now.  He feels like this is helpful.  He could increase up to 9 mg 3 times daily as needed.  -Patient has been taking Cymbalta 60 mg daily.  He does not know if this is helping at all and he would like to try getting off of it.  I provided a prescription for 30 mg capsules that he can wean off of this medication.  -Patient takes Tylenol which is somewhat helpful.  -Patient previously indicated he would like to avoid opiates.    4.  INTERVENTIONS: No additional interventions were  ordered.    -In regards to the low back pain we could consider bilateral L3-4 transforaminal epidural steroid injections.   I would want to discuss this with Dr. Purdy first to make sure she is comfortable with him getting epidural steroid injections while he is using a bone growth stimulator.     -Patient had a C7-T1 interlaminar epidural steroid injection November 8, 2021 at [facility] with slight improvement.  -Patient had a left carpal tunnel injection at [facility] with only 2 weeks of relief.  -Patient had left shoulder injections x2 at [facility] with temporary improvement.       -Patient had bilateral L4-5 transforaminal epidural steroid injections March 16, 2022 at [facility] With no improvement.  -Patient had bilateral L5-S1 transforaminal epidural steroid injections March 31, 2022 at [facility] with no improvement.    5.  PATIENT EDUCATION:  Patient is in agreement the above plan.  All questions were answered.    6.  FOLLOW-UP: Patient is scheduled to see Dr. Purdy later this morning.  I have not scheduled any routine follow-up for the patient with me.  We will see what Dr. Purdy's plan is first.  If he has questions or concerns, he should not hesitate to call.    Subjective:     Rigo Joyner is a 51 year old male who presents today for follow-up regarding neck pain with radiation to the left upper extremity and low back pain with radiation into the left greater than right lower extremity.  Patient is following up after he had a left C4-5 transforaminal epidural steroid injection April 15, 2022.  This injection provided 30 to 40% relief of his pain.  He then had a left C6-7 transforaminal epidural steroid injection May 5, 2022.  This injection provided 50% relief of his pain.  However, he notes that no injection has provided much relief of his wrist/hand pain.    Patient complains of neck pain.  Pain radiates into left shoulder, down the left lateral upper arm, into the left  extensor forearm.  He also has pain at the left wrist.  He has intermittent numbness and tingling in his left hand at night.    Patient complains of bilateral low back pain.  He notes slight improvement in his back pain since wearing the lumbar stimulator.  Pain radiates into the bilateral anterolateral thighs to the knees.  Occasionally he feels pain extend into the left anterior shin and dorsal foot.  He rates his pain today as a 3 out of 10.  Pain is aggravated with increased activity, bending, twisting.  Pain is alleviated with rest, lying flat.    Patient completed physical therapy for his neck in January 2022.  He completed physical therapy for his lower back early 2021.  He also chiropractor.  He is wearing his bone growth stimulator regularly.  He takes gabapentin 6 mg 3 times daily.  This is helpful and he denies side effects.  He take Cymbalta 60 mg daily.  He does not think this is doing anything.  He takes Tylenol as needed.    Review of Systems:  Positive for numbness/tingling, headache.  Negative for weakness, loss of bowel/bladder control, inability to urinate, pain much worse at night, trip/stumble/falls medical D swallowing, difficulty with hand skills, fevers, unintentional weight loss.     Objective:   CONSTITUTIONAL:  Vital signs as above.  No acute distress.  The patient is well nourished and well groomed.    PSYCHIATRIC:  The patient is awake, alert, oriented to person, place and time.  The patient is answering questions appropriately with clear speech.  Normal affect.  HEENT: Normocephalic, atraumatic.  Sclera clear.    LYMPH: No cervical lymphadenopathy  SKIN:  Skin over the face, neck bilateral upper extremities is clean, dry, intact without rashes.  MUSCULOSKELETAL: The patient has 5/5 strength for the bilateral shoulder abductors, elbow flexors/extensors, wrist extensors, finger flexors/abductors, hip flexors, knee flexors/extensors, ankle dorsi/plantar flexors.  Patient is able to  ambulate on toes and heels bilaterally.  NEUROLOGICAL: Negative Gill's bilaterally.  Sensation to light touch is intact bilateral upper and lower extremities throughout.    RESULTS:    I reviewed the MRI cervical spine from [facility] dated September 29, 2021.  At C6-7 there is a small left paracentral disc protrusion with mild overall spinal canal stenosis and moderate to severe bilateral foraminal stenosis.  At C5-6 there is a disc osteophyte complex with mild to moderate spinal canal stenosis and severe bilateral foraminal stenosis.  At C4-5 there is a disc osteophyte complex with mild spinal canal stenosis, moderate to severe right, severe left foraminal stenosis.  Please see report for further details.     I reviewed the CT cervical spine from [facility] dated December 9, 2021.  This shows disc degeneration C4-5 through C6-7.  There is mild to moderate spinal canal stenosis C4-5 C5-6.  There is foraminal stenosis at multiple levels severe on the left C4-5, C5-6, C6-7.     I reviewed the EMG left upper extremity from [facility] dated September 13, 2021.  This shows mild left median mononeuropathy.  No evidence for radiculopathy, plexopathy.     I reviewed the CT lumbar spine from [facility] dated December 9, 2021.  This shows interbody and instrumented posterior spinal fusion L3-4 with no bridging bone seen anteriorly and no posterior fusion masses.  No hardware loosening.  At L5-S1 there is a right paracentral disc herniation abutting the traversing S1 nerve roots.  At L4-5 there is a dorsal annular bulge but no neural compromise.     I reviewed the report of an MRI right hip from [facility] dated ~third, 2021.  This shows a 1 cm segment of linear tearing involving the far superior aspect of the anterosuperior labrum.     I reviewed the report of an EMG left lower from [facility] dated December 14, 2021 which was normal.

## 2022-05-19 ENCOUNTER — OFFICE VISIT (OUTPATIENT)
Dept: PHYSICAL MEDICINE AND REHAB | Facility: CLINIC | Age: 52
End: 2022-05-19

## 2022-05-19 ENCOUNTER — OFFICE VISIT (OUTPATIENT)
Dept: NEUROSURGERY | Facility: CLINIC | Age: 52
End: 2022-05-19
Payer: COMMERCIAL

## 2022-05-19 VITALS
SYSTOLIC BLOOD PRESSURE: 129 MMHG | HEART RATE: 73 BPM | WEIGHT: 208 LBS | OXYGEN SATURATION: 92 % | HEIGHT: 69 IN | DIASTOLIC BLOOD PRESSURE: 73 MMHG | BODY MASS INDEX: 30.81 KG/M2

## 2022-05-19 VITALS
HEART RATE: 84 BPM | SYSTOLIC BLOOD PRESSURE: 137 MMHG | WEIGHT: 217 LBS | HEIGHT: 69 IN | BODY MASS INDEX: 32.14 KG/M2 | DIASTOLIC BLOOD PRESSURE: 73 MMHG

## 2022-05-19 DIAGNOSIS — G56.02 CARPAL TUNNEL SYNDROME OF LEFT WRIST: Primary | ICD-10-CM

## 2022-05-19 DIAGNOSIS — M54.12 CERVICAL RADICULOPATHY: ICD-10-CM

## 2022-05-19 DIAGNOSIS — S32.009K LUMBAR PSEUDOARTHROSIS: ICD-10-CM

## 2022-05-19 DIAGNOSIS — M54.16 LUMBAR RADICULITIS: ICD-10-CM

## 2022-05-19 PROCEDURE — 99214 OFFICE O/P EST MOD 30 MIN: CPT | Performed by: SURGERY

## 2022-05-19 PROCEDURE — 99214 OFFICE O/P EST MOD 30 MIN: CPT | Performed by: PHYSICIAN ASSISTANT

## 2022-05-19 RX ORDER — DULOXETIN HYDROCHLORIDE 30 MG/1
30 CAPSULE, DELAYED RELEASE ORAL DAILY
Qty: 30 CAPSULE | Refills: 0 | Status: SHIPPED | OUTPATIENT
Start: 2022-05-19 | End: 2022-07-19

## 2022-05-19 ASSESSMENT — PAIN SCALES - GENERAL: PAINLEVEL: MILD PAIN (3)

## 2022-05-19 NOTE — LETTER
5/19/2022         RE: Rigo Joyner  [id number] [id number]        Dear Colleague,    Thank you for referring your patient, Rigo Joyner, to the [facility]. Please see a copy of my visit note below.    NEUROSURGERY FOLLOWUP  NOTE    Rigo Joyner comes today in f/u. 50 yo male who has a h/o L3-4 instrumented fusion 1 year ago for spondylolisthesis with Dr Tobias who presents with ongoing anterior thigh pain and low back pain.  Reviewed patient's lumbar CT which shows no interbody fusion as well as no posterior lateral bone fusion at his level of his previous instrumented fusion.  He also did not appear to have any significant residual stenosis at this level.  Since her last visit he has undergone a DEXA which showed normal bone quality as well as a vitamin D level which was low at 17.  Started over-the-counter vitamin D and has obtained his bone stimulator.  His overall his stiffness may be increased but his pain overall has lessened.  We will continue the bone stimulator.  And we will follow-up in 5 months with a new CT of his lumbar spine.     In regards to his cervical spine, upper extremity EMG of his left arm showed a mild left carpal tunnel syndrome without any significant cervical radiculopathy.  Given the pattern of his upper extremity symptoms difficult to say if it is the C5, C6, and/or C7 nerve root involved.  Imaging of his cervical spine shows severe foraminal narrowing on the left at cervical 4-5, cervical 5-6, and cervical 6-7.  Recommend diagnostic and potentially therapeutic left cervical 4-5 followed by left cervical 6-7 transforaminal epidural steroid injection.  He will follow up after transforaminal epidural steroid injection. We discussed possible surgery pending result of TFESI.   He is status post left cervical 4-5 and left cervical 6-7 transforaminal epidural steroid injections in late April and early May.  He has  significantly improved his overall arm pain.  The only thing that remains is a burning sensation in his wrist to his proximal forearm.  He also notes numbness and tingling in his left hand worse at night.  Mild left carpal tunnel syndrome on his last EMG.  We discussed the risks and benefits of undergoing a left carpal tunnel release.  Given he has had improvement from his cervical radiculopathy we will hold on any further cervical surgery at this time.    I spent more than 30 minutes in this apt, examining the pt, reviewing the scans, reviewing notes from chart, discussing treatment options with risks and benefits and coordinating care.     Sandrita Purdy MD      CC:     Martinez Deshpande Duane [facility] [address] [facility] [id number]        Again, thank you for allowing me to participate in the care of your patient.        Sincerely,        Sandrita Purdy MD

## 2022-05-19 NOTE — PATIENT INSTRUCTIONS
Get the EMG of your left arm.    Continue gabapentin two pills three times per day.    You can wean off of the duloxetine if you want - take 30 mg daily x 1 week and then stop.    I have not scheduled any routine follow-up with me at this point.  Lets see what Dr. Purdy's plan is first.  Please send me a [place] message if you have questions or concerns.

## 2022-05-19 NOTE — PROGRESS NOTES
NEUROSURGERY FOLLOWUP  NOTE    Rigo Joyner comes today in f/u. 52 yo male who has a h/o L3-4 instrumented fusion 1 year ago for spondylolisthesis with Dr Tobias who presents with ongoing anterior thigh pain and low back pain.  Reviewed patient's lumbar CT which shows no interbody fusion as well as no posterior lateral bone fusion at his level of his previous instrumented fusion.  He also did not appear to have any significant residual stenosis at this level.  Since her last visit he has undergone a DEXA which showed normal bone quality as well as a vitamin D level which was low at 17.  Started over-the-counter vitamin D and has obtained his bone stimulator.  His overall his stiffness may be increased but his pain overall has lessened.  We will continue the bone stimulator.  And we will follow-up in 5 months with a new CT of his lumbar spine.     In regards to his cervical spine, upper extremity EMG of his left arm showed a mild left carpal tunnel syndrome without any significant cervical radiculopathy.  Given the pattern of his upper extremity symptoms difficult to say if it is the C5, C6, and/or C7 nerve root involved.  Imaging of his cervical spine shows severe foraminal narrowing on the left at cervical 4-5, cervical 5-6, and cervical 6-7.  Recommend diagnostic and potentially therapeutic left cervical 4-5 followed by left cervical 6-7 transforaminal epidural steroid injection.  He will follow up after transforaminal epidural steroid injection. We discussed possible surgery pending result of TFESI.   He is status post left cervical 4-5 and left cervical 6-7 transforaminal epidural steroid injections in late April and early May.  He has significantly improved his overall arm pain.  The only thing that remains is a burning sensation in his wrist to his proximal forearm.  He also notes numbness and tingling in his left hand worse at night.  Mild left carpal tunnel syndrome on his last EMG.  We discussed the  risks and benefits of undergoing a left carpal tunnel release.  Given he has had improvement from his cervical radiculopathy we will hold on any further cervical surgery at this time.    I spent more than 30 minutes in this apt, examining the pt, reviewing the scans, reviewing notes from chart, discussing treatment options with risks and benefits and coordinating care.     Sandrita Purdy MD      CC:     Martinez Deshpande Duane  [facility] [address] [facility] [id number]

## 2022-05-19 NOTE — PATIENT INSTRUCTIONS
Vitamin d was low 17. He started over the counter vitamin d.  Also he started a bone stimulator. Stiffness may be increased but pain is better. Pain in lower left apsect of his incision.   Repeat CT lumbar spine in 5 months.   Left CTR

## 2022-05-19 NOTE — LETTER
5/19/2022         RE: Rigo Joyner  [address] [place] [address] [address]        Dear Colleague,    Thank you for referring your patient, Rigo Joyner, to the [facility]. Please see a copy of my visit note below.    Assessment:   Rigo Joyner is a 51 year old y.o. male with past medical history significant for BPH, diverticulitis, hyperlipidemia  who presents today for follow-up regarding 2 areas of pain.  1.  Left neck pain with radiation into the left upper extremity with associated numbness and tingling.  My review of an MRI cervical spine from [facility] dated September 29, 2021 shows multilevel foraminal stenosis which is moderate to severe bilaterally C6-7, severe bilaterally C5-6, moderate to severe right and severe left C4-5. EMG left upper extremity showed mild left carpal tunnel syndrome and no radiculopathy.    -Patient had a C7-T1 interlaminar epidural steroid injection November 8, 2021 with minimal improvement.    - Patient is status post a left C4-5 transforaminal epidural steroid injection April 15, 2022 which provided 30 to 40% relief of his pain.  - Patient then had a left C6-7 transforaminal epidural steroid injection May 5, 2022 which provided 50% relief of his pain.  - Patient notes that no injection has been helpful for his wrist pain and he continues to have numbness and tingling in his hand at night.  He is concerned his carpal tunnel syndrome may have worsened.  2.  Chronic left low back pain with radiation into the left greater than right lower extremity with associated numbness and tingling patient has a history of a left L3-4 instrumented fusion September 2020 with Dr. Tobias.  He has ongoing neuropathic pain down the legs.  My review of the CT lumbar spine from [facility] dated December 9, 2021 shows interbody and instrumented posterior fusion L3-4.  There is no bridging bone seen anteriorly and no posterior  Usually it is sufficient to simply hold the Eliquis on the day of extraction.  The last dose of Eliquis should be at least 12 hours prior to the extraction.   fusion mass.  No evidence of hardware loosening.  At L5-S1 there is disc degeneration with a right paracentral disc herniation abutting the traversing S1 nerve roots.  At L4-5 there is a disc bulge without neural impingement.  Leg pain currently follows an L3/L4 pattern.  He had an EMG left lower extremity was normal.  Patient has seen Dr. Purdy who recommended a bone growth stimulator and possible redo fusion if fusion fails.  Patient notes some improvement in his back and leg pain since wearing the bone growth stimulator.       Plan:     A shared decision making plan was used.  The patient's values and choices were respected.  The following represents what was discussed and decided upon by the physician assistant and the patient.      1.  DIAGNOSTIC TESTS:    -I reviewed the CT lumbar spine [facility] December 9, 2021.  -I reviewed the CT cervical spine [facility] December 9, 2021.  -I reviewed the MRI cervical spine from [facility] September 29, 2021.  -I reviewed the EMG left upper extremity [facility] September 13, 2021.  -I also reviewed the report of the MRI right hip from [facility] dated July 3, 2021 which shows a labral tear.  -I reviewed the EMG left lower extremity from [facility] from December 14, 2021 which was normal.  - I ordered an updated EMG left upper extremity due to persistent hand/wrist symptoms.    2.  PHYSICAL THERAPY: Patient has had extensive physical therapy.  He completed physical therapy for his neck in January 2022 at [facility].  He completed physical therapy for his lower back in 2021 at [facility].  He does home exercises.    3.  MEDICATIONS:    -Lyrica was not helpful.  -Patient will continue on gabapentin 6 mg 3 times daily for now.  He feels like this is helpful.  He could increase up to 9 mg 3 times daily as needed.  -Patient has been taking Cymbalta 60 mg daily.  He does not know if this is helping at all and he would like to try  getting off of it.  I provided a prescription for 30 mg capsules that he can wean off of this medication.  -Patient takes Tylenol which is somewhat helpful.  -Patient previously indicated he would like to avoid opiates.    4.  INTERVENTIONS: No additional interventions were ordered.    -In regards to the low back pain we could consider bilateral L3-4 transforaminal epidural steroid injections.   I would want to discuss this with Dr. Purdy first to make sure she is comfortable with him getting epidural steroid injections while he is using a bone growth stimulator.     -Patient had a C7-T1 interlaminar epidural steroid injection November 8, 2021 at [facility] with slight improvement.  -Patient had a left carpal tunnel injection at [facility] with only 2 weeks of relief.  -Patient had left shoulder injections x2 at [facility] with temporary improvement.       -Patient had bilateral L4-5 transforaminal epidural steroid injections March 16, 2022 at [facility] With no improvement.  -Patient had bilateral L5-S1 transforaminal epidural steroid injections March 31, 2022 at [facility] with no improvement.    5.  PATIENT EDUCATION:  Patient is in agreement the above plan.  All questions were answered.    6.  FOLLOW-UP: Patient is scheduled to see Dr. Purdy later this morning.  I have not scheduled any routine follow-up for the patient with me.  We will see what Dr. Purdy's plan is first.  If he has questions or concerns, he should not hesitate to call.    Subjective:     Rigo Joyner is a 51 year old male who presents today for follow-up regarding neck pain with radiation to the left upper extremity and low back pain with radiation into the left greater than right lower extremity.  Patient is following up after he had a left C4-5 transforaminal epidural steroid injection April 15, 2022.  This injection provided 30 to 40% relief of his pain.  He then had a left C6-7 transforaminal epidural steroid  injection May 5, 2022.  This injection provided 50% relief of his pain.  However, he notes that no injection has provided much relief of his wrist/hand pain.    Patient complains of neck pain.  Pain radiates into left shoulder, down the left lateral upper arm, into the left extensor forearm.  He also has pain at the left wrist.  He has intermittent numbness and tingling in his left hand at night.    Patient complains of bilateral low back pain.  He notes slight improvement in his back pain since wearing the lumbar stimulator.  Pain radiates into the bilateral anterolateral thighs to the knees.  Occasionally he feels pain extend into the left anterior shin and dorsal foot.  He rates his pain today as a 3 out of 10.  Pain is aggravated with increased activity, bending, twisting.  Pain is alleviated with rest, lying flat.    Patient completed physical therapy for his neck in January 2022.  He completed physical therapy for his lower back early 2021.  He also chiropractor.  He is wearing his bone growth stimulator regularly.  He takes gabapentin 6 mg 3 times daily.  This is helpful and he denies side effects.  He take Cymbalta 60 mg daily.  He does not think this is doing anything.  He takes Tylenol as needed.    Review of Systems:  Positive for numbness/tingling, headache.  Negative for weakness, loss of bowel/bladder control, inability to urinate, pain much worse at night, trip/stumble/falls medical D swallowing, difficulty with hand skills, fevers, unintentional weight loss.     Objective:   CONSTITUTIONAL:  Vital signs as above.  No acute distress.  The patient is well nourished and well groomed.    PSYCHIATRIC:  The patient is awake, alert, oriented to person, place and time.  The patient is answering questions appropriately with clear speech.  Normal affect.  HEENT: Normocephalic, atraumatic.  Sclera clear.    LYMPH: No cervical lymphadenopathy  SKIN:  Skin over the face, neck bilateral upper extremities is clean,  dry, intact without rashes.  MUSCULOSKELETAL: The patient has 5/5 strength for the bilateral shoulder abductors, elbow flexors/extensors, wrist extensors, finger flexors/abductors, hip flexors, knee flexors/extensors, ankle dorsi/plantar flexors.  Patient is able to ambulate on toes and heels bilaterally.  NEUROLOGICAL: Negative Gill's bilaterally.  Sensation to light touch is intact bilateral upper and lower extremities throughout.    RESULTS:    I reviewed the MRI cervical spine from [facility] dated September 29, 2021.  At C6-7 there is a small left paracentral disc protrusion with mild overall spinal canal stenosis and moderate to severe bilateral foraminal stenosis.  At C5-6 there is a disc osteophyte complex with mild to moderate spinal canal stenosis and severe bilateral foraminal stenosis.  At C4-5 there is a disc osteophyte complex with mild spinal canal stenosis, moderate to severe right, severe left foraminal stenosis.  Please see report for further details.     I reviewed the CT cervical spine from [facility] dated December 9, 2021.  This shows disc degeneration C4-5 through C6-7.  There is mild to moderate spinal canal stenosis C4-5 C5-6.  There is foraminal stenosis at multiple levels severe on the left C4-5, C5-6, C6-7.     I reviewed the EMG left upper extremity from [facility] dated September 13, 2021.  This shows mild left median mononeuropathy.  No evidence for radiculopathy, plexopathy.     I reviewed the CT lumbar spine from [facility] dated December 9, 2021.  This shows interbody and instrumented posterior spinal fusion L3-4 with no bridging bone seen anteriorly and no posterior fusion masses.  No hardware loosening.  At L5-S1 there is a right paracentral disc herniation abutting the traversing S1 nerve roots.  At L4-5 there is a dorsal annular bulge but no neural compromise.     I reviewed the report of an MRI right hip from [facility] dated ~third, 2021.   This shows a 1 cm segment of linear tearing involving the far superior aspect of the anterosuperior labrum.     I reviewed the report of an EMG left lower from [facility] dated December 14, 2021 which was normal.           Again, thank you for allowing me to participate in the care of your patient.        Sincerely,        Dayanna Vogt PA-C

## 2022-05-19 NOTE — NURSING NOTE
Pt is here follow up after Dexa/TFSEI.pt states that the 1 injection helped a week or 2 and the 2nd injection helped or gave him relief 40-50%. Pt still has left shoulder pain and stop half of the left arm. Pt has burning sensation on the left arm.   NDI: 28%  Julio Cesar Davies MA

## 2022-05-20 ENCOUNTER — PREP FOR PROCEDURE (OUTPATIENT)
Dept: NEUROSURGERY | Facility: CLINIC | Age: 52
End: 2022-05-20
Payer: COMMERCIAL

## 2022-05-20 ENCOUNTER — TELEPHONE (OUTPATIENT)
Dept: NEUROSURGERY | Facility: CLINIC | Age: 52
End: 2022-05-20
Payer: COMMERCIAL

## 2022-05-20 DIAGNOSIS — G56.00 CARPAL TUNNEL SYNDROME: Primary | ICD-10-CM

## 2022-05-20 DIAGNOSIS — Z01.812 ENCOUNTER FOR PRE-OPERATIVE LABORATORY TESTING: Primary | ICD-10-CM

## 2022-05-20 NOTE — LETTER
Dear Mr. Joyner,    This letter will help in preparation for your upcoming surgery. Please contact us with any additional questions you may have regarding your surgery. Contact information for your surgery scheduler:   Dr. Purdy: [phone] ~ Tricia      You are scheduled for: Left Carpal Tunnel  With: Sandrita Purdy M.D.   Date/Time: Friday, Ama 3, 2022 at 1:55pm (time subject to change)  Location: [place] [address]    Check in at the Welcome Desk inside the main doors of the [facility]. They will direct you to the surgery waiting area. Please arrive at 12:55pm. A nurse from [facility] (surgery admit unit) at the hospital will call you with your exact arrival time to the hospital - typically two hours prior to your scheduled surgery time.     In the event of an emergency surgery case, there may be an adjustment to your start time for surgery.     PREPARING FOR YOUR SURGERY    *Pre-op Physical: 5/24/22 at 9:20am with Dr. Deshpande.     *Please discuss the necessity of receiving a pneumococcal vaccine prior to surgery at your pre-op physical. Recommended for all patients over the age of 65 or based on certain medical conditions.     *After the pre-op physical is complete, please have your clinic fax the visit note to our office at [phone].    *Pre-op Lab Work: 5/26/2022 at 10:45 at [facility] [place]. [address] [place], [place] [id number].    *Covid-19 Pre-procedure Test: 5/31/2022 at 9:15am at [facility]. [address] [id number].    *Collar/Brace: You will be fitted for the collar/brace at your Readiness Visit with our office.     Please bring collar/brace with you the day of surgery.    *Readiness Visit: Please stop by to get brace at [facility] [address] [place] [address].  Dr. Purdy's nurse will call you prior to the day of  surgery to go over the results of your pre-op physical/lab results/covid result, along with additional information you will need for the day of surgery.    *Ensure that you have completed your pre-op physical, along with any other necessary tests/appointments (listed above), prior to your Readiness Visit.                 ADDITIONAL INFORMATION REGARDING YOUR SURGERY    Medications    Bring a list ALL medications, including any over-the-counter vitamins and herbal supplements with you to the hospital on the day of surgery.    DO NOT bring your medications with you the day of surgery.    Please see attached third sheet for more details on medications/vitamins/herbal supplements that should be discontinued prior to your surgery date.     If you are unsure if you should discontinue a medication/ vitamin/herbal supplement, please call our office and discuss with a nurse.    Continue taking your medications/vitamins/herbal supplements unless they are on the attached list.     Failure to follow the instructions regarding medications/vitamins/herbal supplements will result in cancellation of your surgery.    Day BEFORE Surgery    DO NOT shave near your surgical site. This can cause irritation of the skin    Using a washcloth and provided bottle of Hibiclens, shower the night BEFORE surgery, using a half bottle of Hibiclens to wash your body, avoiding face and genitals. The morning OF surgery, shower and use the second half of the bottle to wash your body, avoiding face and genitals. If you are unable to take a shower the morning of surgery, please discuss your options with the nurse at your readiness visit.     NOTHING to eat after 11:00 p.m. the night prior to surgery.    CLEAR LIQUIDS: May have the following liquids up to two (2) hours before your arrival time at the hospital: water, plain black coffee (no cream or milk), plain black tea or plain green tea (no cream or milk), Gatorade or Propel Water.    SMOKING: Stop  smoking as far before surgery as possible, or as directed by your surgeon. NO tobacco products of any kind (cigarettes, e-cigarettes, chewing tobacco) beginning at 11:00 p.m. the night prior to your procedure.     ALCOHOL: You should stop drinking alcohol beginning at 11:00 p.m. the night prior to your procedure    Contact our office if you have symptoms of illness such as a fever of 101 or greater, chills, cough, sore throat, or if you develop a rash or any open sore    Day OF Surgery    If you ve been instructed to take a medication(s) on the morning of surgery, please take with a very small sip of water.    Wear loose & comfortable clothing and flat shoes, Leave jewelry/valuables at home. If you wear contact lenses, remove them at home and wear glasses. Remove any body piercings. Remove nail polish.     Planning for Discharge    Start planning for your care after discharge as soon as you receive this letter.    If you have not made arrangements for someone to take you home and stay with you for the first 48 hours after discharge, your surgery may be cancelled.                        PRE-OPERATIVE MEDICATION INSTRUCTIONS    Review this information with your primary care physician prior to discontinuing any of the medications listed below.  Notify your primary care physician that you have been instructed to discontinue these medications.    TEN (10) Days Prior to Surgery, STOP the Following Medications   Jamee-[place]  Anacin  Aspirin  Excedrin  Pepto Bismol    **Before taking ANY over-the-counter medications, check the label for Aspirin   Non-steroidal   Anti-inflammatory Medications (NSAIDS)    Celebrex  Diclofenac (Cataflam)  Etodolac (Iodine)  Fenoprofen (Nalfon)  Ibuprofen (Advil, Motrin, Nuprin)  Indomethacin (Indocin)  Ketoprofen  Ketorolac (Toradol)  Meloxicam (Mobic)  Naproxen (AnaProx, Aleve, Naprosyn)  Relafen (Nabumetone)  Sulindac (Clinoril)   Herbal Supplements (this is a partial list of herbals to be  discontinued)    Julián Canales  Ephedra  Feverfew  Fish Oil  Flaxseed Oil  Garlic  Candy  Gingko  Ginseng  Goldenseal  Imitrex (Sumatriptan)  Kava  Krill Oil  Licorice  Multi Vitamins  Hoyt s Wort  Turmeric  Valerian  Vitamin E  Yohimbe   CHECK WITH YOUR PRESCRIBING DOCTOR BEFORE STOPPING ANY BLOOD THINNERS (approximately 7 days prior to surgery)  (Coumadin, Plavix, Eliquis, Xarelto, Pradaxa, Platel, Aggrenox, Effient (Prasugrel), Ticlid)      ALWAYS CHECK WITH YOUR PRESCRIBING DOCTOR REGARDING THE MEDICATIONS LISTED BELOW; RECOMMENDED STOP TIME IS ALSO LISTED      If you are taking Lovenox, discontinue 24 HOURS prior to surgery    If you are taking weight loss medication, discontinue 7 days prior to surgery    If you are taking Metformin or Simvastatin, check with your primary care physician (or whoever has prescribed you this medication) regarding when to discontinue prior to surgery

## 2022-05-20 NOTE — TELEPHONE ENCOUNTER
ORDER FROM: Dr. Purdy    PRE AUTHORIZATION: [place] approved    METHOD OF PATIENT CONTACT: Spoke to patient over the phone, best number to contact #[phone]     PROCEDURE: Left carpal tunnel release    SURGICAL DATE: 6/8/22 at 1:05pm at [facility] TEST: 6/4/22 at 9:50am    READINESS VISIT: Please call, patient knows to stop by to get brace    PCP, CLINIC, PHONE#: Dr. Deshpande at [facility] #[phone]. Pre-op physical 5/24/22 at 9:20am     FILM INFO: EMG 12/14/21 [facility]     SURGICAL LETTER: Mailed 5/23/2022

## 2022-05-26 ENCOUNTER — LAB (OUTPATIENT)
Dept: LAB | Facility: CLINIC | Age: 52
End: 2022-05-26
Payer: COMMERCIAL

## 2022-05-26 DIAGNOSIS — Z11.59 ENCOUNTER FOR SCREENING FOR OTHER VIRAL DISEASES: Primary | ICD-10-CM

## 2022-05-26 DIAGNOSIS — Z01.812 ENCOUNTER FOR PRE-OPERATIVE LABORATORY TESTING: ICD-10-CM

## 2022-05-26 LAB
ANION GAP SERPL CALCULATED.3IONS-SCNC: 10 MMOL/L (ref 5–18)
APTT PPP: 28 SECONDS (ref 22–38)
BUN SERPL-MCNC: 13 MG/DL (ref 8–22)
CALCIUM SERPL-MCNC: 9.1 MG/DL (ref 8.5–10.5)
CHLORIDE BLD-SCNC: 106 MMOL/L (ref 98–107)
CO2 SERPL-SCNC: 25 MMOL/L (ref 22–31)
CREAT SERPL-MCNC: 0.79 MG/DL (ref 0.7–1.3)
ERYTHROCYTE [DISTWIDTH] IN BLOOD BY AUTOMATED COUNT: 12.1 % (ref 10–15)
GFR SERPL CREATININE-BSD FRML MDRD: >90 ML/MIN/1.73M2
GLUCOSE BLD-MCNC: 102 MG/DL (ref 70–125)
HCT VFR BLD AUTO: 44.2 % (ref 40–53)
HGB BLD-MCNC: 15.4 G/DL (ref 13.3–17.7)
INR PPP: 1.02 (ref 0.85–1.15)
MCH RBC QN AUTO: 32.5 PG (ref 26.5–33)
MCHC RBC AUTO-ENTMCNC: 34.8 G/DL (ref 31.5–36.5)
MCV RBC AUTO: 93 FL (ref 78–100)
PLATELET # BLD AUTO: 278 10E3/UL (ref 150–450)
POTASSIUM BLD-SCNC: 4.4 MMOL/L (ref 3.5–5)
RBC # BLD AUTO: 4.74 10E6/UL (ref 4.4–5.9)
SODIUM SERPL-SCNC: 141 MMOL/L (ref 136–145)
WBC # BLD AUTO: 6.1 10E3/UL (ref 4–11)

## 2022-05-26 PROCEDURE — 85027 COMPLETE CBC AUTOMATED: CPT

## 2022-05-26 PROCEDURE — 85610 PROTHROMBIN TIME: CPT

## 2022-05-26 PROCEDURE — 36415 COLL VENOUS BLD VENIPUNCTURE: CPT

## 2022-05-26 PROCEDURE — 85730 THROMBOPLASTIN TIME PARTIAL: CPT

## 2022-05-26 PROCEDURE — 80048 BASIC METABOLIC PNL TOTAL CA: CPT

## 2022-05-31 ENCOUNTER — LAB (OUTPATIENT)
Dept: LAB | Facility: CLINIC | Age: 52
End: 2022-05-31
Payer: COMMERCIAL

## 2022-05-31 DIAGNOSIS — Z11.59 ENCOUNTER FOR SCREENING FOR OTHER VIRAL DISEASES: ICD-10-CM

## 2022-05-31 LAB — SARS-COV-2 RNA RESP QL NAA+PROBE: NEGATIVE

## 2022-05-31 PROCEDURE — U0003 INFECTIOUS AGENT DETECTION BY NUCLEIC ACID (DNA OR RNA); SEVERE ACUTE RESPIRATORY SYNDROME CORONAVIRUS 2 (SARS-COV-2) (CORONAVIRUS DISEASE [COVID-19]), AMPLIFIED PROBE TECHNIQUE, MAKING USE OF HIGH THROUGHPUT TECHNOLOGIES AS DESCRIBED BY CMS-2020-01-R: HCPCS

## 2022-05-31 PROCEDURE — U0005 INFEC AGEN DETEC AMPLI PROBE: HCPCS

## 2022-06-01 DIAGNOSIS — Z01.812 ENCOUNTER FOR PREOPERATIVE SCREENING LABORATORY TESTING FOR COVID-19 VIRUS: Primary | ICD-10-CM

## 2022-06-01 DIAGNOSIS — Z11.52 ENCOUNTER FOR PREOPERATIVE SCREENING LABORATORY TESTING FOR COVID-19 VIRUS: Primary | ICD-10-CM

## 2022-06-04 ENCOUNTER — LAB (OUTPATIENT)
Dept: FAMILY MEDICINE | Facility: CLINIC | Age: 52
End: 2022-06-04
Attending: SURGERY
Payer: COMMERCIAL

## 2022-06-04 DIAGNOSIS — Z01.812 ENCOUNTER FOR PREOPERATIVE SCREENING LABORATORY TESTING FOR COVID-19 VIRUS: ICD-10-CM

## 2022-06-04 DIAGNOSIS — Z11.52 ENCOUNTER FOR PREOPERATIVE SCREENING LABORATORY TESTING FOR COVID-19 VIRUS: ICD-10-CM

## 2022-06-04 LAB — SARS-COV-2 RNA RESP QL NAA+PROBE: NEGATIVE

## 2022-06-04 PROCEDURE — U0005 INFEC AGEN DETEC AMPLI PROBE: HCPCS

## 2022-06-04 PROCEDURE — U0003 INFECTIOUS AGENT DETECTION BY NUCLEIC ACID (DNA OR RNA); SEVERE ACUTE RESPIRATORY SYNDROME CORONAVIRUS 2 (SARS-COV-2) (CORONAVIRUS DISEASE [COVID-19]), AMPLIFIED PROBE TECHNIQUE, MAKING USE OF HIGH THROUGHPUT TECHNOLOGIES AS DESCRIBED BY CMS-2020-01-R: HCPCS

## 2022-06-07 ENCOUNTER — TELEPHONE (OUTPATIENT)
Dept: NEUROSURGERY | Facility: CLINIC | Age: 52
End: 2022-06-07
Payer: COMMERCIAL

## 2022-06-07 NOTE — TELEPHONE ENCOUNTER
Called patient, discussed surgery, post-op course, expectations, follow up plan.    Reviewed H&P from 05/24/2022 - cleared for surgery  Labs - WNL    To OR as planned.     Check in - 0835    Nothing to eat or drink after midnight the night before surgery.     Reviewed with patient: Arrive 2 hours prior to scheduled surgery.    Continue to refrain from NSAIDS (Ibuprofen, Aleve, Naprosyn), ASA, Over the counter herbal medications or supplements, anti-coagulants and blood thinners.     Patient confirmed they have help/assistance in place at home upon discharge      Patient was informed that we will provide up to 1 week prescription of pain medication for post-operative pain.      Instructed patient about the following: After your surgery, if you will be staying in-patient, a nursing provider team will be monitoring you closely throughout your stay and communicate your health status to your surgeon and other providers.  You will be seen by Advanced Practice Providers (e.g., nurse practitioners, clinic nurse specialist, and physician assistants) who will check on you regularly to assess the status of your surgery.     Mira Rios RN, CNRN

## 2022-06-08 ENCOUNTER — ANESTHESIA EVENT (OUTPATIENT)
Dept: SURGERY | Facility: CLINIC | Age: 52
End: 2022-06-08
Payer: COMMERCIAL

## 2022-06-08 ENCOUNTER — TELEPHONE (OUTPATIENT)
Dept: NEUROSURGERY | Facility: CLINIC | Age: 52
End: 2022-06-08

## 2022-06-08 ENCOUNTER — ANESTHESIA (OUTPATIENT)
Dept: SURGERY | Facility: CLINIC | Age: 52
End: 2022-06-08
Payer: COMMERCIAL

## 2022-06-08 ENCOUNTER — HOSPITAL ENCOUNTER (OUTPATIENT)
Facility: CLINIC | Age: 52
Discharge: HOME | End: 2022-06-08
Attending: SURGERY | Admitting: SURGERY
Payer: COMMERCIAL

## 2022-06-08 VITALS
BODY MASS INDEX: 33.04 KG/M2 | WEIGHT: 223.1 LBS | DIASTOLIC BLOOD PRESSURE: 72 MMHG | SYSTOLIC BLOOD PRESSURE: 112 MMHG | HEIGHT: 69 IN | HEART RATE: 73 BPM | TEMPERATURE: 97.3 F | OXYGEN SATURATION: 97 % | RESPIRATION RATE: 15 BRPM

## 2022-06-08 DIAGNOSIS — G56.02 CARPAL TUNNEL SYNDROME OF LEFT WRIST: Primary | ICD-10-CM

## 2022-06-08 PROCEDURE — 250N000011 HC RX IP 250 OP 636: Performed by: NURSE ANESTHETIST, CERTIFIED REGISTERED

## 2022-06-08 PROCEDURE — 250N000009 HC RX 250: Performed by: SURGERY

## 2022-06-08 PROCEDURE — 999N000141 HC STATISTIC PRE-PROCEDURE NURSING ASSESSMENT: Performed by: SURGERY

## 2022-06-08 PROCEDURE — 999N000127 HC STATISTIC PERIPHERAL IV START W US GUIDANCE

## 2022-06-08 PROCEDURE — 250N000011 HC RX IP 250 OP 636: Performed by: ANESTHESIOLOGY

## 2022-06-08 PROCEDURE — 360N000075 HC SURGERY LEVEL 2, PER MIN: Performed by: SURGERY

## 2022-06-08 PROCEDURE — 250N000011 HC RX IP 250 OP 636: Performed by: PHYSICIAN ASSISTANT

## 2022-06-08 PROCEDURE — 710N000012 HC RECOVERY PHASE 2, PER MINUTE: Performed by: SURGERY

## 2022-06-08 PROCEDURE — 64721 CARPAL TUNNEL SURGERY: CPT | Mod: LT | Performed by: SURGERY

## 2022-06-08 PROCEDURE — 272N000001 HC OR GENERAL SUPPLY STERILE: Performed by: SURGERY

## 2022-06-08 PROCEDURE — 258N000003 HC RX IP 258 OP 636: Performed by: ANESTHESIOLOGY

## 2022-06-08 PROCEDURE — 370N000017 HC ANESTHESIA TECHNICAL FEE, PER MIN: Performed by: SURGERY

## 2022-06-08 RX ORDER — OXYCODONE HYDROCHLORIDE 5 MG/1
5 TABLET ORAL EVERY 4 HOURS PRN
Status: DISCONTINUED | OUTPATIENT
Start: 2022-06-08 | End: 2022-06-08 | Stop reason: HOSPADM

## 2022-06-08 RX ORDER — ONDANSETRON 2 MG/ML
INJECTION INTRAMUSCULAR; INTRAVENOUS PRN
Status: DISCONTINUED | OUTPATIENT
Start: 2022-06-08 | End: 2022-06-08

## 2022-06-08 RX ORDER — CEFAZOLIN SODIUM/WATER 2 G/20 ML
2 SYRINGE (ML) INTRAVENOUS SEE ADMIN INSTRUCTIONS
Status: DISCONTINUED | OUTPATIENT
Start: 2022-06-08 | End: 2022-06-08 | Stop reason: HOSPADM

## 2022-06-08 RX ORDER — PROPOFOL 10 MG/ML
INJECTION, EMULSION INTRAVENOUS CONTINUOUS PRN
Status: DISCONTINUED | OUTPATIENT
Start: 2022-06-08 | End: 2022-06-08

## 2022-06-08 RX ORDER — OXYCODONE AND ACETAMINOPHEN 5; 325 MG/1; MG/1
TABLET ORAL
Qty: 15 TABLET | Refills: 0 | Status: SHIPPED | OUTPATIENT
Start: 2022-06-08 | End: 2022-09-01

## 2022-06-08 RX ORDER — LACTOBACILLUS RHAMNOSUS GG 10B CELL
1 CAPSULE ORAL EVERY MORNING
COMMUNITY

## 2022-06-08 RX ORDER — CEFAZOLIN SODIUM/WATER 2 G/20 ML
2 SYRINGE (ML) INTRAVENOUS
Status: COMPLETED | OUTPATIENT
Start: 2022-06-08 | End: 2022-06-08

## 2022-06-08 RX ORDER — ONDANSETRON 4 MG/1
4 TABLET, ORALLY DISINTEGRATING ORAL EVERY 30 MIN PRN
Status: DISCONTINUED | OUTPATIENT
Start: 2022-06-08 | End: 2022-06-08 | Stop reason: HOSPADM

## 2022-06-08 RX ORDER — HYDROMORPHONE HCL IN WATER/PF 6 MG/30 ML
0.2 PATIENT CONTROLLED ANALGESIA SYRINGE INTRAVENOUS EVERY 5 MIN PRN
Status: DISCONTINUED | OUTPATIENT
Start: 2022-06-08 | End: 2022-06-08 | Stop reason: HOSPADM

## 2022-06-08 RX ORDER — ASPIRIN 81 MG/1
81 TABLET ORAL DAILY
Status: ON HOLD
Start: 2022-06-08 | End: 2023-11-11

## 2022-06-08 RX ORDER — LIDOCAINE 40 MG/G
CREAM TOPICAL
Status: DISCONTINUED | OUTPATIENT
Start: 2022-06-08 | End: 2022-06-08 | Stop reason: HOSPADM

## 2022-06-08 RX ORDER — PROPOFOL 10 MG/ML
INJECTION, EMULSION INTRAVENOUS PRN
Status: DISCONTINUED | OUTPATIENT
Start: 2022-06-08 | End: 2022-06-08

## 2022-06-08 RX ORDER — SODIUM CHLORIDE, SODIUM LACTATE, POTASSIUM CHLORIDE, CALCIUM CHLORIDE 600; 310; 30; 20 MG/100ML; MG/100ML; MG/100ML; MG/100ML
INJECTION, SOLUTION INTRAVENOUS CONTINUOUS
Status: DISCONTINUED | OUTPATIENT
Start: 2022-06-08 | End: 2022-06-08 | Stop reason: HOSPADM

## 2022-06-08 RX ORDER — ONDANSETRON 2 MG/ML
4 INJECTION INTRAMUSCULAR; INTRAVENOUS EVERY 30 MIN PRN
Status: DISCONTINUED | OUTPATIENT
Start: 2022-06-08 | End: 2022-06-08 | Stop reason: HOSPADM

## 2022-06-08 RX ORDER — FENTANYL CITRATE 50 UG/ML
25 INJECTION, SOLUTION INTRAMUSCULAR; INTRAVENOUS EVERY 5 MIN PRN
Status: DISCONTINUED | OUTPATIENT
Start: 2022-06-08 | End: 2022-06-08 | Stop reason: HOSPADM

## 2022-06-08 RX ORDER — DEXAMETHASONE SODIUM PHOSPHATE 4 MG/ML
INJECTION, SOLUTION INTRA-ARTICULAR; INTRALESIONAL; INTRAMUSCULAR; INTRAVENOUS; SOFT TISSUE PRN
Status: DISCONTINUED | OUTPATIENT
Start: 2022-06-08 | End: 2022-06-08

## 2022-06-08 RX ORDER — FENTANYL CITRATE 50 UG/ML
25 INJECTION, SOLUTION INTRAMUSCULAR; INTRAVENOUS
Status: DISCONTINUED | OUTPATIENT
Start: 2022-06-08 | End: 2022-06-08 | Stop reason: HOSPADM

## 2022-06-08 RX ORDER — ONDANSETRON 2 MG/ML
4 INJECTION INTRAMUSCULAR; INTRAVENOUS EVERY 6 HOURS PRN
Status: DISCONTINUED | OUTPATIENT
Start: 2022-06-08 | End: 2022-06-08 | Stop reason: HOSPADM

## 2022-06-08 RX ADMIN — Medication 2 G: at 10:26

## 2022-06-08 RX ADMIN — PROPOFOL 150 MCG/KG/MIN: 10 INJECTION, EMULSION INTRAVENOUS at 10:35

## 2022-06-08 RX ADMIN — DEXAMETHASONE SODIUM PHOSPHATE 4 MG: 4 INJECTION, SOLUTION INTRA-ARTICULAR; INTRALESIONAL; INTRAMUSCULAR; INTRAVENOUS; SOFT TISSUE at 10:40

## 2022-06-08 RX ADMIN — SODIUM CHLORIDE, POTASSIUM CHLORIDE, SODIUM LACTATE AND CALCIUM CHLORIDE: 600; 310; 30; 20 INJECTION, SOLUTION INTRAVENOUS at 09:54

## 2022-06-08 RX ADMIN — ONDANSETRON 4 MG: 2 INJECTION INTRAMUSCULAR; INTRAVENOUS at 10:40

## 2022-06-08 RX ADMIN — ONDANSETRON 4 MG: 2 INJECTION INTRAMUSCULAR; INTRAVENOUS at 09:55

## 2022-06-08 RX ADMIN — PROPOFOL 50 MG: 10 INJECTION, EMULSION INTRAVENOUS at 10:35

## 2022-06-08 RX ADMIN — MIDAZOLAM 2 MG: 1 INJECTION INTRAMUSCULAR; INTRAVENOUS at 10:32

## 2022-06-08 NOTE — TELEPHONE ENCOUNTER
PATIENT NAME:  Rigo Joyner  YOB: 1970  MRN: [id number]  SURGEON: Dr. Purdy  DATE of SURGERY: 06/08/2022  PROCEDURE: Left CTR    FOLLOW-UP:  Wound Check: n/a  Staples/Sutures Out: 10-12 days  Post Op Visit: 6 weeks  Post-op Provider: [email or web address] on Dr. Purdy clinic day  DIAGNOSTICS: n/a  DISPOSITION: Home same day    ADDITIONAL INSTRUCTIONS FOR MEDICAL STAFF:        Mira Rios RN, CNRN

## 2022-06-08 NOTE — INTERVAL H&P NOTE
"I have reviewed the surgical (or preoperative) H&P that is linked to this encounter, and examined the patient. There are no significant changes    Clinical Conditions Present on Arrival:  Clinically Significant Risk Factors Present on Admission                  # Platelet Defect: home medication list includes an antiplatelet medication  # Obesity: Estimated body mass index is 32.95 kg/m  as calculated from the following:    Height as of this encounter: 5' 9\" (1.753 m).    Weight as of this encounter: 223 lb 1.6 oz (101.2 kg).       "

## 2022-06-08 NOTE — ANESTHESIA PREPROCEDURE EVALUATION
Anesthesia Pre-Procedure Evaluation    Patient: Rigo Joyner   MRN: [id number] : 1970        Procedure : Procedure(s):  Left carpal tunnel release          Past Medical History:   Diagnosis Date     Hyperlipemia       Past Surgical History:   Procedure Laterality Date     BILATERAL HIP ARTHROSCOPY       GI SURGERY       LAPAROSCOPIC LEFT COLON RESECTION       LUMBAR FUSION        Allergies   Allergen Reactions     Bee Pollen Cough, Headache and Itching     Pollen Extract Cough, Headache and Itching     Seasonal Allergies Headache, Itching, Rash and Visual Disturbance      Social History     Tobacco Use     Smoking status: Never Smoker     Smokeless tobacco: Former User     Types: Chew   Substance Use Topics     Alcohol use: Never      Wt Readings from Last 1 Encounters:   22 101.2 kg (223 lb 1.6 oz)        Anesthesia Evaluation   Pt has had prior anesthetic.         ROS/MED HX  ENT/Pulmonary:  - neg pulmonary ROS     Neurologic:  - neg neurologic ROS     Cardiovascular:  - neg cardiovascular ROS  (-) murmur and wheezes   METS/Exercise Tolerance: >4 METS    Hematologic:  - neg hematologic  ROS     Musculoskeletal: Comment: Hx of Lumbar fusion  Carpal tunnel syndrome      GI/Hepatic:  - neg GI/hepatic ROS     Renal/Genitourinary:  - neg Renal ROS     Endo:  - neg endo ROS     Psychiatric/Substance Use:  - neg psychiatric ROS     Infectious Disease:  - neg infectious disease ROS     Malignancy:  - neg malignancy ROS     Other:  - neg other ROS          Physical Exam    Airway  airway exam normal      Mallampati: I   TM distance: > 3 FB   Neck ROM: full   Mouth opening: > 3 cm    Respiratory Devices and Support         Dental  no notable dental history         Cardiovascular          Rhythm and rate: regular and normal (-) no murmur    Pulmonary           breath sounds clear to auscultation   (-) no wheezes        OUTSIDE LABS:  CBC:   Lab Results   Component Value Date    WBC 6.1 2022    WBC  5.6 07/20/2021    HGB 15.4 05/26/2022    HGB 15.2 07/20/2021    HCT 44.2 05/26/2022    HCT 44.8 07/20/2021     05/26/2022     07/20/2021     BMP:   Lab Results   Component Value Date     05/26/2022     07/20/2021    POTASSIUM 4.4 05/26/2022    POTASSIUM 3.7 07/20/2021    CHLORIDE 106 05/26/2022    CHLORIDE 109 07/20/2021    CO2 25 05/26/2022    CO2 31 07/20/2021    BUN 13 05/26/2022    BUN 10 07/20/2021    CR 0.79 05/26/2022    CR 0.91 07/20/2021     05/26/2022    GLC 91 07/20/2021     COAGS:   Lab Results   Component Value Date    PTT 28 05/26/2022    INR 1.02 05/26/2022     POC: No results found for: BGM, HCG, HCGS  HEPATIC:   Lab Results   Component Value Date    ALBUMIN 4.0 07/20/2021    PROTTOTAL 7.0 07/20/2021    ALT 52 07/20/2021    AST 34 07/20/2021    ALKPHOS 48 07/20/2021    BILITOTAL 1.0 07/20/2021     OTHER:   Lab Results   Component Value Date    LACT 1.6 06/10/2021    JOSE 9.1 05/26/2022    LIPASE 119 07/24/2019    CRP 44.5 (H) 04/09/2018       Anesthesia Plan    ASA Status:  1   NPO Status:  NPO Appropriate    Anesthesia Type: MAC.   Induction: Intravenous, Propofol.   Maintenance: TIVA.        Consents    Anesthesia Plan(s) and associated risks, benefits, and realistic alternatives discussed. Questions answered and patient/representative(s) expressed understanding.     - Discussed: Risks, Benefits and Alternatives for BOTH SEDATION and the PROCEDURE were discussed     - Discussed with:  Patient      - Patient is DNR/DNI Status: No         Postoperative Care    Pain management: IV analgesics, Oral pain medications, Multi-modal analgesia.   PONV prophylaxis: Ondansetron (or other 5HT-3), Dexamethasone or Solumedrol     Comments:    Other Comments: TIVA with Propofol  Zofran for PONV            Raza Villalobos MD

## 2022-06-08 NOTE — PHARMACY-ADMISSION MEDICATION HISTORY
Pharmacy Note - Admission Medication History    Pertinent Provider Information: N/A   ______________________________________________________________________    Prior To Admission (PTA) med list completed and updated in EMR.       PTA Med List   Medication Sig Last Dose     acetaminophen (TYLENOL) 500 MG tablet Take 1,000 mg by mouth every 6 hours as needed Unknown at Unknown time     aspirin (ASA) 81 MG tablet Take 81 mg by mouth daily (with dinner) 5/19/2022     atorvastatin (LIPITOR) 10 MG tablet Take 10 mg by mouth daily (with dinner) 6/7/2022 at 1800     fluticasone (FLONASE) 50 MCG/ACT nasal spray Spray 1 spray in nostril daily as needed Unknown at Unknown time     gabapentin (NEURONTIN) 300 MG capsule Take 1 cap at bedtime, increase by 1 cap every three days, max dose 3 caps tid (Patient taking differently: Take 600 mg by mouth 3 times daily) 6/8/2022 at AM x1     lactobacillus rhamnosus, GG, (CULTURELL) capsule Take 1 capsule by mouth daily (with dinner) 6/7/2022 at 1800       Information source(s): Patient and Clinic records    Method of interview communication: in-person    Patient was asked about OTC/herbal products specifically.  PTA med list reflects this.    Based on the pharmacist's assessment, the PTA med list information appears reliable    Allergies were reviewed, assessed, and updated with the patient.      Patient does not use any multi-dose medications prior to admission.     Thank you for the opportunity to participate in the care of this patient.      Matteo Meyer RPH     6/8/2022     9:06 AM

## 2022-06-08 NOTE — ANESTHESIA POSTPROCEDURE EVALUATION
Patient: Rigo Joyner    Procedure: Procedure(s):  Left carpal tunnel release       Anesthesia Type:  MAC    Note:  Disposition: Outpatient   Postop Pain Control: Uneventful            Sign Out: Well controlled pain   PONV: No   Neuro/Psych: Uneventful            Sign Out: Acceptable/Baseline neuro status   Airway/Respiratory: Uneventful            Sign Out: Acceptable/Baseline resp. status   CV/Hemodynamics: Uneventful            Sign Out: Acceptable CV status; No obvious hypovolemia; No obvious fluid overload   Other NRE: NONE   DID A NON-ROUTINE EVENT OCCUR? No           Last vitals:  Vitals Value Taken Time   /72 06/08/22 1200   Temp 36.3  C (97.3  F) 06/08/22 1200   Pulse 70 06/08/22 1215   Resp     SpO2 98 % 06/08/22 1215   Vitals shown include unvalidated device data.    Electronically Signed By: Raza Villalobos MD  June 8, 2022  1:45 PM

## 2022-06-08 NOTE — BRIEF OP NOTE
[facility] Brief Operative Note    Pre-operative diagnosis: Carpal tunnel syndrome [G56.00]   Post-operative diagnosis same   Procedure: Procedure(s):  Left carpal tunnel release   Surgeon(s): Surgeon(s) and Role:     * Sandrita Purdy MD - Primary   Estimated blood loss: <1cc   Specimens: * No specimens in log *   Findings: Carpal tunnel impingement of median nerve

## 2022-06-08 NOTE — DISCHARGE INSTRUCTIONS
Instructions to care for your wound at home:   Remove bulky dressing 2 days after surgery and apply regular Band-Aid to incision for 4 days   Okay to wash hands while band-aid is on   After 4 days remove band-aid and wash hands as normal getting incision wet, do not soak or submerge incision in water  Sleep in splint once bulky dressing is removed  Avoid repetitive arm and hand movement for 4 weeks after surgery. Example includes making meals or computer typing   You can apply ice to your wrist 10-20 minutes every 1-2 hours as needed for symptom management.   If you were not given a splint/brace in clinic you can purchase one at your local pharmacy. The goal is to keep your hand/wrist straight while you heal.   No lifting greater than a fork   Be sure to move fingers every hour (wiggle fingers- pinky to thumb and up and down)   Follow up will be in 2 weeks office will contact you to schedule appointment if you do not hear from the office in 2 days please call to schedule appointment  [phone]  Please contact the office for any concerns or redness, heat, or drainage from incision

## 2022-06-08 NOTE — ANESTHESIA CARE TRANSFER NOTE
Patient: Rigo Joyner    Procedure: Procedure(s):  Left carpal tunnel release       Diagnosis: Carpal tunnel syndrome [G56.00]  Diagnosis Additional Information: No value filed.    Anesthesia Type:   MAC     Note:    Oropharynx: oropharynx clear of all foreign objects  Level of Consciousness: awake  Oxygen Supplementation: face mask  Level of Supplemental Oxygen (L/min / FiO2): 6  Independent Airway: airway patency satisfactory and stable  Dentition: dentition unchanged  Vital Signs Stable: post-procedure vital signs reviewed and stable  Report to RN Given: handoff report given  Patient transferred to: Phase II    Handoff Report: Identifed the Patient, Identified the Reponsible Provider, Reviewed the pertinent medical history, Discussed the surgical course, Reviewed Intra-OP anesthesia mangement and issues during anesthesia, Set expectations for post-procedure period and Allowed opportunity for questions and acknowledgement of understanding      Vitals:  Vitals Value Taken Time   /71 06/08/22 1130   Temp 36.7  C (98  F) 06/08/22 1123   Pulse 83 06/08/22 1129   Resp 15 06/08/22 1123   SpO2 94 % 06/08/22 1129   Vitals shown include unvalidated device data.    Electronically Signed By: ARINA Blum CRNA  June 8, 2022  11:31 AM

## 2022-06-09 NOTE — OP NOTE
NEUROSURGERY OPERATIVE REPORT     DATE OF SERVICE:  6/8/2022    PREOPERATIVE DIAGNOSES:   1. Left carpal tunnel syndrome.     POSTOPERATIVE DIAGNOSES:   1. same    PROCEDURE:   1. Left carpal tunnel release    SURGEON: Sandrita Purdy MD    INDICATIONS:  Rigo Joyner is a 51 year old male who presents with clinical and electrophysiological evidence consistent with left carpal tunnel syndrome. He has failed medical management.  We discussed treatment options including risks and benefits of left carpal tunnel release.   He appeared to understand, ask appropriate questions and elected to proceed.    PROCEDURE:  After obtaining informed consent, the patient was brought to the operating room with MAC anesthesia.  The  pt was positioned supine on the operating room table with the left arm extended on an arm board. He was prepped and draped in the usual sterile fashion.    A preincisional infiltration of 1% lidocaine without epinephrine was performed along the  incision which was lateral to the thenar crease extending from the level of the wrist to a plane perpendicular to the outstretched thumb.   The skin was opened sharply down to the level of the palmar aponeurosis which was divided until reaching the flexor retinaculum.  The retinaculum was divided layer by layer until reaching the median nerve. We continued the dissection distally until reaching palmar fat. We continued the dissection proximally by tenting the subcutaneous tissues at the level of the wrist and cutting the retinaculum under direct vision with a long tenotomy scissors until we could easily pass a [place] dissector into the distal forearm with no impingement.  Once this was accomplished we then proceeded to copiously irrigate the incision and proceeded to close the skin edges with interrupted vertical mattress suture to approximate the skin edges.  Sponge and needle counts were correct per closure ×2 sterile dressings were placed including  gauze Kerlix fluffs and Ace wrap patient was taken to the recovery room moving all digits freely with no new deficits.    Estimated blood loss: < 1 ml    Specimens: none    Findings: Carpal tunnel impingement of median nerve           Sandrita Purdy MD    Cc:  Semmler, Steven Duane

## 2022-06-15 ENCOUNTER — TELEPHONE (OUTPATIENT)
Dept: NEUROSURGERY | Facility: CLINIC | Age: 52
End: 2022-06-15
Payer: COMMERCIAL

## 2022-06-15 ENCOUNTER — ALLIED HEALTH/NURSE VISIT (OUTPATIENT)
Dept: NEUROSURGERY | Facility: CLINIC | Age: 52
End: 2022-06-15
Payer: COMMERCIAL

## 2022-06-15 DIAGNOSIS — G56.02 CARPAL TUNNEL SYNDROME OF LEFT WRIST: Primary | ICD-10-CM

## 2022-06-15 PROCEDURE — 99207 PR NO CHARGE NURSE ONLY: CPT

## 2022-06-15 NOTE — TELEPHONE ENCOUNTER
"Returned call to pt and offered an office visit to assess his incision for signs of infection and further intervention if needed based on his report of a suture \"popping out\" earlier this week.     Pt will come into clinic at 11am today.     Kimi Driver RN   "

## 2022-06-15 NOTE — TELEPHONE ENCOUNTER
Patient calling, one of the stitches broke/pulled out - it doesn't look infected, just a little open. Patient has appointment on Monday - Do you want to see him sooner or wait till Monday

## 2022-06-15 NOTE — PROGRESS NOTES
"Rigo Joyner is s/p Left carpal tunnel release with Dr. Purdy on 6/08/2022.    Patient presents today for post-op incision check and is 7 days post-op. He reports that a suture in the middle of his incision \"popped out\" and his wound is no longer approximated. He reports tingling and \"nerve sensations\" in all fingers and along the outer aspect of his left hand and forearm.    Upon inspection, one small area near the midline of the incision is slightly open. No sign of infection or swelling. All other sutures in tact.     Wound assessed by FLORI Wagner CNP. Advised thorough cleansing and application of Exofin. Wound was then dressed with primapore dressing. Verbal wound care instructions given.     Pt to follow up on Monday for suture removal. He was also given a new wrist brace today.     Reviewed activity restrictions in detail as pt has been \"over doing it.\" Advised elevation and frequent icing, pt provided an ice pack in clinic today as well.    All of patient's questions addressed today. He was instructed to call with any additional questions/concerns. Follow up appts made.     Kimi Driver RN    "

## 2022-06-20 ENCOUNTER — ALLIED HEALTH/NURSE VISIT (OUTPATIENT)
Dept: NEUROSURGERY | Facility: CLINIC | Age: 52
End: 2022-06-20
Payer: COMMERCIAL

## 2022-06-20 VITALS
SYSTOLIC BLOOD PRESSURE: 116 MMHG | OXYGEN SATURATION: 96 % | HEART RATE: 75 BPM | RESPIRATION RATE: 16 BRPM | DIASTOLIC BLOOD PRESSURE: 83 MMHG

## 2022-06-20 DIAGNOSIS — G56.02 CARPAL TUNNEL SYNDROME OF LEFT WRIST: Primary | ICD-10-CM

## 2022-06-20 PROCEDURE — 99207 PR NO CHARGE NURSE ONLY: CPT

## 2022-06-20 NOTE — PATIENT INSTRUCTIONS
Wound care:  No lotions, soaps, powders, ointments, creams, or patches on incision until the wound is well healed. Keep the wound dry.  If you notice increased or persistent drainage from your wound, contact our office.      Wash your hands before changing the dressing or touching the wound. If someone is helping you change the dressing, ensure that the person washes his/her hands.  Good handwashing can decrease the risk of infection.  If you are unable to see the wound, have someone check the wound daily for redness, swelling or drainage.  A small amount of drainage is normal.       Wash your wound daily. Remove all dressings prior to your shower.  Apply mild soap directly to the wound, form a light lather and rinse with running water.     Do not submerge the wound under water. No baths or swimming for 6 weeks.     If you develop redness, swelling, drainage, or temp 101 or greater, please call our office at [phone].     Activity:   Move your finger joints (straighten and bend) at least 10times, three times a day.   No lifting anything greater than you can put on your fork when eating for 6 weeks.    Wear your splint most of the time for 6 weeks.     ELEVATE operative hand above heart level as much as possible for 3 days after surgery to alleviate swelling.  ICE: We recommend ice to your incision for 20 minutes four times a day for paincontrol and to decrease post op swelling.  You may alternate the ice with heat for muscle spasms.

## 2022-06-20 NOTE — PROGRESS NOTES
Rigo LOZANO Ar is s/p left carpal tunnel release with Dr. Purdy on 06/08/2022.      Patient presents today for post-op incision check and is 12 days post-op. He presented preoperatively with a burning sensation in his wrist with numbness and tingling in his left hand which was worse at night. Today, he reports intermittent burning in the left wrist with improved numbness/tingling in his left hand. Patient is pleased with the outcome of the surgery; however continues to have cervical symptoms that bother him (predominantly pain between his shoulder blades).    Taking tylenol for pain with adequate relief. Pt is using ice for soreness.      Activity restrictions reinforced at this time as outlined the After Visit Summary.     Surgical wound WNL - C/D/I, no signs of infection or skin breakdown.  Incision well-healed: good skin approximation, no redness or visible/palpable edema, no tenderness to palpation.  Pt denies fever, chills or sweats.    Suture removal - sutures intact, removed moderate difficulty-second RN assisted. Wound prepped with alcohol swabs before and after removal.  Surrounding skin has no signs of breakdown.  Verbal instructions regarding incision care were given and outlined in AVS.  Pt advised to call us if any s/s of infection noted.    All of patient's questions addressed today. He was instructed to call with any additional questions/concerns. Follow up appts made.     Kimi Driver RN

## 2022-07-19 ENCOUNTER — OFFICE VISIT (OUTPATIENT)
Dept: NEUROSURGERY | Facility: CLINIC | Age: 52
End: 2022-07-19
Payer: COMMERCIAL

## 2022-07-19 VITALS
BODY MASS INDEX: 33.03 KG/M2 | HEIGHT: 69 IN | HEART RATE: 78 BPM | WEIGHT: 223 LBS | OXYGEN SATURATION: 99 % | SYSTOLIC BLOOD PRESSURE: 124 MMHG | DIASTOLIC BLOOD PRESSURE: 69 MMHG

## 2022-07-19 DIAGNOSIS — S32.009K PSEUDOARTHROSIS OF LUMBAR SPINE: ICD-10-CM

## 2022-07-19 DIAGNOSIS — G56.02 CARPAL TUNNEL SYNDROME OF LEFT WRIST: Primary | ICD-10-CM

## 2022-07-19 PROCEDURE — 99213 OFFICE O/P EST LOW 20 MIN: CPT | Mod: 24 | Performed by: NURSE PRACTITIONER

## 2022-07-19 NOTE — LETTER
7/19/2022         RE: Rigo Joyner  [address] [id number]        Dear Colleague,    Thank you for referring your patient, Rigo Joyner, to the [facility]. Please see a copy of my visit note below.    Neurosurgery clinic note:      A/P:  Ari is a 52yo M who is approx 6 wks s/p left carpal tunnel release with Dr. Purdy on 06/08/2022. He presented preoperatively with a burning sensation in his wrist with numbness and tingling in his left hand which was worse at night.    Ari complains of ongoing left arm pain. Now also pain into the R shoulder. Sometimes the R elbow. He feels like he has a retained suture in the prox hand. There are two black dots. States he will try to dig and look for sutures at home.  Stable numbness and tingling in the hand. Dr Purdy saw pt at time of appt for discussion and attempted removal of possible retained suture - no suture found. Betadine swab used and bandaid placed. Recommended starting hand therapy. F/u prn in terms of his postop carpal tunnel.    Given his ongoing pain that extends down the arm, recommended seeing Dayanna next week and considering a left C5-6 UBALDO. He has already had left C4-5 and C6-7 injections with some improvement. Could consider ACDF if improvement with injection. We otherwise have plans to f/u in 3 mos with a new lumbar CT to recheck pseudoarthrosis of lumbar spine. If no progression with bone stimulator, could offer redo lumbar fusion.      Plan:  1. Start OT hand therapy  2. F/u with Dayanna next week. Consider left C5-6 UBALDO.  3. 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 Follow-up in 3 mos with Dr Purdy with lumbar CT to recheck fusion of lumbar spine. If no progression of lumbar fusion, could consider redo lumbar fusion. At that appt, can also discuss how injections went, and consider ACDF.  2. If suture materials peeks through incision at any point, call us for RN  appt to remove    Exam:    General: seated in NAD, appears comfortable    Strength:  Deltoids: 5/5 right, 5/5 left  Triceps: 5/5 right, 5/5 left  Biceps: 5/5 right, 5/5 left  Handgrips: 5/5 right, 5/5 left  Intrinsics: 5/5 right, 5/5 left  Extensors: 5/5 right, 5/5 left  Hip flexors: 5/5 right, 5/5 left  Quads: 5/5 right, 5/5 left  Hamstrings: 5/5 right, 5/5 left  Dorsiflexion: 5/5 right, 5/5 left  Plantarflexion 5/5 right, 5/5 left  EHL: 5/5 right, 5/5 left    Sensation is decreased to light touch left palm, fingers. Otherwise intact to light touch throughout upper and lower extremities bilaterally    Gait is smooth and coordinated.    Incision: CDI, nicely healed. prox incision 1/2cm area of thickening with two tiny scabs. Area was explored by Dr Purdy at time of appt, scabs removed, underlying skin explored - no suture material seen. Betadine and bandaid applied.        Sofia Prasad FNP-C  [facility] Neurosurgery  O. [phone]          Again, thank you for allowing me to participate in the care of your patient.        Sincerely,        ARINA Jsoeph CNP

## 2022-07-19 NOTE — PROGRESS NOTES
Neurosurgery clinic note:      A/P:  Ari is a 52yo M who is approx 6 wks s/p left carpal tunnel release with Dr. Purdy on 06/08/2022. He presented preoperatively with a burning sensation in his wrist with numbness and tingling in his left hand which was worse at night.    Ari complains of ongoing left arm pain. Now also pain into the R shoulder. Sometimes the R elbow. He feels like he has a retained suture in the prox hand. There are two black dots. States he will try to dig and look for sutures at home.  Stable numbness and tingling in the hand. Dr Purdy saw pt at time of appt for discussion and attempted removal of possible retained suture - no suture found. Betadine swab used and bandaid placed. Recommended starting hand therapy. F/u prn in terms of his postop carpal tunnel.    Given his ongoing pain that extends down the arm, recommended seeing Dayanna next week and considering a left C5-6 UBALDO. He has already had left C4-5 and C6-7 injections with some improvement. Could consider ACDF if improvement with injection. We otherwise have plans to f/u in 3 mos with a new lumbar CT to recheck pseudoarthrosis of lumbar spine. If no progression with bone stimulator, could offer redo lumbar fusion.      Plan:  1. Start OT hand therapy  2. F/u with Dayanna next week. Consider left C5-6 UBALDO.  3. 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 Follow-up in 3 mos with Dr Purdy with lumbar CT to recheck fusion of lumbar spine. If no progression of lumbar fusion, could consider redo lumbar fusion. At that appt, can also discuss how injections went, and consider ACDF.  2. If suture materials peeks through incision at any point, call us for RN appt to remove    Exam:    General: seated in NAD, appears comfortable    Strength:  Deltoids: 5/5 right, 5/5 left  Triceps: 5/5 right, 5/5 left  Biceps: 5/5 right, 5/5 left  Handgrips: 5/5 right, 5/5 left  Intrinsics: 5/5 right, 5/5 left  Extensors: 5/5 right, 5/5 left  Hip  flexors: 5/5 right, 5/5 left  Quads: 5/5 right, 5/5 left  Hamstrings: 5/5 right, 5/5 left  Dorsiflexion: 5/5 right, 5/5 left  Plantarflexion 5/5 right, 5/5 left  EHL: 5/5 right, 5/5 left    Sensation is decreased to light touch left palm, fingers. Otherwise intact to light touch throughout upper and lower extremities bilaterally    Gait is smooth and coordinated.    Incision: CDI, nicely healed. prox incision 1/2cm area of thickening with two tiny scabs. Area was explored by Dr Purdy at time of appt, scabs removed, underlying skin explored - no suture material seen. Betadine and bandaid applied.        Sofia Prasad FNP-C  [facility] Neurosurgery  O. [phone]

## 2022-07-22 NOTE — PROGRESS NOTES
Assessment:   Rigo Joyner is a 51 year old y.o. male with past medical history significant for BPH, diverticulitis, hyperlipidemia  who presents today for follow-up regarding 2 areas of pain.  1.  Left neck pain with radiation into the left upper extremity with associated numbness and tingling.  My review of an MRI cervical spine from [facility] dated September 29, 2021 shows multilevel foraminal stenosis which is moderate to severe bilaterally C6-7, severe bilaterally C5-6, moderate to severe right and severe left C4-5. EMG left upper extremity showed mild left carpal tunnel syndrome and no radiculopathy.    He had only partial relief with left C4-5 and left C6-7 transforaminal epidural steroid injections.  Patient underwent left carpal tunnel release June 8, 2022 with no improvement in symptoms. Question if he may be more symptomatic from the foraminal stenosis on the left at C5-6.  Neurosurgery has recommended that he try a left C5-6 transforaminal epidural steroid injection.    2.  Chronic left low back pain with radiation into the left greater than right lower extremity with associated numbness and tingling patient has a history of a left L3-4 instrumented fusion September 2020 with Dr. Tobias.  He has ongoing neuropathic pain down the legs.  My review of the CT lumbar spine from [facility] dated December 9, 2021 shows interbody and instrumented posterior fusion L3-4.  There is no bridging bone seen anteriorly and no posterior fusion mass.  No evidence of hardware loosening.  At L5-S1 there is disc degeneration with a right paracentral disc herniation abutting the traversing S1 nerve roots.  At L4-5 there is a disc bulge without neural impingement.  Leg pain currently follows an L3/L4 pattern.  He had an EMG left lower extremity was normal.  Patient has seen Dr. Purdy who recommended a bone growth stimulator and possible redo fusion if fusion fails.         Plan:     A shared decision  making plan was used.  The patient's values and choices were respected.  The following represents what was discussed and decided upon by the physician assistant and the patient.      1.  DIAGNOSTIC TESTS:    -I reviewed the report of the CT lumbar spine [facility] December 9, 2021.  -I reviewed the report of the CT cervical spine [facility] December 9, 2021.  -I reviewed the MRI cervical spine from [facility] September 29, 2021.  -EMG left lower extremity from [facility] neurology from December 14, 2021 which was normal.      2.  PHYSICAL THERAPY: Patient has had extensive physical therapy.  He completed physical therapy for his neck in January 2022 at [facility].  He completed physical therapy for his lower back in 2021 at [facility].    He has interested in trying additional physical therapy for his neck in [place].  I entered a referral for this.    3.  MEDICATIONS:    -Lyrica was not helpful.  -I refilled gabapentin.  He is going to slightly increase his dose to 600 mg in the morning, 600 mg in the afternoon, and 900 mg at bedtime.  -Cymbalta was not helpful so he weaned off of it.  -Patient takes Tylenol which is somewhat helpful.  -Patient previously indicated he would like to avoid opiates.    4.  INTERVENTIONS: Patient has had many injections.    -Patient had a C7-T1 interlaminar epidural steroid injection November 8, 2021 with minimal improvement.    - Patient then underwent a left C4-5 transforaminal epidural steroid injection April 15, 2022 which provided 30 to 40% relief of his pain.  - Patient then had a left C6-7 transforaminal epidural steroid injection May 5, 2022 which provided 50% relief of his pain.  -Neurosurgery has recommended a left C5-6 transforaminal epidural steroid injection.  I think it is reasonable to try this.  I did tell the patient that he has already had 5 injections in the last 12 months.  Doing more than 4 injections in a 12 months.  Increases your risk of  developing osteoporosis/osteopenia.  Patient voiced understanding to this risk and indicated he would like to proceed with the injection.  An order for a left C5-6 transforaminal epidural steroid injection was placed.    -In regards to the low back pain we could consider bilateral L3-4 transforaminal epidural steroid injections.   I would want to discuss this with Dr. Purdy first to make sure she is comfortable with him getting epidural steroid injections while he is using a bone growth stimulator.        -Patient had bilateral L4-5 transforaminal epidural steroid injections March 16, 2022 at [facility] With no improvement.  -Patient had bilateral L5-S1 transforaminal epidural steroid injections March 31, 2022 at [facility] with no improvement.    5.  PATIENT EDUCATION:  Patient is in agreement the above plan.  All questions were answered.    6.  FOLLOW-UP: Patient can follow-up with me 2 weeks after his left C5-6 transforaminal epidural steroid injection.  He is also going to follow-up in 3 months with neurosurgery after a new lumbar CT to recheck pseudoarthrosis of lumbar spine.    Subjective:     Rigo Joyner is a 51 year old male who presents today for follow-up regarding neck pain with radiation to the left upper extremity and low back pain with radiation into the left greater than right lower extremity.  Patient had left carpal tunnel release June 8, 2022 with Dr. Purdy.  Unfortunately, patient did not have any improvement in his symptoms with a carpal tunnel release.    Patient complains of left neck pain.  Pain radiates into left shoulder, down the left triceps and biceps and into the left forearm.  He has paresthesias affecting primarily the ulnar aspect of the forearm.    He also complains of bilateral low back pain.  Pain radiates into left right and right lower extremity.  He has numbness and tingling in the left calf and foot.    Pain is aggravated with increased activity.  Pain is alleviated with  lying flat and no activity.    Patient completed physical therapy for his neck in January 2022.  He completed physical therapy for his lower back early 2021.  He is currently going to hand therapy.  He also chiropractor.  He is wearing his bone growth stimulator regularly.  He takes gabapentin 600 mg 3 times daily.  This is helpful and he denies side effects.  He weaned off of duloxetine because he did not think it was helping.    Review of Systems:  Positive for numbness/tingling, headache, pain much worse in neck, difficulty swallowing..  Negative for weakness, loss of bowel/bladder control, inability to urinate, trip/stumble/falls, difficulty with hand skills, fevers, unintentional weight loss.     Objective:   CONSTITUTIONAL:  Vital signs as above.  No acute distress.  The patient is well nourished and well groomed.    PSYCHIATRIC:  The patient is awake, alert, oriented to person, place and time.  The patient is answering questions appropriately with clear speech.  Normal affect.  HEENT: Normocephalic, atraumatic.  Sclera clear.    LYMPH: No cervical lymphadenopathy  SKIN:  Skin over the face, neck bilateral upper extremities is clean, dry, intact without rashes.  MUSCULOSKELETAL: The patient has 5/5 strength for the bilateral shoulder abductors, elbow flexors/extensors, wrist extensors, finger flexors/abductors, hip flexors, knee flexors/extensors, ankle dorsi/plantar flexors.  Tender to palpation left upper trapezius muscle.  NEUROLOGICAL: Negative Gill's bilaterally.  Sensation to light touch is intact bilateral upper and lower extremities throughout.    RESULTS:    I reviewed the MRI cervical spine from [facility] dated September 29, 2021.  At C6-7 there is a small left paracentral disc protrusion with mild overall spinal canal stenosis and moderate to severe bilateral foraminal stenosis.  At C5-6 there is a disc osteophyte complex with mild to moderate spinal canal stenosis and severe bilateral  foraminal stenosis.  At C4-5 there is a disc osteophyte complex with mild spinal canal stenosis, moderate to severe right, severe left foraminal stenosis.  Please see report for further details.     I reviewed the CT cervical spine from [facility] dated December 9, 2021.  This shows disc degeneration C4-5 through C6-7.  There is mild to moderate spinal canal stenosis C4-5 C5-6.  There is foraminal stenosis at multiple levels severe on the left C4-5, C5-6, C6-7.     CT lumbar spine from [facility] dated December 9, 2021.  This shows interbody and instrumented posterior spinal fusion L3-4 with no bridging bone seen anteriorly and no posterior fusion masses.  No hardware loosening.  At L5-S1 there is a right paracentral disc herniation abutting the traversing S1 nerve roots.  At L4-5 there is a dorsal annular bulge but no neural compromise.        EMG left lower from [facility] dated December 14, 2021 which was normal.

## 2022-07-25 ENCOUNTER — HOSPITAL ENCOUNTER (OUTPATIENT)
Dept: OCCUPATIONAL THERAPY | Facility: CLINIC | Age: 52
Setting detail: THERAPIES SERIES
Discharge: HOME | End: 2022-07-25
Attending: NURSE PRACTITIONER
Payer: COMMERCIAL

## 2022-07-25 DIAGNOSIS — G56.02 CARPAL TUNNEL SYNDROME OF LEFT WRIST: ICD-10-CM

## 2022-07-25 PROCEDURE — 97026 INFRARED THERAPY: CPT | Mod: GO | Performed by: OCCUPATIONAL THERAPIST

## 2022-07-25 PROCEDURE — 97035 APP MDLTY 1+ULTRASOUND EA 15: CPT | Mod: GO | Performed by: OCCUPATIONAL THERAPIST

## 2022-07-25 PROCEDURE — 97140 MANUAL THERAPY 1/> REGIONS: CPT | Mod: GO | Performed by: OCCUPATIONAL THERAPIST

## 2022-07-25 PROCEDURE — 97110 THERAPEUTIC EXERCISES: CPT | Mod: GO | Performed by: OCCUPATIONAL THERAPIST

## 2022-07-25 PROCEDURE — 97165 OT EVAL LOW COMPLEX 30 MIN: CPT | Mod: GO | Performed by: OCCUPATIONAL THERAPIST

## 2022-07-25 NOTE — PROGRESS NOTES
07/25/22   Hand Therapy Evaluation   General Information/History   Start Of Care Date 07/25/22   Referring Physician Sofia Prasad CNP   Orders Evaluate And Treat As Indicated   Orders Date 07/19/22   Medical Diagnosis Left Carpal Tunnel Release   Additional Occupational Profile Info/Pertinent history of current problem Patient relates his shoulder started having issues int he fall of 2020. He got shoulder treatment . He needs a 3 level fusion but has not done that yet. He then decided to . He said he was never having numbness and tingling in median digits but did show that he had  positive EMG for mild CTS.  He had CTR on 6/3/22. May be getting a C5-6 injection  tomorrow.   Previous treatment or current condition nothing   Past medical history failed back fusion - has bone growth stimulator- see chart, diverticulitis, Neck injections May / June of this year   How/Where did it occur From insidious onset;At home   Onset date of current episode/exacerbation 06/03/22   Date of surgery 06/03/22   Surgical procedure Left CTR   Chronicity New   Hand Dominance Right   Affected side Left   Functional limitations perform activities of daily living;perform desired leisure / sports activities   Reported Symptoms Pain;Tingling;Edema;Loss of Motion/Stiffness;Loss of strength   Prior level of function Independent ADL;Independent IADL   Important Activities fishing , home projects   Patient role/Employment history Retired   Occupation Comments Does do occasional part time jobs   Patient/Family goals statement To have improved strength and soreness to use functionally   Fall Risk Screen   Fall screen completed by OT   Have you fallen 2 or more times in the past year? No   Have you fallen and had an injury in the past year? No   Is patient a fall risk? No   Abuse Screen (yes response referral indicated)   Feels Unsafe at Home or Work/School no   Feels Threatened by Someone no   Does Anyone Try to Keep You From Having Contact  with Others or Doing Things Outside Your Home? no   Physical Signs of Abuse Present no   Pain   Pain Primary Pain Report   Primary Pain Report   Location scar are   Radiation Upper Arm And Cervical Spine;Hand   Pain Quality Sharp;Aching;Burning   Frequency Intermittent   Scale 2/10;10/10   Pain Is Same All Of The Time   Pain Is Exacerbated By Lifting;Carrying;Activity/movement   Pain Is Relieved By Rest;Splints;Ice;Nsaids,analgesics   Progression Since Onset Unchanged   Edema   Edema Distal Wrist Crease   Edema Comments apparent swelling in thenar area.  DWC 18.7 bilaterally   Skin Appearance   Skin Appearance Other (Comment)   Scar/Wound   Scar/Wound Comments slight soreness around scar and palpable  scar tissue mildly tender with palpation   ROM   ROM AROM   AROM   Comments generally WNL compared to left with both have limitations with full M motion   Sensation Findings   Sensation Findings Sensation;Other (see comments)   Sensation Comments no sensory complaints into medial digits   Strength   Strength Strength;Other (see comments)    Avg - Left 50    Avg - Right 65    Avg Comments 3/10   Lateral Pinch - Left 18   Lateral Pinch - Right 24   3 Point Pinch - Left 11   3 Point Pinch - Right 15   Strength Comments wrist 5/5 all planes   Education Assessment   Preferred Learning Style Demonstration;Reading   Barriers to Learning No barriers   Therapy Interventions   Planned Therapy Interventions Ultrasound;Iontophoresis (list drug);Paraffin;Light Therapy;Fluidotherapy;Strengthening;Stretching;Manual Therapy;Scar Management;Edema Management;Self Care/Home Management;Home Program   Therapy plan comments To be added as appropriate   Clinical Impression   Criteria for Skilled Therapeutic Interventions Met yes;does not meet criteria for skilled intervention   OT Diagnosis decreased ADL's IADL's   Influenced by the following impairments Pain;Edema;Decreased range of motion;Decreased strength;Impaired sensation    Assessment of Occupational Performance 1-3 Performance Deficits   Identified Performance Deficits home improvement tasks, opening things, weight bearing   Clinical Decision Making (Complexity) Low complexity   Therapy Frequency 1x/week   Predicted Duration of Therapy Intervention (days/wks) 6 weeks   Risks and Benefits of Treatment have been explained. Yes   Patient, Family & other staff in agreement with plan of care Yes   Clinical Impression Comments Patient doing very well post carpal tunnel release with some problematic scar tissue, Due to significant cervical issues, some pain may still be due to a more proximal issue.   Hand Eval Goals   Hand Eval Goals 1;2;3   Hand Goal 1   Goal Identifier home program   Goal Description Patient to be independent with home program, not needing more than 15% verbal cuing to perform correctly   Target Date 08/15/22   Hand Goal 2   Goal Identifier jar   Goal Description Increase  strength by 10# so patient can better get a tight jar open   Target Date 08/25/22   Hand Goal 3   Goal Identifier weight bearing   Goal Description Patient to report 80% improvement in being able to bear weight into left hand   Target Date 09/05/22   Total Evaluation Time       Lucy Olvera OTR/L CHT  Occupational Therapist, Certified Hand Therapist

## 2022-07-26 ENCOUNTER — OFFICE VISIT (OUTPATIENT)
Dept: PHYSICAL MEDICINE AND REHAB | Facility: CLINIC | Age: 52
End: 2022-07-26
Payer: COMMERCIAL

## 2022-07-26 VITALS — SYSTOLIC BLOOD PRESSURE: 141 MMHG | DIASTOLIC BLOOD PRESSURE: 92 MMHG | HEART RATE: 74 BPM

## 2022-07-26 DIAGNOSIS — M54.16 LUMBAR RADICULITIS: ICD-10-CM

## 2022-07-26 DIAGNOSIS — M54.12 CERVICAL RADICULOPATHY: Primary | ICD-10-CM

## 2022-07-26 PROCEDURE — 99214 OFFICE O/P EST MOD 30 MIN: CPT | Performed by: PHYSICIAN ASSISTANT

## 2022-07-26 RX ORDER — GABAPENTIN 300 MG/1
CAPSULE ORAL
Qty: 210 CAPSULE | Refills: 1 | Status: SHIPPED | OUTPATIENT
Start: 2022-07-26 | End: 2022-12-13

## 2022-07-26 ASSESSMENT — PAIN SCALES - GENERAL: PAINLEVEL: MODERATE PAIN (4)

## 2022-07-26 NOTE — LETTER
7/26/2022         RE: Rigo Joyner  [id number] [address] [facility] [id number]        Dear Colleague,    Thank you for referring your patient, Rigo Joyner, to the [facility]. Please see a copy of my visit note below.    Assessment:   Rigo Joyner is a 51 year old y.o. male with past medical history significant for BPH, diverticulitis, hyperlipidemia  who presents today for follow-up regarding 2 areas of pain.  1.  Left neck pain with radiation into the left upper extremity with associated numbness and tingling.  My review of an MRI cervical spine from [facility] dated September 29, 2021 shows multilevel foraminal stenosis which is moderate to severe bilaterally C6-7, severe bilaterally C5-6, moderate to severe right and severe left C4-5. EMG left upper extremity showed mild left carpal tunnel syndrome and no radiculopathy.    He had only partial relief with left C4-5 and left C6-7 transforaminal epidural steroid injections.  Patient underwent left carpal tunnel release June 8, 2022 with no improvement in symptoms. Question if he may be more symptomatic from the foraminal stenosis on the left at C5-6.  Neurosurgery has recommended that he try a left C5-6 transforaminal epidural steroid injection.    2.  Chronic left low back pain with radiation into the left greater than right lower extremity with associated numbness and tingling patient has a history of a left L3-4 instrumented fusion September 2020 with Dr. Tobias.  He has ongoing neuropathic pain down the legs.  My review of the CT lumbar spine from [facility] dated December 9, 2021 shows interbody and instrumented posterior fusion L3-4.  There is no bridging bone seen anteriorly and no posterior fusion mass.  No evidence of hardware loosening.  At L5-S1 there is disc degeneration with a right paracentral disc herniation abutting the traversing S1 nerve roots.  At L4-5 there is a disc bulge  without neural impingement.  Leg pain currently follows an L3/L4 pattern.  He had an EMG left lower extremity was normal.  Patient has seen Dr. Purdy who recommended a bone growth stimulator and possible redo fusion if fusion fails.         Plan:     A shared decision making plan was used.  The patient's values and choices were respected.  The following represents what was discussed and decided upon by the physician assistant and the patient.      1.  DIAGNOSTIC TESTS:    -I reviewed the report of the CT lumbar spine CDI December 9, 2021.  -I reviewed the report of the CT cervical spine [facility] December 9, 2021.  -I reviewed the MRI cervical spine from [facility] September 29, 2021.  -EMG left lower extremity from [facility] neurology from December 14, 2021 which was normal.      2.  PHYSICAL THERAPY: Patient has had extensive physical therapy.  He completed physical therapy for his neck in January 2022 at [facility].  He completed physical therapy for his lower back in 2021 at [facility].    He has interested in trying additional physical therapy for his neck in [place].  I entered a referral for this.    3.  MEDICATIONS:    -Lyrica was not helpful.  -I refilled gabapentin.  He is going to slightly increase his dose to 600 mg in the morning, 600 mg in the afternoon, and 900 mg at bedtime.  -Cymbalta was not helpful so he weaned off of it.  -Patient takes Tylenol which is somewhat helpful.  -Patient previously indicated he would like to avoid opiates.    4.  INTERVENTIONS: Patient has had many injections.    -Patient had a C7-T1 interlaminar epidural steroid injection November 8, 2021 with minimal improvement.    - Patient then underwent a left C4-5 transforaminal epidural steroid injection April 15, 2022 which provided 30 to 40% relief of his pain.  - Patient then had a left C6-7 transforaminal epidural steroid injection May 5, 2022 which provided 50% relief of his pain.  -Neurosurgery  has recommended a left C5-6 transforaminal epidural steroid injection.  I think it is reasonable to try this.  I did tell the patient that he has already had 5 injections in the last 12 months.  Doing more than 4 injections in a 12 months.  Increases your risk of developing osteoporosis/osteopenia.  Patient voiced understanding to this risk and indicated he would like to proceed with the injection.  An order for a left C5-6 transforaminal epidural steroid injection was placed.    -In regards to the low back pain we could consider bilateral L3-4 transforaminal epidural steroid injections.   I would want to discuss this with Dr. Purdy first to make sure she is comfortable with him getting epidural steroid injections while he is using a bone growth stimulator.        -Patient had bilateral L4-5 transforaminal epidural steroid injections March 16, 2022 at [facility] With no improvement.  -Patient had bilateral L5-S1 transforaminal epidural steroid injections March 31, 2022 at [facility] with no improvement.    5.  PATIENT EDUCATION:  Patient is in agreement the above plan.  All questions were answered.    6.  FOLLOW-UP: Patient can follow-up with me 2 weeks after his left C5-6 transforaminal epidural steroid injection.  He is also going to follow-up in 3 months with neurosurgery after a new lumbar CT to recheck pseudoarthrosis of lumbar spine.    Subjective:     Rigo Joyner is a 51 year old male who presents today for follow-up regarding neck pain with radiation to the left upper extremity and low back pain with radiation into the left greater than right lower extremity.  Patient had left carpal tunnel release June 8, 2022 with Dr. Purdy.  Unfortunately, patient did not have any improvement in his symptoms with a carpal tunnel release.    Patient complains of left neck pain.  Pain radiates into left shoulder, down the left triceps and biceps and into the left forearm.  He has paresthesias affecting primarily the  ulnar aspect of the forearm.    He also complains of bilateral low back pain.  Pain radiates into left right and right lower extremity.  He has numbness and tingling in the left calf and foot.    Pain is aggravated with increased activity.  Pain is alleviated with lying flat and no activity.    Patient completed physical therapy for his neck in January 2022.  He completed physical therapy for his lower back early 2021.  He is currently going to hand therapy.  He also chiropractor.  He is wearing his bone growth stimulator regularly.  He takes gabapentin 600 mg 3 times daily.  This is helpful and he denies side effects.  He weaned off of duloxetine because he did not think it was helping.    Review of Systems:  Positive for numbness/tingling, headache, pain much worse in neck, difficulty swallowing..  Negative for weakness, loss of bowel/bladder control, inability to urinate, trip/stumble/falls, difficulty with hand skills, fevers, unintentional weight loss.     Objective:   CONSTITUTIONAL:  Vital signs as above.  No acute distress.  The patient is well nourished and well groomed.    PSYCHIATRIC:  The patient is awake, alert, oriented to person, place and time.  The patient is answering questions appropriately with clear speech.  Normal affect.  HEENT: Normocephalic, atraumatic.  Sclera clear.    LYMPH: No cervical lymphadenopathy  SKIN:  Skin over the face, neck bilateral upper extremities is clean, dry, intact without rashes.  MUSCULOSKELETAL: The patient has 5/5 strength for the bilateral shoulder abductors, elbow flexors/extensors, wrist extensors, finger flexors/abductors, hip flexors, knee flexors/extensors, ankle dorsi/plantar flexors.  Tender to palpation left upper trapezius muscle.  NEUROLOGICAL: Negative Gill's bilaterally.  Sensation to light touch is intact bilateral upper and lower extremities throughout.    RESULTS:    I reviewed the MRI cervical spine from [facility] dated September 29,  2021.  At C6-7 there is a small left paracentral disc protrusion with mild overall spinal canal stenosis and moderate to severe bilateral foraminal stenosis.  At C5-6 there is a disc osteophyte complex with mild to moderate spinal canal stenosis and severe bilateral foraminal stenosis.  At C4-5 there is a disc osteophyte complex with mild spinal canal stenosis, moderate to severe right, severe left foraminal stenosis.  Please see report for further details.     I reviewed the CT cervical spine from [facility] dated December 9, 2021.  This shows disc degeneration C4-5 through C6-7.  There is mild to moderate spinal canal stenosis C4-5 C5-6.  There is foraminal stenosis at multiple levels severe on the left C4-5, C5-6, C6-7.     CT lumbar spine from [facility] dated December 9, 2021.  This shows interbody and instrumented posterior spinal fusion L3-4 with no bridging bone seen anteriorly and no posterior fusion masses.  No hardware loosening.  At L5-S1 there is a right paracentral disc herniation abutting the traversing S1 nerve roots.  At L4-5 there is a dorsal annular bulge but no neural compromise.        EMG left lower from [facility] neurology dated December 14, 2021 which was normal.           Again, thank you for allowing me to participate in the care of your patient.        Sincerely,        Dayanna Vogt PA-C

## 2022-07-26 NOTE — PATIENT INSTRUCTIONS
A left C5/6 TFESI has been ordered today.      Please note that this injection uses cortisone.  The cortisone may somewhat weaken the immune system.  It is unknown how much the immune system is weakened.  It is unknown if it is weakened to the point that you may be more likely to get the COVID-19 virus, or if you do get the COVID-19 virus, if you would be sicker than you would have been if you had not had the cortisone injection.  If you do not wish to proceed with the injection, please let the nurse/physician know and do NOT schedule the injection.    Please note that since your immune system is weakened from the cortisone, having any vaccine/shot may be less effective if you have this vaccine within 2 weeks from your cortisone injection.  It is advised to wait 2 weeks after your cortisone injection to have any vaccine (or if you have a vaccine first, wait 2 weeks before you have the cortisone injection).    Please schedule this injection at least 1 week  from now to allow time for insurance prior authorization.  On the day of your injection, you cannot be sick or taking antibiotics.  If you become sick and are prescribed, please call the clinic so your injection can be rescheduled for once you have completed your antibiotics.  You will need to bring a  with you for your injection.   If you have any questions or concerns prior to your injection, please do not hesitate to call the nurse navigation line at [phone] or contact Dayanna Vogt through [facility].    An order for physicaltherapy has been provided today.  Someone will call you to schedule physical therapy or you can call [phone] to schedule physical therapy.  It will be very important for you to do your physical therapy exercises on aregular basis to decrease your pain and prevent future flares of pain.

## 2022-08-01 ENCOUNTER — HOSPITAL ENCOUNTER (OUTPATIENT)
Dept: OCCUPATIONAL THERAPY | Facility: CLINIC | Age: 52
Setting detail: THERAPIES SERIES
Discharge: HOME | End: 2022-08-01
Attending: NURSE PRACTITIONER
Payer: COMMERCIAL

## 2022-08-01 PROCEDURE — 97035 APP MDLTY 1+ULTRASOUND EA 15: CPT | Mod: GO | Performed by: OCCUPATIONAL THERAPIST

## 2022-08-01 PROCEDURE — 97140 MANUAL THERAPY 1/> REGIONS: CPT | Mod: GO | Performed by: OCCUPATIONAL THERAPIST

## 2022-08-01 PROCEDURE — 97026 INFRARED THERAPY: CPT | Mod: GO | Performed by: OCCUPATIONAL THERAPIST

## 2022-08-08 ENCOUNTER — HOSPITAL ENCOUNTER (OUTPATIENT)
Dept: OCCUPATIONAL THERAPY | Facility: CLINIC | Age: 52
Setting detail: THERAPIES SERIES
Discharge: HOME | End: 2022-08-08
Attending: NURSE PRACTITIONER
Payer: COMMERCIAL

## 2022-08-08 PROCEDURE — 97140 MANUAL THERAPY 1/> REGIONS: CPT | Mod: GO | Performed by: OCCUPATIONAL THERAPIST

## 2022-08-08 PROCEDURE — 97035 APP MDLTY 1+ULTRASOUND EA 15: CPT | Mod: GO | Performed by: OCCUPATIONAL THERAPIST

## 2022-08-08 PROCEDURE — 97026 INFRARED THERAPY: CPT | Mod: GO | Performed by: OCCUPATIONAL THERAPIST

## 2022-08-09 ENCOUNTER — RADIOLOGY INJECTION OFFICE VISIT (OUTPATIENT)
Dept: PHYSICAL MEDICINE AND REHAB | Facility: CLINIC | Age: 52
End: 2022-08-09
Attending: PHYSICIAN ASSISTANT
Payer: COMMERCIAL

## 2022-08-09 VITALS
HEART RATE: 78 BPM | SYSTOLIC BLOOD PRESSURE: 112 MMHG | OXYGEN SATURATION: 98 % | TEMPERATURE: 98.1 F | DIASTOLIC BLOOD PRESSURE: 84 MMHG

## 2022-08-09 DIAGNOSIS — M54.12 CERVICAL RADICULOPATHY: ICD-10-CM

## 2022-08-09 PROCEDURE — 64479 NJX AA&/STRD TFRM EPI C/T 1: CPT | Mod: LT | Performed by: PHYSICAL MEDICINE & REHABILITATION

## 2022-08-09 RX ORDER — LIDOCAINE HYDROCHLORIDE 10 MG/ML
INJECTION, SOLUTION EPIDURAL; INFILTRATION; INTRACAUDAL; PERINEURAL
Status: COMPLETED | OUTPATIENT
Start: 2022-08-09 | End: 2022-08-09

## 2022-08-09 RX ORDER — DEXAMETHASONE SODIUM PHOSPHATE 10 MG/ML
INJECTION, SOLUTION INTRAMUSCULAR; INTRAVENOUS
Status: COMPLETED | OUTPATIENT
Start: 2022-08-09 | End: 2022-08-09

## 2022-08-09 RX ADMIN — LIDOCAINE HYDROCHLORIDE 1.5 ML: 10 INJECTION, SOLUTION EPIDURAL; INFILTRATION; INTRACAUDAL; PERINEURAL at 10:42

## 2022-08-09 RX ADMIN — DEXAMETHASONE SODIUM PHOSPHATE 10 MG: 10 INJECTION, SOLUTION INTRAMUSCULAR; INTRAVENOUS at 10:42

## 2022-08-09 ASSESSMENT — PAIN SCALES - GENERAL
PAINLEVEL: MILD PAIN (3)
PAINLEVEL: MILD PAIN (3)

## 2022-08-09 NOTE — PATIENT INSTRUCTIONS
DISCHARGE INSTRUCTIONS    During office hours (8:00 a.m.- 4:00 p.m.) questions or concerns may be answered  by calling [facility] Navigation Nurses at  [phone].  Messages received after hours will be returned the following business day.      In the case of an emergency, please dial 911 or seek assistance at the nearest Emergency Room/Urgent Care facility.     All Patients:  You may experience an increase in your symptoms for the first 2 days (It may take anywhere between 2 days- 2 weeks for the steroid to have maximum effect).    You may use ice on the injection site, as frequently as 20 minutes each hour if needed.    You may take your pain medicine.    You may continue taking your regular medication.    You may shower. No swimming, tub bath or hot tub for 48 hours.  You may remove your bandaid/bandage as soon as you are home.    You may resume light activities, as tolerated.    Resume your usual diet as tolerated.    It is strongly advised that you do not drive for 1-3 hours post injection.    If you have had oral sedation:  Do not drive for 8 hours post injection.      If you have had IV sedation:  Do not drive for 24 hours post injection.  Do not operate hazardous machinery or make important personal/business decisions for 24 hours.    POSSIBLE STEROID SIDE EFFECTS (If steroid/cortisone was used for your procedure)    -If you experience these symptoms, it should only last for a short period    Swelling of the legs              Skin redness (flushing)     Mouth (oral) irritation   Blood sugar (glucose) levels            Sweats                   Mood changes  Headache  Weakened immune system for up to 14 days, which could increase the risk of rashid the COVID-19 virus and/or experiencing more severe symptoms of the disease, if exposed.         POSSIBLE PROCEDURE SIDE EFFECTS    -Call the Spine Center if you are concerned    Increased Pain           Increased numbness/tingling      Nausea/Vomiting           Bruising/bleeding at site      Redness or swelling                                              Difficulty walking      Weakness          Fever greater than 100.5    *In the event of a severe headache after an epidural steroid injection that is relieved by lying down, please call the [facility] to speak with a clinical staff member*

## 2022-08-15 ENCOUNTER — HOSPITAL ENCOUNTER (OUTPATIENT)
Dept: PHYSICAL THERAPY | Facility: CLINIC | Age: 52
Setting detail: THERAPIES SERIES
Discharge: HOME | End: 2022-08-15
Attending: PHYSICIAN ASSISTANT
Payer: COMMERCIAL

## 2022-08-15 DIAGNOSIS — M54.12 CERVICAL RADICULOPATHY: ICD-10-CM

## 2022-08-15 PROCEDURE — 97110 THERAPEUTIC EXERCISES: CPT | Mod: GP | Performed by: PHYSICAL THERAPIST

## 2022-08-15 PROCEDURE — 97140 MANUAL THERAPY 1/> REGIONS: CPT | Mod: GP,59 | Performed by: PHYSICAL THERAPIST

## 2022-08-15 PROCEDURE — 97012 MECHANICAL TRACTION THERAPY: CPT | Mod: GP | Performed by: PHYSICAL THERAPIST

## 2022-08-15 PROCEDURE — 97162 PT EVAL MOD COMPLEX 30 MIN: CPT | Mod: GP | Performed by: PHYSICAL THERAPIST

## 2022-08-15 NOTE — PROGRESS NOTES
08/15/22 1000   General Information   Type of Visit Initial OP Ortho PT Evaluation   Start of Care Date 08/15/22   Referring Physician Dayanna Vogt PA-C   Patient/Family Goals Statement decrease radiating Sx   Orders Evaluate and Treat   Date of Order 07/26/22   Certification Required? No   Medical Diagnosis Cervical radiculopathy (M54.12)   Body Part(s)   Body Part(s) Cervical Spine   Presentation and Etiology   Pertinent history of current problem (include personal factors and/or comorbidities that impact the POC) pt notes some discomfort in the neck and L shoulder at this time. He notes having chiropracto, PT, and acupunture to the area. Failed low back fusion of L3-4. Was working for disc replacement but didn't and adjusted to [facility] with neurosurgery. Pt notes having carpal tunnel release earlier this year. Most discomfort is coming to the superior portion of the L shoulder at this time. Sx radiating laterally down the upper arm. Decrease in neck Sx at this time with radiating Sx in the L still present. he notes on and off Sx into the L wrist on the ulnar side.   Impairments A. Pain;J. Burning;K. Numbness;L. Tingling   Functional Limitations perform activities of daily living;perform desired leisure / sports activities   Symptom Location L shoulder and neck   How/Where did it occur With repetition/overuse   Onset date of current episode/exacerbation 07/26/22   Chronicity Chronic   Pain rating (0-10 point scale) Best (/10);Worst (/10)   Best (/10) 2-3   Worst (/10) 8-9   Pain quality A. Sharp;C. Aching;D. Burning;E. Shooting;F. Stabbing   Frequency of pain/symptoms C. With activity   Pain/symptoms are: Worse during the day   Pain/symptoms exacerbated by C. Lifting;D. Carrying;G. Certain positions;K. Home tasks;L. Work tasks   Pain/symptoms eased by B. Walking;C. Rest;H. Cold   Progression of symptoms since onset: Improved   Current Level of Function   Patient role/employment history F. Retired   Fall Risk  Screen   Fall screen completed by PT   Have you fallen 2 or more times in the past year? No   Have you fallen and had an injury in the past year? No   Is patient a fall risk? No   Abuse Screen (yes response referral indicated)   Feels Unsafe at Home or Work/School no   Feels Threatened by Someone no   Does Anyone Try to Keep You From Having Contact with Others or Doing Things Outside Your Home? no   Physical Signs of Abuse Present no   Cervical Spine   Cervical Left Side Bending ROM WFL   Cervical Right Rotation ROM discomfort to the L side of the neck with motion   Cervical Left Rotation ROM WFL   Cervical Flexion ROM WFL   Cervical Extension ROM WFL   Cervical Right Side Bending ROM discomfort to the L side of the neck   Shoulder AROM Screen WFL   Shoulder Shrug (C2-C4) Strength 5/5   Shoulder Abd (C5) Strength 4/5   Shoulder ER (C5, C6) Strength 4-/5   Shoulder IR (C5, C6) Strength 4/5   Elbow Flexion (C5, C6) Strength 4/5   Elbow Extension (C7) Strength 4/5   Upper Trapezius Flexibility tight   Scalene Flexibility tight   Pectoralis Minor Flexibility tight   Vertebral Artery Test -   Transverse Ligament Test -   Spurling Test -   Cervical Distraction Test -   Neer Impingement Test -   Garcia Impingement Test -   Sulcus Sign -   Palpation TPR to UT and supraspinatus; scalenes   Dermatome/Sensory Testing - for changes in sensation to skin but numbness radiating   Planned Therapy Interventions   Planned Therapy Interventions joint mobilization;manual therapy;neuromuscular re-education;ROM;strengthening;stretching   Planned Modality Interventions   Planned Modality Interventions Ultrasound;Traction;TENS;Hot packs;Electrical stimulation   Clinical Impression   Criteria for Skilled Therapeutic Interventions Met yes, treatment indicated   PT Diagnosis L sided neck and shoulder pain   Influenced by the following impairments radiating Sx into the UE, weakness, abnormal posture   Functional limitations due to impairments  lifting, reaching, carrying   Clinical Presentation Evolving/Changing   Clinical Presentation Rationale numbness/tingling Sx changing intermittently; cervical stenosis and various changes within the cervical   Clinical Decision Making (Complexity) Moderate complexity   Therapy Frequency 2 times/Week   Predicted Duration of Therapy Intervention (days/wks) 8   Risk & Benefits of therapy have been explained Yes   Patient, Family & other staff in agreement with plan of care Yes   Clinical Impression Comments Prognosis- good with pt having a decrease in Sx in the last few months with various medical treatments   ORTHO GOALS   PT Ortho Eval Goals 1;2;3   Ortho Goal 1   Goal Identifier ST goal   Goal Description Pt will have a pain of 2/10 when completing household and outdoor chores at home in 4 weeks.   Target Date 09/12/22   Ortho Goal 2   Goal Identifier LT strength goal   Goal Description Pt will have 5/5 shoulder strength in 8 weeks to decrease pain when reaching and carrying.   Target Date 10/10/22   Ortho Goal 3   Goal Identifier LT goal   Goal Description Pt will be independent in home strengthening program for self management of Sx in 8 weeks.   Total Evaluation Time   PT Eval, Moderate Complexity Minutes (54833) 20       Caren Alonzo, PT, DPT

## 2022-08-23 ENCOUNTER — HOSPITAL ENCOUNTER (OUTPATIENT)
Dept: PHYSICAL THERAPY | Facility: CLINIC | Age: 52
Setting detail: THERAPIES SERIES
Discharge: HOME | End: 2022-08-23
Attending: PHYSICIAN ASSISTANT
Payer: COMMERCIAL

## 2022-08-23 PROCEDURE — 97012 MECHANICAL TRACTION THERAPY: CPT | Mod: GP | Performed by: PHYSICAL THERAPIST

## 2022-08-23 PROCEDURE — 97110 THERAPEUTIC EXERCISES: CPT | Mod: GP | Performed by: PHYSICAL THERAPIST

## 2022-08-23 PROCEDURE — 97140 MANUAL THERAPY 1/> REGIONS: CPT | Mod: GP,59 | Performed by: PHYSICAL THERAPIST

## 2022-08-30 ENCOUNTER — HOSPITAL ENCOUNTER (OUTPATIENT)
Dept: PHYSICAL THERAPY | Facility: CLINIC | Age: 52
Setting detail: THERAPIES SERIES
Discharge: HOME | End: 2022-08-30
Attending: PHYSICIAN ASSISTANT
Payer: COMMERCIAL

## 2022-08-30 PROCEDURE — 97110 THERAPEUTIC EXERCISES: CPT | Mod: GP | Performed by: PHYSICAL THERAPIST

## 2022-08-30 PROCEDURE — 97012 MECHANICAL TRACTION THERAPY: CPT | Mod: GP | Performed by: PHYSICAL THERAPIST

## 2022-08-30 PROCEDURE — 97140 MANUAL THERAPY 1/> REGIONS: CPT | Mod: GP,59 | Performed by: PHYSICAL THERAPIST

## 2022-08-31 NOTE — PROGRESS NOTES
Assessment:   Rigo Joyner is a 51 year old y.o. male with past medical history significant for BPH, diverticulitis, hyperlipidemia  who presents today for follow-up regarding 4 areas of pain.  1.  Left neck pain with radiation into the left upper extremity with associated numbness and tingling.  My review of an MRI cervical spine from [facility] dated September 29, 2021 shows multilevel foraminal stenosis which is moderate to severe bilaterally C6-7, severe bilaterally C5-6, moderate to severe right and severe left C4-5. EMG left upper extremity showed mild left carpal tunnel syndrome and no radiculopathy.    He had only partial relief with left C4-5 and left C6-7 transforaminal epidural steroid injections.  Patient underwent left carpal tunnel release June 8, 2022 with no improvement in symptoms.  Patient then had a left C5-6 transforaminal epidural steroid injection August 9, 2022 which did not provide any relief of his pain.  2.  Chronic left low back pain with radiation into the left greater than right lower extremity with associated numbness and tingling patient has a history of a left L3-4 instrumented fusion September 2020 with Dr. Tobias.  He has ongoing neuropathic pain down the legs.  My review of the CT lumbar spine from [facility] dated December 9, 2021 shows interbody and instrumented posterior fusion L3-4.  There is no bridging bone seen anteriorly and no posterior fusion mass.  No evidence of hardware loosening.  At L5-S1 there is disc degeneration with a right paracentral disc herniation abutting the traversing S1 nerve roots.  At L4-5 there is a disc bulge without neural impingement.  Leg pain currently follows primarily an L3/L4 pattern.  He had an EMG left lower extremity was normal.  Patient has seen Dr. Purdy who recommended a bone growth stimulator and possible redo fusion if fusion fails.    3.  Pain and stiffness in the hands and he has noticed some nodules on the  knuckles on both hands.  His sisters have lupus.  He is concerned he may have rheumatoid arthritis.  We will check some labs.  4.  Right elbow pain most consistent with lateral epicondylitis.       Plan:     A shared decision making plan was used.  The patient's values and choices were respected.  The following represents what was discussed and decided upon by the physician assistant and the patient.      1.  DIAGNOSTIC TESTS:    -I reviewed the report of the CT lumbar spine [facility] December 9, 2021.  -I reviewed the report of the CT cervical spine [facility] December 9, 2021.  -I reviewed the MRI cervical spine from [facility] September 29, 2021.  -EMG left lower extremity from [facility]y from December 14, 2021 which was normal.  - I ordered some lab work to check for rheumatoid factor, MARK, inflammatory markers.      2.  PHYSICAL THERAPY: Patient is currently in physical therapy.  He has had 3 sessions of far.  Encouraged him to continue with physical therapy and the home exercises.    3.  MEDICATIONS:    -Lyrica was not helpful.  -Patient is taking gabapentin 600 mg 3 times daily.  He did not tolerate increasing his dose due to drowsiness.  -Cymbalta was not helpful so he weaned off of it.  -Patient takes Tylenol which is somewhat helpful.  That she takes ibuprofen occasionally.  -Patient previously indicated he would like to avoid opiates.    4.  INTERVENTIONS: No additional interventions were ordered.  I do not think have another injection to help with his neck pain.    -Most recently patient had the left C5-6 transforaminal epidural steroid injection with no improvement.  -Patient had a C7-T1 interlaminar epidural steroid injection November 8, 2021 with minimal improvement.    - Patient then underwent a left C4-5 transforaminal epidural steroid injection April 15, 2022 which provided 30 to 40% relief of his pain.  - Patient then had a left C6-7 transforaminal epidural steroid injection May 5,  2022 which provided 50% relief of his pain.      -In regards to the low back pain we could consider bilateral L3-4 transforaminal epidural steroid injections.  However, patient has had 6 injections in the past 12 months.  He should try to wait as long as possible before doing any additional corticosteroid injections.  I would want to discuss this with Dr. Purdy first to make sure she is comfortable with him getting epidural steroid injections while he is using a bone growth stimulator.        -Patient had bilateral L4-5 transforaminal epidural steroid injections March 16, 2022 at [facility] With no improvement.  -Patient had bilateral L5-S1 transforaminal epidural steroid injections March 31, 2022 at [facility] with no improvement.    5.  PATIENT EDUCATION:  - I recommended a counterforce strap for his right lateral epicondylitis.  -If labs are abnormal I will refer to rheumatology.  Patient is going to follow-up with me as needed.  I will send him a [facility] message with his lab results.    6.  FOLLOW-UP: Patient is scheduled to see Dr. Purdy next month.    Subjective:     Rigo Joyner is a 51 year old male who presents today for follow-up regarding neck pain with radiation to the left upper extremity and low back pain with radiation into the left greater than right lower extremity.  Patient is following up after a left C5-6 transforaminal epidural steroid injection August 9, 2022.  Patient reports no improvement in his pain.    Patient complains of left neck pain.  Pain radiates into left shoulder, down the left lateral upper arm to the elbow.  He then has pain at the left ulnar wrist and left dorsal hand.  He has numbness and tingling in the left hand.  He does notice his left wrist is less sore which he thinks is an improvement from his carpal tunnel syndrome.    Patient complains of bilateral low back pain at the incisions.  He complains of left greater than right pins-and-needles type pain t down the legs.   He feels this primarily in the left anterior thigh but it does extend into the anterior shin, dorsal foot, great toe.  He has numbness and tingling in the same distribution as his pain.  On the right it is more of a creepy crawly sensation in the anterior thigh.    Patient also complains of a sensation of joint pain and swelling with stiffness in both hands.  He has a strong family history of autoimmune disease.  He has noticed some nodules on his knuckles.    Patient also complains of right elbow pain at the lateral epicondyle.    Overall, patient rates his pain today as a 3 out of 10.  At its worst it is an 8 of 10.  At its best it is a 1 out of 10.  Pain is aggravated with activity and alleviated with rest and ice.    Patient is currently in physical therapy.  He has had 3 session so far.  He is wearing his bone growth stimulator regularly.  He takes gabapentin 600 mg 3 times daily.  He did not tolerate titrating his dose higher due to drowsiness.  He weaned off of duloxetine because he did not think it was helping.  He takes Tylenol and ibuprofen as needed which are somewhat helpful.    Review of Systems:  Positive for numbness/tingling, weakness, headache.  Negative for loss of bowel/bladder control, inability to urinate, pain much worse at night, trip/stumble/falls, difficulty swallowing, difficulty with hand skills, fevers, unintentional weight loss.     Objective:   CONSTITUTIONAL:  Vital signs as above.  No acute distress.  The patient is well nourished and well groomed.    PSYCHIATRIC:  The patient is awake, alert, oriented to person, place and time.  The patient is answering questions appropriately with clear speech.  Normal affect.  HEENT: Normocephalic, atraumatic.  Sclera clear.    LYMPH: No cervical lymphadenopathy  SKIN:  Skin over the face, neck bilateral upper extremities is clean, dry, intact without rashes.  MUSCULOSKELETAL: The patient has 5/5 strength for the bilateral shoulder abductors, elbow  flexors/extensors, wrist extensors, finger flexors/abductors, hip flexors, knee flexors/extensors, ankle dorsi/plantar flexors.  Reproduction of right elbow pain with resisted right wrist extension.  NEUROLOGICAL: Negative Gill's bilaterally.  Sensation to light touch is intact bilateral upper and lower extremities throughout.    RESULTS:    I reviewed the MRI cervical spine from [facility] dated September 29, 2021.  At C6-7 there is a small left paracentral disc protrusion with mild overall spinal canal stenosis and moderate to severe bilateral foraminal stenosis.  At C5-6 there is a disc osteophyte complex with mild to moderate spinal canal stenosis and severe bilateral foraminal stenosis.  At C4-5 there is a disc osteophyte complex with mild spinal canal stenosis, moderate to severe right, severe left foraminal stenosis.  Please see report for further details.     I reviewed the CT cervical spine from [facility] dated December 9, 2021.  This shows disc degeneration C4-5 through C6-7.  There is mild to moderate spinal canal stenosis C4-5 C5-6.  There is foraminal stenosis at multiple levels severe on the left C4-5, C5-6, C6-7.     CT lumbar spine from [facility] dated December 9, 2021.  This shows interbody and instrumented posterior spinal fusion L3-4 with no bridging bone seen anteriorly and no posterior fusion masses.  No hardware loosening.  At L5-S1 there is a right paracentral disc herniation abutting the traversing S1 nerve roots.  At L4-5 there is a dorsal annular bulge but no neural compromise.        EMG left lower from [facility] dated December 14, 2021 which was normal.

## 2022-09-01 ENCOUNTER — OFFICE VISIT (OUTPATIENT)
Dept: PHYSICAL MEDICINE AND REHAB | Facility: CLINIC | Age: 52
End: 2022-09-01
Payer: COMMERCIAL

## 2022-09-01 ENCOUNTER — LAB (OUTPATIENT)
Dept: LAB | Facility: HOSPITAL | Age: 52
End: 2022-09-01
Payer: COMMERCIAL

## 2022-09-01 VITALS
HEART RATE: 72 BPM | HEIGHT: 69 IN | DIASTOLIC BLOOD PRESSURE: 73 MMHG | BODY MASS INDEX: 33.03 KG/M2 | SYSTOLIC BLOOD PRESSURE: 120 MMHG | WEIGHT: 223 LBS

## 2022-09-01 DIAGNOSIS — M25.50 POLYARTHRALGIA: Primary | ICD-10-CM

## 2022-09-01 DIAGNOSIS — Z98.1 HISTORY OF LUMBAR FUSION: ICD-10-CM

## 2022-09-01 DIAGNOSIS — M54.16 LUMBAR RADICULITIS: ICD-10-CM

## 2022-09-01 DIAGNOSIS — M25.50 POLYARTHRALGIA: ICD-10-CM

## 2022-09-01 DIAGNOSIS — M54.12 CERVICAL RADICULOPATHY: ICD-10-CM

## 2022-09-01 DIAGNOSIS — G56.02 CARPAL TUNNEL SYNDROME OF LEFT WRIST: ICD-10-CM

## 2022-09-01 DIAGNOSIS — S32.009K LUMBAR PSEUDOARTHROSIS: ICD-10-CM

## 2022-09-01 LAB
C REACTIVE PROTEIN LHE: 0.2 MG/DL (ref 0–?)
ERYTHROCYTE [SEDIMENTATION RATE] IN BLOOD BY WESTERGREN METHOD: 5 MM/HR (ref 0–15)

## 2022-09-01 PROCEDURE — 85652 RBC SED RATE AUTOMATED: CPT

## 2022-09-01 PROCEDURE — 36415 COLL VENOUS BLD VENIPUNCTURE: CPT

## 2022-09-01 PROCEDURE — 86140 C-REACTIVE PROTEIN: CPT

## 2022-09-01 PROCEDURE — 99214 OFFICE O/P EST MOD 30 MIN: CPT | Performed by: PHYSICIAN ASSISTANT

## 2022-09-01 PROCEDURE — 86038 ANTINUCLEAR ANTIBODIES: CPT

## 2022-09-01 PROCEDURE — 86431 RHEUMATOID FACTOR QUANT: CPT

## 2022-09-01 ASSESSMENT — PAIN SCALES - GENERAL: PAINLEVEL: MILD PAIN (3)

## 2022-09-01 NOTE — PATIENT INSTRUCTIONS
I ordered labs to look for inflammation/rheumatoid arthritis.  Please get the labs drawn at [facility].  You do nt need an appointment.

## 2022-09-01 NOTE — LETTER
9/1/2022         RE: Rigo Joyner  [id number] [facility] [id number]        Dear Colleague,    Thank you for referring your patient, Rigo Joyner, to the [facility] SPINE [facility]UR[facility]URGERY. Please see a copy of my visit note below.    Assessment:   Rigo Joyner is a 51 year old y.o. male with past medical history significant for BPH, diverticulitis, hyperlipidemia  who presents today for follow-up regarding 4 areas of pain.  1.  Left neck pain with radiation into the left upper extremity with associated numbness and tingling.  My review of an MRI cervical spine from [facility] dated September 29, 2021 shows multilevel foraminal stenosis which is moderate to severe bilaterally C6-7, severe bilaterally C5-6, moderate to severe right and severe left C4-5. EMG left upper extremity showed mild left carpal tunnel syndrome and no radiculopathy.    He had only partial relief with left C4-5 and left C6-7 transforaminal epidural steroid injections.  Patient underwent left carpal tunnel release June 8, 2022 with no improvement in symptoms.  Patient then had a left C5-6 transforaminal epidural steroid injection August 9, 2022 which did not provide any relief of his pain.  2.  Chronic left low back pain with radiation into the left greater than right lower extremity with associated numbness and tingling patient has a history of a left L3-4 instrumented fusion September 2020 with Dr. Tobias.  He has ongoing neuropathic pain down the legs.  My review of the CT lumbar spine from [facility] dated December 9, 2021 shows interbody and instrumented posterior fusion L3-4.  There is no bridging bone seen anteriorly and no posterior fusion mass.  No evidence of hardware loosening.  At L5-S1 there is disc degeneration with a right paracentral disc herniation abutting the traversing S1 nerve roots.  At L4-5 there is a disc bulge without neural impingement.  Leg pain currently follows  primarily an L3/L4 pattern.  He had an EMG left lower extremity was normal.  Patient has seen Dr. Purdy who recommended a bone growth stimulator and possible redo fusion if fusion fails.    3.  Pain and stiffness in the hands and he has noticed some nodules on the knuckles on both hands.  His sisters have lupus.  He is concerned he may have rheumatoid arthritis.  We will check some labs.  4.  Right elbow pain most consistent with lateral epicondylitis.       Plan:     A shared decision making plan was used.  The patient's values and choices were respected.  The following represents what was discussed and decided upon by the physician assistant and the patient.      1.  DIAGNOSTIC TESTS:    -I reviewed the report of the CT lumbar spine [facility] December 9, 2021.  -I reviewed the report of the CT cervical spine [facility] December 9, 2021.  -I reviewed the MRI cervical spine from [facility] September 29, 2021.  -EMG left lower extremity from [facility] from December 14, 2021 which was normal.  - I ordered some lab work to check for rheumatoid factor, MARK, inflammatory markers.      2.  PHYSICAL THERAPY: Patient is currently in physical therapy.  He has had 3 sessions of far.  Encouraged him to continue with physical therapy and the home exercises.    3.  MEDICATIONS:    -Lyrica was not helpful.  -Patient is taking gabapentin 600 mg 3 times daily.  He did not tolerate increasing his dose due to drowsiness.  -Cymbalta was not helpful so he weaned off of it.  -Patient takes Tylenol which is somewhat helpful.  That she takes ibuprofen occasionally.  -Patient previously indicated he would like to avoid opiates.    4.  INTERVENTIONS: No additional interventions were ordered.  I do not think have another injection to help with his neck pain.    -Most recently patient had the left C5-6 transforaminal epidural steroid injection with no improvement.  -Patient had a C7-T1 interlaminar epidural steroid injection  November 8, 2021 with minimal improvement.    - Patient then underwent a left C4-5 transforaminal epidural steroid injection April 15, 2022 which provided 30 to 40% relief of his pain.  - Patient then had a left C6-7 transforaminal epidural steroid injection May 5, 2022 which provided 50% relief of his pain.      -In regards to the low back pain we could consider bilateral L3-4 transforaminal epidural steroid injections.  However, patient has had 6 injections in the past 12 months.  He should try to wait as long as possible before doing any additional corticosteroid injections.  I would want to discuss this with Dr. Purdy first to make sure she is comfortable with him getting epidural steroid injections while he is using a bone growth stimulator.        -Patient had bilateral L4-5 transforaminal epidural steroid injections March 16, 2022 at [facility] With no improvement.  -Patient had bilateral L5-S1 transforaminal epidural steroid injections March 31, 2022 at [facility] with no improvement.    5.  PATIENT EDUCATION:  - I recommended a counterforce strap for his right lateral epicondylitis.  -If labs are abnormal I will refer to rheumatology.  Patient is going to follow-up with me as needed.  I will send him a [facility] message with his lab results.    6.  FOLLOW-UP: Patient is scheduled to see Dr. Purdy next month.    Subjective:     Rigo Joyner is a 51 year old male who presents today for follow-up regarding neck pain with radiation to the left upper extremity and low back pain with radiation into the left greater than right lower extremity.  Patient is following up after a left C5-6 transforaminal epidural steroid injection August 9, 2022.  Patient reports no improvement in his pain.    Patient complains of left neck pain.  Pain radiates into left shoulder, down the left lateral upper arm to the elbow.  He then has pain at the left ulnar wrist and left dorsal hand.  He has numbness and tingling in the left  hand.  He does notice his left wrist is less sore which he thinks is an improvement from his carpal tunnel syndrome.    Patient complains of bilateral low back pain at the incisions.  He complains of left greater than right pins-and-needles type pain t down the legs.  He feels this primarily in the left anterior thigh but it does extend into the anterior shin, dorsal foot, great toe.  He has numbness and tingling in the same distribution as his pain.  On the right it is more of a creepy crawly sensation in the anterior thigh.    Patient also complains of a sensation of joint pain and swelling with stiffness in both hands.  He has a strong family history of autoimmune disease.  He has noticed some nodules on his knuckles.    Patient also complains of right elbow pain at the lateral epicondyle.    Overall, patient rates his pain today as a 3 out of 10.  At its worst it is an 8 of 10.  At its best it is a 1 out of 10.  Pain is aggravated with activity and alleviated with rest and ice.    Patient is currently in physical therapy.  He has had 3 session so far.  He is wearing his bone growth stimulator regularly.  He takes gabapentin 600 mg 3 times daily.  He did not tolerate titrating his dose higher due to drowsiness.  He weaned off of duloxetine because he did not think it was helping.  He takes Tylenol and ibuprofen as needed which are somewhat helpful.    Review of Systems:  Positive for numbness/tingling, weakness, headache.  Negative for loss of bowel/bladder control, inability to urinate, pain much worse at night, trip/stumble/falls, difficulty swallowing, difficulty with hand skills, fevers, unintentional weight loss.     Objective:   CONSTITUTIONAL:  Vital signs as above.  No acute distress.  The patient is well nourished and well groomed.    PSYCHIATRIC:  The patient is awake, alert, oriented to person, place and time.  The patient is answering questions appropriately with clear speech.  Normal affect.  HEENT:  Normocephalic, atraumatic.  Sclera clear.    LYMPH: No cervical lymphadenopathy  SKIN:  Skin over the face, neck bilateral upper extremities is clean, dry, intact without rashes.  MUSCULOSKELETAL: The patient has 5/5 strength for the bilateral shoulder abductors, elbow flexors/extensors, wrist extensors, finger flexors/abductors, hip flexors, knee flexors/extensors, ankle dorsi/plantar flexors.  Reproduction of right elbow pain with resisted right wrist extension.  NEUROLOGICAL: Negative Gill's bilaterally.  Sensation to light touch is intact bilateral upper and lower extremities throughout.    RESULTS:    I reviewed the MRI cervical spine from [facility] dated September 29, 2021.  At C6-7 there is a small left paracentral disc protrusion with mild overall spinal canal stenosis and moderate to severe bilateral foraminal stenosis.  At C5-6 there is a disc osteophyte complex with mild to moderate spinal canal stenosis and severe bilateral foraminal stenosis.  At C4-5 there is a disc osteophyte complex with mild spinal canal stenosis, moderate to severe right, severe left foraminal stenosis.  Please see report for further details.     I reviewed the CT cervical spine from [facility] dated December 9, 2021.  This shows disc degeneration C4-5 through C6-7.  There is mild to moderate spinal canal stenosis C4-5 C5-6.  There is foraminal stenosis at multiple levels severe on the left C4-5, C5-6, C6-7.     CT lumbar spine from [facility] dated December 9, 2021.  This shows interbody and instrumented posterior spinal fusion L3-4 with no bridging bone seen anteriorly and no posterior fusion masses.  No hardware loosening.  At L5-S1 there is a right paracentral disc herniation abutting the traversing S1 nerve roots.  At L4-5 there is a dorsal annular bulge but no neural compromise.        EMG left lower from [facility] neurology dated December 14, 2021 which was normal.           Again, thank you for allowing me  to participate in the care of your patient.        Sincerely,        Dayanna Vogt PA-C

## 2022-09-02 DIAGNOSIS — R76.8 POSITIVE ANA (ANTINUCLEAR ANTIBODY): ICD-10-CM

## 2022-09-02 DIAGNOSIS — M25.50 POLYARTHRALGIA: Primary | ICD-10-CM

## 2022-09-02 LAB
ANA PAT SER IF-IMP: ABNORMAL
ANA SER QL IF: ABNORMAL
ANA TITR SER IF: ABNORMAL {TITER}
RHEUMATOID FACT SER NEPH-ACNC: <6 IU/ML

## 2022-09-06 ENCOUNTER — HOSPITAL ENCOUNTER (OUTPATIENT)
Dept: PHYSICAL THERAPY | Facility: CLINIC | Age: 52
Setting detail: THERAPIES SERIES
Discharge: HOME | End: 2022-09-06
Attending: PHYSICIAN ASSISTANT
Payer: COMMERCIAL

## 2022-09-06 PROCEDURE — 97140 MANUAL THERAPY 1/> REGIONS: CPT | Mod: GP,59 | Performed by: PHYSICAL THERAPIST

## 2022-09-06 PROCEDURE — 97012 MECHANICAL TRACTION THERAPY: CPT | Mod: GP | Performed by: PHYSICAL THERAPIST

## 2022-09-13 ENCOUNTER — HOSPITAL ENCOUNTER (OUTPATIENT)
Dept: PHYSICAL THERAPY | Facility: CLINIC | Age: 52
Setting detail: THERAPIES SERIES
Discharge: HOME | End: 2022-09-13
Attending: PHYSICIAN ASSISTANT
Payer: COMMERCIAL

## 2022-09-13 PROCEDURE — 97110 THERAPEUTIC EXERCISES: CPT | Mod: GP | Performed by: PHYSICAL THERAPIST

## 2022-09-13 PROCEDURE — 97012 MECHANICAL TRACTION THERAPY: CPT | Mod: GP | Performed by: PHYSICAL THERAPIST

## 2022-09-16 ENCOUNTER — HOSPITAL ENCOUNTER (OUTPATIENT)
Dept: PHYSICAL THERAPY | Facility: CLINIC | Age: 52
Setting detail: THERAPIES SERIES
Discharge: HOME | End: 2022-09-16
Attending: PHYSICIAN ASSISTANT
Payer: COMMERCIAL

## 2022-09-16 PROCEDURE — 97110 THERAPEUTIC EXERCISES: CPT | Mod: GP | Performed by: PHYSICAL THERAPIST

## 2022-09-16 PROCEDURE — 97012 MECHANICAL TRACTION THERAPY: CPT | Mod: GP | Performed by: PHYSICAL THERAPIST

## 2022-09-20 ENCOUNTER — HOSPITAL ENCOUNTER (OUTPATIENT)
Dept: PHYSICAL THERAPY | Facility: CLINIC | Age: 52
Setting detail: THERAPIES SERIES
Discharge: HOME | End: 2022-09-20
Attending: PHYSICIAN ASSISTANT
Payer: COMMERCIAL

## 2022-09-20 PROCEDURE — 97110 THERAPEUTIC EXERCISES: CPT | Mod: GP | Performed by: PHYSICAL THERAPIST

## 2022-09-20 PROCEDURE — 97012 MECHANICAL TRACTION THERAPY: CPT | Mod: GP | Performed by: PHYSICAL THERAPIST

## 2022-09-23 ENCOUNTER — HOSPITAL ENCOUNTER (OUTPATIENT)
Dept: PHYSICAL THERAPY | Facility: CLINIC | Age: 52
Setting detail: THERAPIES SERIES
Discharge: HOME | End: 2022-09-23
Attending: PHYSICIAN ASSISTANT
Payer: COMMERCIAL

## 2022-09-23 PROCEDURE — 97012 MECHANICAL TRACTION THERAPY: CPT | Mod: GP | Performed by: PHYSICAL THERAPIST

## 2022-09-23 PROCEDURE — 97110 THERAPEUTIC EXERCISES: CPT | Mod: GP | Performed by: PHYSICAL THERAPIST

## 2022-09-27 ENCOUNTER — HOSPITAL ENCOUNTER (OUTPATIENT)
Dept: PHYSICAL THERAPY | Facility: CLINIC | Age: 52
Setting detail: THERAPIES SERIES
Discharge: HOME | End: 2022-09-27
Attending: PHYSICIAN ASSISTANT
Payer: COMMERCIAL

## 2022-09-27 PROCEDURE — 97110 THERAPEUTIC EXERCISES: CPT | Mod: GP | Performed by: PHYSICAL THERAPIST

## 2022-09-27 PROCEDURE — 97012 MECHANICAL TRACTION THERAPY: CPT | Mod: GP | Performed by: PHYSICAL THERAPIST

## 2022-09-30 ENCOUNTER — HOSPITAL ENCOUNTER (OUTPATIENT)
Dept: PHYSICAL THERAPY | Facility: CLINIC | Age: 52
Setting detail: THERAPIES SERIES
Discharge: HOME | End: 2022-09-30
Attending: PHYSICIAN ASSISTANT
Payer: COMMERCIAL

## 2022-09-30 PROCEDURE — 97012 MECHANICAL TRACTION THERAPY: CPT | Mod: GP | Performed by: PHYSICAL THERAPIST

## 2022-09-30 NOTE — PROGRESS NOTES
[facility] Rehabilitation Service    Outpatient Physical Therapy Discharge Note  Patient: Rigo Joyner  : 1970    Beginning/End Dates of Reporting Period:  8/15/22 to 22    Referring Provider: Dayanna Vogt PA-C    Therapy Diagnosis: L sided neck and shoulder pain     Client Self Report: Pt notes a decrease in overall Sx and able to complete tasks at home with minimal pain. He notes the postiions he rests in creating greater Sx for the pt and avoids these positions at this time.    Objective Measurements:  Objective Measure: MMT  Details: ER- 4+/5 L, 5/5 R:  flex- 5/5 R, 5-/5 L;   strength-equal b/l;  IR- 5/5 b/l       Goals:  Goal Identifier ST goal   Goal Description Pt will have a pain of 2/10 when completing household and outdoor chores at home in 4 weeks.   Target Date 22   Date Met  22   Progress (detail required for progress note):       Goal Identifier LT strength goal   Goal Description Pt will have 5/5 shoulder strength in 8 weeks to decrease pain when reaching and carrying.   Target Date 10/10/22   Date Met      Progress (detail required for progress note): In progress- pt is still showing weakness in the L shoulder. He is getting a home traction unit at this time to maintain his Sx to avoid a neck fusion.     Goal Identifier LT goal   Goal Description Pt will be independent in home strengthening program for self management of Sx in 8 weeks.   Target Date 10/10/22   Date Met  22   Progress (detail required for progress note):         Plan:  Discharge from therapy.    Discharge:    Reason for Discharge: Patient and PT dicussed discontinuing therapy at this time with pt getting a home unit to maintain his Sx. Further work is being done with his low back at this time.    Equipment Issued: none    Discharge Plan: Patient to continue home program.

## 2022-10-19 ENCOUNTER — HOSPITAL ENCOUNTER (OUTPATIENT)
Dept: CT IMAGING | Facility: HOSPITAL | Age: 52
Discharge: HOME | End: 2022-10-19
Attending: NURSE PRACTITIONER | Admitting: NURSE PRACTITIONER
Payer: COMMERCIAL

## 2022-10-19 DIAGNOSIS — S32.009K PSEUDOARTHROSIS OF LUMBAR SPINE: ICD-10-CM

## 2022-10-19 PROCEDURE — 72131 CT LUMBAR SPINE W/O DYE: CPT

## 2022-10-25 ENCOUNTER — TELEPHONE (OUTPATIENT)
Dept: NEUROSURGERY | Facility: CLINIC | Age: 52
End: 2022-10-25

## 2022-10-25 ENCOUNTER — OFFICE VISIT (OUTPATIENT)
Dept: NEUROSURGERY | Facility: CLINIC | Age: 52
End: 2022-10-25
Payer: COMMERCIAL

## 2022-10-25 VITALS
OXYGEN SATURATION: 95 % | SYSTOLIC BLOOD PRESSURE: 147 MMHG | WEIGHT: 205 LBS | HEART RATE: 72 BPM | BODY MASS INDEX: 30.36 KG/M2 | DIASTOLIC BLOOD PRESSURE: 81 MMHG | HEIGHT: 69 IN

## 2022-10-25 DIAGNOSIS — M54.16 LUMBAR RADICULOPATHY: Primary | ICD-10-CM

## 2022-10-25 DIAGNOSIS — M54.12 CERVICAL RADICULOPATHY: ICD-10-CM

## 2022-10-25 DIAGNOSIS — M54.16 LUMBAR RADICULOPATHY: ICD-10-CM

## 2022-10-25 DIAGNOSIS — S32.009K PSEUDOARTHROSIS OF LUMBAR SPINE: Primary | ICD-10-CM

## 2022-10-25 PROCEDURE — 99204 OFFICE O/P NEW MOD 45 MIN: CPT | Performed by: NURSE PRACTITIONER

## 2022-10-25 NOTE — PROGRESS NOTES
"  Neurosurgery clinic note:  10/25/22      A/P:  Ari is a 50yo M who is s/p left carpal tunnel release with Dr. Purdy on 06/08/2022, following also L3-4 fusion by outside provider that had concerns for psuedoarthorisis. He had been using a bone stimulator and returns for follow up with a CT scan.   CT with improvement compared to priors - would continue bone stimulator. I think at this point we should repeat an MRI to evaluate for ongoing symptoms as we can no long reliably say this is related to his prior fusion.   Pain is down bilateral anterior thighs to the knee, left side crosses the knee into the shin and top of the foot. He is on gabapentin which is helpful.     Plan:  Repeat lumbar MRI  EMG bilateral lower extremities  Did not discuss cervical at this visit - I think would require separate appointments going forward.     Subjective: Pain is intermittent in anterior thighs, depends on what he does for the day. He was able to do some building of things this past summer. Pain radiates into the left low buttock as well as bilateral anterior thighs. He has had his hips scoped bilaterally, did have some pain with rotation of hips.     BP (!) 147/81   Pulse 72   Ht 5' 9\" (1.753 m)   Wt 205 lb (93 kg)   SpO2 95%   BMI 30.27 kg/m        Exam:    General: seated in NAD, appears comfortable    Strength:  Hip flexors: 5/5 right, 5/5 left  Quads: 5/5 right, 5/5 left  Hamstrings: 5/5 right, 5/5 left  Dorsiflexion: 5/5 right, 5/5 left  Plantarflexion 5/5 right, 5/5 left  EHL: 5/5 right, 5/5 left    Gait is smooth and coordinated.    Incision: wrist healing well.         Kelly Wagner, CNP  [facility] Neurosurgery  O: [phone]    "

## 2022-10-25 NOTE — NURSING NOTE
Ari is here to f/u on status of fusion. He was been wearing his bone growth stimulator but does not feel improvement. He c/o bilateral leg pain and knee pain and pain in low back.   SANTY today is 40%  CRISTINO Haile

## 2022-10-25 NOTE — PATIENT INSTRUCTIONS
We will order an MRI of your lumbar spine to better evaluate for cause of your symptoms. You can come in for an appointment after or call for results.     EMG I could find was for your upper extremities.

## 2022-10-25 NOTE — LETTER
"    10/25/2022         RE: Rigo Joyner  [address] [facility] [id number]        Dear Colleague,    Thank you for referring your patient, Rigo Joyner, to the [facility] SPINE AND NEUROSURGERY. Please see a copy of my visit note below.      Neurosurgery clinic note:  10/25/22      A/P:  Ari is a 50yo M who is s/p left carpal tunnel release with Dr. Purdy on 06/08/2022, following also L3-4 fusion by outside provider that had concerns for psuedoarthorisis. He had been using a bone stimulator and returns for follow up with a CT scan.   CT with improvement compared to priors - would continue bone stimulator. I think at this point we should repeat an MRI to evaluate for ongoing symptoms as we can no long reliably say this is related to his prior fusion.   Pain is down bilateral anterior thighs to the knee, left side crosses the knee into the shin and top of the foot. He is on gabapentin which is helpful.     Plan:  Repeat lumbar MRI  EMG bilateral lower extremities  Did not discuss cervical at this visit - I think would require separate appointments going forward.     Subjective: Pain is intermittent in anterior thighs, depends on what he does for the day. He was able to do some building of things this past summer. Pain radiates into the left low buttock as well as bilateral anterior thighs. He has had his hips scoped bilaterally, did have some pain with rotation of hips.     BP (!) 147/81   Pulse 72   Ht 5' 9\" (1.753 m)   Wt 205 lb (93 kg)   SpO2 95%   BMI 30.27 kg/m        Exam:    General: seated in NAD, appears comfortable    Strength:  Hip flexors: 5/5 right, 5/5 left  Quads: 5/5 right, 5/5 left  Hamstrings: 5/5 right, 5/5 left  Dorsiflexion: 5/5 right, 5/5 left  Plantarflexion 5/5 right, 5/5 left  EHL: 5/5 right, 5/5 left    Gait is smooth and coordinated.    Incision: wrist healing well.         Kelly Wagner, CNP  [facility] Neurosurgery  O: " [phone]        Again, thank you for allowing me to participate in the care of your patient.        Sincerely,        Kelly Wagner NP

## 2022-10-25 NOTE — TELEPHONE ENCOUNTER
Called pt in follow up regarding lower extremity EMG. Pt's last EMG was December 2021 at [facility].     Orders placed for updated bilateral EMGs. Message sent to Spine Center scheduling to assist as pt's f/up with Dr. Purdy is 11/10.     Kimi Driver RN

## 2022-10-26 ENCOUNTER — HOSPITAL ENCOUNTER (OUTPATIENT)
Dept: MRI IMAGING | Facility: HOSPITAL | Age: 52
Discharge: HOME | End: 2022-10-26
Attending: NURSE PRACTITIONER | Admitting: NURSE PRACTITIONER
Payer: COMMERCIAL

## 2022-10-26 DIAGNOSIS — S32.009K PSEUDOARTHROSIS OF LUMBAR SPINE: ICD-10-CM

## 2022-10-26 DIAGNOSIS — M54.16 LUMBAR RADICULOPATHY: ICD-10-CM

## 2022-10-26 PROCEDURE — 255N000002 HC RX 255 OP 636: Performed by: NURSE PRACTITIONER

## 2022-10-26 PROCEDURE — A9585 GADOBUTROL INJECTION: HCPCS | Performed by: NURSE PRACTITIONER

## 2022-10-26 PROCEDURE — 72158 MRI LUMBAR SPINE W/O & W/DYE: CPT

## 2022-10-26 RX ORDER — GADOBUTROL 604.72 MG/ML
9 INJECTION INTRAVENOUS ONCE
Status: COMPLETED | OUTPATIENT
Start: 2022-10-26 | End: 2022-10-26

## 2022-10-26 RX ADMIN — GADOBUTROL 9 ML: 604.72 INJECTION INTRAVENOUS at 13:04

## 2022-11-09 ENCOUNTER — OFFICE VISIT (OUTPATIENT)
Dept: PHYSICAL MEDICINE AND REHAB | Facility: CLINIC | Age: 52
End: 2022-11-09
Attending: SURGERY
Payer: COMMERCIAL

## 2022-11-09 DIAGNOSIS — M54.16 LUMBAR RADICULOPATHY: ICD-10-CM

## 2022-11-09 PROCEDURE — 95886 MUSC TEST DONE W/N TEST COMP: CPT | Mod: RT | Performed by: PHYSICAL MEDICINE & REHABILITATION

## 2022-11-09 PROCEDURE — 95910 NRV CNDJ TEST 7-8 STUDIES: CPT | Performed by: PHYSICAL MEDICINE & REHABILITATION

## 2022-11-09 NOTE — PROGRESS NOTES
[facility]  [address] [place] [address] [address]  [place] [address]  Office: [phone] Fax: [phone]    Electromyography and Nerve Conduction Study Report        Indication: Patient presents at the request of Dr. Sandrita Purdy for bilateral lower extremity EMG.  He has a history of L3-4 fusion.  His left greater than right lower extremity and thigh pain paresthesias to the left foot.  On exam he has normal sensation to light touch of the lower extremities, 2+ patellar 1+ Achilles reflexes with downgoing toes, and normal muscle strength throughout the major muscle groups of the bilateral lower extremities.      Pt Exam Discussion (Communication Barriers):  Electromyography and nerve conduction testing, including associated discomfort, was discussed with the patient prior to the procedure.  No learning/ communication barriers; patient verbalized understanding of procedure.  Informed consent was obtained.           Pt Assessment:  Testing was successfully completed; patient tolerated testing well.       Blood Thinners: None Skin Temperature: Warmed  29.9                   EMG/NCS  results:     Nerve Conduction Studies  Motor Sites      Amplitude Segment CV Distal Latency F-Latency F-Estimate Temp Comment   Site (mV)  (m/s) (ms) ms ms  C    Left Fibular (EDB) Motor   Ankle 4.5 Ankle-EDB  3.9   22.8    Knee 3.3 Knee-Ankle 49 12.3   22.8    Right Fibular (EDB) Motor   Ankle 5.6 Ankle-EDB  3.6   23    Knee 4.9 Knee-Ankle 47 12.7   22.9    Left Tibial (AH) Motor   Ankle 13.8 Ankle-AH  3.3   22.9    Knee 5.2 Knee-Ankle 48 12.4   22.9    Right Tibial (AH) Motor   Ankle 18.7 Ankle-AH  3.3   22.8    Knee 8.4 Knee-Ankle 46 12.9   22.8      Sensory Sites      Amplitude CV Onset Lat Peak Lat Temp Comment   Site ( V) (m/s) (ms) (ms)  C    Left Sural Sensory   B-Ankle 17 50 2.8 3.4 22.8    Right Sural Sensory   B-Ankle 12 50 2.8 3.5 24.2      H-Reflex Sites      M-Lat H Lat M Neg Amp   Site (ms)  (ms) mV   Left Tibial (S[place]us) H-Reflex   Pop Fossa 6.0 30.9 4.3   Right Tibial (Soleus) H-Reflex   Pop Fossa 5.8 30.7 6.9     H-Reflex Sites      Lt. H Lat Rt. H Lat L-R H Lat   Site (ms) (ms) (ms) Norm   Tibial (Soleus) H-Reflex   Pop Fossa 30.9 30.7 0.20  < 3.0       NCS Waveforms:    Motor                Sensory         H-Reflex              Electromyography     Side Muscle Nerve Root Ins Act Fibs Psw Amp Dur Poly Recrt Int Pat Comment   Right AntTibialis Dp Br Fibular L4-5 Nml Nml Nml Nml Nml 0 Nml Nml    Right Gastroc Tibial S1-2 Nml Nml Nml Nml Nml 0 Nml Nml    Right Fibularis Long Sup Br Fibular L5-S1 Nml Nml Nml Nml Nml 0 Nml Nml    Right VastusLat Femoral L2-4 Nml Nml Nml Nml Nml 0 Nml Nml    Right TensorFascLat SupGluteal L4-5, S1 Nml Nml Nml Nml Nml 0 Nml Nml    Right GluteusMax InfGluteal L5-S2 Nml Nml Nml Nml Nml 0 Nml Nml    Left AntTibialis Dp Br Fibular L4-5 Nml Nml Nml Nml Nml 0 Nml Nml    Left Gastroc Tibial S1-2 Nml Nml Nml Nml Nml 0 Nml Nml    Left Fibularis Long Sup Br Fibular L5-S1 Nml Nml Nml Nml Nml 0 Nml Nml    Left VastusLat Femoral L2-4 Nml Nml Nml Nml Nml 0 Nml Nml    Left RectFemoris Femoral L2-4 Nml Nml Nml Nml Nml 0 Nml Nml            Comment NCS: Normal study  1.  Normal nerve conduction studies bilateral lower extremities.  This includes normal bilateral sural SNAPs, peroneal CMAPs, tibial CMAPs, and symmetric tibial H reflexes.     Comment EMG: Normal study  1.  Normal needle EMG bilateral lower extremities.     Interpretation: Normal study    1. There is no electrodiagnostic evidence of lumbosacral radiculopathy, lumbosacral plexopathy, or focal neuropathy in the bilateral lower extremities.  Specifically, there is no electrodiagnostic evidence of an L3-S1 radiculopathy in the bilateral lower extremities.    The testing was completed in its entirety by the physician.      It was our pleasure caring for your patient today, if there any questions or concerns please do not hesitate  to contact us.

## 2022-11-09 NOTE — LETTER
11/9/2022         RE: Rigo Joyner  [address] [facility] [address]        Dear Colleague,    Thank you for referring your patient, Rigo Joyner, to the [facility]. Please see a copy of my visit note below.    [facility]  [address] [place] [address] [address]  [place] [address]  Office: [phone] Fax: [phone]    Electromyography and Nerve Conduction Study Report        Indication: Patient presents at the request of Dr. Sandrita Purdy for bilateral lower extremity EMG.  He has a history of L3-4 fusion.  His left greater than right lower extremity and thigh pain paresthesias to the left foot.  On exam he has normal sensation to light touch of the lower extremities, 2+ patellar 1+ Achilles reflexes with downgoing toes, and normal muscle strength throughout the major muscle groups of the bilateral lower extremities.      Pt Exam Discussion (Communication Barriers):  Electromyography and nerve conduction testing, including associated discomfort, was discussed with the patient prior to the procedure.  No learning/ communication barriers; patient verbalized understanding of procedure.  Informed consent was obtained.           Pt Assessment:  Testing was successfully completed; patient tolerated testing well.       Blood Thinners: None Skin Temperature: Warmed  29.9                   EMG/NCS  results:     Nerve Conduction Studies  Motor Sites      Amplitude Segment CV Distal Latency F-Latency F-Estimate Temp Comment   Site (mV)  (m/s) (ms) ms ms  C    Left Fibular (EDB) Motor   Ankle 4.5 Ankle-EDB  3.9   22.8    Knee 3.3 Knee-Ankle 49 12.3   22.8    Right Fibular (EDB) Motor   Ankle 5.6 Ankle-EDB  3.6   23    Knee 4.9 Knee-Ankle 47 12.7   22.9    Left Tibial (AH) Motor   Ankle 13.8 Ankle-AH  3.3   22.9    Knee 5.2 Knee-Ankle 48 12.4   22.9    Right Tibial (AH) Motor   Ankle 18.7 Ankle-AH  3.3   22.8    Knee 8.4 Knee-Ankle 46  12.9   22.8      Sensory Sites      Amplitude CV Onset Lat Peak Lat Temp Comment   Site ( V) (m/s) (ms) (ms)  C    Left Sural Sensory   B-Ankle 17 50 2.8 3.4 22.8    Right Sural Sensory   B-Ankle 12 50 2.8 3.5 24.2      H-Reflex Sites      M-Lat H Lat M Neg Amp   Site (ms) (ms) mV   Left Tibial (Soleus) H-Reflex   Pop Fossa 6.0 30.9 4.3   Right Tibial (Soleus) H-Reflex   Pop Fossa 5.8 30.7 6.9     H-Reflex Sites      Lt. H Lat Rt. H Lat L-R H Lat   Site (ms) (ms) (ms) Norm   Tibial (Soleus) H-Reflex   Pop Fossa 30.9 30.7 0.20  < 3.0       NCS Waveforms:    Motor                Sensory         H-Reflex              Electromyography     Side Muscle Nerve Root Ins Act Fibs Psw Amp Dur Poly Recrt Int Pat Comment   Right AntTibialis Dp Br Fibular L4-5 Nml Nml Nml Nml Nml 0 Nml Nml    Right Gastroc Tibial S1-2 Nml Nml Nml Nml Nml 0 Nml Nml    Right Fibularis Long Sup Br Fibular L5-S1 Nml Nml Nml Nml Nml 0 Nml Nml    Right VastusLat Femoral L2-4 Nml Nml Nml Nml Nml 0 Nml Nml    Right TensorFascLat SupGluteal L4-5, S1 Nml Nml Nml Nml Nml 0 Nml Nml    Right GluteusMax InfGluteal L5-S2 Nml Nml Nml Nml Nml 0 Nml Nml    Left AntTibialis Dp Br Fibular L4-5 Nml Nml Nml Nml Nml 0 Nml Nml    Left Gastroc Tibial S1-2 Nml Nml Nml Nml Nml 0 Nml Nml    Left Fibularis Long Sup Br Fibular L5-S1 Nml Nml Nml Nml Nml 0 Nml Nml    Left VastusLat Femoral L2-4 Nml Nml Nml Nml Nml 0 Nml Nml    Left RectFemoris Femoral L2-4 Nml Nml Nml Nml Nml 0 Nml Nml            Comment NCS: Normal study  1.  Normal nerve conduction studies bilateral lower extremities.  This includes normal bilateral sural SNAPs, peroneal CMAPs, tibial CMAPs, and symmetric tibial H reflexes.     Comment EMG: Normal study  1.  Normal needle EMG bilateral lower extremities.     Interpretation: Normal study    1. There is no electrodiagnostic evidence of lumbosacral radiculopathy, lumbosacral plexopathy, or focal neuropathy in the bilateral lower extremities.  Specifically, there  is no electrodiagnostic evidence of an L3-S1 radiculopathy in the bilateral lower extremities.    The testing was completed in its entirety by the physician.      It was our pleasure caring for your patient today, if there any questions or concerns please do not hesitate to contact us.            Again, thank you for allowing me to participate in the care of your patient.        Sincerely,        Rick Byrne, DO

## 2022-11-09 NOTE — PATIENT INSTRUCTIONS
Thank you for choosing the [facility] for your EMG testing.    The ordering provider will receive your final EMG results within the next few days.  Please follow up with your provider for the results and further treatment recommendations.

## 2022-11-10 ENCOUNTER — OFFICE VISIT (OUTPATIENT)
Dept: NEUROSURGERY | Facility: CLINIC | Age: 52
End: 2022-11-10
Payer: COMMERCIAL

## 2022-11-10 VITALS
DIASTOLIC BLOOD PRESSURE: 74 MMHG | BODY MASS INDEX: 30.36 KG/M2 | OXYGEN SATURATION: 96 % | HEART RATE: 81 BPM | SYSTOLIC BLOOD PRESSURE: 115 MMHG | HEIGHT: 69 IN | WEIGHT: 205 LBS

## 2022-11-10 DIAGNOSIS — M54.12 CERVICAL RADICULOPATHY: ICD-10-CM

## 2022-11-10 DIAGNOSIS — M54.16 LUMBAR RADICULOPATHY: Primary | ICD-10-CM

## 2022-11-10 PROCEDURE — 99214 OFFICE O/P EST MOD 30 MIN: CPT | Performed by: SURGERY

## 2022-11-10 RX ORDER — METHOCARBAMOL 750 MG/1
750 TABLET, FILM COATED ORAL 4 TIMES DAILY PRN
Qty: 40 TABLET | Refills: 0 | Status: SHIPPED | OUTPATIENT
Start: 2022-11-10 | End: 2023-01-03

## 2022-11-10 NOTE — PROGRESS NOTES
NEUROSURGERY FOLLOWUP  NOTE  Rigo Joyner comes today in f/u. Patient is a 52 yo male who is s/p left CTR by myself and a L3-4 fusion by outside provider that had concerns for pseudoarthrosis. He had been using a bone stimulator with improvement in interbody bone bridging on most recent CT. Ongoing low back burning pain as well as pain down legs. Possibly L4 on right more L5 on left. We dicussed spinal cord stimulator trial. Has been managing his neck with conservative management  including traction and tens etc. Still taking gabapentin. Adjustable bed helping symptoms. We discussed trying robaxin QID as well as interlaminar epidural L4-5. His shoulder pain is bother him again and had past success with C4-5 TFESI. Will also order a repeat injection left C4-5. Consultation to Dr Brooks to discuss mbb, scs etc placed as well.      I spent more than 20 minutes in this apt, examining the pt, reviewing the scans, reviewing notes from chart, discussing treatment options with risks and benefits and coordinating care.     Sandrita uPrdy MD      CC:     Semmler, Steven Duane  [facility] [address] [facility] [address]

## 2022-11-10 NOTE — NURSING NOTE
Pt is follow up after an MRI. Pt has pain in his lower joel goes down to his hips and both legs left is worse. Pt has numbness in the left thigh goes down to the foot and right thighs down to his knee.    SANTY:  50%  Julio Cesar Davies MA

## 2022-11-14 NOTE — TELEPHONE ENCOUNTER
DIAGNOSIS: (L) Leg Pain - 2nd opinion / Images / Self.Refer / HP   APPOINTMENT DATE: 11.21.22   NOTES STATUS DETAILS   OFFICE NOTE from other specialist Internal 3.1.22 [facility]  2.18.22 [facility]   MEDICATION LIST Internal    LABS     CBC/DIFF Internal 5.26.22

## 2022-11-20 ENCOUNTER — HEALTH MAINTENANCE LETTER (OUTPATIENT)
Age: 52
End: 2022-11-20

## 2022-11-21 ENCOUNTER — PRE VISIT (OUTPATIENT)
Dept: ORTHOPEDICS | Facility: CLINIC | Age: 52
End: 2022-11-21

## 2022-12-13 ENCOUNTER — OFFICE VISIT (OUTPATIENT)
Dept: PHYSICAL MEDICINE AND REHAB | Facility: CLINIC | Age: 52
End: 2022-12-13
Payer: COMMERCIAL

## 2022-12-13 ENCOUNTER — RADIOLOGY INJECTION OFFICE VISIT (OUTPATIENT)
Dept: PHYSICAL MEDICINE AND REHAB | Facility: CLINIC | Age: 52
End: 2022-12-13
Attending: SURGERY
Payer: COMMERCIAL

## 2022-12-13 VITALS — DIASTOLIC BLOOD PRESSURE: 75 MMHG | SYSTOLIC BLOOD PRESSURE: 119 MMHG | HEART RATE: 82 BPM | OXYGEN SATURATION: 97 %

## 2022-12-13 DIAGNOSIS — Z98.1 HISTORY OF LUMBAR FUSION: ICD-10-CM

## 2022-12-13 DIAGNOSIS — M54.16 LUMBAR RADICULITIS: ICD-10-CM

## 2022-12-13 DIAGNOSIS — M47.817 LUMBOSACRAL SPONDYLOSIS WITHOUT MYELOPATHY: Primary | ICD-10-CM

## 2022-12-13 DIAGNOSIS — M54.16 LUMBAR RADICULOPATHY: ICD-10-CM

## 2022-12-13 DIAGNOSIS — M54.12 CERVICAL RADICULOPATHY: Primary | ICD-10-CM

## 2022-12-13 DIAGNOSIS — M54.12 CERVICAL RADICULOPATHY: ICD-10-CM

## 2022-12-13 PROCEDURE — 99215 OFFICE O/P EST HI 40 MIN: CPT | Performed by: PAIN MEDICINE

## 2022-12-13 RX ORDER — GABAPENTIN 300 MG/1
CAPSULE ORAL
Qty: 210 CAPSULE | Refills: 1 | Status: SHIPPED | OUTPATIENT
Start: 2022-12-13 | End: 2023-02-27

## 2022-12-13 RX ORDER — DULOXETIN HYDROCHLORIDE 20 MG/1
20 CAPSULE, DELAYED RELEASE ORAL DAILY
Qty: 30 CAPSULE | Refills: 0 | Status: SHIPPED | OUTPATIENT
Start: 2022-12-13 | End: 2022-12-30 | Stop reason: DRUGHIGH

## 2022-12-13 ASSESSMENT — PAIN SCALES - GENERAL: PAINLEVEL: MODERATE PAIN (5)

## 2022-12-13 NOTE — PROGRESS NOTES
Assessment:     Diagnoses and all orders for this visit:  Lumbosacral spondylosis without myelopathy  -     DULoxetine (CYMBALTA) 20 MG capsule; Take 1 capsule (20 mg) by mouth daily Increase by 20 mg every week until you are taking 60 mg.  -     PAIN Medial Branch Block Lumbar Two Levels Bilateral; Future  Lumbar radiculopathy  -     Spine  Referral  -     DULoxetine (CYMBALTA) 20 MG capsule; Take 1 capsule (20 mg) by mouth daily Increase by 20 mg every week until you are taking 60 mg.  Cervical radiculopathy  -     Spine  Referral  -     DULoxetine (CYMBALTA) 20 MG capsule; Take 1 capsule (20 mg) by mouth daily Increase by 20 mg every week until you are taking 60 mg.  History of lumbar fusion  -     DULoxetine (CYMBALTA) 20 MG capsule; Take 1 capsule (20 mg) by mouth daily Increase by 20 mg every week until you are taking 60 mg.  Lumbar radiculitis  -     gabapentin (NEURONTIN) 300 MG capsule; Take 2 cap in AM, 2  Caps in PM, and 3 caps at bedtime     Rigo Joyner is a 52 year old y.o. male with past medical history significant for BPH, diverticulitis, hyperlipidemia who presents today for follow-up regarding chronic low back and left greater than right lower extremity pain:    -Overall patient's physical exam is reassuring that he has normal strength in his lower extremities.  Pain is likely secondary to lumbar radiculopathy, however does have components of low back pain likely secondary to lumbar spondylosis without myelopathy.  We a long discussion about medial branch blocks and spinal cord stimulator.     Plan:     A shared decision making plan was used. The patient's values and choices were respected. Prior medical records from Dayanna Vogt's last visit on 9/1/2022 as well as Dr. Purdy's note on 11/10/2022 were reviewed today. The following represents what was discussed and decided upon by the provider and the patient.        -DIAGNOSTIC TESTS: Images were personally reviewed and  interpreted.   -- MRI of the lumbar spine dated 10/26/2022 is personally viewed images interpreted and discussed with patient.  There is an L3-4 fusion.  There is adjacent level disease at L4-5 with a broad-based disc bulge with superimposed small central disc protrusion and annular fissure.  There is no high-grade spinal canal stenosis.  There is mild left foraminal stenosis at L4-5.  There are Modic type I changes at L4-5.  There is mild facet arthropathy at L4-5 and L5-S1.    -INTERVENTIONS: I ordered bilateral L3, L4, L5 medial branch blocks.  If he had to positive results with this then would recommend bilateral L4-5 and L5-S1 facet joint injections.  There is relative contraindication as he has hardware and fusion at L3-4.  Should these not be helpful then modified radiofrequency ablation could be done at these levels with using a probe to monitor temperature of hardware during the procedure.    If these were not helpful then recommend spinal cord stimulator trial through [employer].  He would need to have MRI of thoracic spine as well as behavioral health assessment.    We will have him reschedule his cervical transforaminal as he is having congestion and likely sinus infection today.    -MEDICATIONS: I refilled his gabapentin and started him again on duloxetine 20 mg.  He will increase this every week until he is taking 60 mg.  -  Discussed side effects of medications and proper use. Patient verbalized understanding.    -PHYSICAL THERAPY: Recommend he continue doing home exercises on a consistent basis.  He is currently having physical therapy for his neck and arm.  He has had it for his low back and continues to do his exercises.      -PATIENT EDUCATION: 40 minutes of time was spent on this encounter including face-to-face time, precharting and coordination of care.    -FOLLOW UP: Patient will follow up as needed.  Advised to contact clinic if symptoms worsen or change.    Subjective:     Rigo LOZANO  Ar is a 52 year old male who presents today for follow-up regarding chronic low back pain and left greater than right lower extremity pain.  Patient was seen by Dr. Purdy and referred to the spine center for discussion of spinal cord stimulator and medial branch blocks.  Patient notes that he has left greater than right low back and lower extremity pain especially down the anterior thigh and shin.  Left leg pain is much worse than right leg pain.  His pain is worse with sitting as well as bending and twisting.  Pain is improved with laying down on his right side.  Pain today is 5/10.  He is currently taking gabapentin 600 mg 3 times daily and finds it this is helpful.  Tylenol is somewhat helpful as well.  He had been on 60 mg of duloxetine which he felt was helpful, however is no longer taking this.  He had L4-5 transforaminal epidural steroid injections in January or February 2022 with no relief.  He is currently using a bone stimulator that was recommended by Dr. Purdy.  He denies any bowel or bladder changes, fevers, chills, unintentional weight loss.    Evaluation to Date: MRI of cervical spine dated 9/29/2021.  EMG of left upper extremity.    Treatment to Date: Left carpal tunnel release done on 6/8/2022.  Left C4-5, C6-7, and C5-6 transforaminal epidural steroid injections.  Left L3-4 instrumented fusion in September 2020 with Dr. Tobias.  Bilateral L4-5 transforaminal epidural steroid injections and January or February 2022.    Patient Active Problem List   Diagnosis     Sigmoid diverticulitis       Current Outpatient Medications   Medication     DULoxetine (CYMBALTA) 20 MG capsule     gabapentin (NEURONTIN) 300 MG capsule     acetaminophen (TYLENOL) 500 MG tablet     aspirin 81 MG EC tablet     atorvastatin (LIPITOR) 10 MG tablet     fluticasone (FLONASE) 50 MCG/ACT nasal spray     lactobacillus rhamnosus (GG) (CULTURELL) capsule     methocarbamol (ROBAXIN) 750 MG tablet     No current  facility-administered medications for this visit.       Allergies   Allergen Reactions     Bee Pollen Cough, Headache and Itching     Pollen Extract Cough, Headache and Itching     Seasonal Allergies Headache, Itching, Rash and Visual Disturbance       Past Medical History:   Diagnosis Date     Hyperlipemia      PONV (postoperative nausea and vomiting)         Review of Systems  ROS:  Specifically negative for bowel/bladder dysfunction, balance changes, headache, dizziness, foot drop, fevers, chills, appetite changes, nausea/vomiting, unexplained weight loss. Otherwise 13 systems reviewed are negative. Please see the patient's intake questionnaire from today for details.    Reviewed Social, Family, Past Medical and Past Surgical history with patient, no significant changes noted since prior visit.     Objective:     /75   Pulse 82   SpO2 97%     PHYSICAL EXAMINATION:    --CONSTITUTIONAL: Well developed, well nourished, healthy appearing individual.  --PSYCHIATRIC: Appropriate mood and affect. No difficulty interacting due to temper, social withdrawal, or memory issues.  --SKIN: Lumbar region is dry and intact.   --RESPIRATORY: Normal rhythm and effort. No abnormal accessory muscle breathing patterns noted.   --MUSCULOSKELETAL:  Normal lumbar lordosis noted, no lateral shift.  --GROSS MOTOR: Easily arises from a seated position. Gait is non-antalgic  --LUMBAR SPINE:  Inspection reveals no evidence of deformity. Range of motion is mildly limited in lumbar flexion, extension, lateral rotation.  Tenderness to palpation across the low back.  Facet loading is positive bilaterally. Straight leg raising in the seated position is negative to radicular pain.   --SACROILIAC JOINT:  One Finger point test negative.  --LOWER EXTREMITY MOTOR TESTING:  Plantar flexion left 5/5, right 5/5   Dorsiflexion left 5/5, right 5/5   Great toe MTP extension left 5/5, right 5/5  Knee flexion left 5/5, right 5/5  Knee extension left  5/5, right 5/5   Hip flexion left 5/5, right 5/5  Hip abduction left 5/5, right 5/5  Hip adduction left 5/5, right 5/5   --NEUROLOGIC: Bilateral patellar and achilles reflexes are physiologic and symmetric. Sensation to light touch is intact in the bilateral L4, L5, and S1 dermatomes.    RESULTS:   Imaging: Lumbar spine imaging was reviewed today.

## 2022-12-13 NOTE — LETTER
12/13/2022         RE: Rigo Joyner  [address] [facility] [address]        Dear Colleague,    Thank you for referring your patient, Rigo Joyner, to the [facility]. Please see a copy of my visit note below.      Assessment:     Diagnoses and all orders for this visit:  Lumbosacral spondylosis without myelopathy  -     DULoxetine (CYMBALTA) 20 MG capsule; Take 1 capsule (20 mg) by mouth daily Increase by 20 mg every week until you are taking 60 mg.  -     PAIN Medial Branch Block Lumbar Two Levels Bilateral; Future  Lumbar radiculopathy  -     Spine  Referral  -     DULoxetine (CYMBALTA) 20 MG capsule; Take 1 capsule (20 mg) by mouth daily Increase by 20 mg every week until you are taking 60 mg.  Cervical radiculopathy  -     Spine  Referral  -     DULoxetine (CYMBALTA) 20 MG capsule; Take 1 capsule (20 mg) by mouth daily Increase by 20 mg every week until you are taking 60 mg.  History of lumbar fusion  -     DULoxetine (CYMBALTA) 20 MG capsule; Take 1 capsule (20 mg) by mouth daily Increase by 20 mg every week until you are taking 60 mg.  Lumbar radiculitis  -     gabapentin (NEURONTIN) 300 MG capsule; Take 2 cap in AM, 2  Caps in PM, and 3 caps at bedtime     Rigo Joyner is a 52 year old y.o. male with past medical history significant for BPH, diverticulitis, hyperlipidemia who presents today for follow-up regarding chronic low back and left greater than right lower extremity pain:    -Overall patient's physical exam is reassuring that he has normal strength in his lower extremities.  Pain is likely secondary to lumbar radiculopathy, however does have components of low back pain likely secondary to lumbar spondylosis without myelopathy.  We a long discussion about medial branch blocks and spinal cord stimulator.     Plan:     A shared decision making plan was used. The patient's values and choices were respected. Prior  medical records from Dayanna Vogt's last visit on 9/1/2022 as well as Dr. Purdy's note on 11/10/2022 were reviewed today. The following represents what was discussed and decided upon by the provider and the patient.        -DIAGNOSTIC TESTS: Images were personally reviewed and interpreted.   -- MRI of the lumbar spine dated 10/26/2022 is personally viewed images interpreted and discussed with patient.  There is an L3-4 fusion.  There is adjacent level disease at L4-5 with a broad-based disc bulge with superimposed small central disc protrusion and annular fissure.  There is no high-grade spinal canal stenosis.  There is mild left foraminal stenosis at L4-5.  There are Modic type I changes at L4-5.  There is mild facet arthropathy at L4-5 and L5-S1.    -INTERVENTIONS: I ordered bilateral L3, L4, L5 medial branch blocks.  If he had to positive results with this then would recommend bilateral L4-5 and L5-S1 facet joint injections.  There is relative contraindication as he has hardware and fusion at L3-4.  Should these not be helpful then modified radiofrequency ablation could be done at these levels with using a probe to monitor temperature of hardware during the procedure.    If these were not helpful then recommend spinal cord stimulator trial through [employer].  He would need to have MRI of thoracic spine as well as behavioral health assessment.    We will have him reschedule his cervical transforaminal as he is having congestion and likely sinus infection today.    -MEDICATIONS: I refilled his gabapentin and started him again on duloxetine 20 mg.  He will increase this every week until he is taking 60 mg.  -  Discussed side effects of medications and proper use. Patient verbalized understanding.    -PHYSICAL THERAPY: Recommend he continue doing home exercises on a consistent basis.  He is currently having physical therapy for his neck and arm.  He has had it for his low back and continues to do his exercises.       -PATIENT EDUCATION: 40 minutes of time was spent on this encounter including face-to-face time, precharting and coordination of care.    -FOLLOW UP: Patient will follow up as needed.  Advised to contact clinic if symptoms worsen or change.    Subjective:     Rigo Joyner is a 52 year old male who presents today for follow-up regarding chronic low back pain and left greater than right lower extremity pain.  Patient was seen by Dr. Purdy and referred to the spine center for discussion of spinal cord stimulator and medial branch blocks.  Patient notes that he has left greater than right low back and lower extremity pain especially down the anterior thigh and shin.  Left leg pain is much worse than right leg pain.  His pain is worse with sitting as well as bending and twisting.  Pain is improved with laying down on his right side.  Pain today is 5/10.  He is currently taking gabapentin 600 mg 3 times daily and finds it this is helpful.  Tylenol is somewhat helpful as well.  He had been on 60 mg of duloxetine which he felt was helpful, however is no longer taking this.  He had L4-5 transforaminal epidural steroid injections in January or February 2022 with no relief.  He is currently using a bone stimulator that was recommended by Dr. Purdy.  He denies any bowel or bladder changes, fevers, chills, unintentional weight loss.    Evaluation to Date: MRI of cervical spine dated 9/29/2021.  EMG of left upper extremity.    Treatment to Date: Left carpal tunnel release done on 6/8/2022.  Left C4-5, C6-7, and C5-6 transforaminal epidural steroid injections.  Left L3-4 instrumented fusion in September 2020 with Dr. Tobias.  Bilateral L4-5 transforaminal epidural steroid injections and January or February 2022.    Patient Active Problem List   Diagnosis     Sigmoid diverticulitis       Current Outpatient Medications   Medication     DULoxetine (CYMBALTA) 20 MG capsule     gabapentin (NEURONTIN) 300 MG capsule      acetaminophen (TYLENOL) 500 MG tablet     aspirin 81 MG EC tablet     atorvastatin (LIPITOR) 10 MG tablet     fluticasone (FLONASE) 50 MCG/ACT nasal spray     lactobacillus rhamnosus (GG) (CULTURELL) capsule     methocarbamol (ROBAXIN) 750 MG tablet     No current facility-administered medications for this visit.       Allergies   Allergen Reactions     Bee Pollen Cough, Headache and Itching     Pollen Extract Cough, Headache and Itching     Seasonal Allergies Headache, Itching, Rash and Visual Disturbance       Past Medical History:   Diagnosis Date     Hyperlipemia      PONV (postoperative nausea and vomiting)         Review of Systems  ROS:  Specifically negative for bowel/bladder dysfunction, balance changes, headache, dizziness, foot drop, fevers, chills, appetite changes, nausea/vomiting, unexplained weight loss. Otherwise 13 systems reviewed are negative. Please see the patient's intake questionnaire from today for details.    Reviewed Social, Family, Past Medical and Past Surgical history with patient, no significant changes noted since prior visit.     Objective:     /75   Pulse 82   SpO2 97%     PHYSICAL EXAMINATION:    --CONSTITUTIONAL: Well developed, well nourished, healthy appearing individual.  --PSYCHIATRIC: Appropriate mood and affect. No difficulty interacting due to temper, social withdrawal, or memory issues.  --SKIN: Lumbar region is dry and intact.   --RESPIRATORY: Normal rhythm and effort. No abnormal accessory muscle breathing patterns noted.   --MUSCULOSKELETAL:  Normal lumbar lordosis noted, no lateral shift.  --GROSS MOTOR: Easily arises from a seated position. Gait is non-antalgic  --LUMBAR SPINE:  Inspection reveals no evidence of deformity. Range of motion is mildly limited in lumbar flexion, extension, lateral rotation.  Tenderness to palpation across the low back.  Facet loading is positive bilaterally. Straight leg raising in the seated position is negative to radicular pain.    --SACROILIAC JOINT:  One Finger point test negative.  --LOWER EXTREMITY MOTOR TESTING:  Plantar flexion left 5/5, right 5/5   Dorsiflexion left 5/5, right 5/5   Great toe MTP extension left 5/5, right 5/5  Knee flexion left 5/5, right 5/5  Knee extension left 5/5, right 5/5   Hip flexion left 5/5, right 5/5  Hip abduction left 5/5, right 5/5  Hip adduction left 5/5, right 5/5   --NEUROLOGIC: Bilateral patellar and achilles reflexes are physiologic and symmetric. Sensation to light touch is intact in the bilateral L4, L5, and S1 dermatomes.    RESULTS:   Imaging: Lumbar spine imaging was reviewed today.                          Again, thank you for allowing me to participate in the care of your patient.        Sincerely,        Ernesto Brooks, DO

## 2022-12-13 NOTE — PROGRESS NOTES
Patient is Having Cold and Sinus symptoms today. Spoke with Provider and will be postponing injection until cold symptoms have gone away.  Katherine Sanches, CMA

## 2022-12-13 NOTE — PATIENT INSTRUCTIONS
[facility] Injection Requirements    A  is required for all fluoroscopically-guided injections.  Injection appointments may be cancelled if there are signs/symptoms of an active infection or if the patient is being actively treated with antibiotics for a diagnosed infection.  Patients may have their steroid injection cancelled if they have had another steroid injection within 2 weeks.  Diabetic patients will have their blood glucose levels checked the day of their injection and the appointment will be rescheduled if the blood glucose level is 300 or higher.  Patients with allergies to cortisone, local anesthetics, iodine, or contrast dye should contact the Spine Center to further discuss these considerations.  Patients scheduled for medial branch block diagnostic injections should refrain from taking pain medication the day of the procedure.  The medial branch block injection appointment will be rescheduled if the patient's pain rating is not 5/10 or greater at the time of the procedure.  Patients taking warfarin/Coumadin will have their INR checked the day of the procedure and the procedure may be rescheduled if the INR is greater than 3.0.  Please contact the Spine Center [phone]) if you are taking any prescription blood-thinning medications (warfarin, Plavix, Lovenox, Eliquis, Brilinta, Effient, etc.) as special dosing adjustments may need to be made depending on the type of injection you are scheduled to receive.  It is recommended that you delay having your steroid injection if you have received any vaccines within 2 weeks.     I ordered duloxetine(Cymbalta) 20 mg for you.  Please take this for 1 week and then increase to 40 mg after another week take 60 mg.  On refill we will refill you at 60 mg capsules.    Please reschedule your cervical transforaminal.    ~Please call Nurse Navigation line [phone] with any questions or concerns about your treatment plan, if  symptoms worsen and you would like to be seen urgently, or if you have problems controlling bladder and bowel function.

## 2022-12-22 ENCOUNTER — MYC MEDICAL ADVICE (OUTPATIENT)
Dept: PHYSICAL MEDICINE AND REHAB | Facility: CLINIC | Age: 52
End: 2022-12-22

## 2022-12-22 DIAGNOSIS — Z98.1 HISTORY OF LUMBAR FUSION: Primary | ICD-10-CM

## 2022-12-22 RX ORDER — DULOXETIN HYDROCHLORIDE 60 MG/1
60 CAPSULE, DELAYED RELEASE ORAL DAILY
Qty: 30 CAPSULE | Refills: 1 | Status: SHIPPED | OUTPATIENT
Start: 2022-12-22 | End: 2023-02-02

## 2022-12-30 ENCOUNTER — RADIOLOGY INJECTION OFFICE VISIT (OUTPATIENT)
Dept: PHYSICAL MEDICINE AND REHAB | Facility: CLINIC | Age: 52
End: 2022-12-30
Attending: PAIN MEDICINE
Payer: COMMERCIAL

## 2022-12-30 VITALS
DIASTOLIC BLOOD PRESSURE: 74 MMHG | HEART RATE: 78 BPM | TEMPERATURE: 98.2 F | SYSTOLIC BLOOD PRESSURE: 126 MMHG | OXYGEN SATURATION: 97 % | RESPIRATION RATE: 18 BRPM

## 2022-12-30 DIAGNOSIS — M47.817 LUMBOSACRAL SPONDYLOSIS WITHOUT MYELOPATHY: ICD-10-CM

## 2022-12-30 PROCEDURE — 64493 INJ PARAVERT F JNT L/S 1 LEV: CPT | Mod: 50 | Performed by: PAIN MEDICINE

## 2022-12-30 PROCEDURE — 64494 INJ PARAVERT F JNT L/S 2 LEV: CPT | Mod: 50 | Performed by: PAIN MEDICINE

## 2022-12-30 RX ORDER — BUPIVACAINE HYDROCHLORIDE 7.5 MG/ML
INJECTION, SOLUTION EPIDURAL; RETROBULBAR
Status: COMPLETED | OUTPATIENT
Start: 2022-12-30 | End: 2022-12-30

## 2022-12-30 RX ORDER — LIDOCAINE HYDROCHLORIDE 10 MG/ML
INJECTION, SOLUTION EPIDURAL; INFILTRATION; INTRACAUDAL; PERINEURAL
Status: COMPLETED | OUTPATIENT
Start: 2022-12-30 | End: 2022-12-30

## 2022-12-30 RX ADMIN — BUPIVACAINE HYDROCHLORIDE 3 ML: 7.5 INJECTION, SOLUTION EPIDURAL; RETROBULBAR at 08:59

## 2022-12-30 RX ADMIN — LIDOCAINE HYDROCHLORIDE 3 ML: 10 INJECTION, SOLUTION EPIDURAL; INFILTRATION; INTRACAUDAL; PERINEURAL at 08:59

## 2022-12-30 ASSESSMENT — PAIN SCALES - GENERAL
PAINLEVEL: SEVERE PAIN (6)
PAINLEVEL: MODERATE PAIN (5)

## 2022-12-30 NOTE — PATIENT INSTRUCTIONS
DISCHARGE INSTRUCTIONS    During office hours (8:00 a.m.- 4:00 p.m.) questions or concerns may be answered  by calling Spine Center Navigation Nurses at  [phone].  Messages received after hours will be returned the following business day.      In the case of an emergency, please dial 911 or seek assistance at the nearest Emergency Room/Urgent Care facility.     All Patients:  You may experience an increase in your symptoms for the first 2 days, once the numbing medication wears off.    You may resume your regular medication, no pain medication until you have completed your diary    You may shower. No swimming, tub bath or hot tub for 24 hours; remove bandage after 4 hours    Continue your activities that can cause you pain to test the blocks.                         You should not drive for the next 3 to 5 hours (have someone drive you)           POSSIBLE PROCEDURE SIDE EFFECTS  -Call Spine Center if concerned-    Increased Pain  Increased numbness/tingling     Nausea/Vomiting  Bruising/bleeding at site (hematoma)             Swelling at site (edema) Headache  Difficulty walking  Infection        Fever greater than 100.5

## 2023-01-02 ENCOUNTER — TELEPHONE (OUTPATIENT)
Dept: PHYSICAL MEDICINE AND REHAB | Facility: CLINIC | Age: 53
End: 2023-01-02

## 2023-01-02 NOTE — TELEPHONE ENCOUNTER
Patient returned previous message that was left and was informed that Dr. Brooks was not recommending that he proceed with the lumbar radiofrequency ablation process given his response to the initial medial branch blocks that were recently completed.  The patient verbalized understanding of the plan and will plan to discuss care plan recommendations with Dr. Brooks at the time of his cervical epidural steroid injection appointment on 1/3/23.

## 2023-01-02 NOTE — TELEPHONE ENCOUNTER
Pt did not benefit enough with MBBs to proceed, per Dr. Brooks, should follow up with him, Dayanna, or Loren to discuss plan of care going forward. Left VM for pt to call to schedule, or contact [facility] with any questions. Number provided.

## 2023-01-03 ENCOUNTER — RADIOLOGY INJECTION OFFICE VISIT (OUTPATIENT)
Dept: PHYSICAL MEDICINE AND REHAB | Facility: CLINIC | Age: 53
End: 2023-01-03
Payer: COMMERCIAL

## 2023-01-03 ENCOUNTER — HOSPITAL ENCOUNTER (OUTPATIENT)
Dept: RADIOLOGY | Facility: HOSPITAL | Age: 53
Discharge: HOME | End: 2023-01-03
Attending: PAIN MEDICINE | Admitting: PAIN MEDICINE
Payer: COMMERCIAL

## 2023-01-03 VITALS
RESPIRATION RATE: 16 BRPM | HEART RATE: 72 BPM | OXYGEN SATURATION: 96 % | TEMPERATURE: 98.1 F | DIASTOLIC BLOOD PRESSURE: 74 MMHG | SYSTOLIC BLOOD PRESSURE: 136 MMHG

## 2023-01-03 DIAGNOSIS — Z98.1 HISTORY OF LUMBAR FUSION: ICD-10-CM

## 2023-01-03 DIAGNOSIS — Z98.1 HISTORY OF LUMBAR FUSION: Primary | ICD-10-CM

## 2023-01-03 DIAGNOSIS — M54.12 CERVICAL RADICULOPATHY: ICD-10-CM

## 2023-01-03 PROCEDURE — 72120 X-RAY BEND ONLY L-S SPINE: CPT

## 2023-01-03 PROCEDURE — 64479 NJX AA&/STRD TFRM EPI C/T 1: CPT | Mod: LT | Performed by: PAIN MEDICINE

## 2023-01-03 RX ORDER — LIDOCAINE HYDROCHLORIDE 10 MG/ML
INJECTION, SOLUTION EPIDURAL; INFILTRATION; INTRACAUDAL; PERINEURAL
Status: COMPLETED | OUTPATIENT
Start: 2023-01-03 | End: 2023-01-03

## 2023-01-03 RX ORDER — DEXAMETHASONE SODIUM PHOSPHATE 10 MG/ML
INJECTION, SOLUTION INTRAMUSCULAR; INTRAVENOUS
Status: COMPLETED | OUTPATIENT
Start: 2023-01-03 | End: 2023-01-03

## 2023-01-03 RX ADMIN — LIDOCAINE HYDROCHLORIDE 2 ML: 10 INJECTION, SOLUTION EPIDURAL; INFILTRATION; INTRACAUDAL; PERINEURAL at 13:28

## 2023-01-03 RX ADMIN — DEXAMETHASONE SODIUM PHOSPHATE 10 MG: 10 INJECTION, SOLUTION INTRAMUSCULAR; INTRAVENOUS at 13:28

## 2023-01-03 ASSESSMENT — PAIN SCALES - GENERAL
PAINLEVEL: MODERATE PAIN (4)
PAINLEVEL: NO PAIN (1)

## 2023-01-03 NOTE — PATIENT INSTRUCTIONS
DISCHARGE INSTRUCTIONS    During office hours (8:00 a.m.- 4:00 p.m.) questions or concerns may be answered  by calling Spine Center Navigation Nurses at  [phone].  Messages received after hours will be returned the following business day.      In the case of an emergency, please dial 911 or seek assistance at the nearest Emergency Room/Urgent Care facility.     All Patients:    You may experience an increase in your symptoms for the first 2 days (It may take anywhere between 2 days- 2 weeks for the steroid to have maximum effect).    You may use ice on the injection site, as frequently as 20 minutes each hour if needed.    You may take your pain medicine.    You may continue taking your regular medication after your injection. If you have had a Medial Branch Block you may resume pain medication once your pain diary is completed.    You may shower. No swimming, tub bath or hot tub for 48 hours.  You may remove your bandaid/bandage as soon as you are home.    You may resume light activities, as tolerated.    Resume your usual diet as tolerated.    It is strongly advised that you do not drive for 1-3 hours post injection.    If you have had oral sedation:  Do not drive for 8 hours post injection.      If you have had IV sedation:  Do not drive for 24 hours post injection.  Do not operate hazardous machinery or make important personal/business decisions for 24 hours.      POSSIBLE STEROID SIDE EFFECTS (If steroid/cortisone was used for your procedure)    -If you experience these symptoms, it should only last for a short period    Swelling of the legs              Skin redness (flushing)     Mouth (oral) irritation   Blood sugar (glucose) levels            Sweats                    Mood changes  Headache  Sleeplessness  Weakened immune system for up to 14 days, which could increase the risk of rashid the COVID-19 virus and/or experiencing more severe symptoms of the disease, if exposed.  Decreased  effectiveness of the flu vaccine if given within 2 weeks of the steroid.         POSSIBLE PROCEDURE SIDE EFFECTS  -Call the Spine Center if you are concerned  Increased Pain           Increased numbness/tingling      Nausea/Vomiting          Bruising/bleeding at site      Redness or swelling                                              Difficulty walking      Weakness           Fever greater than 100.5    *In the event of a severe headache after an epidural steroid injection that is relieved by lying down, please call the [facility] to speak with a clinical staff member*

## 2023-01-17 NOTE — PROGRESS NOTES
08/08/22   Note: This is a copy of patient's last daily visit and will also serve as their Discharge Summary as they have not been in for further treatment 30 days past this date. Final assessment of goals and physical and functional status , therefore unavailable.    Signing Clinician's Name / Credentials   Signing clinician's name / credentials DEBI Barcenas/L CHT   Session Number   Session Number 3(reporting period 7/25/22 to 8/8/22)   Quick Add   Quick Add  Hand   Hand   Referring Physician Sofia Prasad CNP   Medical Diagnosis Left Carpal Tunnel Release   Surgical procedure Left CTR   Date of surgery 06/03/22   Orders Evaluate And Treat As Indicated   Adult OT Eval Goals   Hand Eval Goals 1;2;3   Hand Goal 1   Goal Identifier home program   Goal Description Patient to be independent with home program, not needing more than 15% verbal cuing to perform correctly   Target Date 08/15/22   Hand Goal 2   Goal Identifier jar   Goal Description Increase  strength by 10# so patient can better get a tight jar open   Target Date 08/25/22   Hand Goal 3   Goal Identifier weight bearing   Goal Description Patient to report 80% improvement in being able to bear weight into left hand   Target Date 09/05/22   Subjective Report   Subjective Report Has neck injection tomorrow and therapy in a few weeks. . Scar tissue slightly better.   Patient Reported % Functional Improvement 10   Initial Pain level 10/10  (at worst 2 at best)   Current Pain level 3/10   Objective Measures   Objective Measures Objective Measure 1   Edema Comments see Eval   Objective Measure 1   Objective Measure palpation to scar   Details moderate scar tissue   Modalities   Modalities Infrared;Ultrasound    Infrared   Infrared Treatment Minutes (97675) 2 Minutes   Skilled Intervention to enhance tissue repair   Treatment Detail 3j/cm2 volar wrist at 50% pulsed   Ultrasound   Ultrasound, Minutes (27385) 8 Minutes   Skilled Intervention to enhance  tissue repair   Treatment Detail 3mhz, 1.0w/cm2, 50% pulsed to volar wrist   Therapeutic Interventions   Therapeutic Interventions Therapeutic Procedure/Exercise;Manual Therapy   Therapeutic Procedure/Exercise       Skilled Intervention education verbal and physical cuing needed   Patient Response demonstrates understanding   Treatment Detail Patient Code [id number]-Intrinsic Passive Range of Motion Lumbrical Stretch 1,Intrinsics Passive Range of Motion Lumbrical Stretch 2,Finger Active Range of Motion FDS Flexor Tendon Gliding, - - other ex to be done at home Nerve Gliding Distal Median,Finger Active Range of Motion Tendon Glides Fist Series,   Manual Therapy       Skilled Intervention to soften and mobilize scar tissue   Treatment Detail augmented STM with Gua Sha tools to scar, thenar and hypothenar area, scar mobilization circular and vector style with home instruction and scar pad issued.   Assessments Completed   Assessments Completed Patient feels therapy to be helpful at softening scar   Equipment   Equipment fabricate elastomer scar pad   Education   Learner Patient   Readiness Eager   Method Booklet/handout;Explanation;Demonstration   Response Verbalizes understanding;Demonstrates understanding   Education Notes see home program   Plan   Home program Patient Code [id number]- ex as above, scar massage, light ADL's   Plan for next session issue putty if indicated next visit. and check scar pad ( elastomer). Will wait a few weeks for follow up   Comments   Comments scheduled to get injection on the 9th c5-6   Total Session Time

## 2023-01-19 DIAGNOSIS — M54.16 LUMBAR RADICULOPATHY: Primary | ICD-10-CM

## 2023-01-31 ENCOUNTER — OFFICE VISIT (OUTPATIENT)
Dept: RHEUMATOLOGY | Facility: CLINIC | Age: 53
End: 2023-01-31
Attending: PHYSICIAN ASSISTANT
Payer: COMMERCIAL

## 2023-01-31 ENCOUNTER — ANCILLARY PROCEDURE (OUTPATIENT)
Dept: GENERAL RADIOLOGY | Facility: CLINIC | Age: 53
End: 2023-01-31
Attending: PHYSICIAN ASSISTANT
Payer: COMMERCIAL

## 2023-01-31 ENCOUNTER — HOSPITAL ENCOUNTER (OUTPATIENT)
Dept: GENERAL RADIOLOGY | Facility: CLINIC | Age: 53
Discharge: HOME | End: 2023-01-31
Attending: PHYSICIAN ASSISTANT | Admitting: PHYSICIAN ASSISTANT
Payer: COMMERCIAL

## 2023-01-31 VITALS
SYSTOLIC BLOOD PRESSURE: 132 MMHG | HEIGHT: 69 IN | DIASTOLIC BLOOD PRESSURE: 66 MMHG | BODY MASS INDEX: 33.18 KG/M2 | WEIGHT: 224 LBS

## 2023-01-31 DIAGNOSIS — R76.8 POSITIVE ANA (ANTINUCLEAR ANTIBODY): Primary | ICD-10-CM

## 2023-01-31 DIAGNOSIS — R76.8 POSITIVE ANA (ANTINUCLEAR ANTIBODY): ICD-10-CM

## 2023-01-31 DIAGNOSIS — M25.50 POLYARTHRALGIA: ICD-10-CM

## 2023-01-31 LAB
ALBUMIN UR-MCNC: NEGATIVE MG/DL
APPEARANCE UR: CLEAR
BILIRUB UR QL STRIP: NEGATIVE
COLOR UR AUTO: YELLOW
GLUCOSE UR STRIP-MCNC: NEGATIVE MG/DL
HGB UR QL STRIP: NEGATIVE
KETONES UR STRIP-MCNC: NEGATIVE MG/DL
LEUKOCYTE ESTERASE UR QL STRIP: NEGATIVE
NITRATE UR QL: NEGATIVE
PH UR STRIP: 5.5 [PH] (ref 5–7)
RBC #/AREA URNS AUTO: NORMAL /HPF
SP GR UR STRIP: >=1.03 (ref 1–1.03)
URATE SERPL-MCNC: 7.3 MG/DL (ref 3.4–7)
UROBILINOGEN UR STRIP-ACNC: 0.2 E.U./DL
WBC #/AREA URNS AUTO: NORMAL /HPF

## 2023-01-31 PROCEDURE — 84550 ASSAY OF BLOOD/URIC ACID: CPT | Performed by: PHYSICIAN ASSISTANT

## 2023-01-31 PROCEDURE — 81001 URINALYSIS AUTO W/SCOPE: CPT | Performed by: PHYSICIAN ASSISTANT

## 2023-01-31 PROCEDURE — 99204 OFFICE O/P NEW MOD 45 MIN: CPT | Performed by: PHYSICIAN ASSISTANT

## 2023-01-31 PROCEDURE — 86160 COMPLEMENT ANTIGEN: CPT | Performed by: PHYSICIAN ASSISTANT

## 2023-01-31 PROCEDURE — 86235 NUCLEAR ANTIGEN ANTIBODY: CPT | Mod: 59 | Performed by: PHYSICIAN ASSISTANT

## 2023-01-31 PROCEDURE — 86160 COMPLEMENT ANTIGEN: CPT | Mod: 59 | Performed by: PHYSICIAN ASSISTANT

## 2023-01-31 PROCEDURE — 86200 CCP ANTIBODY: CPT | Performed by: PHYSICIAN ASSISTANT

## 2023-01-31 PROCEDURE — 36415 COLL VENOUS BLD VENIPUNCTURE: CPT | Performed by: PHYSICIAN ASSISTANT

## 2023-01-31 PROCEDURE — 73130 X-RAY EXAM OF HAND: CPT | Mod: TC | Performed by: RADIOLOGY

## 2023-01-31 PROCEDURE — 86225 DNA ANTIBODY NATIVE: CPT | Performed by: PHYSICIAN ASSISTANT

## 2023-01-31 PROCEDURE — 72170 X-RAY EXAM OF PELVIS: CPT

## 2023-01-31 NOTE — PROGRESS NOTES
Rheumatology Clinic Visit  [facility]  Sudha CovarrubiasJONATHON mena     Rigo Joyner MRN# [id number]   YOB: 1970 Age: 52 year old   Date of Visit: 01/31/2023  Primary care provider: Semmler, Steven Duane          Assessment and Plan:     1. Positive MARK  2. Polyarthralgia    Patient presents today for an initial evaluation of a borderline positive MARK.  He has had significant back pain for many years including a surgery.  He currently has a bone stimulator to help with his bone grow as the fusion did not completely hold.  He is also considering a spinal cord stimulator.  He also has neck pain and reports having bulging disks and pinched nerves.  He is working on trying to improve this without needing surgery.  He does get some pain in his hands.  At the time that his MARK was checked he did report noticing nodules on his knuckles.  These have since resolved.  He does at times feel that his hands are swollen.  Physical examination did not show any synovitis, dactylitis, tenosynovitis or enthesopathy.  He had full joint range of motion.  Normal  strength.  Previous laboratory evaluation and imaging studies were reviewed.  Results below.    Discussed with the patient that a positive MARK is of unknown significance.  10% of the healthy population can have a positive MARK.  We do get false positives with this test as well.  Discussed that a positive MARK can be associated with nonrheumatological autoimmune disorder such as thyroiditis.  It can also be associated with rheumatological autoimmune disorder such as connective tissue disorders or scleroderma.  A positive MARK has also been known to be associated with some viral and bacterial infections.  Given the patient's symptoms and his family history I would recommend checking further serologies regarding the positive MARK.  We will also check a CCP antibodies.  He did also have some dry flaky patches to his scalp with some areas of excoriations.  This  could be evidence of psoriasis which then could lead to the differential of a spondyloarthropathy.  We will get an x-ray of his pelvis as well as his hands as well to further evaluate.  He may need to see dermatology for further evaluation as well.  I will contact the patient with the results once they are complete.    Plan:     1. Schedule follow-up with Sudha Tracy PA-C as needed.   2. Imaging: xray bilateral hands, xray pelvis (looks at the hips and SI joints)  3. Labs: ZIA panel, dsDNA, complement levels, urine, uric acid, CCP antibody, scl-70, centromere      Sudha Tracy, PAC  Rheumatology         History of Present Illness:   Rigo Joyner presents for evaluation of joint pain, positive MARK.     He reports that he has a history of spinal fusion in 2020. He states that at first it felt like it was getting better. He saw another surgeon and needed a bone growth stimulator. He has cervical stenosis and herniations as well. He was doing some back injection. He has been having some success with cervical injection. He uses traction at home which helps. He sees a chiropractor as well. He has been doing the bone stimulator until April and bone is showing that it is growing. He has nerve pain down his legs, L>R. The left goes down into his toes. He sees someone at the spinal center. He is going to try injections around the fusion. He has noticed that is right hand, starts at the elbow and had nodules between the knuckles. The nodules have since resolved. Blood work was done and showed the positive MARK. When he was younger he had issues with his hips and knows he has some arthritis in his hips. His back pain has gradually gotten worse. This also can depend on his activity. Occasionally the right hand will feel stiff. He has a history of carpal tunnel release. He still gets nerve pain on the left though. He has also tried Cymbalta. He is considering the spinal cord stimulator.     Sisters with lupus. Lupus  runs on his father's side. Mother with MSA (multiple systems atrophy).     He does not get any mouth sores or skin rashes, although he gets a scabby like rash on his scalp. He has not seen dermatology recently. No hair loss. No sun sensitivity rashes. No history of blood clots or seizures. No history of pericarditis or pleuritis. No history of uveitis or iritis. No Raynaud's. No fevers or infections. He has some fatigue, but he does get up early. No dry eyes or dry mouth.          Review of Systems:     Constitutional: negative  Skin: negative  Eyes: negative  Ears/Nose/Throat: negative  Respiratory: No shortness of breath, dyspnea on exertion, cough, or hemoptysis  Cardiovascular: negative  Gastrointestinal: negative  Genitourinary: negative  Musculoskeletal: as above  Neurologic: negative  Psychiatric: negative  Hematologic/Lymphatic/Immunologic: negative  Endocrine: negative         Active Problem List:     Patient Active Problem List    Diagnosis Date Noted     Sigmoid diverticulitis 07/27/2018     Priority: Medium            Past Medical History:     Past Medical History:   Diagnosis Date     Hyperlipemia      PONV (postoperative nausea and vomiting)      Past Surgical History:   Procedure Laterality Date     BILATERAL HIP ARTHROSCOPY       GI SURGERY       LAPAROSCOPIC LEFT COLON RESECTION       LUMBAR FUSION       RELEASE CARPAL TUNNEL Left 6/8/2022    Procedure: Left carpal tunnel release;  Surgeon: Sandrita Purdy MD;  Location: [facility] Main OR            Social History:     Social History     Socioeconomic History     Marital status:      Spouse name: Not on file     Number of children: Not on file     Years of education: Not on file     Highest education level: Not on file   Occupational History     Not on file   Tobacco Use     Smoking status: Never     Smokeless tobacco: Former     Types: Chew   Vaping Use     Vaping Use: Never used   Substance and Sexual Activity     Alcohol use: Not  Currently     Comment: Very rare     Drug use: Never     Sexual activity: Not on file   Other Topics Concern     Parent/sibling w/ CABG, MI or angioplasty before 65F 55M? Not Asked   Social History Narrative     Not on file     Social Determinants of Health     Financial Resource Strain: Not on file   Food Insecurity: Not on file   Transportation Needs: Not on file   Physical Activity: Not on file   Stress: Not on file   Social Connections: Not on file   Intimate Partner Violence: Not on file   Housing Stability: Not on file          Family History:     Family History   Problem Relation Age of Onset     Coronary Artery Disease Mother      Hypertension Mother      Depression Mother      Thyroid Disease Mother      Coronary Artery Disease Father      Colon Cancer Father      Thyroid Disease Father             Allergies:     Allergies   Allergen Reactions     Bee Pollen Cough, Headache and Itching     Pollen Extract Cough, Headache and Itching     Seasonal Allergies Headache, Itching, Rash and Visual Disturbance            Medications:     Current Outpatient Medications   Medication Sig Dispense Refill     acetaminophen (TYLENOL) 500 MG tablet Take 1,000 mg by mouth every 6 hours as needed       aspirin 81 MG EC tablet Take 1 tablet (81 mg) by mouth daily Resume 48 hours after surgery       atorvastatin (LIPITOR) 10 MG tablet Take 10 mg by mouth daily (with dinner)       DULoxetine (CYMBALTA) 60 MG capsule Take 1 capsule (60 mg) by mouth daily 30 capsule 1     fluticasone (FLONASE) 50 MCG/ACT nasal spray Spray 1 spray in nostril daily as needed       gabapentin (NEURONTIN) 300 MG capsule Take 2 cap in AM, 2  Caps in PM, and 3 caps at bedtime 210 capsule 1     lactobacillus rhamnosus (GG) (CULTURELL) capsule Take 1 capsule by mouth daily (with dinner)              Physical Exam:   There were no vitals taken for this visit.  Wt Readings from Last 6 Encounters:   11/10/22 93 kg (205 lb)   10/25/22 93 kg (205 lb)    09/01/22 101.2 kg (223 lb)   07/19/22 101.2 kg (223 lb)   06/08/22 101.2 kg (223 lb 1.6 oz)   05/19/22 94.3 kg (208 lb)     Constitutional: well-developed, appearing stated age; cooperative  Eyes: nl PERRLA, conjunctiva, sclera  ENT: nl external ears, nose, hearing, lips, teeth, gums, throat. No mucositis.   No mucous membrane lesions, normal saliva pool  Neck: no mass or thyroid enlargement  Resp: lungs clear to auscultation  CV: RRR, no murmurs, rubs or gallops, no edema  Lymph: no cervical, supraclavicular or epitrochlear nodes  MS: The TMJ, neck, shoulder, elbow, wrist, MCP/PIP/DIP, spine,knee, ankle, and foot MTP/IP joints were examined. No active synovitis or altered joint anatomy. Full joint ROM. Normal  strength. No dactylitis,  tenosynovitis, enthesopathy.   Skin: no nail pitting, alopecia.  He does have some dry scaly patches on the top of his scalp with areas of excoriations.  Neuro: nl cranial nerves.   Psych: nl judgement, orientation, memory, affect.           Data:   Imaging:  MRI lumbar spine 10/26/2022  IMPRESSION:  1.  Postsurgical changes of 360 degrees fusion of L3-L4.   2.  Adjacent level disease at L4-L5 with broad-based disc bulge with superimposed small central disc protrusion and annular fissure.  3.  No high grade spinal canal narrowing.  4.  Mild left neural foraminal narrowing at L4-L5.  5.  Modic type I changes at L4-L5.    Laboratory:  9/1/2022  CRP less than 0.2  Rheumatoid factor less than 6  Sed rate 5  MARK borderline positive with a speckled pattern and a titer of 1: 40

## 2023-01-31 NOTE — PATIENT INSTRUCTIONS
After Visit Instructions:     Thank you for coming to [facility] for your care. It is my goal to partner with you to help you reach your optimal state of health.       Plan:     Schedule follow-up with Sudha Tracy PA-C as needed.   Imaging: xray bilateral hands, xray pelvis (looks at the hips and SI joints)  Labs: ZIA panel, dsDNA, complement levels, urine, uric acid, CCP antibody, scl-70, centromere      Sudha Tracy PA-C  [facility]  [facility]    Contact information: [facility]  Clinic Number:  [phone]  Please call or send a [employer] message with any questions about your care

## 2023-02-01 LAB
C3 SERPL-MCNC: 123 MG/DL (ref 81–157)
C4 SERPL-MCNC: 23 MG/DL (ref 13–39)
CCP AB SER IA-ACNC: 0.7 U/ML
DSDNA AB SER-ACNC: 0.7 IU/ML
ENA SM IGG SER IA-ACNC: <0.7 U/ML
ENA SM IGG SER IA-ACNC: NEGATIVE
ENA SS-A AB SER IA-ACNC: <0.5 U/ML
ENA SS-A AB SER IA-ACNC: NEGATIVE
ENA SS-B IGG SER IA-ACNC: <0.6 U/ML
ENA SS-B IGG SER IA-ACNC: NEGATIVE
U1 SNRNP IGG SER IA-ACNC: <1.1 U/ML
U1 SNRNP IGG SER IA-ACNC: NEGATIVE

## 2023-02-02 ENCOUNTER — RADIOLOGY INJECTION OFFICE VISIT (OUTPATIENT)
Dept: PHYSICAL MEDICINE AND REHAB | Facility: CLINIC | Age: 53
End: 2023-02-02
Attending: PAIN MEDICINE
Payer: COMMERCIAL

## 2023-02-02 VITALS
SYSTOLIC BLOOD PRESSURE: 116 MMHG | HEART RATE: 74 BPM | RESPIRATION RATE: 18 BRPM | DIASTOLIC BLOOD PRESSURE: 84 MMHG | OXYGEN SATURATION: 96 % | TEMPERATURE: 98.2 F

## 2023-02-02 DIAGNOSIS — M54.16 LUMBAR RADICULOPATHY: ICD-10-CM

## 2023-02-02 PROCEDURE — 64483 NJX AA&/STRD TFRM EPI L/S 1: CPT | Mod: 50 | Performed by: PAIN MEDICINE

## 2023-02-02 RX ORDER — LIDOCAINE HYDROCHLORIDE 10 MG/ML
INJECTION, SOLUTION EPIDURAL; INFILTRATION; INTRACAUDAL; PERINEURAL
Status: COMPLETED | OUTPATIENT
Start: 2023-02-02 | End: 2023-02-02

## 2023-02-02 RX ORDER — DEXAMETHASONE SODIUM PHOSPHATE 10 MG/ML
INJECTION, SOLUTION INTRAMUSCULAR; INTRAVENOUS
Status: COMPLETED | OUTPATIENT
Start: 2023-02-02 | End: 2023-02-02

## 2023-02-02 RX ADMIN — DEXAMETHASONE SODIUM PHOSPHATE 20 MG: 10 INJECTION, SOLUTION INTRAMUSCULAR; INTRAVENOUS at 10:08

## 2023-02-02 RX ADMIN — LIDOCAINE HYDROCHLORIDE 4 ML: 10 INJECTION, SOLUTION EPIDURAL; INFILTRATION; INTRACAUDAL; PERINEURAL at 10:07

## 2023-02-02 ASSESSMENT — PAIN SCALES - GENERAL
PAINLEVEL: MODERATE PAIN (5)
PAINLEVEL: MILD PAIN (3)

## 2023-02-02 NOTE — PATIENT INSTRUCTIONS
DISCHARGE INSTRUCTIONS    During office hours (8:00 a.m.- 4:00 p.m.) questions or concerns may be answered  by calling Spine Center Navigation Nurses at  [phone].  Messages received after hours will be returned the following business day.      In the case of an emergency, please dial 911 or seek assistance at the nearest Emergency Room/Urgent Care facility.     All Patients:    You may experience an increase in your symptoms for the first 2 days (It may take anywhere between 2 days- 2 weeks for the steroid to have maximum effect).    You may use ice on the injection site, as frequently as 20 minutes each hour if needed.    You may take your pain medicine.    You may continue taking your regular medication after your injection. If you have had a Medial Branch Block you may resume pain medication once your pain diary is completed.    You may shower. No swimming, tub bath or hot tub for 48 hours.  You may remove your bandaid/bandage as soon as you are home.    You may resume light activities, as tolerated.    Resume your usual diet as tolerated.    It is strongly advised that you do not drive for 1-3 hours post injection.    If you have had oral sedation:  Do not drive for 8 hours post injection.      If you have had IV sedation:  Do not drive for 24 hours post injection.  Do not operate hazardous machinery or make important personal/business decisions for 24 hours.      POSSIBLE STEROID SIDE EFFECTS (If steroid/cortisone was used for your procedure)    -If you experience these symptoms, it should only last for a short period    Swelling of the legs              Skin redness (flushing)     Mouth (oral) irritation   Blood sugar (glucose) levels            Sweats                    Mood changes  Headache  Sleeplessness  Weakened immune system for up to 14 days, which could increase the risk of rashid the COVID-19 virus and/or experiencing more severe symptoms of the disease, if exposed.  Decreased  effectiveness of the flu vaccine if given within 2 weeks of the steroid.         POSSIBLE PROCEDURE SIDE EFFECTS  -Call the Spine Center if you are concerned  Increased Pain           Increased numbness/tingling      Nausea/Vomiting          Bruising/bleeding at site      Redness or swelling                                              Difficulty walking      Weakness           Fever greater than 100.5    *In the event of a severe headache after an epidural steroid injection that is relieved by lying down, please call the [facility] to speak with a clinical staff member*

## 2023-02-08 LAB
CENTROMERE B AB SER-ACNC: 2.4 U/ML
CENTROMERE B AB SER-ACNC: NEGATIVE
ENA SCL70 IGG SER IA-ACNC: <0.6 U/ML
ENA SCL70 IGG SER IA-ACNC: NEGATIVE

## 2023-02-21 ENCOUNTER — OFFICE VISIT (OUTPATIENT)
Dept: PHYSICAL MEDICINE AND REHAB | Facility: CLINIC | Age: 53
End: 2023-02-21
Payer: COMMERCIAL

## 2023-02-21 VITALS
HEART RATE: 80 BPM | DIASTOLIC BLOOD PRESSURE: 74 MMHG | BODY MASS INDEX: 33.18 KG/M2 | WEIGHT: 224 LBS | HEIGHT: 69 IN | SYSTOLIC BLOOD PRESSURE: 127 MMHG

## 2023-02-21 DIAGNOSIS — M54.16 LUMBAR RADICULITIS: ICD-10-CM

## 2023-02-21 DIAGNOSIS — M96.1 FAILED BACK SYNDROME: Primary | ICD-10-CM

## 2023-02-21 DIAGNOSIS — M54.12 CERVICAL RADICULITIS: ICD-10-CM

## 2023-02-21 DIAGNOSIS — Z98.1 HISTORY OF LUMBAR FUSION: ICD-10-CM

## 2023-02-21 PROCEDURE — 99214 OFFICE O/P EST MOD 30 MIN: CPT | Performed by: PHYSICIAN ASSISTANT

## 2023-02-21 RX ORDER — METHOCARBAMOL 750 MG/1
750 TABLET, FILM COATED ORAL
COMMUNITY
Start: 2022-11-10 | End: 2023-04-27

## 2023-02-21 ASSESSMENT — PAIN SCALES - GENERAL: PAINLEVEL: MODERATE PAIN (4)

## 2023-02-21 NOTE — LETTER
2/21/2023         RE: Rigo Joyner  [address] [facility] [id number]        Dear Colleague,    Thank you for referring your patient, Rigo Joyner, to the [facility]. Please see a copy of my visit note below.    Assessment:   Rigo Joyner is a 52 year old y.o. male with past medical history significant for BPH, diverticulitis, hyperlipidemia  who presents today for follow-up regarding chronic neck and back pain.  1.  Left neck pain with radiation into the left upper extremity with associated numbness and tingling.  My review of an MRI cervical spine from [facility] dated September 29, 2021 shows multilevel foraminal stenosis which is moderate to severe bilaterally C6-7, severe bilaterally C5-6, moderate to severe right and severe left C4-5. EMG left upper extremity showed mild left carpal tunnel syndrome and no radiculopathy.  Patient underwent left carpal tunnel release June 8, 2022 with no improvement in symptoms.  Most recently patient had a left C4-5 transforaminal epidural steroid injection January 3, 2023 with no improvement in pain.  2.  Chronic left low back pain with radiation into the left greater than right lower extremity with associated numbness and tingling patient has a history of a left L3-4 instrumented fusion September 2020 with Dr. Tobias.  He has ongoing neuropathic pain down the legs.    CT lumbar spine October 2022 shows well incorporated interbody graft with no hardware failure.  An MRI lumbar spine from October 2022 shows small disc bulges/protrusions at L4-5 and L5-S1.  At L4-5 there is mild left foraminal stenosis.  Patient is following up after bilateral L3-4 transforaminal epidural steroid injections February 2, 2023 which provided greater than 50% relief of pain but only lasted 4 to 5 days.  Patient has tried and failed extensive treatment for his ongoing low back and lower extremity pain.  He has failed back  syndrome.         Plan:     A shared decision making plan was used.  The patient's values and choices were respected.  The following represents what was discussed and decided upon by the physician assistant and the patient.      1.  DIAGNOSTIC TESTS:    -I reviewed the CT lumbar spine and MRI lumbar spine from October 2022.  - I reviewed the lumbar spine flexion-extension x-rays.  -I reviewed the MRI cervical spine from [facility] September 29, 2021.  -EMG left lower extremity from [facility]y from December 14, 2021 which was normal.  - I ordered an MRI thoracic spine for evaluation in preparation for spinal cord stimulator trial.        2.  PHYSICAL THERAPY: Patient is currently in physical therapy.  He has had 3 sessions of far.  Encouraged him to continue with physical therapy and the home exercises.    3.  MEDICATIONS:    -Lyrica was not helpful.  -Patient is taking gabapentin 600 mg in the morning, 600 mg in the afternoon, and 900 mg at bedtime.   -Cymbalta was not helpful and he experienced dry mouth so he stopped taking it.  -Patient takes Aleve/Tylenol combination which is somewhat helpful.  -Patient previously indicated he would like to avoid opiates.    4.  INTERVENTIONS:   -The patient I discussed [employer] spinal cord stimulator trial for his failed back syndrome.  Patient is interested in pursuing this option.  Once he completes his behavioral health evaluation and MRI thoracic spine I will enter the order and request authorization for spinal cord stimulator trial.  -Patient is status post bilateral L3-4 transforaminal epidural steroid injections February 2, 2023 which provided greater than 50% relief of his pain but only lasted 4 to 5 days.  -Patient failed bilateral L3, L4, L5 medial branch blocks December 30, 2022.  -Patient had a left C4-5 transforaminal epidural steroid injection January 3, 2023 which was not helpful.  -Left C5-6 transforaminal epidural steroid injection August 9, 2022  with some improvement in pain.  -Patient had a C7-T1 interlaminar epidural steroid injection November 8, 2021 with minimal improvement.    - Patient then underwent a left C4-5 transforaminal epidural steroid injection April 15, 2022 which provided 30 to 40% relief of his pain.  - Patient then had a left C6-7 transforaminal epidural steroid injection May 5, 2022 which provided 50% relief of his pain.   -Patient had bilateral L4-5 transforaminal epidural steroid injections March 16, 2022 at [facility] With no improvement.  -Patient had bilateral L5-S1 transforaminal epidural steroid injections March 31, 2022 at [facility] with no improvement.    5.  PATIENT EDUCATION: Patient is in agreement the above plan.  All questions were answered.    6.  FOLLOW-UP: I will send the patient a [address] message with his MRI thoracic spine results.  My nurse will call the patient after he completes his behavioral health evaluation.  Then we will request authorization for [employer] spinal cord stimulator trial.  If he has questions or concerns in the meantime, he should not hesitate to call.    Subjective:     Rigo Joyner is a 52 year old male who presents today for follow-up regarding chronic neck and back pain.  Patient is following up after bilateral L3-4 transforaminal epidural steroid injections February 2, 2023.  Patient reports the injections provided greater than 50% relief of his pain but only lasted 4 to 5 days.    Patient complains of bilateral low back pain.  He has pain that extends out toward the left flank where his incision is.  Pain radiates into the bilateral buttocks.  He has pain that radiates into the bilateral hips.  Pain radiates down the left medial thigh and extends down the posterior and medial calf into the left dorsal foot.  He has numbness and tingling in the same distribution as his pain.  He also has slight numbness and tingling on the right anterior thigh.    Patient complains of bilateral neck pain.   Pain radiates into the left greater than right upper extremity.    Overall, patient rates his pain today as a 4 out of 10.  At its worst it is an 8 of 10.  At its best it is a 2 out of 10.  Pain is aggravated with prolonged activity, sitting, bending, twisting.  Pain is alleviated with laying flat.  He denies any new symptoms since he was last seen.    Treatment to date:  - Current physical therapy  - Extensive injections, see above.  - Patient currently takes gabapentin 600 mg in the morning, 600 mg in the afternoon, and 900 mg at bedtime which is helpful.  - Patient takes Aleve/Tylenol combination which is helpful.    Review of Systems:  Positive for numbness/tingling, weakness, headache.  Negative for loss of bowel/bladder control, inability to urinate, pain much worse at night, trip/stumble/falls, difficulty swallowing, difficulty with hand skills, fevers, unintentional weight loss.     Objective:   CONSTITUTIONAL:  Vital signs as above.  No acute distress.  The patient is well nourished and well groomed.    PSYCHIATRIC:  The patient is awake, alert, oriented to person, place and time.  The patient is answering questions appropriately with clear speech.  Normal affect.  HEENT: Normocephalic, atraumatic.  Sclera clear.    LYMPH: No cervical lymphadenopathy  SKIN:  Skin over the face, neck bilateral upper extremities is clean, dry, intact without rashes.  MUSCULOSKELETAL: The patient has 5/5 strength for the bilateral shoulder abductors, elbow flexors/extensors, wrist extensors, finger flexors/abductors, hip flexors, knee flexors/extensors, ankle dorsi/plantar flexors.  Gait is nonantalgic.  NEUROLOGICAL: Negative Gill's bilaterally.  Sensation to light touch is intact bilateral upper and lower extremities throughout.    RESULTS:    I reviewed the MRI cervical spine from [facility] dated September 29, 2021.  At C6-7 there is a small left paracentral disc protrusion with mild overall spinal canal  stenosis and moderate to severe bilateral foraminal stenosis.  At C5-6 there is a disc osteophyte complex with mild to moderate spinal canal stenosis and severe bilateral foraminal stenosis.  At C4-5 there is a disc osteophyte complex with mild spinal canal stenosis, moderate to severe right, severe left foraminal stenosis.  Please see report for further details.     I reviewed the CT cervical spine from [facility] dated December 9, 2021.  This shows disc degeneration C4-5 through C6-7.  There is mild to moderate spinal canal stenosis C4-5 C5-6.  There is foraminal stenosis at multiple levels severe on the left C4-5, C5-6, C6-7.     I reviewed a CT lumbar spine from [facility] dated October 19, 2022.  This shows no instrumentation failure at L3-4.  There is no high-grade spinal canal or neuroforaminal stenosis.  There is spondylitic spondylolisthesis L3-4.    I reviewed the MRI lumbar spine from [facility] dated October 26, 2022.  This shows postoperative changes but 360 degree fusion L3-4.  There is adjacent level disease at L4-5 with a broad-based disc bulge and superimposed small central disc protrusion and annular fissure.  There is mild left foraminal stenosis at this level.  No high-grade spinal canal stenosis.    I reviewed the lumbar spine flexion-extension x-rays from [facility] dated January 3, 2023.  This shows normal alignment in neutral position in flexion and extension with no instability.        EMG left lower from [facility] neurology dated December 14, 2021 which was normal.           Again, thank you for allowing me to participate in the care of your patient.        Sincerely,        Dayanna Vogt PA-C

## 2023-02-21 NOTE — PATIENT INSTRUCTIONS
[facility] Scheduling    Please call [phone] to schedule your image(s) (select option#1). There are 2 different locations, see below. You can do walk-in visits for xray only images if you want.     [facility]  [address] [place] [id number]    [address] [address]

## 2023-02-21 NOTE — PROGRESS NOTES
Assessment:   Rigo Joyner is a 52 year old y.o. male with past medical history significant for BPH, diverticulitis, hyperlipidemia  who presents today for follow-up regarding chronic neck and back pain.  1.  Left neck pain with radiation into the left upper extremity with associated numbness and tingling.  My review of an MRI cervical spine from [facility] dated September 29, 2021 shows multilevel foraminal stenosis which is moderate to severe bilaterally C6-7, severe bilaterally C5-6, moderate to severe right and severe left C4-5. EMG left upper extremity showed mild left carpal tunnel syndrome and no radiculopathy.  Patient underwent left carpal tunnel release June 8, 2022 with no improvement in symptoms.  Most recently patient had a left C4-5 transforaminal epidural steroid injection January 3, 2023 with no improvement in pain.  2.  Chronic left low back pain with radiation into the left greater than right lower extremity with associated numbness and tingling patient has a history of a left L3-4 instrumented fusion September 2020 with Dr. Tobias.  He has ongoing neuropathic pain down the legs.    CT lumbar spine October 2022 shows well incorporated interbody graft with no hardware failure.  An MRI lumbar spine from October 2022 shows small disc bulges/protrusions at L4-5 and L5-S1.  At L4-5 there is mild left foraminal stenosis.  Patient is following up after bilateral L3-4 transforaminal epidural steroid injections February 2, 2023 which provided greater than 50% relief of pain but only lasted 4 to 5 days.  Patient has tried and failed extensive treatment for his ongoing low back and lower extremity pain.  He has failed back syndrome.         Plan:     A shared decision making plan was used.  The patient's values and choices were respected.  The following represents what was discussed and decided upon by the physician assistant and the patient.      1.  DIAGNOSTIC TESTS:    -I reviewed the CT  lumbar spine and MRI lumbar spine from October 2022.  - I reviewed the lumbar spine flexion-extension x-rays.  -I reviewed the MRI cervical spine from [facility] September 29, 2021.  -EMG left lower extremity from [facility] from December 14, 2021 which was normal.  - I ordered an MRI thoracic spine for evaluation in preparation for spinal cord stimulator trial.        2.  PHYSICAL THERAPY: Patient is currently in physical therapy.  He has had 3 sessions of far.  Encouraged him to continue with physical therapy and the home exercises.    3.  MEDICATIONS:    -Lyrica was not helpful.  -Patient is taking gabapentin 600 mg in the morning, 600 mg in the afternoon, and 900 mg at bedtime.   -Cymbalta was not helpful and he experienced dry mouth so he stopped taking it.  -Patient takes Aleve/Tylenol combination which is somewhat helpful.  -Patient previously indicated he would like to avoid opiates.    4.  INTERVENTIONS:   -The patient I discussed [employer] spinal cord stimulator trial for his failed back syndrome.  Patient is interested in pursuing this option.  Once he completes his behavioral health evaluation and MRI thoracic spine I will enter the order and request authorization for spinal cord stimulator trial.  -Patient is status post bilateral L3-4 transforaminal epidural steroid injections February 2, 2023 which provided greater than 50% relief of his pain but only lasted 4 to 5 days.  -Patient failed bilateral L3, L4, L5 medial branch blocks December 30, 2022.  -Patient had a left C4-5 transforaminal epidural steroid injection January 3, 2023 which was not helpful.  -Left C5-6 transforaminal epidural steroid injection August 9, 2022 with some improvement in pain.  -Patient had a C7-T1 interlaminar epidural steroid injection November 8, 2021 with minimal improvement.    - Patient then underwent a left C4-5 transforaminal epidural steroid injection April 15, 2022 which provided 30 to 40% relief of his  pain.  - Patient then had a left C6-7 transforaminal epidural steroid injection May 5, 2022 which provided 50% relief of his pain.   -Patient had bilateral L4-5 transforaminal epidural steroid injections March 16, 2022 at [facility] With no improvement.  -Patient had bilateral L5-S1 transforaminal epidural steroid injections March 31, 2022 at [facility] with no improvement.    5.  PATIENT EDUCATION: Patient is in agreement the above plan.  All questions were answered.    6.  FOLLOW-UP: I will send the patient a [employer] message with his MRI thoracic spine results.  My nurse will call the patient after he completes his behavioral health evaluation.  Then we will request authorization for [employer] spinal cord stimulator trial.  If he has questions or concerns in the meantime, he should not hesitate to call.    Subjective:     Rigo Joyner is a 52 year old male who presents today for follow-up regarding chronic neck and back pain.  Patient is following up after bilateral L3-4 transforaminal epidural steroid injections February 2, 2023.  Patient reports the injections provided greater than 50% relief of his pain but only lasted 4 to 5 days.    Patient complains of bilateral low back pain.  He has pain that extends out toward the left flank where his incision is.  Pain radiates into the bilateral buttocks.  He has pain that radiates into the bilateral hips.  Pain radiates down the left medial thigh and extends down the posterior and medial calf into the left dorsal foot.  He has numbness and tingling in the same distribution as his pain.  He also has slight numbness and tingling on the right anterior thigh.    Patient complains of bilateral neck pain.  Pain radiates into the left greater than right upper extremity.    Overall, patient rates his pain today as a 4 out of 10.  At its worst it is an 8 of 10.  At its best it is a 2 out of 10.  Pain is aggravated with prolonged activity, sitting, bending, twisting.  Pain is  alleviated with laying flat.  He denies any new symptoms since he was last seen.    Treatment to date:  - Current physical therapy  - Extensive injections, see above.  - Patient currently takes gabapentin 600 mg in the morning, 600 mg in the afternoon, and 900 mg at bedtime which is helpful.  - Patient takes Aleve/Tylenol combination which is helpful.    Review of Systems:  Positive for numbness/tingling, weakness, headache.  Negative for loss of bowel/bladder control, inability to urinate, pain much worse at night, trip/stumble/falls, difficulty swallowing, difficulty with hand skills, fevers, unintentional weight loss.     Objective:   CONSTITUTIONAL:  Vital signs as above.  No acute distress.  The patient is well nourished and well groomed.    PSYCHIATRIC:  The patient is awake, alert, oriented to person, place and time.  The patient is answering questions appropriately with clear speech.  Normal affect.  HEENT: Normocephalic, atraumatic.  Sclera clear.    LYMPH: No cervical lymphadenopathy  SKIN:  Skin over the face, neck bilateral upper extremities is clean, dry, intact without rashes.  MUSCULOSKELETAL: The patient has 5/5 strength for the bilateral shoulder abductors, elbow flexors/extensors, wrist extensors, finger flexors/abductors, hip flexors, knee flexors/extensors, ankle dorsi/plantar flexors.  Gait is nonantalgic.  NEUROLOGICAL: Negative Gill's bilaterally.  Sensation to light touch is intact bilateral upper and lower extremities throughout.    RESULTS:    I reviewed the MRI cervical spine from [facility] dated September 29, 2021.  At C6-7 there is a small left paracentral disc protrusion with mild overall spinal canal stenosis and moderate to severe bilateral foraminal stenosis.  At C5-6 there is a disc osteophyte complex with mild to moderate spinal canal stenosis and severe bilateral foraminal stenosis.  At C4-5 there is a disc osteophyte complex with mild spinal canal stenosis, moderate  to severe right, severe left foraminal stenosis.  Please see report for further details.     I reviewed the CT cervical spine from [facility] dated December 9, 2021.  This shows disc degeneration C4-5 through C6-7.  There is mild to moderate spinal canal stenosis C4-5 C5-6.  There is foraminal stenosis at multiple levels severe on the left C4-5, C5-6, C6-7.     I reviewed a CT lumbar spine from [facility] dated October 19, 2022.  This shows no instrumentation failure at L3-4.  There is no high-grade spinal canal or neuroforaminal stenosis.  There is spondylitic spondylolisthesis L3-4.    I reviewed the MRI lumbar spine from [facility] dated October 26, 2022.  This shows postoperative changes but 360 degree fusion L3-4.  There is adjacent level disease at L4-5 with a broad-based disc bulge and superimposed small central disc protrusion and annular fissure.  There is mild left foraminal stenosis at this level.  No high-grade spinal canal stenosis.    I reviewed the lumbar spine flexion-extension x-rays from [facility] dated January 3, 2023.  This shows normal alignment in neutral position in flexion and extension with no instability.        EMG left lower from [facility] neurology dated December 14, 2021 which was normal.

## 2023-02-27 DIAGNOSIS — M54.16 LUMBAR RADICULITIS: ICD-10-CM

## 2023-02-27 RX ORDER — GABAPENTIN 300 MG/1
CAPSULE ORAL
Qty: 210 CAPSULE | Refills: 1 | Status: ON HOLD | OUTPATIENT
Start: 2023-02-27 | End: 2023-11-10 | Stop reason: DRUGHIGH

## 2023-03-02 ENCOUNTER — HOSPITAL ENCOUNTER (OUTPATIENT)
Dept: MRI IMAGING | Facility: CLINIC | Age: 53
Discharge: HOME | End: 2023-03-02
Attending: PHYSICIAN ASSISTANT | Admitting: PHYSICIAN ASSISTANT
Payer: COMMERCIAL

## 2023-03-02 DIAGNOSIS — M96.1 FAILED BACK SYNDROME: ICD-10-CM

## 2023-03-02 PROCEDURE — 72146 MRI CHEST SPINE W/O DYE: CPT

## 2023-03-10 ASSESSMENT — ANXIETY QUESTIONNAIRES
GAD7 TOTAL SCORE: 1
6. BECOMING EASILY ANNOYED OR IRRITABLE: NOT AT ALL
GAD7 TOTAL SCORE: 1
GAD7 TOTAL SCORE: 1
3. WORRYING TOO MUCH ABOUT DIFFERENT THINGS: NOT AT ALL
5. BEING SO RESTLESS THAT IT IS HARD TO SIT STILL: NOT AT ALL
1. FEELING NERVOUS, ANXIOUS, OR ON EDGE: NOT AT ALL
IF YOU CHECKED OFF ANY PROBLEMS ON THIS QUESTIONNAIRE, HOW DIFFICULT HAVE THESE PROBLEMS MADE IT FOR YOU TO DO YOUR WORK, TAKE CARE OF THINGS AT HOME, OR GET ALONG WITH OTHER PEOPLE: NOT DIFFICULT AT ALL
7. FEELING AFRAID AS IF SOMETHING AWFUL MIGHT HAPPEN: NOT AT ALL
4. TROUBLE RELAXING: SEVERAL DAYS
8. IF YOU CHECKED OFF ANY PROBLEMS, HOW DIFFICULT HAVE THESE MADE IT FOR YOU TO DO YOUR WORK, TAKE CARE OF THINGS AT HOME, OR GET ALONG WITH OTHER PEOPLE?: NOT DIFFICULT AT ALL
7. FEELING AFRAID AS IF SOMETHING AWFUL MIGHT HAPPEN: NOT AT ALL
2. NOT BEING ABLE TO STOP OR CONTROL WORRYING: NOT AT ALL

## 2023-03-10 ASSESSMENT — COLUMBIA-SUICIDE SEVERITY RATING SCALE - C-SSRS
6. IN YOUR LIFETIME, HAVE YOU EVER DONE ANYTHING, STARTED TO DO ANYTHING, OR PREPARED TO DO ANYTHING TO END YOUR LIFE?: NO
2. HAVE YOU ACTUALLY HAD ANY THOUGHTS OF KILLING YOURSELF?: NO
1. IN THE PAST MONTH, HAVE YOU WISHED YOU WERE DEAD OR WISHED YOU COULD GO TO SLEEP AND NOT WAKE UP?: NO

## 2023-03-10 ASSESSMENT — PATIENT HEALTH QUESTIONNAIRE - PHQ9
10. IF YOU CHECKED OFF ANY PROBLEMS, HOW DIFFICULT HAVE THESE PROBLEMS MADE IT FOR YOU TO DO YOUR WORK, TAKE CARE OF THINGS AT HOME, OR GET ALONG WITH OTHER PEOPLE: NOT DIFFICULT AT ALL
SUM OF ALL RESPONSES TO PHQ QUESTIONS 1-9: 0
SUM OF ALL RESPONSES TO PHQ QUESTIONS 1-9: 0

## 2023-03-13 ENCOUNTER — OFFICE VISIT (OUTPATIENT)
Dept: PALLIATIVE MEDICINE | Facility: OTHER | Age: 53
End: 2023-03-13
Attending: PHYSICIAN ASSISTANT
Payer: COMMERCIAL

## 2023-03-13 DIAGNOSIS — G89.4 CHRONIC PAIN SYNDROME: Primary | ICD-10-CM

## 2023-03-13 PROCEDURE — 90791 PSYCH DIAGNOSTIC EVALUATION: CPT | Performed by: SOCIAL WORKER

## 2023-03-14 DIAGNOSIS — M96.1 FAILED BACK SYNDROME: Primary | ICD-10-CM

## 2023-03-14 DIAGNOSIS — M54.16 LUMBAR RADICULITIS: ICD-10-CM

## 2023-03-14 NOTE — PROGRESS NOTES
[facility]      PATIENT'S NAME: Rigo Joyner  PREFERRED NAME: Ari  PRONOUNS:       MRN: [id number]  : 1970  ADDRESS: [address]  [facility] [address]  ACCT. NUMBER:  [id number]  DATE OF SERVICE: 3/13/23  START TIME: 900  END TIME: 1000  PREFERRED PHONE: [phone]  May we leave a program related message: Yes  SERVICE MODALITY:  In-person    UNIVERSAL ADULT Mental Health DIAGNOSTIC ASSESSMENT    Identifying Information:  Patient is a 52 year old,    individual.  Patient was referred for an assessment by [facility] Providerreferring provider.  Patient attended the session alone.    Chief Complaint:   The reason for seeking services at this time is: seeking mental health diagnostic assessment as part of the Spinal Cord Stimulator approval process. Pt has been coping with chronic pain for several years.  He has tried multiple medical modalities to manage pain and is now looking to do the Spinal Cord Stimulator to improve pain management.  Problem began from work injury many years ago.       Social/Family History:  Patient reported they grew up in [facility]  .  They were raised by biological parents  .  Parents were always together.  Patient reported that their childhood was good.  Patient described their current relationships with family of origin as close.     The patient describes their cultural background as .  Cultural influences and impact on patient's life structure, values, norms, and healthcare: Urban environments  [place]ice.  Contextual influences on patient's health include: Contextual Factors: Individual Factors finding providers that he can trust.    These factors will be addressed in the Preliminary Treatment plan. Patient identified their preferred language to be English. Patient reported they does not need the assistance of an  or other support involved in therapy.     Patient reported had no significant delays in  developmental tasks.   Patient's highest education level was college graduate  .  Patient identified the following learning problems: none reported.  Modifications will not be used to assist communication in therapy.   Patient reports they are  able to understand written materials.    Patient's current relationship status is     Patient identified their sexual orientation as heterosexual.  Patient reported having   child(davonte). Patient identified partner; siblings; spouse as part of their support system.  Patient identified the quality of these relationships as stable and meaningful,  .      Patient's current living/housing situation involves staying in own home/apartment.  The immediate members of family and household include Nalini wright, 54,Spouse  and they report that housing is stable.    Patient is currently retired from police department.   background.  Patient reports their finances are obtained through [employer] benefits; other. Patient does not identify finances as a current stressor.      Patient reported that they have not been involved with the legal system.   Patient does not report being under probation/ parole/ jurisdiction. They are not under any current court jurisdiction. .    Patient's Strengths and Limitations:  Patient identified the following strengths or resources that will help them succeed in treatment: exercise routine, friends / good social support, family support, insight, intelligence, motivation, strong social skills and work ethic. Things that may interfere with the patient's success in treatment include: physical health concerns.     Assessments:  The following assessments were completed by patient for this visit:  PHQ9:   PHQ-9 SCORE 3/10/2023   PHQ-9 Total Score MyChart 0   PHQ-9 Total Score 0     GAD7:   ASHLYN-7 SCORE 3/10/2023   Total Score 1 (minimal anxiety)   Total Score 1     CAGE-AID:   CAGE-AID Total Score 3/10/2023   Total Score 0   Total Score MyChart 0 (A  total score of 2 or greater is considered clinically significant)     PROMIS 10-Global Health (only subscores and total score): No flowsheet data found.  [place] Suicide Severity Rating Scale (Lifetime/Recent)  [place] Suicide Severity Rating (Lifetime/Recent) 3/10/2023   1. Wish to be Dead (Past 1 Month) 0   2. Non-Specific Active Suicidal Thoughts (Past 1 Month) 0   Calculated C-SSRS Risk Score (Lifetime/Recent) No Risk Indicated       Personal and Family Medical History:  Patient does not report a family history of mental health concerns.  Patient reports family history includes Colon Cancer in his father; Coronary Artery Disease in his father and mother; Depression in his mother; Hypertension in his mother; Thyroid Disease in his father and mother..     Patient does not report Mental Health Diagnosis or Treatment.      Patient has had a physical exam to rule out medical causes for current symptoms.  Date of last physical exam was within the past year. Client was encouraged to follow up with PCP if symptoms were to develop. The patient has a [place] Primary Care Provider, who is named Semmler, Steven Duane..  Patient reports chronic pain. Has tried multiple modalities to manage pain including injections and fusion. Patient reports pain concerns including back, neck and nerve pain in legs.  Patient does want help addressing pain concerns..   There are not significant appetite / nutritional concerns / weight changes.   Patient does not report a history of head injury / trauma / cognitive impairment.       Current Outpatient Medications:      acetaminophen (TYLENOL) 500 MG tablet, Take 1,000 mg by mouth every 6 hours as needed, Disp: , Rfl:      aspirin 81 MG EC tablet, Take 1 tablet (81 mg) by mouth daily Resume 48 hours after surgery, Disp: , Rfl:      atorvastatin (LIPITOR) 10 MG tablet, Take 10 mg by mouth daily (with dinner), Disp: , Rfl:      fluticasone (FLONASE) 50 MCG/ACT nasal spray, Spray 1 spray in  nostril daily as needed, Disp: , Rfl:      gabapentin (NEURONTIN) 300 MG capsule, TAKE 2 CAPSULES BY MOUTH EVERY MORNING, 2 CAPSULES EVERY EVENING, AND 3 CAPSULES EVERY NIGHT AT BEDTIME, Disp: 210 capsule, Rfl: 1     lactobacillus rhamnosus (GG) (CULTURELL) capsule, Take 1 capsule by mouth daily (with dinner), Disp: , Rfl:      methocarbamol (ROBAXIN) 750 MG tablet, Take 750 mg by mouth (Patient not taking: Reported on 2/21/2023), Disp: , Rfl:       Medication Adherence:  Patient reports taking.  taking prescribed medications as prescribed.    Patient Allergies:    Allergies   Allergen Reactions     Bee Pollen Cough, Headache and Itching     Pollen Extract Cough, Headache and Itching     Seasonal Allergies Headache, Itching, Rash and Visual Disturbance       Medical History:    Past Medical History:   Diagnosis Date     Hyperlipemia      PONV (postoperative nausea and vomiting)          Current Mental Status Exam:   Appearance:  Appropriate    Eye Contact:  Good   Psychomotor:  Normal       Gait / station:  no problem  Attitude / Demeanor: Cooperative   Speech      Rate / Production: Normal/ Responsive      Volume:  Normal  volume      Language:  intact  Mood:   Normal  Affect:   Appropriate    Thought Content: Clear   Thought Process: Coherent  Logical       Associations: No loosening of associations  Insight:   Good   Judgment:  Intact   Orientation:  All  Attention/concentration: Good      Substance Use:  Patient did not report a family history of substance use concerns; see medical history section for details.  Patient has not received chemical dependency treatment in the past.  Patient has not ever been to detox.      Patient is not currently receiving any chemical dependency treatment.           Substance History of use Age of first use Date of last use     Pattern and duration of use (include amounts and frequency)   Alcohol Social use     occasional beer- social use. 1-2 at a time.     Cannabis   never used      REPORTS SUBSTANCE USE: N/A     Amphetamines   never used     REPORTS SUBSTANCE USE: N/A   Cocaine/crack    never used       REPORTS SUBSTANCE USE: N/A   Hallucinogens never used         REPORTS SUBSTANCE USE: N/A   Inhalants never used         REPORTS SUBSTANCE USE: N/A   Heroin never used         REPORTS SUBSTANCE USE: N/A   Other Opiates never used     REPORTS SUBSTANCE USE: N/A   Benzodiazepine   never used     REPORTS SUBSTANCE USE: N/A   Barbiturates never used     REPORTS SUBSTANCE USE: N/A   Over the counter meds never used     REPORTS SUBSTANCE USE: N/A   Caffeine currently use 17   REPORTS SUBSTANCE USE: N/A   Nicotine  never used     REPORTS SUBSTANCE USE: N/A   Other substances not listed above:  Identify:  never used     REPORTS SUBSTANCE USE: N/A     Patient reported the following problems as a result of their substance use: no problems, not applicable.    Substance Use: No symptoms    Based on the negative CAGE score and clinical interview there  are not indications of drug or alcohol abuse.      Significant Losses / Trauma / Abuse / Neglect Issues:   Patient did  serve in the , with combat experience  There are indications or report of significant loss, trauma, abuse or neglect issues related to: are indications of trauma or loss but client denies these concerns.  Trauma due to  experience and experience as a  for 25 years.   Concerns for possible neglect are not present.     Safety Assessment:   Patient denies current homicidal ideation and behaviors.  Patient denies current self-injurious ideation and behaviors.    Patient denied risk behaviors associated with substance use.  Patient denies any high risk behaviors associated with mental health symptoms.  Patient reports the following current concerns for their personal safety: None.  Patient reports there are firearms in the house.     yes, they are secured.  .    History of Safety Concerns:  Patient denied a history  of homicidal ideation.     Patient denied a history of personal safety concerns.    Patient denied a history of assaultive behaviors.    Patient denied a history of sexual assault behaviors.     Patient denied a history of risk behaviors associated with substance use.  Patient denies any history of high risk behaviors associated with mental health symptoms.  Patient reports the following protective factors: safe and stable environment; regular sleep; sense of belonging; purpose; secure attachment; daily obligations; effective problem solving skills; commitment to well being; sense of meaning; financial stability; strong sense of self worth or esteem; sense of personal control or determination; access to a variety of clinical interventions and pets    Risk Plan:  See Recommendations for Safety and Risk Management Plan    Review of Symptoms per patient report:   Depression: No symptoms  Brionna:  No Symptoms  Psychosis: No Symptoms  Anxiety: No Symptoms  Panic:  No symptoms  Post Traumatic Stress Disorder:  Experienced traumatic event  due to miltary and as a    Eating Disorder: No Symptoms  ADD / ADHD:  No symptoms  Conduct Disorder: No symptoms  Autism Spectrum Disorder: No symptoms  Obsessive Compulsive Disorder: No Symptoms    Patient reports the following compulsive behaviors and treatment history: none.      Diagnostic Criteria:   Pt does not meet criteria for a mental health diagnosis at this time.   Chronic Pain Syndrome due to Back Injury     Functional Status:  Patient reports the following functional impairments:  management of the household and or completion of tasks.     Nonprogrammatic care:  Patient is requesting basic services to address current mental health concerns.    Clinical Summary:  1. Reason for assessment: Pt completing diagnostic assessment as part of the approval process for the Spinal Cord Stimulator Trial.   .  2. Psychosocial, Cultural and Contextual Factors: Pt is a 52 year  old, ,  man. Retired , lives independently with wife. No legal issues.  background. College education. English primary language. No major financial concerns. .   3. Principal DSM5 Diagnoses  (Sustained by DSM5 Criteria Listed Above):   Pt does not meet criteria for MH diagnosis at this time.   4. Other Diagnoses that is relevant to services:  Chronic Pain Sydrome  5. Provisional Diagnosis:  NA  6. Prognosis: Unknown.  7. Likely consequences of symptoms if not treated: Ongoing chronic pain symptoms  8. Client strengths include:  educated, goal-focused, insightful, intelligent, motivated, open to learning, open to suggestions / feedback, support of family, friends and providers, wants to learn, willing to ask questions, willing to relate to others and work history .     Recommendations:     1. Plan for Safety and Risk Management:   Safety and Risk: Recommended that patient call 911 or go to the local ED should there be a change in any of these risk factors..          Report to child / adult protection services was NA.     2. Patient's identified mental health and physical health concerns with a cultural influence will be addressed by culturally appropriate information and resources.     3. Initial Treatment will focus on:    Chronic Pain- Spinal Cord Stimulator Trial.     4. Resources/Service Plan:    services are not indicated.   Modifications to assist communication are not indicated.   Additional disability accommodations are not indicated.      5. Collaboration:   Collaboration / coordination of treatment will be initiated with the following  support professionals: Spine center Provider/Referring Provider.      6.  Referrals:   The following referral(s) will be initiated: NA. Next Scheduled Appointment: Follow up with [facility] Provider      A Release of Information has been obtained for the following: NA .     Emergency Contact  was not obtained.      Clinical  Substantiation/medical necessity for the above recommendations:   Pt following up with [facility] Provider to move forward with the Spinal Cord Stimulator Process. He does not current meet criteria for mental health diagnosis.  He has been dealing with chronic pain for many years and it impacts many areas of his life.  Pt hopes that the Spinal Cord Stimulator (SCS) with allow him to be more active. He has a good understanding of the process.  He has reasonable expectations or the SCS results.  He has stable and supportive environment.  He has no hx of mental or chemical health concerns.  He has not legal issues.  He has financial stability.  He is able to make his own medical decisions.  He appears to attend and follow through with all medical recommendations.     7. ROSITA:    ROSITA:  Discussed the general effects of drugs and alcohol on health and well-being. Provider gave patient printed information about the  effects of chemical use on their health and well being. Recommendations:  Take medications as prescribed.     8. Records:   These were reviewed at time of assessment.   Information in this assessment was obtained from the medical record and  provided by patient who is a good historian.    Patient's access to their mental health medical record will be withheld due to the following reason(s): Mental health Document.    9.   Interactive Complexity: No      Provider Name/ Credentials:  TANISHA Stewart, [facility] March 13, 2023        Answers for HPI/ROS submitted by the patient on 3/10/2023  If you checked off any problems, how difficult have these problems made it for you to do your work, take care of things at home, or get along with other people?: Not difficult at all  PHQ9 TOTAL SCORE: 0  ASHLYN 7 TOTAL SCORE: 1

## 2023-03-23 ENCOUNTER — TELEPHONE (OUTPATIENT)
Dept: PHYSICAL MEDICINE AND REHAB | Facility: CLINIC | Age: 53
End: 2023-03-23
Payer: COMMERCIAL

## 2023-03-23 NOTE — TELEPHONE ENCOUNTER
Patient contacted to inform him that the prior authorization for his [employer] spinal cord stimulator trial had been received from his insurance carrier and he could move forward with scheduling the procedure with Dr. Brooks at the [facility].  The date of 4/18/23 was agreed upon and he was scheduled for his procedure.  He is aware that he will need to arrive at 930a that day for his procedure.    Procedure requirements were reviewed with the patient to his verbalized understanding including the valium prescription that will be sent to his pharmacy to take as directed prior to his procedure that day.  He will anticipate a call from Dr. Brooks to discuss the consent for the procedure ahead of time.    The patient was encouraged to contact the [facility] if he has any questions or concerns prior to his procedure.

## 2023-03-24 DIAGNOSIS — S32.009K PSEUDOARTHROSIS OF LUMBAR SPINE: Primary | ICD-10-CM

## 2023-03-30 ENCOUNTER — HOSPITAL ENCOUNTER (OUTPATIENT)
Dept: CT IMAGING | Facility: HOSPITAL | Age: 53
Discharge: HOME | End: 2023-03-30
Attending: SURGERY | Admitting: SURGERY
Payer: COMMERCIAL

## 2023-03-30 ENCOUNTER — OFFICE VISIT (OUTPATIENT)
Dept: NEUROSURGERY | Facility: CLINIC | Age: 53
End: 2023-03-30
Attending: SURGERY
Payer: COMMERCIAL

## 2023-03-30 VITALS — SYSTOLIC BLOOD PRESSURE: 125 MMHG | OXYGEN SATURATION: 97 % | DIASTOLIC BLOOD PRESSURE: 66 MMHG | HEART RATE: 80 BPM

## 2023-03-30 DIAGNOSIS — S32.009K PSEUDOARTHROSIS OF LUMBAR SPINE: ICD-10-CM

## 2023-03-30 DIAGNOSIS — M54.12 CERVICAL RADICULOPATHY: Primary | ICD-10-CM

## 2023-03-30 PROCEDURE — 72131 CT LUMBAR SPINE W/O DYE: CPT

## 2023-03-30 PROCEDURE — 99214 OFFICE O/P EST MOD 30 MIN: CPT | Performed by: NURSE PRACTITIONER

## 2023-03-30 ASSESSMENT — PAIN SCALES - GENERAL: PAINLEVEL: SEVERE PAIN (6)

## 2023-03-30 NOTE — LETTER
3/30/2023         RE: Rigo Joyner  [address] [facility] [address]        Dear Colleague,    Thank you for referring your patient, Rigo Joyner, to the [facility]. Please see a copy of my visit note below.    Neurosurgery clinic note:      A/P:  Rigo 53yo patient who is s/p left CTR by Dr Purdy and an L3-4 fusion by an outside provider that had concerns for pseudoarthrosis. He has now used a bone stimulator for a year and is back for a new lumbar CT review. He c/o ongoing low back with burning pain into both legs in the anterior thigh on right, L5 distribution but also the calf on left.     He has had multiple lumbar injections without benefit. His left foot sometimes goes numb and tingly. He is now scheduled for a spinal cord stimulator trial. He would be interested in lumbar revision fusion surgery if an option. He has also had neck and left shoulder pain, sometimes into the left arm which continues to flare up with activity. He has had many cervical injections without relief. He is still taking gabapentin.    We reviewed his new lumbar CT. Hardware appears stable without complication. There is no significant neural foraminal narrowing identified. There are no fractures. Discussed with Dr Purdy. He has maximized conservative care of his neck and back. Recommend trial SCS. If no benefit, follow up with Dr Purdy to discuss fusion revision. Recommend updated cervical MRI with telephone visit with LYNDSAY on [place] clinic day to review results.      Plan:  1. Follow up with Dr Purdy to discuss revision lumbar fusion if no relief with scs  2. New cspine mri with telephone visit with LYNDSAY on Purdy day to review results     Exam:    General: seated in NAD, appears comfortable    Strength:  Deltoids: 5/5 right, 5/5 left  Triceps: 5/5 right, 5/5 left  Biceps: 5/5 right, 5/5 left  Handgrips: 5/5 right, 5/5 left  Intrinsics: 5/5 right, 5/5  left  Extensors: 5/5 right, 5/5 left  Hip flexors: 5/5 right, 5/5 left  Quads: 5/5 right, 5/5 left  Hamstrings: 5/5 right, 5/5 left  Dorsiflexion: 5/5 right, 5/5 left  Plantarflexion 5/5 right, 5/5 left  EHL: 5/5 right, 5/5 left    Sensation is intact to light touch throughout upper and lower extremities bilaterally    Reflexes:  1+ bilaterally throughout the upper and lower extremities    No clonus, babinski, or martinez    Gait is smooth and coordinated.        Imaging:  Personally reviewed the following imaging studies today. Imaging also shown to patient at time of appt.    EXAM: CT LUMBAR SPINE W/O CONTRAST  LOCATION: [facility]  DATE/TIME: 3/30/2023 6:51 AM     INDICATION: Low back pain; r o Spondylolysis; None of the following: Low back pain with standing walking extension, lower extremity weakness, or lumbar x ray with negative result  COMPARISON: None.  TECHNIQUE: Routine CT Lumbar Spine without IV contrast. Multiplanar reformats. Dose reduction techniques were used.      FINDINGS:  No evidence of acute fracture or subluxation of the lumbar spine by CT imaging. Postsurgical changes posterior fusion and interbody graft at L3/L4. No hardware complication. Anterolisthesis of L3 on L4 measuring 2 mm. The vertebral bodies of the lumbar   spine otherwise have normal stature and alignment. The disc spaces are well-maintained. No significant degenerative changes. The partially imaged intra-abdominal contents are unremarkable.     T12/L1:  No posterior disc bulge or spinal canal narrowing. No neural foraminal narrowing.     L1/L2:  Symmetric disc bulge and mild facet arthropathy without significant spinal canal narrowing. No neural foraminal narrowing.     L2/L3:  No posterior disc bulge or spinal canal narrowing. No neural foraminal narrowing.     L3/L4:  Spondylotic ridging without spinal canal narrowing. No neural foraminal narrowing..       L4/L5:  Symmetric disc bulge and moderate facet  arthropathy with mild spinal canal narrowing. Mild bilateral neural foraminal narrowing.      L5/S1: Symmetric disc bulge and mild facet arthropathy without significant spinal canal narrowing. Mild bilateral neural foraminal narrowing.                                                                       IMPRESSION:  1.  No evidence of acute fracture or subluxation of the lumbar spine by CT imaging.  2.  Postsurgical changes posterior fusion and interbody graft at L3/L4. No hardware complication.  3.  Degenerative lumbar spondylosis with level by level analysis as described above.        Sofia Prasad FNP-C  [facility]  O. [phone]          Again, thank you for allowing me to participate in the care of your patient.        Sincerely,        ARINA Joseph CNP

## 2023-03-30 NOTE — NURSING NOTE
"Rigo Joyner is a 52 year old male who presents for:  Chief Complaint   Patient presents with     Follow Up     CT follow up        Initial Vitals:  /66   Pulse 80   SpO2 97%  Estimated body mass index is 33.08 kg/m  as calculated from the following:    Height as of 2/21/23: 5' 9\" (1.753 m).    Weight as of 2/21/23: 224 lb (101.6 kg).. There is no height or weight on file to calculate BSA. BP completed using cuff size: regular  Severe Pain (6)      Marcos Gastelum    "

## 2023-03-30 NOTE — PROGRESS NOTES
Neurosurgery clinic note:      A/P:  Rigo 51yo patient who is s/p left CTR by Dr Purdy and an L3-4 fusion by an outside provider that had concerns for pseudoarthrosis. He has now used a bone stimulator for a year and is back for a new lumbar CT review. He c/o ongoing low back with burning pain into both legs in the anterior thigh on right, L5 distribution but also the calf on left.     He has had multiple lumbar injections without benefit. His left foot sometimes goes numb and tingly. He is now scheduled for a spinal cord stimulator trial. He would be interested in lumbar revision fusion surgery if an option. He has also had neck and left shoulder pain, sometimes into the left arm which continues to flare up with activity. He has had many cervical injections without relief. He is still taking gabapentin.    We reviewed his new lumbar CT. Hardware appears stable without complication. There is no significant neural foraminal narrowing identified. There are no fractures. Discussed with Dr Purdy. He has maximized conservative care of his neck and back. Recommend trial SCS. If no benefit, follow up with Dr Purdy to discuss fusion revision. Recommend updated cervical MRI with telephone visit with LYNDSAY on [place] clinic day to review results.      Plan:  1. Follow up with Dr Purdy to discuss revision lumbar fusion if no relief with scs  2. New cspine mri with telephone visit with LYNDSAY on [place] day to review results     Exam:    General: seated in NAD, appears comfortable    Strength:  Deltoids: 5/5 right, 5/5 left  Triceps: 5/5 right, 5/5 left  Biceps: 5/5 right, 5/5 left  Handgrips: 5/5 right, 5/5 left  Intrinsics: 5/5 right, 5/5 left  Extensors: 5/5 right, 5/5 left  Hip flexors: 5/5 right, 5/5 left  Quads: 5/5 right, 5/5 left  Hamstrings: 5/5 right, 5/5 left  Dorsiflexion: 5/5 right, 5/5 left  Plantarflexion 5/5 right, 5/5 left  EHL: 5/5 right, 5/5 left    Sensation is intact to light touch throughout upper  and lower extremities bilaterally    Reflexes:  1+ bilaterally throughout the upper and lower extremities    No clonus, babinski, or martinez    Gait is smooth and coordinated.        Imaging:  Personally reviewed the following imaging studies today. Imaging also shown to patient at time of appt.    EXAM: CT LUMBAR SPINE W/O CONTRAST  LOCATION: [facility]  DATE/TIME: 3/30/2023 6:51 AM     INDICATION: Low back pain; r o Spondylolysis; None of the following: Low back pain with standing walking extension, lower extremity weakness, or lumbar x ray with negative result  COMPARISON: None.  TECHNIQUE: Routine CT Lumbar Spine without IV contrast. Multiplanar reformats. Dose reduction techniques were used.      FINDINGS:  No evidence of acute fracture or subluxation of the lumbar spine by CT imaging. Postsurgical changes posterior fusion and interbody graft at L3/L4. No hardware complication. Anterolisthesis of L3 on L4 measuring 2 mm. The vertebral bodies of the lumbar   spine otherwise have normal stature and alignment. The disc spaces are well-maintained. No significant degenerative changes. The partially imaged intra-abdominal contents are unremarkable.     T12/L1:  No posterior disc bulge or spinal canal narrowing. No neural foraminal narrowing.     L1/L2:  Symmetric disc bulge and mild facet arthropathy without significant spinal canal narrowing. No neural foraminal narrowing.     L2/L3:  No posterior disc bulge or spinal canal narrowing. No neural foraminal narrowing.     L3/L4:  Spondylotic ridging without spinal canal narrowing. No neural foraminal narrowing..       L4/L5:  Symmetric disc bulge and moderate facet arthropathy with mild spinal canal narrowing. Mild bilateral neural foraminal narrowing.      L5/S1: Symmetric disc bulge and mild facet arthropathy without significant spinal canal narrowing. Mild bilateral neural foraminal narrowing.                                                                        IMPRESSION:  1.  No evidence of acute fracture or subluxation of the lumbar spine by CT imaging.  2.  Postsurgical changes posterior fusion and interbody graft at L3/L4. No hardware complication.  3.  Degenerative lumbar spondylosis with level by level analysis as described above.        Sofia BOWLINGP-C  [facility]  O. [phone]

## 2023-04-01 ENCOUNTER — HOSPITAL ENCOUNTER (OUTPATIENT)
Dept: MRI IMAGING | Facility: CLINIC | Age: 53
Discharge: HOME | End: 2023-04-01
Attending: NURSE PRACTITIONER | Admitting: NURSE PRACTITIONER
Payer: COMMERCIAL

## 2023-04-01 DIAGNOSIS — M54.12 CERVICAL RADICULOPATHY: ICD-10-CM

## 2023-04-01 PROCEDURE — 72141 MRI NECK SPINE W/O DYE: CPT

## 2023-04-04 ENCOUNTER — MYC MEDICAL ADVICE (OUTPATIENT)
Dept: PHYSICAL MEDICINE AND REHAB | Facility: CLINIC | Age: 53
End: 2023-04-04
Payer: COMMERCIAL

## 2023-04-04 DIAGNOSIS — M96.1 FAILED BACK SYNDROME: Primary | ICD-10-CM

## 2023-04-04 RX ORDER — DIAZEPAM 5 MG
TABLET ORAL
Qty: 2 TABLET | Refills: 0 | Status: SHIPPED | OUTPATIENT
Start: 2023-04-04 | End: 2023-04-18

## 2023-04-17 ENCOUNTER — TELEPHONE (OUTPATIENT)
Dept: PHYSICAL MEDICINE AND REHAB | Facility: CLINIC | Age: 53
End: 2023-04-17
Payer: COMMERCIAL

## 2023-04-17 NOTE — TELEPHONE ENCOUNTER
The patient had an oral benzodiazepine medication prescribed by the surgeon who ordered his injection, necessitating a phone consent prior tothe day of his injection.  The patient was contacted on 4/17/2023 at 1322 PM.  The patient has had other conservative treatment but wishes to pursue spine injections.   The procedure of a  spinal cord stimulator trial was discussed in detail.  The patient had the opportunity to directly ask the physician questions regarding the procedure and the questions were answered prior to the consent form being presented.  The risks of the procedure,including but not limited to:  Increased soreness, infection, bleeding, damage to surrounding structures, permanent weakness, paralysis,  allergic reaction,  worsening of increased pain or no change in pain. The patient elected toproceed and gave verbal informed consent.       The patient denies any symptoms of an active infection and denies taking antibiotics. The patient denies taking any prescription blood thinning medications. Thepatient denies any allergies to iodine or iodine contrast.

## 2023-04-18 ENCOUNTER — RADIOLOGY INJECTION OFFICE VISIT (OUTPATIENT)
Dept: PHYSICAL MEDICINE AND REHAB | Facility: CLINIC | Age: 53
End: 2023-04-18
Attending: PAIN MEDICINE
Payer: COMMERCIAL

## 2023-04-18 VITALS
TEMPERATURE: 98.2 F | DIASTOLIC BLOOD PRESSURE: 66 MMHG | OXYGEN SATURATION: 98 % | HEART RATE: 65 BPM | RESPIRATION RATE: 16 BRPM | SYSTOLIC BLOOD PRESSURE: 112 MMHG

## 2023-04-18 DIAGNOSIS — M54.16 LUMBAR RADICULITIS: ICD-10-CM

## 2023-04-18 DIAGNOSIS — M96.1 FAILED BACK SYNDROME: ICD-10-CM

## 2023-04-18 PROCEDURE — 63650 IMPLANT NEUROELECTRODES: CPT | Performed by: PAIN MEDICINE

## 2023-04-18 RX ORDER — SODIUM CHLORIDE 9 MG/ML
INJECTION, SOLUTION INTRAVENOUS CONTINUOUS PRN
Status: COMPLETED | OUTPATIENT
Start: 2023-04-18 | End: 2023-04-18

## 2023-04-18 RX ORDER — CEFAZOLIN SODIUM 1 G/3ML
INJECTION, POWDER, FOR SOLUTION INTRAMUSCULAR; INTRAVENOUS CONTINUOUS PRN
Status: COMPLETED | OUTPATIENT
Start: 2023-04-18 | End: 2023-04-18

## 2023-04-18 RX ORDER — LIDOCAINE HYDROCHLORIDE 10 MG/ML
INJECTION, SOLUTION EPIDURAL; INFILTRATION; INTRACAUDAL; PERINEURAL
Status: COMPLETED | OUTPATIENT
Start: 2023-04-18 | End: 2023-04-18

## 2023-04-18 RX ORDER — BUPIVACAINE HYDROCHLORIDE 2.5 MG/ML
INJECTION, SOLUTION EPIDURAL; INFILTRATION; INTRACAUDAL
Status: COMPLETED | OUTPATIENT
Start: 2023-04-18 | End: 2023-04-18

## 2023-04-18 RX ADMIN — BUPIVACAINE HYDROCHLORIDE 5 ML: 2.5 INJECTION, SOLUTION EPIDURAL; INFILTRATION; INTRACAUDAL at 10:54

## 2023-04-18 RX ADMIN — LIDOCAINE HYDROCHLORIDE 5 ML: 10 INJECTION, SOLUTION EPIDURAL; INFILTRATION; INTRACAUDAL; PERINEURAL at 10:54

## 2023-04-18 ASSESSMENT — PAIN SCALES - GENERAL
PAINLEVEL: MILD PAIN (3)
PAINLEVEL: MILD PAIN (3)

## 2023-04-24 NOTE — PROGRESS NOTES
Rigo Joyner is a 52 year old male  with past medical history significant for  BPH, diverticulitis, hyperlipidemia  who presents today after [employer] spinal cord stimulator trial April 18, 2023.  Unfortunately only 1-lead was able to be placed.     Patient has a history of Chronic left low back pain with radiation into the left greater than right lower extremity with associated numbness and tingling patient has a history of a left L3-4 instrumented fusion September 2020 with Dr. Tobias.    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     Patient received  70-80% relief of his low back pain with a spinal cord stimulator trial but no improvement in his left leg pain.  Patient reported to the [employer] representative that he remodeled his sister's bathroom during the spinal cord stimulator trial.  He reported that he did not have as much coverage of his left leg pain as he had initially.  He only had coverage in the left groin, but his pain involved the lateral thighs wrapping around into the anterior knees. The [employer] representative suspected that the lead may have been pulled out of position.  An x-ray was obtained in the office today (c-arm) and they did find that the lead only extended up to T9.  Dr. Brooks offered to extend the trial and the [employer] representative discussed changing the programming of the stimulator.  The patient elected to have the lead removed.  The lead was removed today by Dr. Brooks.  He is not sure if he wants to move forward with implant.  He is going to see how he feels over the next several days and will call with an update.  Due to the difficulty with trial lead insertion, if he does wish to move forward with the implant he would need to have this done with Dr. Hardy at [facility] neurosurgery for laminectomy with paddle lead placement.

## 2023-04-25 ENCOUNTER — OFFICE VISIT (OUTPATIENT)
Dept: PHYSICAL MEDICINE AND REHAB | Facility: CLINIC | Age: 53
End: 2023-04-25
Payer: COMMERCIAL

## 2023-04-25 VITALS — SYSTOLIC BLOOD PRESSURE: 127 MMHG | HEART RATE: 77 BPM | DIASTOLIC BLOOD PRESSURE: 74 MMHG

## 2023-04-25 DIAGNOSIS — M96.1 FAILED BACK SYNDROME: Primary | ICD-10-CM

## 2023-04-25 PROCEDURE — 99024 POSTOP FOLLOW-UP VISIT: CPT | Performed by: PHYSICIAN ASSISTANT

## 2023-04-25 ASSESSMENT — PAIN SCALES - GENERAL: PAINLEVEL: MILD PAIN (3)

## 2023-04-25 NOTE — LETTER
4/25/2023         RE: Rigo Joyner  [id number] [facility] [address] [address]        Dear Colleague,    Thank you for referring your patient, Rigo Joyner, to the [facility] SPINE [facility]UROSURGERY. Please see a copy of my visit note below.      Rigo Joyner is a 52 year old male  with past medical history significant for  BPH, diverticulitis, hyperlipidemia  who presents today after [employer] spinal cord stimulator trial April 18, 2023.  Unfortunately only 1-lead was able to be placed.     Patient has a history of Chronic left low back pain with radiation into the left greater than right lower extremity with associated numbness and tingling patient has a history of a left L3-4 instrumented fusion September 2020 with Dr. Tobias.    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     Patient received  70-80% relief of his low back pain with a spinal cord stimulator trial but no improvement in his left leg pain.  Patient reported to the [employer] representative that he remodeled his sister's bathroom during the spinal cord stimulator trial.  He reported that he did not have as much coverage of his left leg pain as he had initially.  He only had coverage in the left groin, but his pain involved the lateral thighs wrapping around into the anterior knees. The [employer] representative suspected that the lead may have been pulled out of position.  An x-ray was obtained in the office today (c-arm) and they did find that the lead only extended up to T9.  Dr. Brooks offered to extend the trial and the [employer] representative discussed changing the programming of the stimulator.  The patient elected to have the lead removed.  The lead was removed today by Dr. Brooks.  He is not sure if he wants to move forward with implant.  He is going to see how he feels over the next several days and will call with an update.             Again, thank you  for allowing me to participate in the care of your patient.        Sincerely,        Dayanna Vogt PA-C

## 2023-04-26 ENCOUNTER — TELEPHONE (OUTPATIENT)
Dept: PHYSICAL MEDICINE AND REHAB | Facility: CLINIC | Age: 53
End: 2023-04-26
Payer: COMMERCIAL

## 2023-04-26 NOTE — TELEPHONE ENCOUNTER
Patient contacted the [facility] to inform Dr. Brooks that he would like to move forward with the [employer] spinal cord stimulator implant surgery.  Now that that spinal cord stimulator trial lead has been removed, he feels as if he had 70-80% relief of the pain in his low back and bilateral hips during the trial.    The patient was informed that Dr. Brooks would be updated to determine how best to move forward.      He verbalized understanding and was encouraged to contact the Spine Center if he had any questions or concerns in the meantime.

## 2023-04-27 ENCOUNTER — VIRTUAL VISIT (OUTPATIENT)
Dept: NEUROSURGERY | Facility: CLINIC | Age: 53
End: 2023-04-27
Payer: COMMERCIAL

## 2023-04-27 DIAGNOSIS — Z98.1 HISTORY OF LUMBAR FUSION: ICD-10-CM

## 2023-04-27 DIAGNOSIS — M96.1 FAILED BACK SYNDROME: Primary | ICD-10-CM

## 2023-04-27 DIAGNOSIS — M54.12 CERVICAL RADICULOPATHY: Primary | ICD-10-CM

## 2023-04-27 DIAGNOSIS — M54.16 LUMBAR RADICULITIS: ICD-10-CM

## 2023-04-27 PROCEDURE — 99207 PR NO CHARGE LOS: CPT | Mod: 93 | Performed by: PHYSICIAN ASSISTANT

## 2023-04-27 NOTE — LETTER
4/27/2023         RE: Rigo Joyner  [id number]  [place] [facility] [address]        Dear Colleague,    Thank you for referring your patient, Rigo Joyner, to the [facility] SPINE AND NEUROSURGERY. Please see a copy of my visit note below.    Ari is a 52 year old who is being evaluated via a billable telephone visit.      What phone number would you like to be contacted at? [phone]  How would you like to obtain your AVS? MyChart    Distant Location (provider location):  On-site   Distant Location (patient location): driving in [facility]   Ari is a 52 year old, presents via telephone appointment after recent cervical MRI for further evaluation of his ongoing neck pain and left upper extremity radicular pain. He notes continued pain that radiates into his shoulder on the left and down to the pinky side of his hand and accompanied with numbness and tingling. H he is status post left CTR by Dr. Purdy and a L3-4 fusion by outside provider that had concerns for pseudoarthrosis. He just completed SCS trail of which he was only able to have one lead placed and felt like he was too active during the trail but feels that he had greater than 50% relief with the trial and will most likely proceed with permanent placement.         Review of Systems   Constitutional, HEENT, cardiovascular, pulmonary, gi and gu systems are negative, except as otherwise noted.     Objective           Vitals:  No vitals were obtained today due to virtual visit.    Physical Exam   healthy, alert and no distress  PSYCH: Alert and oriented times 3; coherent speech, normal   rate and volume, able to articulate logical thoughts, able   to abstract reason, no tangential thoughts, no hallucinations   or delusions  His affect is normal  RESP: No cough, no audible wheezing, able to talk in full sentences  Remainder of exam unable to be completed due to telephone visits    Cervical MRI    FINDINGS:     Spine straightening may be related to positioning and/or muscle spasm. Vertebral body heights are maintained. Marrow signal is within normal limits. Normal cord signal. No extraspinal abnormality. Multilevel disc degeneration. Moderate disc height loss   C5-C6 and C6-C7 as well as mild C3-C4 and C4-C5.  Craniovertebral junction and C1-C2: No stenosis.  C2-C3: Unchanged without disc bulge or protrusion. Mild facet arthropathy. Mild bilateral foraminal narrowing. No spinal canal stenosis.  C3-C4: Increased with disc osteophyte complex. Mild left asymmetric facet arthropathy. Severe left and mild-moderate right foraminal stenosis. Mild spinal canal stenosis.  C4-C5: Slightly increased with disc osteophyte complex. Mild-moderate facet arthropathy. Mild spinal canal stenosis. Severe left and moderate right foraminal stenosis.  C5-C6: Slightly increased with disc osteophyte complex. Mild left asymmetric facet arthropathy. Mild-moderate spinal canal stenosis. Severe bilateral foraminal stenosis.  C6-C7: Slightly increased with disc osteophyte complex. Mild facet arthropathy. Mild spinal canal stenosis. Severe bilateral foraminal stenosis.  C7-T1: Unchanged without disc bulge or protrusion. Moderate facet arthropathy. No spinal canal or foraminal stenosis.                                                                    IMPRESSION:  1.  Multilevel degenerative disc disease and facet osteoarthritis has slightly progressed since comparison cervical spine MRI 09/29/2021, as outlined in the segmental analysis.  2.  Notably, mild-moderate spinal canal stenosis at C5-C6 and severe foraminal stenosis on the left C3-C4, left C4-C5, and bilaterally C5-C6 and C6-C7.       Plan:   Patient believes that he will likely proceed with SCS placement as he feels that he had good relief with the trial. In regards to his cervical spine would recommend upper extremity EMG for further evaluation of his radicular pain and could  attempt further injections through the spine clinic. He feels that he had more benefit from C5-C6 and C6-C7 injections than the C4-C5 injection. Will plan to follow up later this year to further discuss surgical options with Dr. Purdy per Dr. Purdy.     Phone call duration: 15 minutes        Again, thank you for allowing me to participate in the care of your patient.        Sincerely,        Elizabeth Alonso PA-C

## 2023-04-27 NOTE — PATIENT INSTRUCTIONS
Patient believes that he will likely proceed with SCS placement as he feels that he had good relief with the trial. In regards to his cervical spine would recommend upper extremity EMG for further evaluation of his radicular pain and could attempt further injections through the spine clinic. He feels that he had more benefit from C5-C6 and C6-C7 injections than the C4-C5 injection. Will plan to follow up later this year to further discuss surgical options with Dr. Purdy.

## 2023-04-27 NOTE — PROGRESS NOTES
Ari is a 52 year old who is being evaluated via a billable telephone visit.      What phone number would you like to be contacted at? [phone]  How would you like to obtain your AVS? [employer]    Distant Location (provider location):  On-site   Distant Location (patient location): driving in [place]         Subjective   Ari is a 52 year old, presents via telephone appointment after recent cervical MRI for further evaluation of his ongoing neck pain and left upper extremity radicular pain. He notes continued pain that radiates into his shoulder on the left and down to the pinky side of his hand and accompanied with numbness and tingling. H he is status post left CTR by Dr. Purdy and a L3-4 fusion by outside provider that had concerns for pseudoarthrosis. He just completed SCS trail of which he was only able to have one lead placed and felt like he was too active during the trail but feels that he had greater than 50% relief with the trial and will most likely proceed with permanent placement.         Review of Systems   Constitutional, HEENT, cardiovascular, pulmonary, gi and gu systems are negative, except as otherwise noted.      Objective           Vitals:  No vitals were obtained today due to virtual visit.    Physical Exam   healthy, alert and no distress  PSYCH: Alert and oriented times 3; coherent speech, normal   rate and volume, able to articulate logical thoughts, able   to abstract reason, no tangential thoughts, no hallucinations   or delusions  His affect is normal  RESP: No cough, no audible wheezing, able to talk in full sentences  Remainder of exam unable to be completed due to telephone visits    Cervical MRI   FINDINGS:     Spine straightening may be related to positioning and/or muscle spasm. Vertebral body heights are maintained. Marrow signal is within normal limits. Normal cord signal. No extraspinal abnormality. Multilevel disc degeneration. Moderate disc height loss   C5-C6 and C6-C7  as well as mild C3-C4 and C4-C5.  Craniovertebral junction and C1-C2: No stenosis.  C2-C3: Unchanged without disc bulge or protrusion. Mild facet arthropathy. Mild bilateral foraminal narrowing. No spinal canal stenosis.  C3-C4: Increased with disc osteophyte complex. Mild left asymmetric facet arthropathy. Severe left and mild-moderate right foraminal stenosis. Mild spinal canal stenosis.  C4-C5: Slightly increased with disc osteophyte complex. Mild-moderate facet arthropathy. Mild spinal canal stenosis. Severe left and moderate right foraminal stenosis.  C5-C6: Slightly increased with disc osteophyte complex. Mild left asymmetric facet arthropathy. Mild-moderate spinal canal stenosis. Severe bilateral foraminal stenosis.  C6-C7: Slightly increased with disc osteophyte complex. Mild facet arthropathy. Mild spinal canal stenosis. Severe bilateral foraminal stenosis.  C7-T1: Unchanged without disc bulge or protrusion. Moderate facet arthropathy. No spinal canal or foraminal stenosis.                                                                    IMPRESSION:  1.  Multilevel degenerative disc disease and facet osteoarthritis has slightly progressed since comparison cervical spine MRI 09/29/2021, as outlined in the segmental analysis.  2.  Notably, mild-moderate spinal canal stenosis at C5-C6 and severe foraminal stenosis on the left C3-C4, left C4-C5, and bilaterally C5-C6 and C6-C7.        Plan:   Patient believes that he will likely proceed with SCS placement as he feels that he had good relief with the trial. In regards to his cervical spine would recommend upper extremity EMG for further evaluation of his radicular pain and could attempt further injections through the spine clinic. He feels that he had more benefit from C5-C6 and C6-C7 injections than the C4-C5 injection. Will plan to follow up later this year to further discuss surgical options with Dr. Purdy per Dr. Purdy.     Phone call duration: 15  minutes

## 2023-05-02 ENCOUNTER — TELEPHONE (OUTPATIENT)
Dept: PHYSICAL MEDICINE AND REHAB | Facility: CLINIC | Age: 53
End: 2023-05-02
Payer: COMMERCIAL

## 2023-05-02 NOTE — TELEPHONE ENCOUNTER
Patient contacted to inform him that Dr. Brooks had been in communication with Dr. Hardy at [facility] about placement of the spinal cord stimulator implant surgery.  He was made aware that a member of the scheduling team would be contacting him to schedule an appointment to discuss the procedure.     He was encouraged to contact the Spine Center if he had not been contacted to do so by the end of the week.    The patient verbalized understanding of the plan.

## 2023-05-09 ENCOUNTER — OFFICE VISIT (OUTPATIENT)
Dept: PHYSICAL MEDICINE AND REHAB | Facility: CLINIC | Age: 53
End: 2023-05-09
Attending: PHYSICIAN ASSISTANT
Payer: COMMERCIAL

## 2023-05-09 DIAGNOSIS — M54.12 CERVICAL RADICULOPATHY: ICD-10-CM

## 2023-05-09 PROCEDURE — 95910 NRV CNDJ TEST 7-8 STUDIES: CPT | Performed by: PHYSICAL MEDICINE & REHABILITATION

## 2023-05-09 PROCEDURE — 95886 MUSC TEST DONE W/N TEST COMP: CPT | Mod: LT | Performed by: PHYSICAL MEDICINE & REHABILITATION

## 2023-05-09 NOTE — LETTER
5/9/2023         RE: Rigo Joyner  [address] [address] [facility] [address]        Dear Colleague,    Thank you for referring your patient, Rigo Joyner, to the [facility] SPINE AND NEUROSURGERY. Please see a copy of my visit note below.    [facility]  [address] [place] [address] [address]  [place] [address]  Office: [phone] Fax: [phone]    Electromyography and Nerve Conduction Study Report        Indication: Patient presents at the request of Elizabeth Alonso PA-C for left upper extremity EMG.  He has cervical spine left upper extremity pain to the wrist.  History of carpal tunnel release on the left.  On exam he has normal sensation to light touch of the upper extremities, symmetric upper extremity reflexes with negative Cami's, normal muscle strength throughout the major muscle groups of the bilateral upper extremities.        Pt Exam Discussion (Communication Barriers):  Electromyography and nerve conduction testing, including associated discomfort, risks, benefits, and alternatives was discussed with the patient prior to the procedure.  No learning/ communication barriers; patient verbalized understanding of procedure.  Informed consent was obtained.           Pt Assessment:  Testing was successfully completed; patient tolerated testing well.       Blood Thinners: None Skin Temperature: Warmed 31.5                     EMG/NCS  results:     Nerve Conduction Studies  Motor Sites      Amplitude Segment CV Distal Latency F-Latency F-Estimate Temp Comment   Site (mV)  (m/s) (ms) ms ms  C    Left Median (APB) Motor   Wrist 9.5 Wrist-APB  3.1   23.3    Elbow 8.5 Elbow-Wrist 57 7.3   23.3    Left Ulnar (ADM) Motor   Wrist 9.6   2.3   23.4    Bel Elbow 9.3 Bel Elbow-Wrist 64 5.9   23.4    Ab Elbow 9.2 Ab Elbow-Wrist 67 7.7   23.4      Sensory Sites      Amplitude CV Onset Lat Peak Lat Temp Comment   Site ( V) (m/s) (ms) (ms)  C    Left Median Anti  Sensory   Wrist-Dig II 30 54 2.4 2.9 24.6    Left Median-Ulnar Palmar Sensory        Median   Palm-Wrist 60 55 1.45 1.85 23.3         Ulnar   Palm-Wrist 22 62 1.30 1.65 23.3    Left Radial Sensory   Forearm-Wrist 23 70 1.43 2.1 23.4    Left Ulnar Anti Sensory   Wrist-Dig V 21 59 1.85 2.3 23.7        NCS Waveforms:    Motor         Sensory                  Electromyography     Side Muscle Nerve Root Ins Act Fibs Psw Amp Dur Poly Recrt Int Pat Comment   Left Deltoid Axillary C5-6 Nml Nml Nml Nml Nml 0 Nml Nml    Left Triceps Radial C6-7-8 Nml Nml Nml Nml Nml 0 Nml Nml    Left PronatorTeres Median C6-7 Nml Nml Nml Nml +/- 0 Nml Nml    Left 1stDorInt Ulnar C8-T1 Nml Nml Nml Nml Nml 0 Nml Nml    Left Abd Poll Brev Median C8-T1 Nml Nml Nml Nml Nml 0 Nml Nml    Left Biceps2 Musculocut C5-6 Nml Nml Nml Nml Nml 0 Nml Nml    Left BrachioRad2 Radial C5-6 Nml Nml Nml Nml Nml 0 Nml Nml          Comment NCS: Normal study  1.  Normal nerve conduction studies left upper extremity.  This includes normal left median, ulnar, and radial SNAPs, median and ulnar transcarpal studies, and median and ulnar CMAP's.    Comment EMG: Borderline study  1.  Equivocal prolonged motor unit potential duration left pronator teres.  Remainder left upper extremity needle EMG normal.    Interpretation: Essentially normal study: There is electrodiagnostic evidence of:    1.  Equivocally prolonged motor unit potential duration of the left pronator teres.  This can be normal for the patient or, under the correct clinical condition, this can be observed in a chronic and inactive left C6 and/or C7 radiculopathy.     2.  There is no electrodiagnostic evidence of active cervical radiculopathy, brachial plexopathy, or focal neuropathy in the left upper extremity.    The testing was completed in its entirety by the physician.       It was our pleasure caring for your patient today, if there any questions or concerns please do not hesitate to contact  us.        Again, thank you for allowing me to participate in the care of your patient.        Sincerely,        Rick Byrne, DO

## 2023-05-09 NOTE — PATIENT INSTRUCTIONS
Thank you for choosing the [facility] for your EMG testing.     The ordering provider will receive your final EMG results within the next few days.  Please follow up with your provider for the results and further treatment recommendations.  Please allow one week for results to be completed. You may contact Neurosurgery via [employer] if you wish to get results as well.

## 2023-05-09 NOTE — PROGRESS NOTES
[facility]  [address] [place] [address] [address]  [place] [address]  Office: [phone] Fax: [phone]    Electromyography and Nerve Conduction Study Report        Indication: Patient presents at the request of Elizabeth Alonso PA-C for left upper extremity EMG.  He has cervical spine left upper extremity pain to the wrist.  History of carpal tunnel release on the left.  On exam he has normal sensation to light touch of the upper extremities, symmetric upper extremity reflexes with negative Cami's, normal muscle strength throughout the major muscle groups of the bilateral upper extremities.        Pt Exam Discussion (Communication Barriers):  Electromyography and nerve conduction testing, including associated discomfort, risks, benefits, and alternatives was discussed with the patient prior to the procedure.  No learning/ communication barriers; patient verbalized understanding of procedure.  Informed consent was obtained.           Pt Assessment:  Testing was successfully completed; patient tolerated testing well.       Blood Thinners: None Skin Temperature: Warmed 31.5                     EMG/NCS  results:     Nerve Conduction Studies  Motor Sites      Amplitude Segment CV Distal Latency F-Latency F-Estimate Temp Comment   Site (mV)  (m/s) (ms) ms ms  C    Left Median (APB) Motor   Wrist 9.5 Wrist-APB  3.1   23.3    Elbow 8.5 Elbow-Wrist 57 7.3   23.3    Left Ulnar (ADM) Motor   Wrist 9.6   2.3   23.4    Bel Elbow 9.3 Bel Elbow-Wrist 64 5.9   23.4    Ab Elbow 9.2 Ab Elbow-Wrist 67 7.7   23.4      Sensory Sites      Amplitude CV Onset Lat Peak Lat Temp Comment   Site ( V) (m/s) (ms) (ms)  C    Left Median Anti Sensory   Wrist-Dig II 30 54 2.4 2.9 24.6    Left Median-Ulnar Palmar Sensory        Median   Palm-Wrist 60 55 1.45 1.85 23.3         Ulnar   Palm-Wrist 22 62 1.30 1.65 23.3    Left Radial Sensory   Forearm-Wrist 23 70 1.43 2.1 23.4    Left Ulnar Anti Sensory   Wrist-Dig V 21  59 1.85 2.3 23.7        NCS Waveforms:    Motor         Sensory                  Electromyography     Side Muscle Nerve Root Ins Act Fibs Psw Amp Dur Poly Recrt Int Pat Comment   Left Deltoid Axillary C5-6 Nml Nml Nml Nml Nml 0 Nml Nml    Left Triceps Radial C6-7-8 Nml Nml Nml Nml Nml 0 Nml Nml    Left PronatorTeres Median C6-7 Nml Nml Nml Nml +/- 0 Nml Nml    Left 1stDorInt Ulnar C8-T1 Nml Nml Nml Nml Nml 0 Nml Nml    Left Abd Poll Brev Median C8-T1 Nml Nml Nml Nml Nml 0 Nml Nml    Left Biceps2 Musculocut C5-6 Nml Nml Nml Nml Nml 0 Nml Nml    Left BrachioRad2 Radial C5-6 Nml Nml Nml Nml Nml 0 Nml Nml          Comment NCS: Normal study  1.  Normal nerve conduction studies left upper extremity.  This includes normal left median, ulnar, and radial SNAPs, median and ulnar transcarpal studies, and median and ulnar CMAP's.    Comment EMG: Borderline study  1.  Equivocal prolonged motor unit potential duration left pronator teres.  Remainder left upper extremity needle EMG normal.    Interpretation: Essentially normal study: There is electrodiagnostic evidence of:    1.  Equivocally prolonged motor unit potential duration of the left pronator teres.  This can be normal for the patient or, under the correct clinical condition, this can be observed in a chronic and inactive left C6 and/or C7 radiculopathy.     2.  There is no electrodiagnostic evidence of active cervical radiculopathy, brachial plexopathy, or focal neuropathy in the left upper extremity.    The testing was completed in its entirety by the physician.       It was our pleasure caring for your patient today, if there any questions or concerns please do not hesitate to contact us.

## 2023-05-12 ENCOUNTER — RADIOLOGY INJECTION OFFICE VISIT (OUTPATIENT)
Dept: PHYSICAL MEDICINE AND REHAB | Facility: CLINIC | Age: 53
End: 2023-05-12
Attending: PHYSICIAN ASSISTANT
Payer: COMMERCIAL

## 2023-05-12 VITALS
HEART RATE: 75 BPM | TEMPERATURE: 98.1 F | DIASTOLIC BLOOD PRESSURE: 78 MMHG | OXYGEN SATURATION: 96 % | SYSTOLIC BLOOD PRESSURE: 112 MMHG | RESPIRATION RATE: 16 BRPM

## 2023-05-12 DIAGNOSIS — M54.12 CERVICAL RADICULOPATHY: ICD-10-CM

## 2023-05-12 PROCEDURE — 64479 NJX AA&/STRD TFRM EPI C/T 1: CPT | Mod: LT | Performed by: PAIN MEDICINE

## 2023-05-12 RX ORDER — DEXAMETHASONE SODIUM PHOSPHATE 10 MG/ML
INJECTION, SOLUTION INTRAMUSCULAR; INTRAVENOUS
Status: COMPLETED | OUTPATIENT
Start: 2023-05-12 | End: 2023-05-12

## 2023-05-12 RX ORDER — LIDOCAINE HYDROCHLORIDE 10 MG/ML
INJECTION, SOLUTION EPIDURAL; INFILTRATION; INTRACAUDAL; PERINEURAL
Status: COMPLETED | OUTPATIENT
Start: 2023-05-12 | End: 2023-05-12

## 2023-05-12 RX ADMIN — DEXAMETHASONE SODIUM PHOSPHATE 10 MG: 10 INJECTION, SOLUTION INTRAMUSCULAR; INTRAVENOUS at 15:32

## 2023-05-12 RX ADMIN — LIDOCAINE HYDROCHLORIDE 2 ML: 10 INJECTION, SOLUTION EPIDURAL; INFILTRATION; INTRACAUDAL; PERINEURAL at 15:32

## 2023-05-12 ASSESSMENT — PAIN SCALES - GENERAL
PAINLEVEL: MILD PAIN (2)
PAINLEVEL: MODERATE PAIN (4)

## 2023-05-12 NOTE — PATIENT INSTRUCTIONS
DISCHARGE INSTRUCTIONS    During office hours (8:00 a.m.- 4:00 p.m.) questions or concerns may be answered  by calling Spine Center Navigation Nurses at  [phone].  Messages received after hours will be returned the following business day.      In the case of an emergency, please dial 911 or seek assistance at the nearest Emergency Room/Urgent Care facility.     All Patients:    You may experience an increase in your symptoms for the first 2 days (It may take anywhere between 2 days- 2 weeks for the steroid to have maximum effect).    You may use ice on the injection site, as frequently as 20 minutes each hour if needed.    You may take your pain medicine.    You may continue taking your regular medication after your injection. If you have had a Medial Branch Block you may resume pain medication once your pain diary is completed.    You may shower. No swimming, tub bath or hot tub for 48 hours.  You may remove your bandaid/bandage as soon as you are home.    You may resume light activities, as tolerated.    Resume your usual diet as tolerated.    It is strongly advised that you do not drive for 1-3 hours post injection.    If you have had oral sedation:  Do not drive for 8 hours post injection.      If you have had IV sedation:  Do not drive for 24 hours post injection.  Do not operate hazardous machinery or make important personal/business decisions for 24 hours.      POSSIBLE STEROID SIDE EFFECTS (If steroid/cortisone was used for your procedure)    -If you experience these symptoms, it should only last for a short period    Swelling of the legs              Skin redness (flushing)     Mouth (oral) irritation   Blood sugar (glucose) levels            Sweats                    Mood changes  Headache  Sleeplessness  Weakened immune system for up to 14 days, which could increase the risk of rashid the COVID-19 virus and/or experiencing more severe symptoms of the disease, if exposed.  Decreased  effectiveness of the flu vaccine if given within 2 weeks of the steroid.         POSSIBLE PROCEDURE SIDE EFFECTS  -Call the Spine Center if you are concerned  Increased Pain           Increased numbness/tingling      Nausea/Vomiting          Bruising/bleeding at site      Redness or swelling                                              Difficulty walking      Weakness           Fever greater than 100.5    *In the event of a severe headache after an epidural steroid injection that is relieved by lying down, please call the [facility] to speak with a clinical staff member*

## 2023-05-15 ENCOUNTER — TELEPHONE (OUTPATIENT)
Dept: NEUROSURGERY | Facility: CLINIC | Age: 53
End: 2023-05-15
Payer: COMMERCIAL

## 2023-05-15 NOTE — TELEPHONE ENCOUNTER
LMTC_ patient requested an appointment with Elizabeth Gaffney . Patient was instructed to call the clinic directly for scheduling

## 2023-06-01 ENCOUNTER — OFFICE VISIT (OUTPATIENT)
Dept: NEUROSURGERY | Facility: CLINIC | Age: 53
End: 2023-06-01
Payer: COMMERCIAL

## 2023-06-01 VITALS — DIASTOLIC BLOOD PRESSURE: 83 MMHG | SYSTOLIC BLOOD PRESSURE: 114 MMHG | OXYGEN SATURATION: 96 % | HEART RATE: 97 BPM

## 2023-06-01 DIAGNOSIS — M54.12 CERVICAL RADICULOPATHY: Primary | ICD-10-CM

## 2023-06-01 PROCEDURE — 99213 OFFICE O/P EST LOW 20 MIN: CPT | Performed by: PHYSICIAN ASSISTANT

## 2023-06-01 ASSESSMENT — PAIN SCALES - GENERAL: PAINLEVEL: MODERATE PAIN (4)

## 2023-06-01 NOTE — LETTER
6/1/2023         RE: Rigo Joyner  [address] [address]        Dear Colleague,    Thank you for referring your patient, Rigo Joyner, to the [facility]. Please see a copy of my visit note below.    Neurosurgery Progress Note: 6/1/2023     A/P: cervical radiculopathy    Plan: Patient has been advised to complete left C6-C7 TF UBALDO and will plan to follow up with Dr. Purdy at the end of summer for further discussion of surgery. Will also plan to complete cervical flexion extension xray just prior to her appointment.     Mr. Joyner is pleasant 52 year old male who presents for further evaluation of his severe neck pain and bilateral upper extremity pain that radiates into his 4th and 5th digit bilaterally. He had a left C5-C6 TF UBALDO on 5/12/2023 and notes that it has provided slight relief of his severe bilateral shoulder and upper arm pain but notes continued sharp stabbing pain that radiates down his arms to his hands. He feels that his activities are severely limited secondary to his pain. He also has chronic low back pain and bilateral leg pain that radiates lateral aspect of his legs to his feet. He has history of lumbar fusion with pseudoarthrosis. He attempted SCS trial but notes only on lead was able to placed and felt the trial covered his medial thighs but not the lateral aspect of his legs. He is unsure he wants to proceed with surgeon placement of stimulator as it would require thoracic laminectomy and being unsure if it would really provide relief plans to hold for now. He has had discussed with Dr. Purdy in the past for a two level ACDF but wants to continue with conservative treatment for now. He denies any changes in bowel or bladder habits.       Exam:  /83   Pulse 97   SpO2 96%     General: alert and oriented x3     Strength is 5/5 throughout both upper extremities throughout.    Sensation is intact throughout  both upper and lower extremities    Gait is smooth and coordinated      Imaging:  Mri reviewed personally   shows multilevel foraminal stenosis which is moderate to severe bilaterally C6-7, severe bilaterally C5-6, moderate to severe right and severe left C4-5.    Elizabeth Alonso PA-C  [facility]  O: [phone]        Again, thank you for allowing me to participate in the care of your patient.        Sincerely,        Elizabeth Alonso PA-C

## 2023-06-01 NOTE — NURSING NOTE
"Rigo Joyner is a 52 year old male who presents for:  Chief Complaint   Patient presents with     RECHECK        Initial Vitals:  /83   Pulse 97   SpO2 96%  Estimated body mass index is 33.08 kg/m  as calculated from the following:    Height as of 2/21/23: 5' 9\" (1.753 m).    Weight as of 2/21/23: 224 lb (101.6 kg).. There is no height or weight on file to calculate BSA. BP completed using cuff size: regular  Moderate Pain (4)    Venancio Cobb    "

## 2023-06-01 NOTE — PATIENT INSTRUCTIONS
Patient has been advised to complete left C6-C7 TF UBALDO and will plan to follow up with Dr. Purdy at the end of summer for further discussion of surgery. Will also plan to complete cervical flexion extension xray just prior to her appointment.

## 2023-06-01 NOTE — PROGRESS NOTES
Neurosurgery Progress Note: 6/1/2023     A/P: cervical radiculopathy    Plan: Patient has been advised to complete left C6-C7 TF UBALDO and will plan to follow up with Dr. Purdy at the end of summer for further discussion of surgery. Will also plan to complete cervical flexion extension xray just prior to her appointment.     Mr. Joyner is pleasant 52 year old male who presents for further evaluation of his severe neck pain and bilateral upper extremity pain that radiates into his 4th and 5th digit bilaterally. He had a left C5-C6 TF UBALDO on 5/12/2023 and notes that it has provided slight relief of his severe bilateral shoulder and upper arm pain but notes continued sharp stabbing pain that radiates down his arms to his hands. He feels that his activities are severely limited secondary to his pain. He also has chronic low back pain and bilateral leg pain that radiates lateral aspect of his legs to his feet. He has history of lumbar fusion with pseudoarthrosis. He attempted SCS trial but notes only on lead was able to placed and felt the trial covered his medial thighs but not the lateral aspect of his legs. He is unsure he wants to proceed with surgeon placement of stimulator as it would require thoracic laminectomy and being unsure if it would really provide relief plans to hold for now. He has had discussed with Dr. Purdy in the past for a two level ACDF but wants to continue with conservative treatment for now. He denies any changes in bowel or bladder habits.       Exam:  /83   Pulse 97   SpO2 96%     General: alert and oriented x3     Strength is 5/5 throughout both upper extremities throughout.    Sensation is intact throughout both upper and lower extremities    Gait is smooth and coordinated      Imaging:  Mri reviewed personally   shows multilevel foraminal stenosis which is moderate to severe bilaterally C6-7, severe bilaterally C5-6, moderate to severe right and severe left C4-5.    Elizabeth  CRISS Alonso  [facility]  O: [phone]

## 2023-06-20 NOTE — TELEPHONE ENCOUNTER
RECORDS RECEIVED FROM: Care Everywhere   REASON FOR VISIT: Cervical radiculopathy   Date of Appt: 8/14/23 9:15am    NOTES (FOR ALL VISITS) STATUS DETAILS   OFFICE NOTE from referring provider Internal 6/1/23, 4/27/23 Elizabeth Alonso PA-C @ [facility]     OFFICE NOTE from other specialist Care Everywhere 5/9/23, 11/9/22 Rick Byrne DO @[facility]    4/25/23, 2/21/23, 9/1/22, 7/26/22, 4/14/22 Dayanna Vogt PA-C @[facility]    3/3/23 Sofia Prasad APRN CNP @ [facility]    2/27/23 Semmler, Steven Duane, MD  @[facility]    12/13/22 Ernesto Brooks DO @ [facility]    11/10/22 Sandrita Purdy MD @[facility]    10/25/22 Kelly Wagner NP @ [facility]     DISCHARGE SUMMARY from hospital Care Everywhere 9/29/20-10/1/20 Bahman Tobias MD @ [facility]     OPERATIVE REPORT Care Everywhere 9/29/20 Bahman Tobias MD @ [facility] L3-L4 LATERAL INTERBODY FUSION, L3-L4 POSTERIOR INSTRUMENTATION   EMG Care Everywhere [facility]  12/14/21 EMG    [facility]  9/13/21 EMG   MEDICATION LIST Internal    IMAGING  (FOR ALL VISITS)     X-RAY Internal  [facility]  1/10/22  XR Cervical Spine     MRI (HEAD, NECK, SPINE) Internal [facility]  4/1/23  MR Cervical Spine  3/2/23  MR Thoracic Spine  10/26/22  MR Lumbar Spine     CT (HEAD, NECK, SPINE) Internal [facility]  3/30/23  CT Lumbar Spine  10/19/22  CT Lumbar Spine    [facility]  12/9/21  CT Lumbar Spine  12/9/21  CT Cervical Spine

## 2023-06-28 PROBLEM — K57.92 DIVERTICULITIS: Status: ACTIVE | Noted: 2023-06-28

## 2023-06-28 PROBLEM — K58.9 IRRITABLE BOWEL SYNDROME: Status: ACTIVE | Noted: 2020-09-30

## 2023-06-28 PROBLEM — K57.32 DIVERTICULITIS OF LARGE INTESTINE WITHOUT PERFORATION OR ABSCESS WITHOUT BLEEDING: Status: ACTIVE | Noted: 2018-07-27

## 2023-06-28 PROBLEM — K64.9 HEMORRHOIDS: Status: ACTIVE | Noted: 2021-05-10

## 2023-06-28 PROBLEM — G89.18 ACUTE POSTOPERATIVE PAIN: Status: ACTIVE | Noted: 2023-06-28

## 2023-06-28 PROBLEM — K63.5 POLYP OF COLON: Status: ACTIVE | Noted: 2021-05-10

## 2023-06-28 PROBLEM — M47.22 OSTEOARTHRITIS OF SPINE WITH RADICULOPATHY, CERVICAL REGION: Status: ACTIVE | Noted: 2022-12-13

## 2023-06-28 PROBLEM — K82.8 OTHER SPECIFIED DISEASES OF GALLBLADDER: Status: ACTIVE | Noted: 2018-10-29

## 2023-06-28 PROBLEM — H53.8 OTHER VISUAL DISTURBANCES: Status: ACTIVE | Noted: 2021-05-10

## 2023-06-28 PROBLEM — Z87.19 HISTORY OF DIVERTICULITIS: Status: ACTIVE | Noted: 2020-09-30

## 2023-06-28 PROBLEM — G89.29 OTHER CHRONIC PAIN: Status: ACTIVE | Noted: 2023-06-28

## 2023-06-29 ENCOUNTER — OFFICE VISIT (OUTPATIENT)
Dept: DERMATOLOGY | Facility: CLINIC | Age: 53
End: 2023-06-29
Payer: COMMERCIAL

## 2023-06-29 DIAGNOSIS — D48.9 NEOPLASM OF UNCERTAIN BEHAVIOR: ICD-10-CM

## 2023-06-29 DIAGNOSIS — Z12.83 ENCOUNTER FOR SCREENING FOR MALIGNANT NEOPLASM OF SKIN: ICD-10-CM

## 2023-06-29 DIAGNOSIS — L82.1 SEBORRHEIC KERATOSES: ICD-10-CM

## 2023-06-29 DIAGNOSIS — L81.4 LENTIGINES: ICD-10-CM

## 2023-06-29 DIAGNOSIS — D22.9 MULTIPLE BENIGN NEVI: Primary | ICD-10-CM

## 2023-06-29 DIAGNOSIS — L91.8 INFLAMED SKIN TAG: ICD-10-CM

## 2023-06-29 DIAGNOSIS — D18.01 CHERRY ANGIOMA: ICD-10-CM

## 2023-06-29 DIAGNOSIS — L21.9 DERMATITIS, SEBORRHEIC: ICD-10-CM

## 2023-06-29 PROCEDURE — 11102 TANGNTL BX SKIN SINGLE LES: CPT | Performed by: NURSE PRACTITIONER

## 2023-06-29 PROCEDURE — 11103 TANGNTL BX SKIN EA SEP/ADDL: CPT | Performed by: NURSE PRACTITIONER

## 2023-06-29 PROCEDURE — 99214 OFFICE O/P EST MOD 30 MIN: CPT | Mod: 25 | Performed by: NURSE PRACTITIONER

## 2023-06-29 PROCEDURE — 11200 RMVL SKIN TAGS UP TO&INC 15: CPT | Mod: XS | Performed by: NURSE PRACTITIONER

## 2023-06-29 PROCEDURE — 88305 TISSUE EXAM BY PATHOLOGIST: CPT | Performed by: DERMATOLOGY

## 2023-06-29 RX ORDER — KETOCONAZOLE 20 MG/ML
SHAMPOO TOPICAL
Qty: 120 ML | Refills: 11 | Status: SHIPPED | OUTPATIENT
Start: 2023-06-29

## 2023-06-29 NOTE — PROGRESS NOTES
[facility] Dermatology Note  Encounter Date: Jun 29, 2023  Office Visit     Reviewed patients past medical history and pertinent chart review prior to patients visit today.     Dermatology Problem List:  Seb derm, ketoconazole shampoo  NUB left and right vertex scalp, shave biopsy 06/29/23 .     ____________________________________________    Assessment & Plan:     # Neoplasm of uncertain behavior:  Left and right vertex scalp  DDx includes prurigo nodule vs NMSC. Shave biopsy today.    Procedure Note: Biopsy by shave technique  The risks and benefits of the procedure were described to the patient. These include but are not limited to bleeding, infection, scar, incomplete removal, and non-diagnostic biopsy. Verbal informed consent was obtained. The above site(s) was cleansed with an alcohol pad and injected with 1% lidocaine with epinephrine. Once anesthesia was obtained, a biopsy(ies) was performed with Gilette blade. The tissue(s) was placed in a labeled container(s) with formalin and sent to pathology. Hemostasis was achieved with aluminum chloride. Vaseline and a bandage were applied to the wound(s). The patient tolerated the procedure well and was given post biopsy care instructions.     # Irritated skin tags. Discussed treatment options with patient including no treatment, cryotherapy, and shave removal. Patient prefers cryotherapy today due to irritation. After verbal consent and discussion of risks and benefits including but no limited to dyspigmentation/scar, blister, and pain, 4 was(were) treated with  2 cycles with liquid nitrogen. Post cryotherapy instructions were provided.      # Seborrheic dermatitis, scalp. Discussed that this is a recurring rash for most people and will need some maintenance even after rash has resolved. Given prescription for ketoconazole 2% shampoo to use every 1-2 days while rash is present, and ongoing 1-3 times weekly when rash is well controlled. Advised to  leave on for 2-5 minutes before rinsing.    # Benign skin findings including: seborrheic keratoses, cherry angioma, lentigines and benign nevi.   - No further intervention required. Patient to report changes.   - Patient reassured of the benign nature of these lesions.    #Signs and Symptoms of non-melanoma skin cancer and ABCDEs of melanoma reviewed with patient. Patient encouraged to perform monthly self skin exams and educated on how to perform them. UV precautions reviewed with patient. Patient was asked about new or changing moles/lesions on body.     #Reviewed Sunscreen: Apply 20 minutes prior to going outdoors and reapply every two hours, when wet or sweating. We recommend using an SPF 30 or higher, and to use one that is water resistant.       Follow-up:  1-2 years for follow up full body skin exam, prn for new or changing lesions or new concerns    Zoraida Cifuentes CNP  Dermatology     ____________________________________________    CC: Skin Check (Full body: concern about nonhealing scabs on scalp . )    HPI:  Mr. Rigo Joyner is a(n) 52 year old male who presents today as a new patient for a full body skin cancer screening. Patient has concerns today about two scabs on his scalp that never go away. He doesn't think they are itchy but they easily break open and scab. .     Patient is otherwise feeling well, without additional skin concerns.     Physical Exam:  Vitals: There were no vitals taken for this visit.  SKIN: Total skin excluding the genitalia areas was performed. The exam included the head/face, neck, both arms, chest, back, abdomen, both legs, digits, mons pubis, buttock and nails.   -axillas, 4 skin colored pedunculated soft papules  -left and right vertex scalp, two excoriated papules  -patches of thickened yellowish skin and some areas of flaking on the scalp  -several 1-2mm red dome shaped symmetric papules scattered on the trunk  -multiple tan/brown flat round macules and raised papules  scattered throughout trunk, extremities and head. No worrisome features for malignancy noted on examination.  -scattered tan, homogenous macules scattered on sun exposed areas of trunk, extremities and face.   -scattered waxy, stuck on tan/brown papules and patches on the trunk     - No other lesions of concern on areas examined.     Medications:  Current Outpatient Medications   Medication     acetaminophen (TYLENOL) 500 MG tablet     aspirin 81 MG EC tablet     atorvastatin (LIPITOR) 10 MG tablet     fluticasone (FLONASE) 50 MCG/ACT nasal spray     gabapentin (NEURONTIN) 300 MG capsule     lactobacillus rhamnosus (GG) (CULTURELL) capsule     UNABLE TO FIND     No current facility-administered medications for this visit.      Past Medical History:   Patient Active Problem List   Diagnosis     Sigmoid diverticulitis     Acute postoperative pain     Allergic rhinitis due to pollen     Benign prostatic hyperplasia     Other specified diseases of gallbladder     Diverticulitis     Diverticulitis of large intestine without perforation or abscess without bleeding     Dry eyes, bilateral     Family history of diabetes mellitus     Hemorrhoids     History of diverticulitis     Irritable bowel syndrome     Mixed hyperlipidemia     Osteoarthritis of spine with radiculopathy, cervical region     Other visual disturbances     Other chronic pain     Polyp of colon     Prediabetes     Pseudoarthrosis of spine     Right hip pain     Past Medical History:   Diagnosis Date     Hyperlipemia      PONV (postoperative nausea and vomiting)        CC Referred Self, MD  No address on file on close of this encounter.

## 2023-06-29 NOTE — LETTER
6/29/2023         RE: Rigo Joyner  [address] [facility] [address]        Dear Colleague,    Thank you for referring your patient, Rigo Joyner, to the [facility]. Please see a copy of my visit note below.    [facility] Dermatology Note  Encounter Date: Jun 29, 2023  Office Visit     Reviewed patients past medical history and pertinent chart review prior to patients visit today.     Dermatology Problem List:  Seb derm, ketoconazole shampoo  NUB left and right vertex scalp, shave biopsy 06/29/23 .     ____________________________________________    Assessment & Plan:     # Neoplasm of uncertain behavior:  Left and right vertex scalp  DDx includes prurigo nodule vs NMSC. Shave biopsy today.    Procedure Note: Biopsy by shave technique  The risks and benefits of the procedure were described to the patient. These include but are not limited to bleeding, infection, scar, incomplete removal, and non-diagnostic biopsy. Verbal informed consent was obtained. The above site(s) was cleansed with an alcohol pad and injected with 1% lidocaine with epinephrine. Once anesthesia was obtained, a biopsy(ies) was performed with Gilette blade. The tissue(s) was placed in a labeled container(s) with formalin and sent to pathology. Hemostasis was achieved with aluminum chloride. Vaseline and a bandage were applied to the wound(s). The patient tolerated the procedure well and was given post biopsy care instructions.     # Irritated skin tags. Discussed treatment options with patient including no treatment, cryotherapy, and shave removal. Patient prefers cryotherapy today due to irritation. After verbal consent and discussion of risks and benefits including but no limited to dyspigmentation/scar, blister, and pain, 4 was(were) treated with  2 cycles with liquid nitrogen. Post cryotherapy instructions were provided.      # Seborrheic dermatitis, scalp. Discussed  that this is a recurring rash for most people and will need some maintenance even after rash has resolved. Given prescription for ketoconazole 2% shampoo to use every 1-2 days while rash is present, and ongoing 1-3 times weekly when rash is well controlled. Advised to leave on for 2-5 minutes before rinsing.    # Benign skin findings including: seborrheic keratoses, cherry angioma, lentigines and benign nevi.   - No further intervention required. Patient to report changes.   - Patient reassured of the benign nature of these lesions.    #Signs and Symptoms of non-melanoma skin cancer and ABCDEs of melanoma reviewed with patient. Patient encouraged to perform monthly self skin exams and educated on how to perform them. UV precautions reviewed with patient. Patient was asked about new or changing moles/lesions on body.     #Reviewed Sunscreen: Apply 20 minutes prior to going outdoors and reapply every two hours, when wet or sweating. We recommend using an SPF 30 or higher, and to use one that is water resistant.       Follow-up:  1-2 years for follow up full body skin exam, prn for new or changing lesions or new concerns    Zoraida Cifuentes CNP  Dermatology     ____________________________________________    CC: Skin Check (Full body: concern about nonhealing scabs on scalp . )    HPI:  Mr. Rigo Joyner is a(n) 52 year old male who presents today as a new patient for a full body skin cancer screening. Patient has concerns today about two scabs on his scalp that never go away. He doesn't think they are itchy but they easily break open and scab. .     Patient is otherwise feeling well, without additional skin concerns.     Physical Exam:  Vitals: There were no vitals taken for this visit.  SKIN: Total skin excluding the genitalia areas was performed. The exam included the head/face, neck, both arms, chest, back, abdomen, both legs, digits, mons pubis, buttock and nails.   -axillas, 4 skin colored pedunculated soft  papules  -left and right vertex scalp, two excoriated papules  -patches of thickened yellowish skin and some areas of flaking on the scalp  -several 1-2mm red dome shaped symmetric papules scattered on the trunk  -multiple tan/brown flat round macules and raised papules scattered throughout trunk, extremities and head. No worrisome features for malignancy noted on examination.  -scattered tan, homogenous macules scattered on sun exposed areas of trunk, extremities and face.   -scattered waxy, stuck on tan/brown papules and patches on the trunk     - No other lesions of concern on areas examined.     Medications:  Current Outpatient Medications   Medication     acetaminophen (TYLENOL) 500 MG tablet     aspirin 81 MG EC tablet     atorvastatin (LIPITOR) 10 MG tablet     fluticasone (FLONASE) 50 MCG/ACT nasal spray     gabapentin (NEURONTIN) 300 MG capsule     lactobacillus rhamnosus (GG) (CULTURELL) capsule     UNABLE TO FIND     No current facility-administered medications for this visit.      Past Medical History:   Patient Active Problem List   Diagnosis     Sigmoid diverticulitis     Acute postoperative pain     Allergic rhinitis due to pollen     Benign prostatic hyperplasia     Other specified diseases of gallbladder     Diverticulitis     Diverticulitis of large intestine without perforation or abscess without bleeding     Dry eyes, bilateral     Family history of diabetes mellitus     Hemorrhoids     History of diverticulitis     Irritable bowel syndrome     Mixed hyperlipidemia     Osteoarthritis of spine with radiculopathy, cervical region     Other visual disturbances     Other chronic pain     Polyp of colon     Prediabetes     Pseudoarthrosis of spine     Right hip pain     Past Medical History:   Diagnosis Date     Hyperlipemia      PONV (postoperative nausea and vomiting)        CC Referred Self, MD  No address on file on close of this encounter.      Again, thank you for allowing me to participate in  the care of your patient.        Sincerely,        ARINA Flores CNP

## 2023-06-29 NOTE — PATIENT INSTRUCTIONS
Patient Education       Proper skin care from [facility]:    -Eliminate harsh soaps as they strip the natural oils from the skin, often resulting in dry itchy skin ( i.e. Dial, Zest, Romanian Spring)  -Use mild soaps such as Cetaphil or Dove Sensitive Skin in the shower. You do not need to use soap on arms, legs, and trunk every time you shower unless visibly soiled.   -Avoid hot or cold showers.  -After showering, lightly dry off and apply moisturizing within 2-3 minutes. This will help trap moisture in the skin.   -Aggressive use of a moisturizer at least 1-2 times a day to the entire body (including -Vanicream, Cetaphil, Aquaphor or Cerave) and moisturize hands after every washing.  -We recommend using moisturizers that come in a tub that needs to be scooped out, not a pump. This has more of an oil base. It will hold moisture in your skin much better than a water base moisturizer. The above recommended are non-pore clogging.      Wear a sunscreen with at least SPF 30 on your face, ears, neck and V of the chest daily. Wear sunscreen on other areas of the body if those areas are exposed to the sun throughout the day. Sunscreens can contain physical and/or chemical blockers. Physical blockers are less likely to clog pores, these include zinc oxide and titanium dioxide. Reapply every two hour and after swimming.     Sunscreen examples: https://www.ewg.org/sunscreen/    UV radiation  UVA radiation remains constant throughout the day and throughout the year. It is a longer wavelength than UVB and therefore penetrates deeper into the skin leading to immediate and delayed tanning, photoaging, and skin cancer. 70-80% of UVA and UVB radiation occurs between the hours of 10am-2pm.  UVB radiation  UVB radiation causes the most harmful effects and is more significant during the summer months. However, snow and ice can reflect UVB radiation leading to skin damage during the winter months as well. UVB radiation is  responsible for tanning, burning, inflammation, delayed erythema (pinkness), pigmentation (brown spots), and skin cancer.     I recommend self monthly full body exams and yearly full body exams with a dermatology provider. If you develop a new or changing lesion please follow up for examination. Most skin cancers are pink and scaly or pink and pearly. However, we do see blue/brown/black skin cancers.  Consider the ABCDEs of melanoma when giving yourself your monthly full body exam ( don't forget the groin, buttocks, feet, toes, etc). A-asymmetry, B-borders, C-color, D-diameter, E-elevation or evolving. If you see any of these changes please follow up in clinic. If you cannot see your back I recommend purchasing a hand held mirror to use with a larger wall mirror.       Checking for Skin Cancer  You can find cancer early by checking your skin each month. There are 3 kinds of skin cancer. They are melanoma, basal cell carcinoma, and squamous cell carcinoma. Doing monthly skin checks is the best way to find new marks or skin changes. Follow the instructions below for checking your skin.   The ABCDEs of checking moles for melanoma   Check your moles or growths for signs of melanoma using ABCDE:   Asymmetry: the sides of the mole or growth don t match  Border: the edges are ragged, notched, or blurred  Color: the color within the mole or growth varies  Diameter: the mole or growth is larger than 6 mm (size of a pencil eraser)  Evolving: the size, shape, or color of the mole or growth is changing (evolving is not shown in the images below)    Checking for other types of skin cancer  Basal cell carcinoma or squamous cell carcinoma have symptoms such as:     A spot or mole that looks different from all other marks on your skin  Changes in how an area feels, such as itching, tenderness, or pain  Changes in the skin's surface, such as oozing, bleeding, or scaliness  A sore that does not heal  New swelling or redness beyond  the border of a mole    Who s at risk?  Anyone can get skin cancer. But you are at greater risk if you have:   Fair skin, light-colored hair, or light-colored eyes  Many moles or abnormal moles on your skin  A history of sunburns from sunlight or tanning beds  A family history of skin cancer  A history of exposure to radiation or chemicals  A weakened immune system  If you have had skin cancer in the past, you are at risk for recurring skin cancer.   How to check your skin  Do your monthly skin checkups in front of a full-length mirror. Check all parts of your body, including your:   Head (ears, face, neck, and scalp)  Torso (front, back, and sides)  Arms (tops, undersides, upper, and lower armpits)  Hands (palms, backs, and fingers, including under the nails)  Buttocks and genitals  Legs (front, back, and sides)  Feet (tops, soles, toes, including under the nails, and between toes)  If you have a lot of moles, take digital photos of them each month. Make sure to take photos both up close and from a distance. These can help you see if any moles change over time.   Most skin changes are not cancer. But if you see any changes in your skin, call your doctor right away. Only he or she can diagnose a problem. If you have skin cancer, seeing your doctor can be the first step toward getting the treatment that could save your life.   [employer] last reviewed this educational content on 4/1/2019 2000-2020 [employer]. [address] [place] [address], [place] [address]. All rights reserved. This information is not intended as a substitute for professional medical care. Always follow your healthcare professional's instructions.       When should I call my doctor?  If you are worsening or not improving, please, contact us or seek urgent care as noted below.     Who should I call with questions (adults)?  [facility] (adult and pediatric): [phone]  [facility]  [place] (adult): [phone]  [facility] ([place], [place], [place] and [place]) [phone]  For urgent needs outside of business hours call the [facility] at [phone] and ask for the dermatology resident on call to be paged  If this is a medical emergency and you are unable to reach an ER, Call 911      If you need a prescription refill, please contact your pharmacy. Refills are approved or denied by our Physicians during normal business hours, Monday through Fridays  Per office policy, refills will not be granted if you have not been seen within the past year (or sooner depending on your child's condition)       Wound Care Instructions     FOR SUPERFICIAL WOUNDS     [facility], [place], or    [facility] [phone]        1)  Clean and dry the area with tap water using a Q-tip or sterile gauze pad.    2) Apply Vaseline, Aquaphor, Polysporin ointment or Bacitracin ointment over entire wound.  Do NOT use Neosporin ointment.       REPEAT THESE INSTRUCTIONS AT LEAST ONCE A DAY UNTIL THE WOUND HAS COMPLETELY HEALED.    It is an old wives tale that a wound heals better when it is exposed to air and allowed to dry out. The wound will heal faster with a better cosmetic result if it is kept moist with ointment and covered with a bandage.    **Do not let the wound dry out.**        PATIENT INFORMATION:    During the healing process you will notice a number of changes. All wounds develop a small halo of redness surrounding the wound.  This means healing is occurring. Severe itching with extensive redness usually indicates sensitivity to the ointment or bandage tape used to dress the wound.  You should call our office if this develops.      Swelling  and/or discoloration around your surgical site is common, particularly when performed around the eye.    All wounds normally drain.  The larger the wound the more drainage there will be.  After 7-10  days, you will notice the wound beginning to shrink and new skin will begin to grow.  The wound is healed when you can see skin has formed over the entire area.  A healed wound has a healthy, shiny look to the surface and is red to dark pink in color to normalize.  Wounds may take approximately 4-6 weeks to heal.  Larger wounds may take 6-8 weeks.  After the wound is healed you may discontinue dressing changes.    You may experience a sensation of tightness as your wound heals. This is normal and will gradually subside.    Your healed wound may be sensitive to temperature changes. This sensitivity improves with time, but if you re having a lot of discomfort, try to avoid temperature extremes.    Patients frequently experience itching after their wound appears to have healed because of the continue healing under the skin.  Plain Vaseline will help relieve the itching.        POSSIBLE COMPLICATIONS    BLEEDING:    Leave the bandage in place.  Use tightly rolled up gauze or a cloth to apply direct pressure over the bandage for 30  minutes.  Reapply pressure for an additional 30 minutes if necessary  Use additional gauze and tape to maintain pressure once the bleeding has stopped.

## 2023-06-30 ENCOUNTER — RADIOLOGY INJECTION OFFICE VISIT (OUTPATIENT)
Dept: PHYSICAL MEDICINE AND REHAB | Facility: CLINIC | Age: 53
End: 2023-06-30
Attending: PHYSICIAN ASSISTANT
Payer: COMMERCIAL

## 2023-06-30 VITALS
TEMPERATURE: 98.1 F | RESPIRATION RATE: 16 BRPM | DIASTOLIC BLOOD PRESSURE: 68 MMHG | SYSTOLIC BLOOD PRESSURE: 120 MMHG | HEART RATE: 77 BPM | OXYGEN SATURATION: 97 %

## 2023-06-30 DIAGNOSIS — M54.12 CERVICAL RADICULOPATHY: ICD-10-CM

## 2023-06-30 PROBLEM — K64.9 HEMORRHOIDS: Status: ACTIVE | Noted: 2018-02-28

## 2023-06-30 PROBLEM — H93.19 TINNITUS: Status: ACTIVE | Noted: 2021-05-10

## 2023-06-30 PROBLEM — K57.92 DIVERTICULITIS: Status: ACTIVE | Noted: 2018-07-23

## 2023-06-30 PROBLEM — M43.16 SPONDYLOLISTHESIS OF LUMBAR REGION: Status: ACTIVE | Noted: 2020-09-29

## 2023-06-30 PROBLEM — Z90.49 STATUS POST LAPAROSCOPIC CHOLECYSTECTOMY: Status: ACTIVE | Noted: 2021-05-10

## 2023-06-30 PROBLEM — R10.9 NONSPECIFIC ABDOMINAL PAIN: Status: ACTIVE | Noted: 2018-07-23

## 2023-06-30 PROBLEM — R93.3 IMAGING OF GASTROINTESTINAL TRACT ABNORMAL: Status: ACTIVE | Noted: 2018-10-19

## 2023-06-30 PROBLEM — K62.5 HEMORRHAGE OF RECTUM AND ANUS: Status: ACTIVE | Noted: 2022-01-19

## 2023-06-30 PROBLEM — Z86.0101 HISTORY OF ADENOMATOUS POLYP OF COLON: Status: ACTIVE | Noted: 2018-03-02

## 2023-06-30 PROCEDURE — 64479 NJX AA&/STRD TFRM EPI C/T 1: CPT | Mod: LT | Performed by: PAIN MEDICINE

## 2023-06-30 RX ORDER — LIDOCAINE HYDROCHLORIDE 10 MG/ML
INJECTION, SOLUTION EPIDURAL; INFILTRATION; INTRACAUDAL; PERINEURAL
Status: COMPLETED | OUTPATIENT
Start: 2023-06-30 | End: 2023-06-30

## 2023-06-30 RX ORDER — ATORVASTATIN CALCIUM 20 MG/1
20 TABLET, FILM COATED ORAL AT BEDTIME
COMMUNITY
Start: 2023-05-30

## 2023-06-30 RX ORDER — DEXAMETHASONE SODIUM PHOSPHATE 10 MG/ML
INJECTION, SOLUTION INTRAMUSCULAR; INTRAVENOUS
Status: COMPLETED | OUTPATIENT
Start: 2023-06-30 | End: 2023-06-30

## 2023-06-30 RX ADMIN — LIDOCAINE HYDROCHLORIDE 2 ML: 10 INJECTION, SOLUTION EPIDURAL; INFILTRATION; INTRACAUDAL; PERINEURAL at 11:52

## 2023-06-30 RX ADMIN — DEXAMETHASONE SODIUM PHOSPHATE 10 MG: 10 INJECTION, SOLUTION INTRAMUSCULAR; INTRAVENOUS at 11:52

## 2023-06-30 ASSESSMENT — PAIN SCALES - GENERAL: PAINLEVEL: MILD PAIN (2)

## 2023-06-30 NOTE — PATIENT INSTRUCTIONS
DISCHARGE INSTRUCTIONS    During office hours (8:00 a.m.- 4:00 p.m.) questions or concerns may be answered  by calling [facility] Navigation Nurses at  [phone].  Messages received after hours will be returned the following business day.      In the case of an emergency, please dial 911 or seek assistance at the nearest Emergency Room/Urgent Care facility.     All Patients:    You may experience an increase in your symptoms for the first 2 days (It may take anywhere between 2 days- 2 weeks for the steroid to have maximum effect).    You may use ice on the injection site, as frequently as 20 minutes each hour if needed.    You may take your pain medicine.    You may continue taking your regular medication after your injection. If you have had a Medial Branch Block you may resume pain medication once your pain diary is completed.    You may shower. No swimming, tub bath or hot tub for 48 hours.  You may remove your bandaid/bandage as soon as you are home.    You may resume light activities, as tolerated.    Resume your usual diet as tolerated.    It is strongly advised that you do not drive for 1-3 hours post injection.    If you have had oral sedation:  Do not drive for 8 hours post injection.      If you have had IV sedation:  Do not drive for 24 hours post injection.  Do not operate hazardous machinery or make important personal/business decisions for 24 hours.      POSSIBLE STEROID SIDE EFFECTS (If steroid/cortisone was used for your procedure)    -If you experience these symptoms, it should only last for a short period    Swelling of the legs              Skin redness (flushing)     Mouth (oral) irritation   Blood sugar (glucose) levels            Sweats                    Mood changes  Headache  Sleeplessness  Weakened immune system for up to 14 days, which could increase the risk of rashid the COVID-19 virus and/or experiencing more severe symptoms of the disease, if exposed.  Decreased  effectiveness of the flu vaccine if given within 2 weeks of the steroid.         POSSIBLE PROCEDURE SIDE EFFECTS  -Call the Spine Center if you are concerned  Increased Pain           Increased numbness/tingling      Nausea/Vomiting          Bruising/bleeding at site      Redness or swelling                                              Difficulty walking      Weakness           Fever greater than 100.5    *In the event of a severe headache after an epidural steroid injection that is relieved by lying down, please call the [facility] to speak with a clinical staff member*

## 2023-07-01 LAB
PATH REPORT.COMMENTS IMP SPEC: NORMAL
PATH REPORT.FINAL DX SPEC: NORMAL
PATH REPORT.GROSS SPEC: NORMAL
PATH REPORT.MICROSCOPIC SPEC OTHER STN: NORMAL
PATH REPORT.RELEVANT HX SPEC: NORMAL

## 2023-08-14 ENCOUNTER — PRE VISIT (OUTPATIENT)
Dept: NEUROSURGERY | Facility: CLINIC | Age: 53
End: 2023-08-14

## 2023-09-05 ENCOUNTER — OFFICE VISIT (OUTPATIENT)
Dept: NEUROSURGERY | Facility: CLINIC | Age: 53
End: 2023-09-05
Attending: PHYSICIAN ASSISTANT
Payer: COMMERCIAL

## 2023-09-05 ENCOUNTER — PREP FOR PROCEDURE (OUTPATIENT)
Dept: NEUROSURGERY | Facility: CLINIC | Age: 53
End: 2023-09-05

## 2023-09-05 ENCOUNTER — HOSPITAL ENCOUNTER (OUTPATIENT)
Dept: RADIOLOGY | Facility: HOSPITAL | Age: 53
Discharge: HOME | End: 2023-09-05
Attending: PHYSICIAN ASSISTANT | Admitting: PHYSICIAN ASSISTANT
Payer: COMMERCIAL

## 2023-09-05 VITALS — DIASTOLIC BLOOD PRESSURE: 81 MMHG | HEART RATE: 68 BPM | OXYGEN SATURATION: 98 % | SYSTOLIC BLOOD PRESSURE: 129 MMHG

## 2023-09-05 DIAGNOSIS — M54.12 CERVICAL RADICULOPATHY: Primary | ICD-10-CM

## 2023-09-05 DIAGNOSIS — M54.12 CERVICAL RADICULOPATHY: ICD-10-CM

## 2023-09-05 PROCEDURE — 99214 OFFICE O/P EST MOD 30 MIN: CPT | Performed by: SURGERY

## 2023-09-05 PROCEDURE — 72050 X-RAY EXAM NECK SPINE 4/5VWS: CPT

## 2023-09-05 RX ORDER — TURMERIC 400 MG
CAPSULE ORAL
Status: ON HOLD | COMMUNITY
End: 2023-11-10

## 2023-09-05 ASSESSMENT — PAIN SCALES - GENERAL: PAINLEVEL: MODERATE PAIN (5)

## 2023-09-05 NOTE — PROGRESS NOTES
NEUROSURGERY FOLLOW UP  NOTE    Rigo Joyner comes today in follow-up. He is s/p left CTR on 6/8/22 as well as L3-4 instrumented fusion with Dr Tobias.     Ongoing left arm pain. Diffuse in nature. SNRI at C6 and C7 both gave partial relief. Updated MRI of his cervical spine     Tried SCS trial with some relief of lumbar symptoms but not full. Got a seocdary opinion with [facility] who agreed with no further spine surgery. He then followed with ortho for his hips. He at this time will continue conservative management for his lumbar disease.     Cervical xray shows straightening of the normal lordosis without instability. MRI of his cervical spine shows multilevel spondylosis with mild spinal canal stenosis at multi-levels and mild to moderate at cervical 5-6. In addition has multiple levels of foraminal narrowing including severe at left cervical 4-5 and bilateral cervical 5-6 and cervical 6-7. Symptoms appear to be most consistent with C6 and C7 radiculopathy. We discussed could be additional C5 radiculopathy but since he responded to the SNRI at C6 and C7 would favor treating these levels currently only. Recommend cervical 5-cervical 7 anterior decompression and artifical disc replacements. Risks of anterior neck surgery include but are not limited to inadequate symptom relief, nerve or spinal cord damage, durotomy, hematoma, infection, injury to trachea or esophagus, speech disturbance from injury to the recurrent laryngeal nerve, swallowing difficulties, hardware malfunction. He agreed to proceed.     Sandrita Purdy MD      CC:     Semmler, Steven Duane  [address] [facility] [address]

## 2023-09-05 NOTE — NURSING NOTE
"Rigo Joyner is a 52 year old male who presents for:  Chief Complaint   Patient presents with    Follow Up     Injection follow up  -last one minimal relief  Left arm pain  Left arm n/t/w with burning sensation   Ready for surgery    Hx of lumbar fusion - recently left leg getting painful  Being seen for hip pain - getting injections        Initial Vitals:  /81   Pulse 68   SpO2 98%  Estimated body mass index is 33.08 kg/m  as calculated from the following:    Height as of 2/21/23: 5' 9\" (1.753 m).    Weight as of 2/21/23: 224 lb (101.6 kg).. There is no height or weight on file to calculate BSA. BP completed using cuff size: regular  Moderate Pain (5)      Marcos Gastelum  "

## 2023-09-05 NOTE — LETTER
9/5/2023         RE: Rigo Joyner  [address] [facility] [id number]        Dear Colleague,    Thank you for referring your patient, Rigo Joyner, to the [facility]. Please see a copy of my visit note below.    NEUROSURGERY FOLLOW UP  NOTE    Rigo Joyner comes today in follow-up. He is s/p left CTR on 6/8/22 as well as L3-4 instrumented fusion with Dr Tobias.     Ongoing left arm pain. Diffuse in nature. SNRI at C6 and C7 both gave partial relief. Updated MRI of his cervical spine     Tried SCS trial with some relief of lumbar symptoms but not full. Got a seocdary opinion with [facility] who agreed with no further spine surgery. He then followed with ortho for his hips. He at this time will continue conservative management for his lumbar disease.     Cervical xray shows straightening of the normal lordosis without instability. MRI of his cervical spine shows multilevel spondylosis with mild spinal canal stenosis at multi-levels and mild to moderate at cervical 5-6. In addition has multiple levels of foraminal narrowing including severe at left cervical 4-5 and bilateral cervical 5-6 and cervical 6-7. Symptoms appear to be most consistent with C6 and C7 radiculopathy. We discussed could be additional C5 radiculopathy but since he responded to the SNRI at C6 and C7 would favor treating these levels currently only. Recommend cervical 5-cervical 7 anterior decompression and artifical disc replacements. Risks of anterior neck surgery include but are not limited to inadequate symptom relief, nerve or spinal cord damage, durotomy, hematoma, infection, injury to trachea or esophagus, speech disturbance from injury to the recurrent laryngeal nerve, swallowing difficulties, hardware malfunction. He agreed to proceed.     Sandrita Purdy MD      CC:     Semmler, Steven Duane  [address] [facility] [address]      Again, thank you for allowing me to  participate in the care of your patient.        Sincerely,        Sandrita Purdy MD

## 2023-09-05 NOTE — PATIENT INSTRUCTIONS
Recommend cervical C5-6 and C6-7 artifical disc replacement    Please review COMPLETE information about your proposed surgery, pre-operative requirements, post-operative course and expectations - available in a folder provided to you in clinic!    Your surgery scheduler will call you within 3 business days to begin the process of scheduling your surgery and appointments.     Pre-Operative    Pre-operative physical / Lab work with primary care physician within 30 days of surgical date. We prefer the pre-op exam to be done 2 weeks prior to surgery. We also prefer pre-op lab work be done at [facility] outpatient lab, 2 weeks prior to surgery.     If all pre-op appointments/test are not completed prior to your surgery date, you will be asked to reschedule your surgery.           As part of preparation for your upcoming procedure your primary care doctor may order a test to rule out a COVID-19 infection. This is no longer a requirement prior to surgery.     Readiness Visits    Prior to surgery, you may have a telephone or in person readiness visit with our RN team to discuss your upcomming surgery, results of your pre-op physical, and lab work.   If you will require a collar/neck brace after surgery, you will be fitted for one at your readiness visit prior to surgery (scheduled by the surgery scheduler).     Shower procedure    Hibiclens shower: Use one packet the night before surgery and one packet the morning of surgery for a whole body shower. Avoid face, hair, and genitals.      Eating/Drinking    Stop all solid foods 8 hours before surgery.  Stop all clear liquids 2 hours prior to arrival time     Clear liquids include water, clear juice, black coffee, or clear tea without milk, Gatorade, clear soda.     Medications - please refer to the pre-operative medication instructions sheet in your folder    Hold Aspirin, NSAIDs (Advil/Ibuprofen, Indocin, Naproxen,Nuprin,Relafen/Nabumetone,  Diclofenac,Meloxicam, Aleve, Celebrex) and all vitamins and supplements x 7-10 days prior to surgical date  You can take Tylenol (Acetaminophen) for pain up until the date of your surgery   Do not exceed 3,000 mg per day   Any other medications prescribed, please discuss with your primary care provider at your pre-operative physical. Please discuss when/if it is safe for you to stop taking blood thinners with your primary care provider.   We will NOT provide pain medications prior to surgery. We will prescribe post-op pain medications for up to 6 weeks after surgery.       FMLA/Short-term disability    If you are currently employed, you will likely need to be off work for 4-6 weeks for post-op recovery and healing.  Please fax any FMLA/short term disability paperwork to [phone], mail it into the clinic, drop it off in person, or send via a [employer] message.   You may also call our clinic with the date in which you'd like to return to work, and we can provide a work letter to your employer  We will support Short-Term Disability up to 12 weeks, beginning the date of your surgery. We do NOT support Long-Term Disability/Social Security Benefits.     Pain Management after surgery    Dealing with pain    As your body heals, you might feel a stabbing, burning, or aching pain. You may also have some numbness.  Everyone feels pain differently, we may ask you to rate your pain using a pain scale. This will let us know how much pain you feel.   Keep in mind that medicine won't take away all of your pain. It helps to try other ways to relax and ease pain.     Things to help with pain    After surgery, we will give you medicine for your pain. These medications work well, but they can make you drowsy, itchy, or sick to your stomach, and constipated. Try to take narcotics with food if they cause nausea.   For mild to moderate pain, you can take medication such as Tylenol or Ibuprofen. These can be used with narcotics and  muscle relaxants. *If you have had a fusion: do NOT use NSAIDs for 6 months after surgery.   Do NOT drive while taking narcotic pain medication  Do NOT drink alcohol while using narcotic pain medication  You can utilize ice as needed (no longer than 20 minutes at one time) you may apply this over your covered incision  Utilize heat for muscle spasms, do not apply heat over your incision  If a muscle relaxer is prescribed, please do NOT take it at the same time as your narcotic pain medication. Take them at least 90 minutes apart.   You may also use pain cream/patches on sore muscles. Do NOT apply these on your incision. Patches may be cut in 1/2 if needed.     *After your surgery, if you will be staying in-patient, a nursing team will be monitoring you closely throughout your stay and communicate your health status to your surgeon and other providers.  You will be seen by Advanced Practice Providers (e.g., nurse practitioners, clinic nurse specialists, and physician assistants) who will check on you regularly to assess the status of your surgical recovery.     Incision Care    Look at your incision site every day. You  may need a mirror, or family member to help you.   Do not submerge your incision in water such as pools, hot tubs, baths for at least 6 weeks or until incision is healed  You may get your incision wet in the shower. Allow water and soap to run over incision, and gently pat dry.   Remove the dressing the day after you are discharged from the hospital. Keep the incision clean and dry at all times. This may require several bandage changes.   Contact us right away if you have:   Fever over 101 degrees farenheit  Green or yellow drainage (pus) from your incision or increased bloody drainage   Redness, swelling, or warmth at your surgery site   Notify the clinic [phone].    Activity Restrictions    For the first 6 weeks, no lifting,pushing, or pulling > 5-10 pounds, no bending, twisting.  Use the  stairs in moderation   Walking: Walking is the best way to start exercise after surgery. Take short frequent walks. You may gradually increase the distance as tolerated. If you feel pain, decrease your activity, but DO NOT stop walking. Walking will help you regain strength.  Avoid prolonged positioning for longer than 30 minutes (change positions frequently while awake)  No contact sports until after follow up visit  No high impact activities such as; running/jogging, snowmobile or 4 penny riding or any other recreational vehicles until deemed safe by your surgeon/care team.   Please call the clinic if you develop any of the following symptoms:  Swelling and/or warmth in one or both legs  Pain or tenderness in your leg, ankle, foot, or arm   Red or discolored/pale skin     Post-Op Follow Up Appointments    We will call you to schedule these appointments after your discharge from the hospital.   Incision assessment within 2 weeks with a Registered Nurse   6 week post-op with a Nurse Practitioner/Physician Assistant. Your surgeon will be available on this day.

## 2023-09-07 ENCOUNTER — TELEPHONE (OUTPATIENT)
Dept: NEUROSURGERY | Facility: CLINIC | Age: 53
End: 2023-09-07

## 2023-09-07 ENCOUNTER — TELEPHONE (OUTPATIENT)
Dept: NEUROSURGERY | Facility: CLINIC | Age: 53
End: 2023-09-07
Payer: COMMERCIAL

## 2023-09-07 DIAGNOSIS — Z01.812 ENCOUNTER FOR PRE-OPERATIVE LABORATORY TESTING: Primary | ICD-10-CM

## 2023-09-07 NOTE — TELEPHONE ENCOUNTER
Patient is scheduled for surgery on 11/10/23. I gave the patient surgery details, and he verbalized understanding.

## 2023-10-03 ENCOUNTER — TRANSFERRED RECORDS (OUTPATIENT)
Dept: NEUROSURGERY | Facility: CLINIC | Age: 53
End: 2023-10-03

## 2023-10-04 ENCOUNTER — TRANSFERRED RECORDS (OUTPATIENT)
Dept: HEALTH INFORMATION MANAGEMENT | Facility: CLINIC | Age: 53
End: 2023-10-04

## 2023-10-21 ENCOUNTER — TRANSFERRED RECORDS (OUTPATIENT)
Dept: NEUROSURGERY | Facility: CLINIC | Age: 53
End: 2023-10-21

## 2023-10-27 ENCOUNTER — TRANSFERRED RECORDS (OUTPATIENT)
Dept: HEALTH INFORMATION MANAGEMENT | Facility: CLINIC | Age: 53
End: 2023-10-27

## 2023-10-27 ENCOUNTER — LAB (OUTPATIENT)
Dept: LAB | Facility: CLINIC | Age: 53
End: 2023-10-27
Attending: SURGERY
Payer: COMMERCIAL

## 2023-10-27 DIAGNOSIS — Z01.812 ENCOUNTER FOR PRE-OPERATIVE LABORATORY TESTING: ICD-10-CM

## 2023-10-27 LAB
ANION GAP SERPL CALCULATED.3IONS-SCNC: 11 MMOL/L (ref 7–15)
APTT PPP: 28 SECONDS (ref 22–38)
BASOPHILS # BLD AUTO: 0.1 10E3/UL (ref 0–0.2)
BASOPHILS NFR BLD AUTO: 1 %
BUN SERPL-MCNC: 11.4 MG/DL (ref 6–20)
CALCIUM SERPL-MCNC: 9.4 MG/DL (ref 8.6–10)
CHLORIDE SERPL-SCNC: 107 MMOL/L (ref 98–107)
CREAT SERPL-MCNC: 0.79 MG/DL (ref 0.67–1.17)
DEPRECATED HCO3 PLAS-SCNC: 23 MMOL/L (ref 22–29)
EGFRCR SERPLBLD CKD-EPI 2021: >90 ML/MIN/1.73M2
EOSINOPHIL # BLD AUTO: 0 10E3/UL (ref 0–0.7)
EOSINOPHIL NFR BLD AUTO: 0 %
ERYTHROCYTE [DISTWIDTH] IN BLOOD BY AUTOMATED COUNT: 11.7 % (ref 10–15)
GLUCOSE SERPL-MCNC: 121 MG/DL (ref 70–99)
HCT VFR BLD AUTO: 48.9 % (ref 40–53)
HGB BLD-MCNC: 16.5 G/DL (ref 13.3–17.7)
IMM GRANULOCYTES # BLD: 0 10E3/UL
IMM GRANULOCYTES NFR BLD: 0 %
INR PPP: 1.03 (ref 0.85–1.15)
LYMPHOCYTES # BLD AUTO: 1.8 10E3/UL (ref 0.8–5.3)
LYMPHOCYTES NFR BLD AUTO: 31 %
MCH RBC QN AUTO: 31.9 PG (ref 26.5–33)
MCHC RBC AUTO-ENTMCNC: 33.7 G/DL (ref 31.5–36.5)
MCV RBC AUTO: 94 FL (ref 78–100)
MONOCYTES # BLD AUTO: 0.4 10E3/UL (ref 0–1.3)
MONOCYTES NFR BLD AUTO: 7 %
NEUTROPHILS # BLD AUTO: 3.6 10E3/UL (ref 1.6–8.3)
NEUTROPHILS NFR BLD AUTO: 61 %
PLATELET # BLD AUTO: 258 10E3/UL (ref 150–450)
POTASSIUM SERPL-SCNC: 4.2 MMOL/L (ref 3.4–5.3)
RBC # BLD AUTO: 5.18 10E6/UL (ref 4.4–5.9)
SODIUM SERPL-SCNC: 141 MMOL/L (ref 135–145)
WBC # BLD AUTO: 5.9 10E3/UL (ref 4–11)

## 2023-10-27 PROCEDURE — 85025 COMPLETE CBC W/AUTO DIFF WBC: CPT

## 2023-10-27 PROCEDURE — 85730 THROMBOPLASTIN TIME PARTIAL: CPT

## 2023-10-27 PROCEDURE — 85610 PROTHROMBIN TIME: CPT

## 2023-10-27 PROCEDURE — 36415 COLL VENOUS BLD VENIPUNCTURE: CPT

## 2023-10-27 PROCEDURE — 80048 BASIC METABOLIC PNL TOTAL CA: CPT

## 2023-10-27 NOTE — H&P (VIEW-ONLY)
[facility]      Preoperative Consultation   Rigo Joyner   : 1970   Gender: male    Date of Encounter: 10/27/2023    Nursing Notes:   Porsha Davis  10/27/2023  8:04 AM  Sign at exiting of workspace  Rigo Joyner is a 53 y.o. male (1970) who presents for preop evaluation undergoing cervical disc replacement.    Date of Surgery: 11/10/23  Surgical Specialty: Orthopedic/Spine  Dr. Sandrita Estrada  [facility]urgical Facility: [facility]  Fax number: [phone]  Surgery type: outpatient  Primary Physician: Semmler, Steven Duane Adena M Henning CMA.................... 10/27/2023      7:59 AM         History of Present Illness   Ongoing cervical spine pain.  Review with surgeon either disc replacement or fusion.      Review of Systems   A comprehensive review of systems was negative except for items noted in HPI.    Patient Active Problem List   Diagnosis Code     Allergic rhinitis due to pollen J30.1     Benign non-nodular prostatic hyperplasia with lower urinary tract symptoms N40.1     Family history of diabetes mellitus (DM) Z83.3     Mixed hyperlipidemia E78.2     Prediabetes R73.03     Biliary dyskinesia K82.8     Spondylolisthesis of lumbar region M43.16     Acute postoperative pain G89.18     IBS (irritable bowel syndrome) K58.9     History of diverticulitis Z87.19     Ossification of posterior longitudinal ligament (HC) M48.8X9     Osteoarthritis of spine with radiculopathy, cervical region M47.22     Current Outpatient Medications   Medication Sig     acetaminophen (TYLENOL EXTRA STRGTH) 500 mg tablet Take 2 tablets by mouth every 6 hours. Max acetaminophen dose: 4000mg in 24 hrs.     aspirin (ECOTRIN) 81 mg enteric coated tablet Take 1 tablet by mouth once daily with a meal.     atorvastatin (Lipitor) 20 mg tablet Take 1 Tablet (20 mg) by mouth at bedtime.     fluticasone (50 mcg per actuation) nasal solution (FLONASE) Inhale 1 Spray in the  "nostril(s) once daily if needed for Rhinitis.     gabapentin (NEURONTIN) 300 mg capsule Take 2 Capsules (600 mg) by mouth three times daily.     Lactobacillus rhamnosus GG (CULTURELLE) 15 billion cell capsule Take 1 Capsule by mouth once daily.     psyllium husk, with sugar, (METAMUCIL) 3.4 gram packet Take 1 Packet by mouth once daily.     No current facility-administered medications for this visit.     Allergies   Allergen Reactions     Bee Pollen Cough, Headache and Itching     Past Surgical History:   . Laterality Date     CHOLECYSTECTOMY  2018     HIP ARTHROSCOPY W/ LABRAL DEBRIDEMENT Left      hip labral tear Right 11/2021     LAPAROSCOPIC CHOLECYSTECTOMY  11/01/2018     LUMBAR FUSION  09/29/2020    L3-4     RIGHT COLECTOMY  2021     Social History     Tobacco Use     Smoking status: Never     Smokeless tobacco: Never   Vaping Use     Vaping Use: Never used   Substance Use Topics     Alcohol use: Not Currently     Comment: rare     Drug use: Not Currently     Family History   Problem Relation Age of Onset     Cancer-colon Father         age 77     Diabetes Father      Heart Disease Father         55- MI       PAST DIFFICULTY WITH ANESTHESIA: None     Physical Exam   /64 (Cuff Site: Left Arm, Position: Sitting, Cuff Size: Adult Large)   Pulse 72   Ht 1.75 m (5' 8.9\")   Wt 125.2 kg (276 lb)   SpO2 98%   BMI 40.88 kg/m   Body mass index is 40.88 kg/m .  General Appearance: Pleasant, alert, appropriate appearance for age. No acute distress  Heart:  S1 and S2 normal, no murmurs, clicks, gallops or rubs. Regular rate and rhythm.   Lungs:  clear; no wheezes or rales.    Abdomen: is soft without tenderness, guarding, mass, rebound or organomegaly. Bowel sounds are normal. No CVA tenderness or inguinal adenopathy noted.  Extremities:  peripheral pulses normal no edema, redness or tenderness in the calves       Assessment / Plan     Labs: pending  ECG: yes    ICD-10-CM    1. Preop cardiovascular exam  Z01.810 " GA READING EKG - NO CHARGE, COMP ONLY     EKG 12 LEAD     GA ECG ROUTINE ECG W/LEAST 12 LDS W/I&R     BASIC METABOLIC PANEL     CBC AND DIFFERENTIAL      2. Pre-op exam  Z01.818         Patient is cleared for planned procedure.   Electronically Signed by:   Steven Duane Semmler, MD ....................  10/27/2023   9:52 AM

## 2023-10-31 ENCOUNTER — VIRTUAL VISIT (OUTPATIENT)
Dept: NEUROSURGERY | Facility: CLINIC | Age: 53
End: 2023-10-31
Payer: COMMERCIAL

## 2023-10-31 DIAGNOSIS — M54.12 CERVICAL RADICULOPATHY: Primary | ICD-10-CM

## 2023-10-31 PROCEDURE — 99207 PR NO CHARGE LOS: CPT | Performed by: SURGERY

## 2023-10-31 NOTE — LETTER
10/31/2023         RE: Rigo Joyner  [id number] [address] [id number]        Dear Colleague,    Thank you for referring your patient, Rigo Joyner, to the [facility]. Please see a copy of my visit note below.    BILLABLE TELEPHONE VISIT:    Further discussed the rationale of upcoming surgery. All questions answered and he agreed to proceed.     Location of patient: [place]  Location of Dr Purdy: [facility] [place], [place]  Length of visit: 16:25 minutes     Sandrita Purdy MD       Again, thank you for allowing me to participate in the care of your patient.        Sincerely,        Sandrita Purdy MD

## 2023-10-31 NOTE — PROGRESS NOTES
BILLABLE TELEPHONE VISIT:    Further discussed the rationale of upcoming surgery. All questions answered and he agreed to proceed.     Location of patient: [place]  Location of Dr Purdy: [facility] [place], [place]  Length of visit: 16:25 minutes     Sandrita Purdy MD

## 2023-11-07 ENCOUNTER — TRANSFERRED RECORDS (OUTPATIENT)
Dept: HEALTH INFORMATION MANAGEMENT | Facility: CLINIC | Age: 53
End: 2023-11-07

## 2023-11-09 ENCOUNTER — TELEPHONE (OUTPATIENT)
Dept: NEUROSURGERY | Facility: CLINIC | Age: 53
End: 2023-11-09
Payer: COMMERCIAL

## 2023-11-09 NOTE — TELEPHONE ENCOUNTER
Called patient, discussed surgery, post-op course, expectations, follow up plan.    Reviewed H&P from 10/27/2023 - cleared for surgery  Labs, EKG - WNL    MRI done on 04/01/2023 - in [place]    To OR as planned.     Check in - 0900    Nothing to eat or drink after midnight the night before surgery.     Reviewed with patient: Arrive 2 hours prior to scheduled surgery.    Continue to refrain from NSAIDS (Ibuprofen, Aleve, Naprosyn), ASA, Over the counter herbal medications or supplements, anti-coagulants and blood thinners.     Patient confirmed they have help/assistance in place at home upon discharge    Instructions: using a washcloth and a bottle of provided Hibiclens, wash your body, avoiding your face and genitals. Preferably, shower the night before surgery and the morning of surgery using a half a bottle each time for your whole body shower.        Patient was informed that we will provide up to 6 weeks prescription of pain medication for post-operative pain.      Instructed patient about the following: After your surgery, if you will be staying in-patient, a nursing provider team will be monitoring you closely throughout your stay and communicate your health status to your surgeon and other providers.  You will be seen by Advanced Practice Providers (e.g., nurse practitioners, clinic nurse specialist, and physician assistants) who will check on you regularly to assess the status of your surgery.     Mira Rios RN, CNRN

## 2023-11-10 ENCOUNTER — APPOINTMENT (OUTPATIENT)
Dept: RADIOLOGY | Facility: CLINIC | Age: 53
DRG: 518 | End: 2023-11-10
Attending: SURGERY
Payer: COMMERCIAL

## 2023-11-10 ENCOUNTER — ANESTHESIA EVENT (OUTPATIENT)
Dept: SURGERY | Facility: CLINIC | Age: 53
DRG: 518 | End: 2023-11-10
Payer: COMMERCIAL

## 2023-11-10 ENCOUNTER — ANESTHESIA (OUTPATIENT)
Dept: SURGERY | Facility: CLINIC | Age: 53
DRG: 518 | End: 2023-11-10
Payer: COMMERCIAL

## 2023-11-10 ENCOUNTER — HOSPITAL ENCOUNTER (INPATIENT)
Facility: CLINIC | Age: 53
LOS: 1 days | Discharge: HOME | DRG: 518 | End: 2023-11-11
Attending: SURGERY | Admitting: SURGERY
Payer: COMMERCIAL

## 2023-11-10 DIAGNOSIS — Z98.890 S/P CERVICAL DISC REPLACEMENT: Primary | ICD-10-CM

## 2023-11-10 DIAGNOSIS — G56.02 CARPAL TUNNEL SYNDROME OF LEFT WRIST: ICD-10-CM

## 2023-11-10 LAB — HGB BLD-MCNC: 15.1 G/DL (ref 13.3–17.7)

## 2023-11-10 PROCEDURE — 370N000017 HC ANESTHESIA TECHNICAL FEE, PER MIN: Performed by: SURGERY

## 2023-11-10 PROCEDURE — 710N000010 HC RECOVERY PHASE 1, LEVEL 2, PER MIN: Performed by: SURGERY

## 2023-11-10 PROCEDURE — 36415 COLL VENOUS BLD VENIPUNCTURE: CPT

## 2023-11-10 PROCEDURE — 250N000009 HC RX 250: Performed by: SURGERY

## 2023-11-10 PROCEDURE — 250N000011 HC RX IP 250 OP 636: Mod: JZ | Performed by: NURSE ANESTHETIST, CERTIFIED REGISTERED

## 2023-11-10 PROCEDURE — 250N000011 HC RX IP 250 OP 636: Mod: JZ

## 2023-11-10 PROCEDURE — 0RR30JZ REPLACEMENT OF CERVICAL VERTEBRAL DISC WITH SYNTHETIC SUBSTITUTE, OPEN APPROACH: ICD-10-PCS | Performed by: SURGERY

## 2023-11-10 PROCEDURE — 258N000003 HC RX IP 258 OP 636

## 2023-11-10 PROCEDURE — 999N000141 HC STATISTIC PRE-PROCEDURE NURSING ASSESSMENT: Performed by: SURGERY

## 2023-11-10 PROCEDURE — 999N000182 XR SURGERY CARM FLUORO GREATER THAN 5 MIN: Mod: TC

## 2023-11-10 PROCEDURE — 0RT30ZZ RESECTION OF CERVICAL VERTEBRAL DISC, OPEN APPROACH: ICD-10-PCS | Performed by: SURGERY

## 2023-11-10 PROCEDURE — 258N000003 HC RX IP 258 OP 636: Performed by: ANESTHESIOLOGY

## 2023-11-10 PROCEDURE — 272N000001 HC OR GENERAL SUPPLY STERILE: Performed by: SURGERY

## 2023-11-10 PROCEDURE — 250N000011 HC RX IP 250 OP 636: Performed by: NURSE ANESTHETIST, CERTIFIED REGISTERED

## 2023-11-10 PROCEDURE — 250N000013 HC RX MED GY IP 250 OP 250 PS 637: Performed by: ANESTHESIOLOGY

## 2023-11-10 PROCEDURE — 250N000005 HC OR RX SURGIFLO HEMOSTATIC MATRIX 10ML 199102S OPNP: Performed by: SURGERY

## 2023-11-10 PROCEDURE — 85018 HEMOGLOBIN: CPT

## 2023-11-10 PROCEDURE — 250N000011 HC RX IP 250 OP 636: Performed by: SURGERY

## 2023-11-10 PROCEDURE — 120N000001 HC R&B MED SURG/OB

## 2023-11-10 PROCEDURE — 250N000009 HC RX 250: Performed by: NURSE ANESTHETIST, CERTIFIED REGISTERED

## 2023-11-10 PROCEDURE — 250N000025 HC SEVOFLURANE, PER MIN: Performed by: SURGERY

## 2023-11-10 PROCEDURE — 250N000013 HC RX MED GY IP 250 OP 250 PS 637

## 2023-11-10 PROCEDURE — C1776 JOINT DEVICE (IMPLANTABLE): HCPCS | Performed by: SURGERY

## 2023-11-10 PROCEDURE — 360N000084 HC SURGERY LEVEL 4 W/ FLUORO, PER MIN: Performed by: SURGERY

## 2023-11-10 PROCEDURE — 999N000127 HC STATISTIC PERIPHERAL IV START W US GUIDANCE

## 2023-11-10 PROCEDURE — 250N000011 HC RX IP 250 OP 636: Performed by: PHYSICIAN ASSISTANT

## 2023-11-10 DEVICE — PRESTIGE LP DISC 6X18MM
Type: IMPLANTABLE DEVICE | Site: SPINE CERVICAL | Status: FUNCTIONAL
Brand: PRESTIGE® LP CERVICAL DISC SYSTEM

## 2023-11-10 DEVICE — PRESTIGE LP DISC 7X18MM
Type: IMPLANTABLE DEVICE | Status: FUNCTIONAL
Brand: PRESTIGE® LP CERVICAL DISC SYSTEM

## 2023-11-10 RX ORDER — PROPOFOL 10 MG/ML
INJECTION, EMULSION INTRAVENOUS CONTINUOUS PRN
Status: DISCONTINUED | OUTPATIENT
Start: 2023-11-10 | End: 2023-11-10

## 2023-11-10 RX ORDER — LORATADINE 10 MG/1
10 TABLET ORAL DAILY PRN
Status: DISCONTINUED | OUTPATIENT
Start: 2023-11-10 | End: 2023-11-11 | Stop reason: HOSPADM

## 2023-11-10 RX ORDER — PROCHLORPERAZINE MALEATE 10 MG
10 TABLET ORAL EVERY 6 HOURS PRN
Status: DISCONTINUED | OUTPATIENT
Start: 2023-11-10 | End: 2023-11-11 | Stop reason: HOSPADM

## 2023-11-10 RX ORDER — CEFAZOLIN SODIUM/WATER 2 G/20 ML
2 SYRINGE (ML) INTRAVENOUS SEE ADMIN INSTRUCTIONS
Status: DISCONTINUED | OUTPATIENT
Start: 2023-11-10 | End: 2023-11-10 | Stop reason: HOSPADM

## 2023-11-10 RX ORDER — CEFAZOLIN SODIUM 2 G/100ML
2 INJECTION, SOLUTION INTRAVENOUS EVERY 8 HOURS
Qty: 300 ML | Refills: 0 | Status: DISCONTINUED | OUTPATIENT
Start: 2023-11-10 | End: 2023-11-11 | Stop reason: HOSPADM

## 2023-11-10 RX ORDER — NALOXONE HYDROCHLORIDE 0.4 MG/ML
0.4 INJECTION, SOLUTION INTRAMUSCULAR; INTRAVENOUS; SUBCUTANEOUS
Status: DISCONTINUED | OUTPATIENT
Start: 2023-11-10 | End: 2023-11-11 | Stop reason: HOSPADM

## 2023-11-10 RX ORDER — ACETAMINOPHEN 325 MG/1
975 TABLET ORAL ONCE
Status: COMPLETED | OUTPATIENT
Start: 2023-11-10 | End: 2023-11-10

## 2023-11-10 RX ORDER — POLYETHYLENE GLYCOL 3350 17 G/17G
17 POWDER, FOR SOLUTION ORAL DAILY
Status: DISCONTINUED | OUTPATIENT
Start: 2023-11-11 | End: 2023-11-11 | Stop reason: HOSPADM

## 2023-11-10 RX ORDER — KETAMINE HYDROCHLORIDE 10 MG/ML
INJECTION INTRAMUSCULAR; INTRAVENOUS PRN
Status: DISCONTINUED | OUTPATIENT
Start: 2023-11-10 | End: 2023-11-10

## 2023-11-10 RX ORDER — GABAPENTIN 300 MG/1
300 CAPSULE ORAL
Status: COMPLETED | OUTPATIENT
Start: 2023-11-10 | End: 2023-11-10

## 2023-11-10 RX ORDER — ONDANSETRON 4 MG/1
4 TABLET, ORALLY DISINTEGRATING ORAL EVERY 6 HOURS PRN
Status: DISCONTINUED | OUTPATIENT
Start: 2023-11-10 | End: 2023-11-11 | Stop reason: HOSPADM

## 2023-11-10 RX ORDER — KETOTIFEN FUMARATE 0.35 MG/ML
1 SOLUTION/ DROPS OPHTHALMIC 2 TIMES DAILY PRN
Status: ON HOLD | COMMUNITY
End: 2024-04-17

## 2023-11-10 RX ORDER — FENTANYL CITRATE 50 UG/ML
25 INJECTION, SOLUTION INTRAMUSCULAR; INTRAVENOUS EVERY 5 MIN PRN
Status: DISCONTINUED | OUTPATIENT
Start: 2023-11-10 | End: 2023-11-10 | Stop reason: HOSPADM

## 2023-11-10 RX ORDER — NALOXONE HYDROCHLORIDE 0.4 MG/ML
0.2 INJECTION, SOLUTION INTRAMUSCULAR; INTRAVENOUS; SUBCUTANEOUS
Status: DISCONTINUED | OUTPATIENT
Start: 2023-11-10 | End: 2023-11-11 | Stop reason: HOSPADM

## 2023-11-10 RX ORDER — SODIUM CHLORIDE, SODIUM LACTATE, POTASSIUM CHLORIDE, CALCIUM CHLORIDE 600; 310; 30; 20 MG/100ML; MG/100ML; MG/100ML; MG/100ML
INJECTION, SOLUTION INTRAVENOUS CONTINUOUS
Status: DISCONTINUED | OUTPATIENT
Start: 2023-11-10 | End: 2023-11-10 | Stop reason: HOSPADM

## 2023-11-10 RX ORDER — FENTANYL CITRATE 50 UG/ML
50 INJECTION, SOLUTION INTRAMUSCULAR; INTRAVENOUS EVERY 5 MIN PRN
Status: DISCONTINUED | OUTPATIENT
Start: 2023-11-10 | End: 2023-11-10 | Stop reason: HOSPADM

## 2023-11-10 RX ORDER — LIDOCAINE 40 MG/G
CREAM TOPICAL
Status: DISCONTINUED | OUTPATIENT
Start: 2023-11-10 | End: 2023-11-10 | Stop reason: HOSPADM

## 2023-11-10 RX ORDER — OXYCODONE HYDROCHLORIDE 5 MG/1
5 TABLET ORAL EVERY 4 HOURS PRN
Status: DISCONTINUED | OUTPATIENT
Start: 2023-11-10 | End: 2023-11-11 | Stop reason: HOSPADM

## 2023-11-10 RX ORDER — FENTANYL CITRATE 50 UG/ML
INJECTION, SOLUTION INTRAMUSCULAR; INTRAVENOUS PRN
Status: DISCONTINUED | OUTPATIENT
Start: 2023-11-10 | End: 2023-11-10

## 2023-11-10 RX ORDER — GABAPENTIN 300 MG/1
600 CAPSULE ORAL 2 TIMES DAILY
COMMUNITY

## 2023-11-10 RX ORDER — OXYCODONE HYDROCHLORIDE 5 MG/1
10 TABLET ORAL EVERY 4 HOURS PRN
Status: DISCONTINUED | OUTPATIENT
Start: 2023-11-10 | End: 2023-11-11 | Stop reason: HOSPADM

## 2023-11-10 RX ORDER — ENOXAPARIN SODIUM 100 MG/ML
40 INJECTION SUBCUTANEOUS EVERY 24 HOURS
Status: DISCONTINUED | OUTPATIENT
Start: 2023-11-12 | End: 2023-11-11 | Stop reason: HOSPADM

## 2023-11-10 RX ORDER — ONDANSETRON 4 MG/1
4 TABLET, ORALLY DISINTEGRATING ORAL EVERY 30 MIN PRN
Status: DISCONTINUED | OUTPATIENT
Start: 2023-11-10 | End: 2023-11-10 | Stop reason: HOSPADM

## 2023-11-10 RX ORDER — ACETAMINOPHEN 325 MG/1
650 TABLET ORAL EVERY 4 HOURS PRN
Status: DISCONTINUED | OUTPATIENT
Start: 2023-11-13 | End: 2023-11-11 | Stop reason: HOSPADM

## 2023-11-10 RX ORDER — ONDANSETRON 2 MG/ML
4 INJECTION INTRAMUSCULAR; INTRAVENOUS EVERY 6 HOURS PRN
Status: DISCONTINUED | OUTPATIENT
Start: 2023-11-10 | End: 2023-11-11 | Stop reason: HOSPADM

## 2023-11-10 RX ORDER — AMOXICILLIN 250 MG
1 CAPSULE ORAL 2 TIMES DAILY
Status: DISCONTINUED | OUTPATIENT
Start: 2023-11-10 | End: 2023-11-11 | Stop reason: HOSPADM

## 2023-11-10 RX ORDER — ONDANSETRON 2 MG/ML
4 INJECTION INTRAMUSCULAR; INTRAVENOUS EVERY 30 MIN PRN
Status: DISCONTINUED | OUTPATIENT
Start: 2023-11-10 | End: 2023-11-10 | Stop reason: HOSPADM

## 2023-11-10 RX ORDER — DEXAMETHASONE SODIUM PHOSPHATE 4 MG/ML
INJECTION, SOLUTION INTRA-ARTICULAR; INTRALESIONAL; INTRAMUSCULAR; INTRAVENOUS; SOFT TISSUE PRN
Status: DISCONTINUED | OUTPATIENT
Start: 2023-11-10 | End: 2023-11-10

## 2023-11-10 RX ORDER — MULTIVITAMIN,THERAPEUTIC
1 TABLET ORAL DAILY
Status: DISCONTINUED | OUTPATIENT
Start: 2023-11-11 | End: 2023-11-11 | Stop reason: HOSPADM

## 2023-11-10 RX ORDER — GABAPENTIN 300 MG/1
600 CAPSULE ORAL 2 TIMES DAILY
Status: DISCONTINUED | OUTPATIENT
Start: 2023-11-10 | End: 2023-11-11 | Stop reason: HOSPADM

## 2023-11-10 RX ORDER — ONDANSETRON 2 MG/ML
INJECTION INTRAMUSCULAR; INTRAVENOUS PRN
Status: DISCONTINUED | OUTPATIENT
Start: 2023-11-10 | End: 2023-11-10

## 2023-11-10 RX ORDER — ACETAMINOPHEN 325 MG/1
975 TABLET ORAL EVERY 8 HOURS
Qty: 27 TABLET | Refills: 0 | Status: DISCONTINUED | OUTPATIENT
Start: 2023-11-10 | End: 2023-11-11 | Stop reason: HOSPADM

## 2023-11-10 RX ORDER — GLYCOPYRROLATE 0.2 MG/ML
INJECTION, SOLUTION INTRAMUSCULAR; INTRAVENOUS PRN
Status: DISCONTINUED | OUTPATIENT
Start: 2023-11-10 | End: 2023-11-10

## 2023-11-10 RX ORDER — BISACODYL 10 MG
10 SUPPOSITORY, RECTAL RECTAL DAILY PRN
Status: DISCONTINUED | OUTPATIENT
Start: 2023-11-10 | End: 2023-11-11 | Stop reason: HOSPADM

## 2023-11-10 RX ORDER — ATORVASTATIN CALCIUM 10 MG/1
20 TABLET, FILM COATED ORAL AT BEDTIME
Status: DISCONTINUED | OUTPATIENT
Start: 2023-11-10 | End: 2023-11-11 | Stop reason: HOSPADM

## 2023-11-10 RX ORDER — HYDROMORPHONE HCL IN WATER/PF 6 MG/30 ML
0.4 PATIENT CONTROLLED ANALGESIA SYRINGE INTRAVENOUS
Status: DISCONTINUED | OUTPATIENT
Start: 2023-11-10 | End: 2023-11-11 | Stop reason: HOSPADM

## 2023-11-10 RX ORDER — METHOCARBAMOL 750 MG/1
750 TABLET, FILM COATED ORAL EVERY 6 HOURS PRN
Status: DISCONTINUED | OUTPATIENT
Start: 2023-11-10 | End: 2023-11-11 | Stop reason: HOSPADM

## 2023-11-10 RX ORDER — DEXMEDETOMIDINE HYDROCHLORIDE 4 UG/ML
INJECTION, SOLUTION INTRAVENOUS CONTINUOUS PRN
Status: DISCONTINUED | OUTPATIENT
Start: 2023-11-10 | End: 2023-11-10

## 2023-11-10 RX ORDER — HYDROMORPHONE HCL IN WATER/PF 6 MG/30 ML
0.2 PATIENT CONTROLLED ANALGESIA SYRINGE INTRAVENOUS
Status: DISCONTINUED | OUTPATIENT
Start: 2023-11-10 | End: 2023-11-11 | Stop reason: HOSPADM

## 2023-11-10 RX ORDER — SODIUM CHLORIDE 9 MG/ML
INJECTION, SOLUTION INTRAVENOUS CONTINUOUS
Status: DISCONTINUED | OUTPATIENT
Start: 2023-11-10 | End: 2023-11-11 | Stop reason: HOSPADM

## 2023-11-10 RX ORDER — CEFAZOLIN SODIUM/WATER 2 G/20 ML
2 SYRINGE (ML) INTRAVENOUS
Status: COMPLETED | OUTPATIENT
Start: 2023-11-10 | End: 2023-11-10

## 2023-11-10 RX ORDER — HYDROMORPHONE HCL IN WATER/PF 6 MG/30 ML
0.2 PATIENT CONTROLLED ANALGESIA SYRINGE INTRAVENOUS EVERY 5 MIN PRN
Status: DISCONTINUED | OUTPATIENT
Start: 2023-11-10 | End: 2023-11-10 | Stop reason: HOSPADM

## 2023-11-10 RX ORDER — HYDROMORPHONE HCL IN WATER/PF 6 MG/30 ML
0.4 PATIENT CONTROLLED ANALGESIA SYRINGE INTRAVENOUS EVERY 5 MIN PRN
Status: DISCONTINUED | OUTPATIENT
Start: 2023-11-10 | End: 2023-11-10 | Stop reason: HOSPADM

## 2023-11-10 RX ORDER — LIDOCAINE HYDROCHLORIDE 10 MG/ML
INJECTION, SOLUTION INFILTRATION; PERINEURAL PRN
Status: DISCONTINUED | OUTPATIENT
Start: 2023-11-10 | End: 2023-11-10

## 2023-11-10 RX ORDER — PROPOFOL 10 MG/ML
INJECTION, EMULSION INTRAVENOUS PRN
Status: DISCONTINUED | OUTPATIENT
Start: 2023-11-10 | End: 2023-11-10

## 2023-11-10 RX ADMIN — SENNOSIDES AND DOCUSATE SODIUM 1 TABLET: 8.6; 5 TABLET ORAL at 20:44

## 2023-11-10 RX ADMIN — DEXAMETHASONE SODIUM PHOSPHATE 4 MG: 4 INJECTION, SOLUTION INTRA-ARTICULAR; INTRALESIONAL; INTRAMUSCULAR; INTRAVENOUS; SOFT TISSUE at 12:14

## 2023-11-10 RX ADMIN — SODIUM CHLORIDE: 9 INJECTION, SOLUTION INTRAVENOUS at 18:23

## 2023-11-10 RX ADMIN — ACETAMINOPHEN 975 MG: 325 TABLET ORAL at 10:08

## 2023-11-10 RX ADMIN — FENTANYL CITRATE 100 MCG: 50 INJECTION INTRAMUSCULAR; INTRAVENOUS at 12:14

## 2023-11-10 RX ADMIN — ACETAMINOPHEN 975 MG: 325 TABLET ORAL at 18:01

## 2023-11-10 RX ADMIN — Medication 2 G: at 12:10

## 2023-11-10 RX ADMIN — ROCURONIUM BROMIDE 50 MG: 10 INJECTION, SOLUTION INTRAVENOUS at 12:14

## 2023-11-10 RX ADMIN — GABAPENTIN 600 MG: 300 CAPSULE ORAL at 20:45

## 2023-11-10 RX ADMIN — SODIUM CHLORIDE, POTASSIUM CHLORIDE, SODIUM LACTATE AND CALCIUM CHLORIDE: 600; 310; 30; 20 INJECTION, SOLUTION INTRAVENOUS at 09:48

## 2023-11-10 RX ADMIN — SODIUM CHLORIDE, POTASSIUM CHLORIDE, SODIUM LACTATE AND CALCIUM CHLORIDE: 600; 310; 30; 20 INJECTION, SOLUTION INTRAVENOUS at 13:00

## 2023-11-10 RX ADMIN — CEFAZOLIN SODIUM 2 G: 2 INJECTION, SOLUTION INTRAVENOUS at 20:41

## 2023-11-10 RX ADMIN — PROPOFOL 180 MG: 10 INJECTION, EMULSION INTRAVENOUS at 12:14

## 2023-11-10 RX ADMIN — KETAMINE HYDROCHLORIDE 20 MG: 10 INJECTION INTRAMUSCULAR; INTRAVENOUS at 13:45

## 2023-11-10 RX ADMIN — HYDROMORPHONE HYDROCHLORIDE 0.2 MG: 0.2 INJECTION, SOLUTION INTRAMUSCULAR; INTRAVENOUS; SUBCUTANEOUS at 18:01

## 2023-11-10 RX ADMIN — PROPOFOL 50 MCG/KG/MIN: 10 INJECTION, EMULSION INTRAVENOUS at 12:14

## 2023-11-10 RX ADMIN — HYDROMORPHONE HYDROCHLORIDE 0.5 MG: 1 INJECTION, SOLUTION INTRAMUSCULAR; INTRAVENOUS; SUBCUTANEOUS at 12:52

## 2023-11-10 RX ADMIN — LIDOCAINE HYDROCHLORIDE 50 MG: 10 INJECTION, SOLUTION INFILTRATION; PERINEURAL at 12:14

## 2023-11-10 RX ADMIN — DEXMEDETOMIDINE 0.5 MCG/KG/HR: 100 INJECTION, SOLUTION, CONCENTRATE INTRAVENOUS at 12:15

## 2023-11-10 RX ADMIN — HYDROMORPHONE HYDROCHLORIDE 0.5 MG: 1 INJECTION, SOLUTION INTRAMUSCULAR; INTRAVENOUS; SUBCUTANEOUS at 14:22

## 2023-11-10 RX ADMIN — ATORVASTATIN CALCIUM 20 MG: 10 TABLET, FILM COATED ORAL at 20:44

## 2023-11-10 RX ADMIN — KETAMINE HYDROCHLORIDE 30 MG: 10 INJECTION INTRAMUSCULAR; INTRAVENOUS at 12:35

## 2023-11-10 RX ADMIN — METHOCARBAMOL 750 MG: 750 TABLET, FILM COATED ORAL at 22:33

## 2023-11-10 RX ADMIN — OXYCODONE HYDROCHLORIDE 10 MG: 5 TABLET ORAL at 20:45

## 2023-11-10 RX ADMIN — ONDANSETRON 4 MG: 2 INJECTION INTRAMUSCULAR; INTRAVENOUS at 14:50

## 2023-11-10 RX ADMIN — OXYCODONE HYDROCHLORIDE 5 MG: 5 TABLET ORAL at 16:42

## 2023-11-10 RX ADMIN — MIDAZOLAM 2 MG: 1 INJECTION INTRAMUSCULAR; INTRAVENOUS at 12:07

## 2023-11-10 RX ADMIN — GLYCOPYRROLATE 0.2 MG: 0.2 INJECTION INTRAMUSCULAR; INTRAVENOUS at 14:14

## 2023-11-10 ASSESSMENT — ACTIVITIES OF DAILY LIVING (ADL)
ADLS_ACUITY_SCORE: 35
ADLS_ACUITY_SCORE: 35
ADLS_ACUITY_SCORE: 37
ADLS_ACUITY_SCORE: 35
ADLS_ACUITY_SCORE: 37
ADLS_ACUITY_SCORE: 33

## 2023-11-10 NOTE — ANESTHESIA PREPROCEDURE EVALUATION
Anesthesia Pre-Procedure Evaluation    Patient: Rigo Joyner   MRN: [id number] : 1970        Procedure : Procedure(s):  cervical 5-cervical 6 and cervical 6-cervical 7 anterior discectomy and artificial disc replacements          Past Medical History:   Diagnosis Date    Hyperlipemia     Obese     PONV (postoperative nausea and vomiting)       Past Surgical History:   Procedure Laterality Date    BILATERAL HIP ARTHROSCOPY      GI SURGERY      LAPAROSCOPIC LEFT COLON RESECTION      LUMBAR FUSION      RELEASE CARPAL TUNNEL Left 2022    Procedure: Left carpal tunnel release;  Surgeon: Sandrita Purdy MD;  Location: [facility] Main OR      Allergies   Allergen Reactions    Bee Pollen Cough, Headache and Itching    Pollen Extract Cough, Headache and Itching    Seasonal Allergies Headache, Itching, Rash and Visual Disturbance      Social History     Tobacco Use    Smoking status: Never    Smokeless tobacco: Former   Substance Use Topics    Alcohol use: Not Currently     Comment: Very rare      Wt Readings from Last 1 Encounters:   11/10/23 97.5 kg (215 lb)        Anesthesia Evaluation   Pt has had prior anesthetic.     No history of anesthetic complications       ROS/MED HX  ENT/Pulmonary:  - neg pulmonary ROS     Neurologic:  - neg neurologic ROS     Cardiovascular:     (+) Dyslipidemia - -   -  - -                                      METS/Exercise Tolerance:     Hematologic:  - neg hematologic  ROS     Musculoskeletal:  - neg musculoskeletal ROS     GI/Hepatic:  - neg GI/hepatic ROS     Renal/Genitourinary:  - neg Renal ROS     Endo:     (+)               Obesity,       Psychiatric/Substance Use:  - neg psychiatric ROS     Infectious Disease:  - neg infectious disease ROS     Malignancy:       Other:            Physical Exam    Airway        Mallampati: II   TM distance: > 3 FB   Neck ROM: full   Mouth opening: > 3 cm    Respiratory Devices and Support         Dental           Cardiovascular    "cardiovascular exam normal          Pulmonary   pulmonary exam normal                OUTSIDE LABS:  CBC:   Lab Results   Component Value Date    WBC 5.9 10/27/2023    WBC 6.1 05/26/2022    HGB 16.5 10/27/2023    HGB 15.4 05/26/2022    HCT 48.9 10/27/2023    HCT 44.2 05/26/2022     10/27/2023     05/26/2022     BMP:   Lab Results   Component Value Date     10/27/2023     05/26/2022    POTASSIUM 4.2 10/27/2023    POTASSIUM 4.4 05/26/2022    CHLORIDE 107 10/27/2023    CHLORIDE 106 05/26/2022    CO2 23 10/27/2023    CO2 25 05/26/2022    BUN 11.4 10/27/2023    BUN 13 05/26/2022    CR 0.79 10/27/2023    CR 0.79 05/26/2022     (H) 10/27/2023     05/26/2022     COAGS:   Lab Results   Component Value Date    PTT 28 10/27/2023    INR 1.03 10/27/2023     POC: No results found for: \"BGM\", \"HCG\", \"HCGS\"  HEPATIC:   Lab Results   Component Value Date    ALBUMIN 4.0 07/20/2021    PROTTOTAL 7.0 07/20/2021    ALT 52 07/20/2021    AST 34 07/20/2021    ALKPHOS 48 07/20/2021    BILITOTAL 1.0 07/20/2021     OTHER:   Lab Results   Component Value Date    LACT 1.6 06/10/2021    JOSE 9.4 10/27/2023    LIPASE 119 07/24/2019    CRP 0.2 09/01/2022    SED 5 09/01/2022       Anesthesia Plan    ASA Status:  2       Anesthesia Type: General.     - Airway: ETT   Induction: Intravenous.   Maintenance: Balanced.        Consents            Postoperative Care    Pain management: IV analgesics, Oral pain medications, Multi-modal analgesia.   PONV prophylaxis: Ondansetron (or other 5HT-3), Dexamethasone or Solumedrol     Comments:                Howard Jones MD  "

## 2023-11-10 NOTE — INTERVAL H&P NOTE
"I have reviewed the surgical (or preoperative) H&P that is linked to this encounter, and examined the patient. There are no significant changes    Clinical Conditions Present on Arrival:  Clinically Significant Risk Factors Present on Admission                 # Drug Induced Platelet Defect: home medication list includes an antiplatelet medication  # Obesity: Estimated body mass index is 31.75 kg/m  as calculated from the following:    Height as of this encounter: 5' 9\" (1.753 m).    Weight as of this encounter: 215 lb (97.5 kg).       "

## 2023-11-10 NOTE — BRIEF OP NOTE
[facility] Brief Operative Note    Pre-operative diagnosis: Cervical radiculopathy [M54.12]   Post-operative diagnosis Same    Procedure: Procedure(s):  cervical 5-cervical 6 and cervical 6-cervical 7 anterior discectomy and artificial disc replacements   Surgeon(s): Surgeon(s) and Role:     * Sandrita Purdy MD - Primary     * Sudha Navarrete PA-C - Assisting   Estimated blood loss: 30 cc    Specimens: * No specimens in log *   Findings: Stenosis C5-6> C6-7  Implant Name Type Inv. Item Serial No.  Lot No. LRB No. Used Action   DISC INTVRTB 18MM 7MM PSTG LP [id number] - [id number] Total Joint Component/Insert DISC INTVRTB 18MM 7MM PSTG LP [id number]  [employer] [id number] N/A 1 Implanted   DISC INTVRTB 18MM 6MM PSTG LP [id number] - [id number] Total Joint Component/Insert DISC INTVRTB 18MM 6MM PSTG LP [id number]  [employer] [id number] N/A 1 Implanted

## 2023-11-10 NOTE — ANESTHESIA CARE TRANSFER NOTE
Patient: Rigo Joyner    Procedure: Procedure(s):  cervical 5-cervical 6 and cervical 6-cervical 7 anterior discectomy and artificial disc replacements       Diagnosis: Cervical radiculopathy [M54.12]  Diagnosis Additional Information: No value filed.    Anesthesia Type:   General     Note:    Oropharynx: oropharynx clear of all foreign objects and spontaneously breathing  Level of Consciousness: drowsy  Oxygen Supplementation: face mask  Level of Supplemental Oxygen (L/min / FiO2): 6  Independent Airway: airway patency satisfactory and stable  Dentition: dentition unchanged  Vital Signs Stable: post-procedure vital signs reviewed and stable  Report to RN Given: handoff report given  Patient transferred to: PACU    Handoff Report: Identifed the Patient, Identified the Reponsible Provider, Reviewed the pertinent medical history, Discussed the surgical course, Reviewed Intra-OP anesthesia mangement and issues during anesthesia, Set expectations for post-procedure period and Allowed opportunity for questions and acknowledgement of understanding    Vitals:  Vitals Value Taken Time   /59 11/10/23 1510   Temp     Pulse 76 11/10/23 1511   Resp     SpO2 99 % 11/10/23 1511   Vitals shown include unfiled device data.    Electronically Signed By: ARINA Blum CRNA  November 10, 2023  3:12 PM

## 2023-11-10 NOTE — ANESTHESIA PROCEDURE NOTES
Airway         Procedure Start/Stop Times: 11/10/2023 12:16 PM and 11/10/2023 12:18 PM  Staff -        CRNA: Swapnil Pollard APRN CRNA       Performed By: CRNA  Consent for Airway        Urgency: elective  Indications and Patient Condition       Indications for airway management: francis-procedural       Induction type:intravenous       Mask difficulty assessment: 1 - vent by mask    Final Airway Details       Final airway type: endotracheal airway       Successful airway: ETT - single  Endotracheal Airway Details        ETT size (mm): 7.5       Cuffed: yes       Successful intubation technique: video laryngoscopy       VL Blade Size: Glidescope 4       Grade View of Cords: 1       Adjucts: stylet       Position: Right       Measured from: lips       Secured at (cm): 23       Bite block used: Soft    Post intubation assessment        Placement verified by: capnometry, equal breath sounds and chest rise        Number of attempts at approach: 1       Secured with: silk tape       Ease of procedure: easy       Dentition: Intact and Unchanged    Medication(s) Administered   Medication Administration Time: 11/10/2023 12:16 PM

## 2023-11-10 NOTE — ANESTHESIA POSTPROCEDURE EVALUATION
Patient: Rigo Joyner    Procedure: Procedure(s):  cervical 5-cervical 6 and cervical 6-cervical 7 anterior discectomy and artificial disc replacements       Anesthesia Type:  General    Note:  Disposition: Inpatient   Postop Pain Control: Uneventful            Sign Out: Well controlled pain   PONV: No   Neuro/Psych: Uneventful            Sign Out: Acceptable/Baseline neuro status   Airway/Respiratory: Uneventful            Sign Out: Acceptable/Baseline resp. status   CV/Hemodynamics: Uneventful            Sign Out: Acceptable CV status; No obvious hypovolemia; No obvious fluid overload   Other NRE: NONE   DID A NON-ROUTINE EVENT OCCUR? No           Last vitals:  Vitals Value Taken Time   /66 11/10/23 1600   Temp 36.4  C (97.6  F) 11/10/23 1540   Pulse 66 11/10/23 1623   Resp 14 11/10/23 1623   SpO2 95 % 11/10/23 1623   Vitals shown include unfiled device data.    Electronically Signed By: Howard Jones MD  November 10, 2023  4:24 PM

## 2023-11-11 ENCOUNTER — APPOINTMENT (OUTPATIENT)
Dept: PHYSICAL THERAPY | Facility: CLINIC | Age: 53
DRG: 518 | End: 2023-11-11
Payer: COMMERCIAL

## 2023-11-11 ENCOUNTER — APPOINTMENT (OUTPATIENT)
Dept: RADIOLOGY | Facility: CLINIC | Age: 53
DRG: 518 | End: 2023-11-11
Payer: COMMERCIAL

## 2023-11-11 VITALS
HEART RATE: 69 BPM | DIASTOLIC BLOOD PRESSURE: 69 MMHG | HEIGHT: 69 IN | TEMPERATURE: 97.9 F | RESPIRATION RATE: 16 BRPM | SYSTOLIC BLOOD PRESSURE: 147 MMHG | OXYGEN SATURATION: 98 % | BODY MASS INDEX: 31.84 KG/M2 | WEIGHT: 215 LBS

## 2023-11-11 LAB — GLUCOSE BLDC GLUCOMTR-MCNC: 145 MG/DL (ref 70–99)

## 2023-11-11 PROCEDURE — 250N000011 HC RX IP 250 OP 636: Mod: JZ

## 2023-11-11 PROCEDURE — 97161 PT EVAL LOW COMPLEX 20 MIN: CPT | Mod: GP

## 2023-11-11 PROCEDURE — 250N000013 HC RX MED GY IP 250 OP 250 PS 637

## 2023-11-11 PROCEDURE — 72040 X-RAY EXAM NECK SPINE 2-3 VW: CPT

## 2023-11-11 PROCEDURE — 97116 GAIT TRAINING THERAPY: CPT | Mod: GP

## 2023-11-11 RX ORDER — ASPIRIN 81 MG/1
81 TABLET ORAL DAILY
Status: ON HOLD
Start: 2023-11-15 | End: 2024-04-18

## 2023-11-11 RX ORDER — MELOXICAM 7.5 MG/1
7.5 TABLET ORAL DAILY
Qty: 14 TABLET | Refills: 0 | Status: SHIPPED | OUTPATIENT
Start: 2023-11-11 | End: 2024-02-27

## 2023-11-11 RX ORDER — OXYCODONE HYDROCHLORIDE 5 MG/1
5 TABLET ORAL EVERY 4 HOURS PRN
Qty: 42 TABLET | Refills: 0 | Status: SHIPPED | OUTPATIENT
Start: 2023-11-11 | End: 2024-02-27

## 2023-11-11 RX ORDER — METHOCARBAMOL 750 MG/1
750 TABLET, FILM COATED ORAL EVERY 6 HOURS PRN
Qty: 42 TABLET | Refills: 0 | Status: ON HOLD | OUTPATIENT
Start: 2023-11-11 | End: 2024-04-18

## 2023-11-11 RX ADMIN — ACETAMINOPHEN 975 MG: 325 TABLET ORAL at 09:45

## 2023-11-11 RX ADMIN — CEFAZOLIN SODIUM 2 G: 2 INJECTION, SOLUTION INTRAVENOUS at 03:35

## 2023-11-11 RX ADMIN — GABAPENTIN 600 MG: 300 CAPSULE ORAL at 09:45

## 2023-11-11 RX ADMIN — POLYETHYLENE GLYCOL 3350 17 G: 17 POWDER, FOR SOLUTION ORAL at 09:46

## 2023-11-11 RX ADMIN — THERA TABS 1 TABLET: TAB at 09:46

## 2023-11-11 RX ADMIN — OXYCODONE HYDROCHLORIDE 5 MG: 5 TABLET ORAL at 06:09

## 2023-11-11 RX ADMIN — METHOCARBAMOL 750 MG: 750 TABLET, FILM COATED ORAL at 10:18

## 2023-11-11 RX ADMIN — SENNOSIDES AND DOCUSATE SODIUM 1 TABLET: 8.6; 5 TABLET ORAL at 09:46

## 2023-11-11 RX ADMIN — ACETAMINOPHEN 975 MG: 325 TABLET ORAL at 02:01

## 2023-11-11 RX ADMIN — OXYCODONE HYDROCHLORIDE 10 MG: 5 TABLET ORAL at 02:01

## 2023-11-11 RX ADMIN — HYDROMORPHONE HYDROCHLORIDE 0.4 MG: 0.2 INJECTION, SOLUTION INTRAMUSCULAR; INTRAVENOUS; SUBCUTANEOUS at 00:57

## 2023-11-11 ASSESSMENT — ACTIVITIES OF DAILY LIVING (ADL)
ADLS_ACUITY_SCORE: 37

## 2023-11-11 NOTE — DISCHARGE SUMMARY
HOSPITAL DISCHARGE SUMMARY         Patient Name: Rigo Joyner  YOB: 1970  Age: 53 year old  Medical Record Number: [id number]  Primary Physician: Semmler, Steven Duane  Admission Date: 11/10/2023.  Discharge Date:11/11/2023      He will be discharged from [facility] to home    PRINCIPAL DIAGNOSIS CAUSING ADMISSION: S/P cervical disc replacement    Discharge Diagnoses:  Principal Problem:    S/P cervical disc replacement      HPI:Rigo Joyner comes today in follow-up. He is s/p left CTR on 6/8/22 as well as L3-4 instrumented fusion with Dr Tobias. Ongoing left arm pain. Diffuse in nature. SNRI at C6 and C7 both gave partial relief. Updated MRI of his cervical spine Tried SCS trial with some relief of lumbar symptoms but not full. Got a seocdary opinion with [facility] who agreed with no further spine surgery. He then followed with ortho for his hips. He at this time will continue conservative management for his lumbar disease. Cervical xray shows straightening of the normal lordosis without instability. MRI of his cervical spine shows multilevel spondylosis with mild spinal canal stenosis at multi-levels and mild to moderate at cervical 5-6. In addition has multiple levels of foraminal narrowing including severe at left cervical 4-5 and bilateral cervical 5-6 and cervical 6-7. Symptoms appear to be most consistent with C6 and C7 radiculopathy. We discussed could be additional C5 radiculopathy but since he responded to the SNRI at C6 and C7 would favor treating these levels currently only. Recommend cervical 5-cervical 7 anterior decompression and artifical disc replacements. Risks of anterior neck surgery include but are not limited to inadequate symptom relief, nerve or spinal cord damage, durotomy, hematoma, infection, injury to trachea or esophagus, speech disturbance from injury to the recurrent laryngeal nerve, swallowing difficulties, hardware malfunction. He agreed to  proceed.     BRIEF HOSPITAL COURSE: Rigo Joyner is a 53 year old male who was admitted on day of surgery and underwent Procedure(s):  cervical 5-cervical 6 and cervical 6-cervical 7 anterior discectomy and artificial disc replacements.  Surgery was without complications.  Postoperatively he reported posterior cervical tightness with accompanied headaches but stated that his radicular upper extremity pain feels slightly improved.   On exam on day of discharge, his strength was 5/5 with no new focal deficits.  PT/OT consultations were obtained to assess function, assist with mobilization, and evaluate for discharge recommendations.  By POD # 0 he was tolerating a regular diet, ambulating, voiding without difficulty, and obtaining good pain control on oral medication.  His incision was clean, dry and intact.   He met all discharge criteria and was stable for discharge.      PROCEDURES PERFORMED DURING HOSPITALIZATION: Procedure(s):  cervical 5-cervical 6 and cervical 6-cervical 7 anterior discectomy and artificial disc replacements     COMPLICATIONS IN HOSPITAL:  None.    IMPORTANT PENDING TEST RESULTS: None.    PERTINENT FINDINGS/RESULTS AT DISCHARGE:  None.    CONDITION AT DISCHARGE:  improving    DISCHARGE MEDICATIONS  Robaxin   Oxycodone   Mobic       AFTER DISCHARGE ORDERS AND INSTRUCTIONS:    Follow up with Neurosurgery: in 2  weeks  Follow up appointment with Primary Care Physician: Semmler, Steven Duane as needed  Diet: regular diet  Activity: *No lifting, pushing or pulling greater than 5-10 pounds (this is about a gallon of milk).  *No repetitive bending, twisting, or jarring activities  *No overhead work  *No aerobic or strenuous activity  *No activities with increased risk of falls  *You may move about your home as tolerated  *You may walk up and down stairs as tolerated  *You may increase your activity slowly over the next 4-6 weeks    WALKING PROGRAM: As you can tolerate, walk daily-start with  5-10 minutes of continuous walking. This is in addition to the walking that you do as part of your daily activities. Increase the time that you walk by 5 minutes every couple of days. Do not exceed 30-45 minutes of continuous walking until seen in follow-up. Walking is the best exercise after surgery.  **Listen to your body, if you find that you are more painful or fatigued, you may need to proceed more slowly.    **Do not smoke or expose yourself to second hand smoke. Cigarette smoke can delay healing and cause complications.     DRIVING:  We recommend that you do not drive while taking medications for pain or muscle spasms. Always read and follow the advice on your prescription bottle. If you have questions, speak with your pharmacist.  We recommend that you do not drive while wearing a brace, as it could limit your range of motion.    WORK: If you plan to return to work before your 4-6 week appointment, call and discuss with one of the nurses in the neurosurgery office.     USE OF ICE OR  HEAT:  *Icing can decrease post op swelling, thereby helping with post op pain control. Always protect your skin to prevent frostbite or burn.    *For the first 48 hours we recommend ice to the surgical area for 15-20 minutes at a time several times a day.      *Any longer than 15-20 minutes can cause damage to the tissues, including frostbite.     *Allow area to warm for at least 45 minutes or an hour between treatments.    *Ice as frequently as you wish, so long as the area is warm to touch and has normal sensation before repeating.    *After 48 hours, you may use heat or ice, which ever feels best to you.  Always remember to protect your skin, and to use no greater than 15-20 minutes at a time.     *You can make your own ice bags at home by mixing 3 parts water with 1 part rubbing alcohol in a Ziplock bag and placing in the freezer.  It will not freeze solid.      BOWEL MOVEMENT:  If you did not have a bowel movement (pooped)  before you were discharged from the hospital, and do not have a bowel movement within 2 days of discharge. Please call our office and request to speak to a nurse.    Your insurance may not cover medications to treat or prevent  constipation.      There are many  over the counter medications for constipation including: Colace, Senakot, Dulcolax, and Miralax. In addition to medications eat a high fiber diet and drink plenty of water.    If you are taking narcotics, you should being taking medication daily for constipation.      If you develop loose or runny stools, stop taking the medications for constipation.   Warnings: Call [phone] with the following symptoms:    *Temperature 101(fever) or greater or chills  *Redness, swelling or increased drainage from the wound  *Worsening pain not relieved by the pain prescription given  *Worsening or new onset of weakness, or numbness and tingling  *Loss or change in your ability to control bowel or bladder function  *Change in your ability to walk, talk, see or think  *If you did not have a bowel movement before leaving the hospital and your bowels have not moved within 48 hours of your discharge    !!!! IF YOU HAVE A SERIOUS OR THREATENING EMERGENCY, CALL 911 OR COME TO THE EMERGENCY ROOM.  IF YOUR CONDITIONS ALLOWS COME TO THE HOSPITAL WHERE YOUR SURGERY WAS PERFORMED.    QUESTIONS OR CONCERNS:   OUR OFFICE HOURS ARE FROM 9:00 A.M -4:30 P.M. MONDAY-FRIDAY.  AFTER OFFICE HOURS, CALLS ARE ANSWERED BY THE ON-CALL PROVIDER. PLEASE CALL WITH ROUTINE QUESTIONS DURING OFFICE HOURS. THE ON-CALL PROVIDER WILL NOT REFILL PRESCRIPTIONS.   Wound care:WOUND CARE:  A dressing is not required.      Wash your hands before touching the wound.  Ensure that anyone assisting you in the care of your wound washes her/his hands before touching the wound. Good handwashing can decrease the risk of serious infection.    If you are unable to see your wound, have someone check the wound daily  for redness, swelling,or drainage.   You may shower. Wash your wound daily.  Apply mild soap directly to the wound, form a light lather and rinse with running water. Pat the wound dry.  Do not rub, pick, or otherwise help the dermabond come off more quickly.  Do not place tape or adhesive over the dermabond.    Do not submerge the wound under water. No baths or swimming for 6 weeks.     If you develop redness, swelling, drainage, or temp 101 or greater, call our office at [phone]    You may have an appointment in 7-10 days to have your wound checked.       Elizabeth Alonso PA-C  [facility]  O: [phone]

## 2023-11-11 NOTE — PROGRESS NOTES
Pt is not appropriate for skilled OT services at this time due to pt independent with ADL.  Will defer to PT for POC.  Plan was discussed with PT/RN.  Will D/C current OT orders. Thank you.    11/11/2023 by Theodora Dorado, OT, OTR/L

## 2023-11-11 NOTE — PLAN OF CARE
Goal Outcome Evaluation:  Patient alert and oriented to room 18, call light, rounding, VSS on room air.  Patient's CMS intact.  Dressing to neck clean, dry, intact with ESEQUIEL drain putting out 10cc bloody drainage.  Patient reported pain 4-6 in bilateral shoulders, given prn 5mg oxycodone without relief, given prn IV dilaudid and able to rest post.  Patient then given 10mg oxycodone and reported pain mildly improved, given prn Robaxin with minimal relief.  Lia DO for nights to continue promoting pain management.  Patient tolerating getting up with 1 assist, gait belt, denies dizziness.  Patient tolerated clear liquids and full liquids, bowel sounds active, denies nausea.  Patient plans to discharge back home when able.  At shift change patient called out that ESEQUIEL drain disconnected, ends covered with sterile dressing and Lia DO for nights paging surgery team to clarify ESEQUIEL drain care.

## 2023-11-11 NOTE — PROGRESS NOTES
11/11/23 0815   Appointment Info   Signing Clinician's Name / Credentials (PT) Olga Lidia Harvey, PT DPT [facility]   Living Environment   People in Home spouse   Current Living Arrangements house   Transportation Anticipated family or friend will provide   General Information   Onset of Illness/Injury or Date of Surgery 11/10/23   Referring Physician Sandrita Purdy   Patient/Family Therapy Goals Statement (PT) return home with spouse   Existing Precautions/Restrictions no known precautions/restrictions   Cognition   Orientation Status (Cognition) oriented x 4   Pain Assessment   Patient Currently in Pain   (pain rated 3-4/10)   Range of Motion (ROM)   ROM Comment B LE grossly WNL   Strength (Manual Muscle Testing)   Strength Comments B LE grossly > 3/5   Bed Mobility   Comment, (Bed Mobility) able to perform bed mobility with supervision and HOB elevated with use of rails. Patient safe with technique   Transfers   Comment, (Transfers) Patient able to perform sit <> stand transfers wth FWW and supervision with safe technique   Gait/Stairs (Locomotion)   Atkins Level (Gait) supervision   Assistive Device (Gait) walker, front-wheeled   Distance in Feet (Gait) 10   Pattern (Gait) step-through;swing-through   Deviations/Abnormal Patterns (Gait) gait speed decreased;stride length decreased   Clinical Impression   Criteria for Skilled Therapeutic Intervention Yes, treatment indicated   PT Diagnosis (PT) impaired funcitonal mobility   Influenced by the following impairments pain, weakness   Functional limitations due to impairments gait, transfers   Clinical Presentation (PT Evaluation Complexity) stable   Clinical Presentation Rationale pt presents as medically diagnosed   Clinical Decision Making (Complexity) low complexity   Planned Therapy Interventions (PT) gait training;transfer training   Risk & Benefits of therapy have been explained evaluation/treatment results reviewed;risks/benefits reviewed;patient   PT  Total Evaluation Time   PT Eval, Low Complexity Minutes (16011) 10   Physical Therapy Goals   PT Frequency One time eval and treatment only   PT Predicted Duration/Target Date for Goal Attainment 11/14/23   PT Goals Gait   PT: Gait Supervision/stand-by assist;Rolling walker;150 feet;Goal Met;Completed   Interventions   Interventions Quick Adds Gait Training   Gait Training   Gait Training Minutes (97140) 10   Symptoms Noted During/After Treatment (Gait Training) fatigue;increased pain   Treatment Detail/Skilled Intervention Patient able to walk with FWW and supervision x 150 ft.  Demonstrated decreased gait speed and B decreased step length.   PT Discharge Planning   PT Plan PT for eval and 1x treatment   PT Discharge Recommendation (DC Rec) home with assist   PT Brief overview of current status Patient able to perfrom functional mobility and gait safely with suprevision and FWW   Total Session Time   Timed Code Treatment Minutes 10   Total Session Time (sum of timed and untimed services) 20

## 2023-11-11 NOTE — PLAN OF CARE
Problem: Spinal Surgery  Goal: Absence of Infection Signs and Symptoms  Outcome: Progressing  Intervention: Prevent or Manage Infection  Recent Flowsheet Documentation  Taken 11/11/2023 0050 by Lia Lucas, RN  Infection Prevention:   rest/sleep promoted   single patient room provided   Problem: Spinal Surgery  Goal: Optimal Pain Control and Function  Intervention: Prevent or Manage Pain  Recent Flowsheet Documentation  Taken 11/11/2023 0201 by Lia Lucas, RN  Pain Management Interventions: medication (see MAR)  Taken 11/11/2023 0057 by Lia Lucas, RN  Pain Management Interventions: medication (see MAR)      A&Ox4,. VSS, on RA. C/o pain in posterior medial back between shoulder blades and a H/A. No blurry vision with H/A. Needed a dose of IV Dilaudid for breakthrough pain. Also gave PRN 10 mg Oxycodone; effective for pain control. Utilizing ice too. ESEQUIEL drain in place after re-connecting it. Minimal output; drainage is bloody. Dressing on neck C/D/I. CMS intact, no deficits. Using urinal at bedside. Walked in halls on shift; tolerated well. Discharge pending.

## 2023-11-11 NOTE — PROGRESS NOTES
Physical Therapy Discharge Summary    Reason for therapy discharge:    Discharged to home.    Progress towards therapy goal(s). See goals on Care Plan in [facility] electronic health record for goal details.  Goals met    Therapy recommendation(s):    Recommend outpatient PT when appropriate

## 2023-11-11 NOTE — PROGRESS NOTES
Neurosurgery Progress Note: 11/11/2023       A/P: Mr. Joyner is a 53 year old male who is POD#1 cervical 5-cervical 6 and cervical 6-cervical 7 anterior discectomy and artificial disc replacements  by Dr Purdy     Presently states that he is doing okay has slight posterior cervical tightness but feels that his previous left radicular arm pain has slightly improved. Has soreness with swallowing but tolerated solids with breakfast. Voiding without difficulty      Plan:  1. Remove drain   2. Increase activity with PT/OT  3. Okay to discharge with follow up as scheduled      Neurosurgery Attending: The patient's clinical examination, laboratory data, and plan was discussed with Dr. Purdy     HPI: Rigo Joyner comes today in follow-up. He is s/p left CTR on 6/8/22 as well as L3-4 instrumented fusion with Dr Tobias. Ongoing left arm pain. Diffuse in nature. SNRI at C6 and C7 both gave partial relief. Updated MRI of his cervical spine Tried SCS trial with some relief of lumbar symptoms but not full. Got a seocdary opinion with [facility] who agreed with no further spine surgery. He then followed with ortho for his hips. He at this time will continue conservative management for his lumbar disease. Cervical xray shows straightening of the normal lordosis without instability. MRI of his cervical spine shows multilevel spondylosis with mild spinal canal stenosis at multi-levels and mild to moderate at cervical 5-6. In addition has multiple levels of foraminal narrowing including severe at left cervical 4-5 and bilateral cervical 5-6 and cervical 6-7. Symptoms appear to be most consistent with C6 and C7 radiculopathy. We discussed could be additional C5 radiculopathy but since he responded to the SNRI at C6 and C7 would favor treating these levels currently only. Recommend cervical 5-cervical 7 anterior decompression and artifical disc replacements. Risks of anterior neck surgery include but are not limited to  "inadequate symptom relief, nerve or spinal cord damage, durotomy, hematoma, infection, injury to trachea or esophagus, speech disturbance from injury to the recurrent laryngeal nerve, swallowing difficulties, hardware malfunction. He agreed to proceed.      S:   Patient states that he is doing okay has complaint of posterior cervical tightness notes that previous left radicular arm pain feels slightly improved denies any numbness or tingling.      O:  BP (!) 147/69 (BP Location: Right arm)   Pulse 69   Temp 97.9  F (36.6  C) (Temporal)   Resp 16   Ht 1.753 m (5' 9\")   Wt 97.5 kg (215 lb)   SpO2 98%   BMI 31.75 kg/m       General: Awake, alert, NAD.      Motor: normal bulk and tone     Strength: full strength in all extremities throughout, bilaterally.     Sensation: intact to light touch throughout both upper and lower extremities     Incision: dressing clean dry and intact      ESEQUIEL drain: 5mL/8hrs overnight     Xray reviewed personally with good hardware alignment      Elizabeth Alonso PA-C  [facility] Neurosurgery  O: [phone]   "

## 2023-11-11 NOTE — PROVIDER NOTIFICATION
Called LYNDSAY, Elizabeth Alonso, regarding ESEQUIEL drain becoming dislodged from tubing. Provider okay with reconnecting tubing at this time

## 2023-11-14 NOTE — OP NOTE
NEUROSURGERY OPERATIVE REPORT     DATE OF SERVICE: 11/10/2023     PREOPERATIVE DIAGNOSES:  Cervical radiculopathy     POSTOPERATIVE DIAGNOSES:  same    PROCEDURE:   1.  Cervical 5-cervical 6 and cervical 6-cervical 7 anterior discectomy and artificial disc replacements   2.  Use of operating room microscope.     SURGEON: Sandrita Purdy MD    ASSISTANT:  Sudha Navarrete PA-C     INDICATIONS:  Rigo Joyner is a 53 year old male who presents with history of left CTR on 6/8/22 as well as L3-4 instrumented fusion with Dr Tobias who presents with ongoing left arm pain. Cervical xray shows straightening of the normal lordosis without instability. MRI of his cervical spine shows multilevel spondylosis with mild spinal canal stenosis at multi-levels and mild to moderate at cervical 5-6. In addition has multiple levels of foraminal narrowing including severe at left cervical 4-5 and bilateral cervical 5-6 and cervical 6-7. Symptoms appear to be most consistent with C6 and C7 radiculopathy. Recommend cervical 5-cervical 7 anterior decompressions and artifical disc replacements. Risks of anterior neck surgery include but are not limited to inadequate symptom relief, nerve or spinal cord damage, durotomy, hematoma, infection, injury to trachea or esophagus, speech disturbance from injury to the recurrent laryngeal nerve, swallowing difficulties, hardware malfunction. He agreed to proceed.       PROCEDURE:  After obtaining informed consent, the patient was brought to the operating room with pneumatic stockings in place.  IV antibiotics was administered. He was intubated under general endotracheal anesthesia. He was positioned supine on the operating room table with a bump under the shoulders support behind the neck and the head cradled in a soft headrest leaving the neck in a slightly extended position.  His shoulder were taped and retracted as needed to expose the anterior neck for a skin incision. He was  prepped and draped in the usual sterile fashion.        The right sided anterior neck incision was opened sharply and extended down to the platysma.  The platysma wasopened in one layer with sharp dissection and undercut superiorly and inferiorly for ease of reapproximation at the end of the procedure.  We next continued the dissection medial to this sternocleidomastoid muscle. After identifying the carotid and gently retracting it laterally, as well as identifying the trachea and esophagus which were gently retracted medially with hand-held retractors, to open the dissection.  We continued the dissection sharply and once reaching the loose  Areolar plane we used blunt dissection down to the anterior surface of the vertebral bodies.   We placed a short marker needle in the disc space to verify levels with a lateral x-ray, cervical 6-7.     Once levels were confirmed we then proceeded to elevate the longus coli muscles bilaterally and placed our retractors.  We then used a [place] retractor system into the anterior surface of cervical 6 and cervical 7 used to achieve distraction after cutting the anterior ligament. The operating microscope was the utilized for the remainder of the case.  The disc was removed with a combination of high speed air drill, Kerrison, curettes and pituitary grabbers until the end plates were free of disc material. The posterior longitudinal ligament was opened with a blunt nerve hook. The remainder of the disc was removed with Kerrison rongeurs. We verified that the foramen were open with a blunt tip nerve hook. Next we trialed our disc replacement device. We then drilled and cut the troughs. We then place the disc replacement device at cervical 6-7. With the graft in good position we released the Dayton pins and verified a nice solid fit.  We removed the Dayton distractor pins and filled the screw holes with bone wax.  Then proceeded to the cervical 5-6 level and repeated the steps above  again trialing and placing her disc replacement device after drilling and cutting her trough.  Once this was completed we took anterior posterior and lateral x-rays which showed adequate position of the grafts.    An assistant was needed for retraction, positioning, closure.    Once the construct was in good position, we copiously irrigated the incision with antibiotic-containing saline and achieved hemostasis, and remove the retractor system.  We verified good pulsation in the carotid.  We verified no injury to the trachea /esophagus.  A drain was placed. We closed the platysma with interrupted 2-0 Vicryl ,  inverted 2-0 Vicryl to approximate the subcutaneous tissues and Monocryl to approximate the skin edges.   Dermabond was placed. Sponge and needle counts were correct prior to closure x2.      Patient tolerated the procedure well, was taken to the recovery room at neurological baseline with no new deficits appreciated.     ESTIMATED BLOOD LOSS: 30 CC    SPECIMENS: none    FINDINGS: Stenosis C5-6> C6-7     IMPLANTS:   Implant Name Type Inv. Item Serial No.  Lot No. LRB No. Used Action   DISC INTVRTB 18MM 7MM PSTG LP [id number] - [id number] Total Joint Component/Insert DISC INTVRTB 18MM 7MM PSTG LP [id number]  [employer] [id number] N/A 1 Implanted   DISC INTVRTB 18MM 6MM PSTG LP [id number] - [id number] Total Joint Component/Insert DISC INTVRTB 18MM 6MM PSTG LP [id number]  [employer] [id number] N/A 1 Implanted          Sandrita Purdy MD    Cc: Semmler, Steven Duane

## 2023-11-28 ENCOUNTER — ALLIED HEALTH/NURSE VISIT (OUTPATIENT)
Dept: NEUROSURGERY | Facility: CLINIC | Age: 53
End: 2023-11-28
Payer: COMMERCIAL

## 2023-11-28 VITALS — HEART RATE: 72 BPM | RESPIRATION RATE: 18 BRPM | SYSTOLIC BLOOD PRESSURE: 131 MMHG | DIASTOLIC BLOOD PRESSURE: 68 MMHG

## 2023-11-28 DIAGNOSIS — M54.12 CERVICAL RADICULOPATHY: Primary | ICD-10-CM

## 2023-11-28 PROCEDURE — 99207 PR NO CHARGE NURSE ONLY: CPT

## 2023-11-28 NOTE — PROGRESS NOTES
Rigo Joyner is status post cervical 5-cervical 6 and cervical 6-cervical 7 anterior discectomy and artificial disc replacements on 11/10/2023 with Dr. Purdy.  Preoperatively presented with ongoing left arm pain.    Today he returns in follow up for wound check. He is doing reasonably well - reports much improved arm pain. Endorses muscle ache in left triceps associated with ROM. Denies numbness or tingling in UE. /grasp WNL. No weakness. Takes Advil prn. Inquires about switching to a different MR as Robaxin is not beneficial. Gait and balance are normal.     Surgical wound WNL - CDI, no signs of infection or skin breakdown.  Incision well-healed: good skin approximation, no redness or visible/palpable edema, no tenderness to palpation.  PT. AF, denies fever, chills or sweats.  Pt. reports that the symptoms are improved from pre-op.    Mira Rios, RN, CNRN

## 2023-12-28 ENCOUNTER — HOSPITAL ENCOUNTER (OUTPATIENT)
Dept: RADIOLOGY | Facility: HOSPITAL | Age: 53
Discharge: HOME | End: 2023-12-28
Attending: SURGERY | Admitting: SURGERY
Payer: COMMERCIAL

## 2023-12-28 ENCOUNTER — OFFICE VISIT (OUTPATIENT)
Dept: NEUROSURGERY | Facility: CLINIC | Age: 53
End: 2023-12-28
Payer: COMMERCIAL

## 2023-12-28 VITALS — SYSTOLIC BLOOD PRESSURE: 128 MMHG | DIASTOLIC BLOOD PRESSURE: 72 MMHG | HEART RATE: 72 BPM | OXYGEN SATURATION: 96 %

## 2023-12-28 DIAGNOSIS — M54.12 CERVICAL RADICULOPATHY: ICD-10-CM

## 2023-12-28 DIAGNOSIS — M54.12 CERVICAL RADICULOPATHY: Primary | ICD-10-CM

## 2023-12-28 PROCEDURE — 72040 X-RAY EXAM NECK SPINE 2-3 VW: CPT

## 2023-12-28 PROCEDURE — 99024 POSTOP FOLLOW-UP VISIT: CPT | Performed by: PHYSICIAN ASSISTANT

## 2023-12-28 ASSESSMENT — PAIN SCALES - GENERAL: PAINLEVEL: MODERATE PAIN (5)

## 2023-12-28 NOTE — NURSING NOTE
"Rigo Joyner is a 53 year old male who presents for:  Chief Complaint   Patient presents with    RECHECK     L neck to wrist pain        Initial Vitals:  /72   Pulse 72   SpO2 96%  Estimated body mass index is 31.75 kg/m  as calculated from the following:    Height as of 11/10/23: 5' 9\" (1.753 m).    Weight as of 11/10/23: 215 lb (97.5 kg)..BP completed using cuff size: regular  Moderate Pain (5)    Venancio Cobb    "

## 2023-12-28 NOTE — LETTER
12/28/2023         RE: Rigo Joyner  [address] [facility] [address]        Dear Colleague,    Thank you for referring your patient, Rigo Joyner, to the [facility]. Please see a copy of my visit note below.    Neurosurgery Progress Note: 12/28/2023     A/P: postoperative cervical 5-cervical 6 and cervical 6-cervical 7 anterior discectomy and artificial disc replacements on 11/10/2023 by Dr. Purdy       Plan:Patient discussed with Dr. Purdy and recommendations to begin physical therapy focusing on cervical mobility and strength. Will also obtain upper extremity EMG for further evaluation of his continued cervical radicular pain with noted degenerative changes at C4-5. Will plan to follow up in 6 weeks with Dr. Prudy for further evaluation. Also will attempt to obtain updated lumbar MRI that was completed at [facility] for further evaluation of which continued low back and radicular lower extremity pain.       Mr. Joyner is a 53 year old male who presents postoperative cervical 5-cervical 6 and cervical 6-cervical 7 anterior discectomy and artificial disc replacements on 11/10/2023 by Dr. Purdy. Presently he states that he is doing okay but has continued posterior cervical pain that will radiate into his bilateral shoulders (L>R) and notes that the pain will extend into his mid bicep. He states that on occasion he will still get twinge of pain into his forearm and wrist on the left but not nearly as severe or as frequent as it was prior to surgery. He notes continued severe low back pain to the left of his spine that will radiate into his buttocks and left anterior thigh as well as wrap into his medial shin and great toe that he notes had been present since his L3-4 instrumented fusion with Dr Tobias in 2020 though has persisted and worsened since then. He feels similar sensation intermittently into his right lower extremity but states that it is  not painful. He recently completely lumbar MRI at [facility] [place] with [place] [facility].       HPI:  Rigo Joyner is a 53 year old male who presents with history of left CTR on 6/8/22 as well as L3-4 instrumented fusion with Dr Tobias who presents with ongoing left arm pain. Cervical xray shows straightening of the normal lordosis without instability. MRI of his cervical spine shows multilevel spondylosis with mild spinal canal stenosis at multi-levels and mild to moderate at cervical 5-6. In addition has multiple levels of foraminal narrowing including severe at left cervical 4-5 and bilateral cervical 5-6 and cervical 6-7. Symptoms appear to be most consistent with C6 and C7 radiculopathy. Recommend cervical 5-cervical 7 anterior decompressions and artifical disc replacements. Risks of anterior neck surgery include but are not limited to inadequate symptom relief, nerve or spinal cord damage, durotomy, hematoma, infection, injury to trachea or esophagus, speech disturbance from injury to the recurrent laryngeal nerve, swallowing difficulties, hardware malfunction. He agreed to proceed.      Exam:  /72   Pulse 72   SpO2 96%     General: alert and oriented x3     Strength is 5/5 throughout both upper extremities throughout.    Sensation is intact throughout both upper and lower extremities    Gait is smooth and coordinated    Incision: well healed without erythema, induration, or drainage    Tenderness to palpation of lower lumbar spine and bilateral buttocks (L>R)     Imaging:  Xray reviewed personally  IMPRESSION:   Postsurgical changes disc arthroplasty at C5/C6 and C6/C7. No hardware complication. The vertebral bodies of the cervical spine have normal stature and alignment. The disc spaces are well-maintained. No significant degenerative changes. Soft tissues   unremarkable.    Elizabeth Alonso PA-C  [facility]  O: [phone]       Again, thank you for allowing me to participate in the  care of your patient.        Sincerely,        Elizabeth Alonso PA-C

## 2023-12-28 NOTE — PATIENT INSTRUCTIONS
Patient discussed with Dr. Purdy and recommendations to begin physical therapy focusing on cervical mobility and strength. Will also obtain upper extremity EMG for further evaluation of his continued cervical radicular pain with noted degenerative changes at C4-5. Will plan to follow up in 6 weeks with Dr. Purdy for further evaluation. Also will attempt to obtain updated lumbar MRI that was completed at [facility] for further evaluation of which continued low back and radicular lower extremity pain.

## 2023-12-28 NOTE — PROGRESS NOTES
Neurosurgery Progress Note: 12/28/2023     A/P: postoperative cervical 5-cervical 6 and cervical 6-cervical 7 anterior discectomy and artificial disc replacements on 11/10/2023 by Dr. Purdy       Plan:Patient discussed with Dr. Purdy and recommendations to begin physical therapy focusing on cervical mobility and strength. Will also obtain upper extremity EMG for further evaluation of his continued cervical radicular pain with noted degenerative changes at C4-5. Will plan to follow up in 6 weeks with Dr. Purdy for further evaluation. Also will attempt to obtain updated lumbar MRI that was completed at [facility] for further evaluation of which continued low back and radicular lower extremity pain.       Mr. Joyner is a 53 year old male who presents postoperative cervical 5-cervical 6 and cervical 6-cervical 7 anterior discectomy and artificial disc replacements on 11/10/2023 by Dr. Purdy. Presently he states that he is doing okay but has continued posterior cervical pain that will radiate into his bilateral shoulders (L>R) and notes that the pain will extend into his mid bicep. He states that on occasion he will still get twinge of pain into his forearm and wrist on the left but not nearly as severe or as frequent as it was prior to surgery. He notes continued severe low back pain to the left of his spine that will radiate into his buttocks and left anterior thigh as well as wrap into his medial shin and great toe that he notes had been present since his L3-4 instrumented fusion with Dr Tobias in 2020 though has persisted and worsened since then. He feels similar sensation intermittently into his right lower extremity but states that it is not painful. He recently completely lumbar MRI at [facility] with [place] [facility].       HPI:  Rigo Joyner is a 53 year old male who presents with history of left CTR on 6/8/22 as well as L3-4 instrumented fusion with Dr Tobias who presents with ongoing left arm pain.  Cervical xray shows straightening of the normal lordosis without instability. MRI of his cervical spine shows multilevel spondylosis with mild spinal canal stenosis at multi-levels and mild to moderate at cervical 5-6. In addition has multiple levels of foraminal narrowing including severe at left cervical 4-5 and bilateral cervical 5-6 and cervical 6-7. Symptoms appear to be most consistent with C6 and C7 radiculopathy. Recommend cervical 5-cervical 7 anterior decompressions and artifical disc replacements. Risks of anterior neck surgery include but are not limited to inadequate symptom relief, nerve or spinal cord damage, durotomy, hematoma, infection, injury to trachea or esophagus, speech disturbance from injury to the recurrent laryngeal nerve, swallowing difficulties, hardware malfunction. He agreed to proceed.      Exam:  /72   Pulse 72   SpO2 96%     General: alert and oriented x3     Strength is 5/5 throughout both upper extremities throughout.    Sensation is intact throughout both upper and lower extremities    Gait is smooth and coordinated    Incision: well healed without erythema, induration, or drainage    Tenderness to palpation of lower lumbar spine and bilateral buttocks (L>R)     Imaging:  Xray reviewed personally  IMPRESSION:   Postsurgical changes disc arthroplasty at C5/C6 and C6/C7. No hardware complication. The vertebral bodies of the cervical spine have normal stature and alignment. The disc spaces are well-maintained. No significant degenerative changes. Soft tissues   unremarkable.    Elizabeth Alonso PA-C  [facility]surgery  O: [phone]

## 2024-01-11 ENCOUNTER — OFFICE VISIT (OUTPATIENT)
Dept: PHYSICAL MEDICINE AND REHAB | Facility: CLINIC | Age: 54
End: 2024-01-11
Attending: PHYSICIAN ASSISTANT
Payer: COMMERCIAL

## 2024-01-11 DIAGNOSIS — M54.12 CERVICAL RADICULOPATHY: ICD-10-CM

## 2024-01-11 PROCEDURE — 95886 MUSC TEST DONE W/N TEST COMP: CPT | Mod: LT | Performed by: PHYSICAL MEDICINE & REHABILITATION

## 2024-01-11 PROCEDURE — 95909 NRV CNDJ TST 5-6 STUDIES: CPT | Performed by: PHYSICAL MEDICINE & REHABILITATION

## 2024-01-11 NOTE — PATIENT INSTRUCTIONS
Thank you for choosing the [facility] for your EMG testing.    The ordering provider will receive your final EMG results within the next few days.  Please follow up with your provider for the results and further treatment recommendations.

## 2024-01-11 NOTE — PROGRESS NOTES
[facility]  [address] [place] [address] [address]  [place] [address]  Office: [phone] Fax: [phone]    Electromyography and Nerve Conduction Study Report        Indication: Patient presents at the request of Elizabeth Alonso CNP for left upper extremity EMG.  He has left-sided cervical spine shoulder pain and arm pain into the wrist.  Status post disc arthroplasty x 2 two months ago.  On exam he has normal sensation to light touch throughout the upper extremities, normal reflexes through the upper extremities, with negative Cami's, and normal muscle strength throughout the major muscle groups of the bilateral upper extremities.        Pt Exam Discussion (Communication Barriers):  Electromyography and nerve conduction testing, including associated discomfort, risks, benefits, and alternatives was discussed with the patient prior to the procedure.  No learning/ communication barriers; patient verbalized understanding of procedure.  Informed consent was obtained.           Pt Assessment:  Testing was successfully completed; patient tolerated testing well.       Blood Thinners: ASA Skin Temperature: Warmed 32.0                     EMG/NCS  results:     Nerve Conduction Studies  Motor Sites      Segment Distal Latency Neg. Amp CV F-Latency F-Estimate Comment   Site  (ms) (mV) (m/s) (ms) (ms)    Left Median (APB) Motor   Wrist Wrist-APB 3.4 10.6       Elbow Elbow-Wrist 7.5 9.7 59      Left Ulnar (ADM) Motor   Wrist  2.2 12.4       Bel Elbow Bel Elbow-Wrist 6.0 11.1 63      Ab Elbow Ab Elbow-Wrist 7.5 11.0 67        Sensory Sites      Onset Lat Peak Lat Amp CV Comment   Site (ms) (ms) ( V) (m/s)    Left Median Anti Sensory   Wrist-Dig II 2.4 2.8 30 54    Left Median-Ulnar Palmar Sensory        Median   Palm-Wrist 1.43 1.83 57 56         Ulnar   Palm-Wrist 1.08 1.65 21 74    Left Radial Sensory   Forearm-Wrist 1.50 2.0 26 67    Left Ulnar Anti Sensory   Wrist-Dig V 1.88 2.4 26 59         NCS Waveforms:    Motor         Sensory                  Electromyography     Side Muscle Nerve Root Ins Act Fibs Psw Fasc Recrt Dur Amp Poly Comment   Left Deltoid Axillary C5-6 Nml Nml Nml Nml Nml Nml Nml 0    Left Triceps Radial C6-7-8 Nml Nml Nml Nml Nml Nml Nml 0    Left PronatorTeres Median C6-7 Nml Nml Nml Nml Nml Nml Nml 0    Left 1stDorInt Ulnar C8-T1 Nml Nml Nml Nml Nml Nml Nml 0    Left Biceps Musculocut C5-6 Nml Nml Nml Nml Nml Nml Nml 0        Comment NCS: Normal study  1.  Normal nerve conduction studies left upper extremity.  This includes normal left median ulnar and radial SNAPs, median and ulnar transcarpal studies, and median and ulnar CMAP's.    Comment EMG: Normal study  1.  Normal needle EMG left upper extremity.    Interpretation: Normal study    1. There is no electrodiagnostic evidence of cervical radiculopathy, brachial plexopathy, or focal neuropathy in the left upper extremity.  Specifically, there is no electrodiagnostic evidence of a left C5-7 radiculopathy.    The testing was completed in its entirety by the physician.       It was our pleasure caring for your patient today, if there any questions or concerns please do not hesitate to contact us.

## 2024-01-11 NOTE — LETTER
1/11/2024         RE: Rigo Joyner  [address] [address] [facility] [address]        Dear Colleague,    Thank you for referring your patient, Rigo Joyner, to the [facility]. Please see a copy of my visit note below.    [facility]  [address] [place] [address] [address]  [place] [address]  Office: [phone] Fax: [phone]    Electromyography and Nerve Conduction Study Report        Indication: Patient presents at the request of Elizabeth Alonso CNP for left upper extremity EMG.  He has left-sided cervical spine shoulder pain and arm pain into the wrist.  Status post disc arthroplasty x 2 two months ago.  On exam he has normal sensation to light touch throughout the upper extremities, normal reflexes through the upper extremities, with negative Cami's, and normal muscle strength throughout the major muscle groups of the bilateral upper extremities.        Pt Exam Discussion (Communication Barriers):  Electromyography and nerve conduction testing, including associated discomfort, risks, benefits, and alternatives was discussed with the patient prior to the procedure.  No learning/ communication barriers; patient verbalized understanding of procedure.  Informed consent was obtained.           Pt Assessment:  Testing was successfully completed; patient tolerated testing well.       Blood Thinners: ASA Skin Temperature: Warmed 32.0                     EMG/NCS  results:     Nerve Conduction Studies  Motor Sites      Segment Distal Latency Neg. Amp CV F-Latency F-Estimate Comment   Site  (ms) (mV) (m/s) (ms) (ms)    Left Median (APB) Motor   Wrist Wrist-APB 3.4 10.6       Elbow Elbow-Wrist 7.5 9.7 59      Left Ulnar (ADM) Motor   Wrist  2.2 12.4       Bel Elbow Bel Elbow-Wrist 6.0 11.1 63      Ab Elbow Ab Elbow-Wrist 7.5 11.0 67        Sensory Sites      Onset Lat Peak Lat Amp CV Comment   Site (ms) (ms) ( V) (m/s)    Left Median Anti  Sensory   Wrist-Dig II 2.4 2.8 30 54    Left Median-Ulnar Palmar Sensory        Median   Palm-Wrist 1.43 1.83 57 56         Ulnar   Palm-Wrist 1.08 1.65 21 74    Left Radial Sensory   Forearm-Wrist 1.50 2.0 26 67    Left Ulnar Anti Sensory   Wrist-Dig V 1.88 2.4 26 59        NCS Waveforms:    Motor         Sensory                  Electromyography     Side Muscle Nerve Root Ins Act Fibs Psw Fasc Recrt Dur Amp Poly Comment   Left Deltoid Axillary C5-6 Nml Nml Nml Nml Nml Nml Nml 0    Left Triceps Radial C6-7-8 Nml Nml Nml Nml Nml Nml Nml 0    Left PronatorTeres Median C6-7 Nml Nml Nml Nml Nml Nml Nml 0    Left 1stDorInt Ulnar C8-T1 Nml Nml Nml Nml Nml Nml Nml 0    Left Biceps Musculocut C5-6 Nml Nml Nml Nml Nml Nml Nml 0        Comment NCS: Normal study  1.  Normal nerve conduction studies left upper extremity.  This includes normal left median ulnar and radial SNAPs, median and ulnar transcarpal studies, and median and ulnar CMAP's.    Comment EMG: Normal study  1.  Normal needle EMG left upper extremity.    Interpretation: Normal study    1. There is no electrodiagnostic evidence of cervical radiculopathy, brachial plexopathy, or focal neuropathy in the left upper extremity.  Specifically, there is no electrodiagnostic evidence of a left C5-7 radiculopathy.    The testing was completed in its entirety by the physician.       It was our pleasure caring for your patient today, if there any questions or concerns please do not hesitate to contact us.    Again, thank you for allowing me to participate in the care of your patient.        Sincerely,        Rick Byrne DO

## 2024-01-12 ENCOUNTER — THERAPY VISIT (OUTPATIENT)
Dept: PHYSICAL THERAPY | Facility: CLINIC | Age: 54
End: 2024-01-12
Attending: PHYSICIAN ASSISTANT
Payer: COMMERCIAL

## 2024-01-12 DIAGNOSIS — M54.12 CERVICAL RADICULOPATHY: ICD-10-CM

## 2024-01-12 PROCEDURE — 97140 MANUAL THERAPY 1/> REGIONS: CPT | Mod: GP | Performed by: PHYSICAL THERAPIST

## 2024-01-12 PROCEDURE — 97110 THERAPEUTIC EXERCISES: CPT | Mod: GP | Performed by: PHYSICAL THERAPIST

## 2024-01-12 PROCEDURE — 97161 PT EVAL LOW COMPLEX 20 MIN: CPT | Mod: GP | Performed by: PHYSICAL THERAPIST

## 2024-01-12 NOTE — PROGRESS NOTES
PHYSICAL THERAPY EVALUATION  Type of Visit: Evaluation    See electronic medical record for Abuse and Falls Screening details.    Subjective       Presenting condition or subjective complaint: Therapy after cervical disk replacement, Has had neck and shoulder pain for the 3 years, with radicular pain into L arm and wrist. L shoulder is bothersome especially with driving and prolonged use of LUE. This has improved since his C5-6, C6-7 anterior discectomy and artificial disc replacement on 11/10/23. Continues to have left sided LBP as well.   Date of onset: 11/10/23    Relevant medical history: Arthritis   Dates & types of surgery: 2018 2019 2020 2021 2022 2023    Prior diagnostic imaging/testing results: MRI; CT scan; X-ray; EMG     Prior therapy history for the same diagnosis, illness or injury: Yes 2021 2022    Living Environment  Social support: With a significant other or spouse   Type of home: House   Stairs to enter the home: Yes 4 Is there a railing: Yes   Ramp: No   Stairs inside the home: Yes 13 Is there a railing: Yes   Help at home: None  Equipment owned:       Employment: No    Hobbies/Interests:      Patient goals for therapy: Driving more active with out getting arm pain    Pain assessment: Pain present  Location: superior left shoulder/Rating: 3/10     Objective   CERVICAL SPINE EVALUATION  PAIN: Pain Level at Rest: 1/10  Pain Level with Use: 7/10  Pain Location: cervical spine and shoulder  Pain Quality: Aching and Burning  Pain Frequency: intermittent  Pain is Worst: daytime  Pain is Exacerbated By: looking down, driving  Pain is Relieved By: rest  POSTURE: WNL  ROM:  25% limited L cervical rotation, 15% limited R rotation, 25% limited SB bilaterally, some discomfort with flexion  MYOTOMES: WNL  NEURAL TENSION:    Left Right   Median Positive    Ulnar Positive    Radial  Negative       FLEXIBILITY: Decreased upper trap L, Decreased levator L, Decreased pectoralis minor L, Decreased latissimus L    SPECIAL TESTS:    Left Right   Alar Ligament Negative    Cervical Flexion-Rotation Positive Negative         Compression Negative  Negative    Distraction DNT DNT   Spurling s Negative  Negative         Transverse Ligament Negative  Negative      PALPATION:  TTP UT, levator scap, supraspinatus, overall guarded mobility with L shoulder movements  SPINAL SEGMENTAL CONCLUSIONS:  poor thoracic mobility especially CT junction, painful sternoclavicular joint with decreased mobility        Assessment & Plan   CLINICAL IMPRESSIONS  Medical Diagnosis: Cervical radiculopathy    Treatment Diagnosis: neck and shoulder pain   Impression/Assessment: Patient is a 53 year old male with L neck and shoulder complaints.  The following significant findings have been identified: Pain, Decreased ROM/flexibility, Decreased joint mobility, Decreased strength, Impaired sensation, Impaired muscle performance, and Decreased activity tolerance. These impairments interfere with their ability to perform self care tasks, work tasks, recreational activities, household chores, and driving  as compared to previous level of function.     Clinical Decision Making (Complexity):  Clinical Presentation: Stable/Uncomplicated  Clinical Presentation Rationale: based on medical and personal factors listed in PT evaluation  Clinical Decision Making (Complexity): Low complexity    PLAN OF CARE  Treatment Interventions:  Modalities: E-stim  Interventions: Manual Therapy, Neuromuscular Re-education, Therapeutic Activity, Therapeutic Exercise, Self-Care/Home Management    Long Term Goals     PT Goal 1  Goal Identifier: 1.  Goal Description: Patient will tolerate work tasks with pain <2/10.  Target Date: 02/09/24  PT Goal 2  Goal Identifier: 2.  Goal Description: Patient will tolerate driving without pain into L shoulder.  Target Date: 02/23/24  PT Goal 3  Goal Identifier: 3.  Goal Description: Patient will be ind with HEP in order to reduce return of  symptoms.  Target Date: 03/22/24      Frequency of Treatment: 1x/week  Duration of Treatment: 10 weeks    Education Assessment:        Risks and benefits of evaluation/treatment have been explained.   Patient/Family/caregiver agrees with Plan of Care.     Evaluation Time:     PT Eval, Low Complexity Minutes (71196): 25     Signing Clinician: Theresa Moore PT

## 2024-01-19 ENCOUNTER — THERAPY VISIT (OUTPATIENT)
Dept: PHYSICAL THERAPY | Facility: CLINIC | Age: 54
End: 2024-01-19
Attending: PHYSICIAN ASSISTANT
Payer: COMMERCIAL

## 2024-01-19 ENCOUNTER — TRANSCRIBE ORDERS (OUTPATIENT)
Dept: OTHER | Age: 54
End: 2024-01-19

## 2024-01-19 DIAGNOSIS — M54.12 CERVICAL RADICULOPATHY: Primary | ICD-10-CM

## 2024-01-19 DIAGNOSIS — Z98.1 ARTHRODESIS STATUS: ICD-10-CM

## 2024-01-19 DIAGNOSIS — M51.369 DDD (DEGENERATIVE DISC DISEASE), LUMBAR: Primary | ICD-10-CM

## 2024-01-19 PROCEDURE — 97140 MANUAL THERAPY 1/> REGIONS: CPT | Mod: GP | Performed by: PHYSICAL THERAPIST

## 2024-01-19 PROCEDURE — 97110 THERAPEUTIC EXERCISES: CPT | Mod: GP | Performed by: PHYSICAL THERAPIST

## 2024-01-25 ENCOUNTER — THERAPY VISIT (OUTPATIENT)
Dept: PHYSICAL THERAPY | Facility: CLINIC | Age: 54
End: 2024-01-25
Attending: PHYSICIAN ASSISTANT
Payer: COMMERCIAL

## 2024-01-25 DIAGNOSIS — M51.369 DDD (DEGENERATIVE DISC DISEASE), LUMBAR: ICD-10-CM

## 2024-01-25 DIAGNOSIS — Z98.1 ARTHRODESIS STATUS: ICD-10-CM

## 2024-01-25 PROCEDURE — 97161 PT EVAL LOW COMPLEX 20 MIN: CPT | Mod: GP | Performed by: PHYSICAL THERAPIST

## 2024-01-25 PROCEDURE — 97140 MANUAL THERAPY 1/> REGIONS: CPT | Mod: GP | Performed by: PHYSICAL THERAPIST

## 2024-01-25 PROCEDURE — 97110 THERAPEUTIC EXERCISES: CPT | Mod: GP | Performed by: PHYSICAL THERAPIST

## 2024-01-25 NOTE — PROGRESS NOTES
PHYSICAL THERAPY EVALUATION  Type of Visit: Evaluation    See electronic medical record for Abuse and Falls Screening details.    Subjective       Presenting condition or subjective complaint: Therapy after cervical disk replacement, LBP with L SIJ pain and radicular symptoms on anterior thighs and sometimes into his left toe. Symptoms increase with pushing/pulling, bending, lifting, and sitting. Recent MRI notes disc dyfunction in L3-4, L4-5 and hx DDD. Also has a L4-5 fusion back in  as well as R labral hip repair and L hip scope done that continue to give him discomfort.   Date of onset: 22    Relevant medical history: Arthritis   Dates & types of surgery: 2018    Prior diagnostic imaging/testing results: MRI; CT scan; X-ray; EMG     Prior therapy history for the same diagnosis, illness or injury: Yes 2021    Living Environment  Social support: With a significant other or spouse   Type of home: House   Stairs to enter the home: Yes 4 Is there a railing: Yes   Ramp: No   Stairs inside the home: Yes 13 Is there a railing: Yes   Help at home: None  Equipment owned:       Employment: No    Hobbies/Interests:      Patient goals for therapy: Driving more active with out getting arm pain    Pain assessment: Pain present  Location: low back pain/Ratin/10     Objective   LUMBAR SPINE EVALUATION  PAIN: Pain Level at Rest: 4/10  Pain Level with Use: 9/10  Pain Location: lumbar spine and hip  Pain Quality: Aching, Burning, Shooting, and Throbbing  Pain Frequency: constant  Pain is Worst: daytime or nighttime  Pain is Exacerbated By: sitting, sitting in recliner, laying on his back can be uncomfortable  INTEGUMENTARY (edema, incisions):  healed incision  POSTURE: Sitting Posture: Rounded shoulders, Forward head, Lordosis decreased  BALANCE/PROPRIOCEPTION: WNL  ROM:  extension limited  PELVIC/SI SCREEN: Sacroiliac Provocation Test: L SIJ painful  STRENGTH: WNL  MYOTOMES: WNL  NEURAL  TENSION:    Left Right   Femoral Nerve Positive Positive   Slump Negative  Negative      FLEXIBILITY: Decreased hip IR L, Decreased hip ER L, Decreased adductors L, Decreased piriformis L, Decreased hip flexors L, Decreased quadriceps L, Decreased hamstrings L, Decreased hip IR R, Decreased hip ER R, Decreased adductors R, Decreased piriformis R, Decreased hip flexors R, Decreased quadriceps R, Decreased hamstrings R  LUMBAR/HIP Special Tests:    Left Right   BRADLEY Positive Negative    FADIR/Labrum/YAMILKA Positive Positive   Femoral Nerve Positive Positive   Malachi's Negative  Negative    Piriformis Negative  Negative    Quadrant Testing Negative  Negative    SLR Negative  Negative    Slump Negative  Negative    Stork with Extension Negative  Negative    Byron Positive Positive           PELVIS/SI SPECIAL TESTS:  positive L SIJ cluster  PALPATION:  L SIJ, TFL, bilateral glute med/piriformis  SPINAL SEGMENTAL CONCLUSIONS:  moderately limited lumbar mobility and decreased sacral mobility      Assessment & Plan   CLINICAL IMPRESSIONS  Medical Diagnosis: DDD (degenerative disc disease), lumbar    Treatment Diagnosis: low back   Impression/Assessment: Patient is a 53 year old male with LBP and radicular symptoms in BLE complaints.  The following significant findings have been identified: Pain, Decreased ROM/flexibility, Decreased joint mobility, Decreased strength, Impaired sensation, Impaired muscle performance, and Decreased activity tolerance. These impairments interfere with their ability to perform self care tasks, work tasks, recreational activities, household chores, driving , household mobility, and community mobility as compared to previous level of function.     Clinical Decision Making (Complexity):  Clinical Presentation: Stable/Uncomplicated  Clinical Presentation Rationale: based on medical and personal factors listed in PT evaluation  Clinical Decision Making (Complexity): Low complexity    PLAN OF  CARE  Treatment Interventions:  Interventions: Manual Therapy, Neuromuscular Re-education, Therapeutic Activity, Therapeutic Exercise, Self-Care/Home Management    Long Term Goals     PT Goal 1  Goal Identifier: 1.  Goal Description: Patient will tolerate prolonged sitting > 30 min in order to tolerate car rides.  Target Date: 02/22/24  PT Goal 2  Goal Identifier: 2.  Goal Description: Patient will report 25% less radicular symptoms into BLE to improve QOL.  Target Date: 03/07/24  PT Goal 3  Goal Identifier: 3.  Goal Description: Patient will report pushing and pulling objects without pain in order to participate in daily tasks.  Target Date: 03/07/24      Frequency of Treatment: 1x/week  Duration of Treatment: 6 weeks    Education Assessment:        Risks and benefits of evaluation/treatment have been explained.   Patient/Family/caregiver agrees with Plan of Care.     Evaluation Time:     PT Eval, Low Complexity Minutes (58147): 20    Signing Clinician: Theresa Moore PT

## 2024-01-26 ENCOUNTER — THERAPY VISIT (OUTPATIENT)
Dept: PHYSICAL THERAPY | Facility: CLINIC | Age: 54
End: 2024-01-26
Attending: PHYSICIAN ASSISTANT
Payer: COMMERCIAL

## 2024-01-26 DIAGNOSIS — M54.12 CERVICAL RADICULOPATHY: Primary | ICD-10-CM

## 2024-01-26 PROCEDURE — 97110 THERAPEUTIC EXERCISES: CPT | Mod: GP | Performed by: PHYSICAL THERAPIST

## 2024-01-26 PROCEDURE — 97140 MANUAL THERAPY 1/> REGIONS: CPT | Mod: GP | Performed by: PHYSICAL THERAPIST

## 2024-02-02 ENCOUNTER — THERAPY VISIT (OUTPATIENT)
Dept: PHYSICAL THERAPY | Facility: CLINIC | Age: 54
End: 2024-02-02
Attending: PHYSICIAN ASSISTANT
Payer: COMMERCIAL

## 2024-02-02 DIAGNOSIS — M51.369 DDD (DEGENERATIVE DISC DISEASE), LUMBAR: Primary | ICD-10-CM

## 2024-02-02 DIAGNOSIS — M54.12 CERVICAL RADICULOPATHY: Primary | ICD-10-CM

## 2024-02-02 PROCEDURE — 97110 THERAPEUTIC EXERCISES: CPT | Mod: GP | Performed by: PHYSICAL THERAPIST

## 2024-02-02 PROCEDURE — 97140 MANUAL THERAPY 1/> REGIONS: CPT | Mod: GP | Performed by: PHYSICAL THERAPIST

## 2024-02-09 ENCOUNTER — THERAPY VISIT (OUTPATIENT)
Dept: PHYSICAL THERAPY | Facility: CLINIC | Age: 54
End: 2024-02-09
Attending: PHYSICIAN ASSISTANT
Payer: COMMERCIAL

## 2024-02-09 DIAGNOSIS — M51.369 DDD (DEGENERATIVE DISC DISEASE), LUMBAR: Primary | ICD-10-CM

## 2024-02-09 DIAGNOSIS — M54.12 CERVICAL RADICULOPATHY: Primary | ICD-10-CM

## 2024-02-09 PROCEDURE — 97140 MANUAL THERAPY 1/> REGIONS: CPT | Mod: GP | Performed by: PHYSICAL THERAPIST

## 2024-02-09 PROCEDURE — 97110 THERAPEUTIC EXERCISES: CPT | Mod: GP | Performed by: PHYSICAL THERAPIST

## 2024-02-13 ENCOUNTER — HOSPITAL ENCOUNTER (OUTPATIENT)
Dept: RADIOLOGY | Facility: HOSPITAL | Age: 54
Discharge: HOME | End: 2024-02-13
Attending: PHYSICIAN ASSISTANT | Admitting: PHYSICIAN ASSISTANT
Payer: COMMERCIAL

## 2024-02-13 ENCOUNTER — OFFICE VISIT (OUTPATIENT)
Dept: NEUROSURGERY | Facility: CLINIC | Age: 54
End: 2024-02-13
Attending: PHYSICIAN ASSISTANT
Payer: COMMERCIAL

## 2024-02-13 VITALS — OXYGEN SATURATION: 96 % | SYSTOLIC BLOOD PRESSURE: 126 MMHG | HEART RATE: 78 BPM | DIASTOLIC BLOOD PRESSURE: 70 MMHG

## 2024-02-13 DIAGNOSIS — M54.12 CERVICAL RADICULOPATHY: Primary | ICD-10-CM

## 2024-02-13 DIAGNOSIS — M54.12 CERVICAL RADICULOPATHY: ICD-10-CM

## 2024-02-13 PROCEDURE — 99214 OFFICE O/P EST MOD 30 MIN: CPT | Performed by: SURGERY

## 2024-02-13 PROCEDURE — 72040 X-RAY EXAM NECK SPINE 2-3 VW: CPT

## 2024-02-13 ASSESSMENT — PAIN SCALES - GENERAL: PAINLEVEL: MILD PAIN (3)

## 2024-02-13 NOTE — LETTER
2/13/2024         RE: Rigo Joyner  [address] [facility] [address]        Dear Colleague,    Thank you for referring your patient, Rigo Joyner, to the [facility]ERY. Please see a copy of my visit note below.    NEUROSURGERY FOLLOW UP  NOTE    Rigo Joyner comes today in follow-up. Patient is a 54 yo male who is status post cervical 5-cervical 6 and cervical 6-cervical 7 anterior discectomy and artificial disc replacements on 11/10/23. Feels some of his arm pain an paresthesias have improved. Still noticing left shoulder pian with radiation into his elbow. Feels like twitching.  Currently in physical therapy.     PHYSICAL EXAM:   Constitutional: /70   Pulse 78   SpO2 96%      Mental Status: A & O in no acute distress.  Affect is appropriate.  Speech is fluent.  Recent and remote memory are intact.  Attention span and concentration are normal.     Motor:  Normal bulk and tone all muscle groups of upper extremities. 5/5 in UE     Sensory: Sensation intact bilaterally to light touch in UE     Reflexes; supinator, biceps, triceps 1+     Incision clean, dry, intact.    IMAGING:   I personally reviewed all radiographic images        CONSULTATION ASSESSMENT AND PLAN:    54 yo male who is status post cervical 5-cervical 6 and cervical 6-cervical 7 anterior discectomy and artificial disc replacements on 11/10/23.  Reviewed his cervical x-ray which shows stable artificial disc replacements no evidence of hardware loosening or failure.  He is doing overall okay still has pain into his left shoulder.  He is going to continue physical therapy.  Will also continue gabapentin 1-2 tabs twice a day.  Recommend a left cervical 4-5 TFESI monitor for relief.  Left shoulder pain does not get better could consider decompression of left C5 nerve.    Sandrita Purdy MD      CC:     Semmler, Steven Duane  [facility] [facility] [address]      Again, thank  you for allowing me to participate in the care of your patient.        Sincerely,        Sandrita Purdy MD

## 2024-02-13 NOTE — PROGRESS NOTES
NEUROSURGERY FOLLOW UP  NOTE    Rigo LOZANO Nithyakasieóscar comes today in follow-up. Patient is a 54 yo male who is status post cervical 5-cervical 6 and cervical 6-cervical 7 anterior discectomy and artificial disc replacements on 11/10/23. Feels some of his arm pain an paresthesias have improved. Still noticing left shoulder pian with radiation into his elbow. Feels like twitching.  Currently in physical therapy.     PHYSICAL EXAM:   Constitutional: /70   Pulse 78   SpO2 96%      Mental Status: A & O in no acute distress.  Affect is appropriate.  Speech is fluent.  Recent and remote memory are intact.  Attention span and concentration are normal.     Motor:  Normal bulk and tone all muscle groups of upper extremities. 5/5 in UE     Sensory: Sensation intact bilaterally to light touch in UE     Reflexes; supinator, biceps, triceps 1+     Incision clean, dry, intact.    IMAGING:   I personally reviewed all radiographic images        CONSULTATION ASSESSMENT AND PLAN:    54 yo male who is status post cervical 5-cervical 6 and cervical 6-cervical 7 anterior discectomy and artificial disc replacements on 11/10/23.  Reviewed his cervical x-ray which shows stable artificial disc replacements no evidence of hardware loosening or failure.  He is doing overall okay still has pain into his left shoulder.  He is going to continue physical therapy.  Will also continue gabapentin 1-2 tabs twice a day.  Recommend a left cervical 4-5 TFESI monitor for relief.  Left shoulder pain does not get better could consider decompression of left C5 nerve.    Sandrita Purdy MD      CC:     Semmler, Steven Duane  [address] [facility] [address]

## 2024-02-14 ENCOUNTER — THERAPY VISIT (OUTPATIENT)
Dept: PHYSICAL THERAPY | Facility: CLINIC | Age: 54
End: 2024-02-14
Attending: PHYSICIAN ASSISTANT
Payer: COMMERCIAL

## 2024-02-14 DIAGNOSIS — M54.12 CERVICAL RADICULOPATHY: Primary | ICD-10-CM

## 2024-02-14 DIAGNOSIS — M51.369 DDD (DEGENERATIVE DISC DISEASE), LUMBAR: Primary | ICD-10-CM

## 2024-02-14 PROCEDURE — 97110 THERAPEUTIC EXERCISES: CPT | Mod: GP | Performed by: PHYSICAL THERAPIST

## 2024-02-14 PROCEDURE — 97140 MANUAL THERAPY 1/> REGIONS: CPT | Mod: GP | Performed by: PHYSICAL THERAPIST

## 2024-02-22 ENCOUNTER — TELEPHONE (OUTPATIENT)
Dept: PHYSICAL MEDICINE AND REHAB | Facility: CLINIC | Age: 54
End: 2024-02-22
Payer: COMMERCIAL

## 2024-02-22 NOTE — TELEPHONE ENCOUNTER
Patient is scheduled to have a Left C4-C5 TFESI as ordered per [facility]. Noted to be taking ASA 81 mg. Call placed to patient to discuss. Left message to return call.     *If patient is taking the ASA, need to get medical clearance from managing provider and reschedule injection as he would need to hold for 6 days prior to injection.

## 2024-02-22 NOTE — TELEPHONE ENCOUNTER
Pt returned call. Pt reports he does take a daily aspirin 81 mg but does not have a current managing provider for this. Discussed with Dr. Brooks and patient will start holding aspirin tomorrow AM (he already took his dose today) through his injection next week. He will resume after the injection.     Appointment note updated.

## 2024-02-23 ENCOUNTER — THERAPY VISIT (OUTPATIENT)
Dept: PHYSICAL THERAPY | Facility: CLINIC | Age: 54
End: 2024-02-23
Attending: PHYSICIAN ASSISTANT
Payer: COMMERCIAL

## 2024-02-23 DIAGNOSIS — M51.369 DDD (DEGENERATIVE DISC DISEASE), LUMBAR: Primary | ICD-10-CM

## 2024-02-23 PROCEDURE — 97110 THERAPEUTIC EXERCISES: CPT | Mod: GP | Performed by: PHYSICAL THERAPIST

## 2024-02-23 PROCEDURE — 97140 MANUAL THERAPY 1/> REGIONS: CPT | Mod: GP | Performed by: PHYSICAL THERAPIST

## 2024-02-27 ENCOUNTER — RADIOLOGY INJECTION OFFICE VISIT (OUTPATIENT)
Dept: PHYSICAL MEDICINE AND REHAB | Facility: CLINIC | Age: 54
End: 2024-02-27
Attending: SURGERY
Payer: COMMERCIAL

## 2024-02-27 VITALS
OXYGEN SATURATION: 95 % | SYSTOLIC BLOOD PRESSURE: 118 MMHG | DIASTOLIC BLOOD PRESSURE: 74 MMHG | TEMPERATURE: 97.3 F | RESPIRATION RATE: 16 BRPM | HEART RATE: 67 BPM

## 2024-02-27 DIAGNOSIS — M54.12 CERVICAL RADICULOPATHY: ICD-10-CM

## 2024-02-27 PROCEDURE — 64479 NJX AA&/STRD TFRM EPI C/T 1: CPT | Mod: LT | Performed by: PAIN MEDICINE

## 2024-02-27 RX ORDER — LIDOCAINE HYDROCHLORIDE 10 MG/ML
INJECTION, SOLUTION EPIDURAL; INFILTRATION; INTRACAUDAL; PERINEURAL
Status: COMPLETED | OUTPATIENT
Start: 2024-02-27 | End: 2024-02-27

## 2024-02-27 RX ORDER — DEXAMETHASONE SODIUM PHOSPHATE 10 MG/ML
INJECTION, SOLUTION INTRAMUSCULAR; INTRAVENOUS
Status: COMPLETED | OUTPATIENT
Start: 2024-02-27 | End: 2024-02-27

## 2024-02-27 RX ADMIN — DEXAMETHASONE SODIUM PHOSPHATE 10 MG: 10 INJECTION, SOLUTION INTRAMUSCULAR; INTRAVENOUS at 09:24

## 2024-02-27 RX ADMIN — LIDOCAINE HYDROCHLORIDE 2 ML: 10 INJECTION, SOLUTION EPIDURAL; INFILTRATION; INTRACAUDAL; PERINEURAL at 09:24

## 2024-02-27 ASSESSMENT — PAIN SCALES - GENERAL
PAINLEVEL: MILD PAIN (3)
PAINLEVEL: MILD PAIN (2)

## 2024-02-27 NOTE — PATIENT INSTRUCTIONS
DISCHARGE INSTRUCTIONS    During office hours (8:00 a.m.- 4:00 p.m.) questions or concerns may be answered  by calling [facility] Navigation Nurses at  [phone].  Messages received after hours will be returned the following business day.      In the case of an emergency, please dial 911 or seek assistance at the nearest Emergency Room/Urgent Care facility.     All Patients:    You may experience an increase in your symptoms for the first 2 days (It may take anywhere between 2 days- 2 weeks for the steroid to have maximum effect).    You may use ice on the injection site, as frequently as 20 minutes each hour if needed.    You may take your pain medicine.    You may continue taking your regular medication after your injection. If you have had a Medial Branch Block you may resume pain medication once your pain diary is completed.    You may shower. No swimming, tub bath or hot tub for 48 hours.  You may remove your bandaid/bandage as soon as you are home.    You may resume light activities, as tolerated.    Resume your usual diet as tolerated.    It is strongly advised that you do not drive for 1-3 hours post injection.    If you have had oral sedation:  Do not drive for 8 hours post injection.      If you have had IV sedation:  Do not drive for 24 hours post injection.  Do not operate hazardous machinery or make important personal/business decisions for 24 hours.      POSSIBLE STEROID SIDE EFFECTS (If steroid/cortisone was used for your procedure)    -If you experience these symptoms, it should only last for a short period    Swelling of the legs              Skin redness (flushing)     Mouth (oral) irritation   Blood sugar (glucose) levels            Sweats                    Mood changes  Headache  Sleeplessness  Weakened immune system for up to 14 days, which could increase the risk of rashid the COVID-19 virus and/or experiencing more severe symptoms of the disease, if exposed.  Decreased  effectiveness of the flu vaccine if given within 2 weeks of the steroid.         POSSIBLE PROCEDURE SIDE EFFECTS  -Call the Spine Center if you are concerned  Increased Pain           Increased numbness/tingling      Nausea/Vomiting          Bruising/bleeding at site      Redness or swelling                                              Difficulty walking      Weakness           Fever greater than 100.5    *In the event of a severe headache after an epidural steroid injection that is relieved by lying down, please call the [facility] to speak with a clinical staff member*

## 2024-02-28 ENCOUNTER — THERAPY VISIT (OUTPATIENT)
Dept: PHYSICAL THERAPY | Facility: CLINIC | Age: 54
End: 2024-02-28
Attending: PHYSICIAN ASSISTANT
Payer: COMMERCIAL

## 2024-02-28 DIAGNOSIS — M51.369 DDD (DEGENERATIVE DISC DISEASE), LUMBAR: Primary | ICD-10-CM

## 2024-02-28 PROCEDURE — 97110 THERAPEUTIC EXERCISES: CPT | Mod: GP | Performed by: PHYSICAL THERAPIST

## 2024-02-28 PROCEDURE — 97140 MANUAL THERAPY 1/> REGIONS: CPT | Mod: GP | Performed by: PHYSICAL THERAPIST

## 2024-03-12 ENCOUNTER — OFFICE VISIT (OUTPATIENT)
Dept: NEUROSURGERY | Facility: CLINIC | Age: 54
End: 2024-03-12
Payer: COMMERCIAL

## 2024-03-12 VITALS — SYSTOLIC BLOOD PRESSURE: 127 MMHG | HEART RATE: 69 BPM | OXYGEN SATURATION: 94 % | DIASTOLIC BLOOD PRESSURE: 74 MMHG

## 2024-03-12 DIAGNOSIS — M54.12 CERVICAL RADICULOPATHY: Primary | ICD-10-CM

## 2024-03-12 PROCEDURE — 99212 OFFICE O/P EST SF 10 MIN: CPT | Performed by: SURGERY

## 2024-03-12 ASSESSMENT — PAIN SCALES - GENERAL: PAINLEVEL: MODERATE PAIN (4)

## 2024-03-12 NOTE — PROGRESS NOTES
52 yo male who is status post cervical 5-cervical 6 and cervical 6-cervical 7 anterior discectomy and artificial disc replacements on 11/10/23.  Reviewed his cervical x-ray which shows stable artificial disc replacements no evidence of hardware loosening or failure.  He is doing overall okay still has pain into his left shoulder.  He is going to continue physical therapy.  Will also continue gabapentin 1-2 tabs twice a day.  Recommend a left cervical 4-5 TFESI monitor for relief.  Left shoulder pain does not get better could consider decompression of left C5 nerve.     Injection maybe helped for a day or two. Symptoms ongoing in left shoulder. Pins and needles and pain. This radiates down his arm to his wrist. Neck pain has improved. Before surgery arm was sore and now its more pins and needles. More soreness in the arm. Injection confirms symptoms likely C5 nerve on left. Continue therapy and will get a new MRI cervical. Discussed possible cervical 4-5 anterior decompression pending results of new MRI. Return to clinic after MRI.     Sandrita Purdy MD

## 2024-03-12 NOTE — LETTER
3/12/2024         RE: Rigo Joyner  [address] [facility] [address]        Dear Colleague,    Thank you for referring your patient, Rigo Joyner, to the [facility] SPINE AND NEUROSURGERY. Please see a copy of my visit note below.    54 yo male who is status post cervical 5-cervical 6 and cervical 6-cervical 7 anterior discectomy and artificial disc replacements on 11/10/23.  Reviewed his cervical x-ray which shows stable artificial disc replacements no evidence of hardware loosening or failure.  He is doing overall okay still has pain into his left shoulder.  He is going to continue physical therapy.  Will also continue gabapentin 1-2 tabs twice a day.  Recommend a left cervical 4-5 TFESI monitor for relief.  Left shoulder pain does not get better could consider decompression of left C5 nerve.     Injection maybe helped for a day or two. Symptoms ongoing in left shoulder. Pins and needles and pain. This radiates down his arm to his wrist. Neck pain has improved. Before surgery arm was sore and now its more pins and needles. More soreness in the arm. Injection confirms symptoms likely C5 nerve on left. Continue therapy and will get a new MRI cervical. Discussed possible cervical 4-5 anterior decompression pending results of new MRI. Return to clinic after MRI.     Sandrita Purdy MD      Again, thank you for allowing me to participate in the care of your patient.        Sincerely,        Sandrita Purdy MD

## 2024-03-20 ENCOUNTER — HOSPITAL ENCOUNTER (OUTPATIENT)
Dept: MRI IMAGING | Facility: CLINIC | Age: 54
Discharge: HOME | End: 2024-03-20
Attending: SURGERY | Admitting: SURGERY
Payer: COMMERCIAL

## 2024-03-20 DIAGNOSIS — M54.12 CERVICAL RADICULOPATHY: ICD-10-CM

## 2024-03-20 PROCEDURE — 72141 MRI NECK SPINE W/O DYE: CPT

## 2024-03-26 ENCOUNTER — OFFICE VISIT (OUTPATIENT)
Dept: NEUROSURGERY | Facility: CLINIC | Age: 54
End: 2024-03-26
Payer: COMMERCIAL

## 2024-03-26 ENCOUNTER — PREP FOR PROCEDURE (OUTPATIENT)
Dept: NEUROLOGY | Facility: CLINIC | Age: 54
End: 2024-03-26

## 2024-03-26 VITALS — HEART RATE: 75 BPM | OXYGEN SATURATION: 96 % | SYSTOLIC BLOOD PRESSURE: 135 MMHG | DIASTOLIC BLOOD PRESSURE: 74 MMHG

## 2024-03-26 DIAGNOSIS — M54.12 CERVICAL RADICULOPATHY: Primary | ICD-10-CM

## 2024-03-26 PROCEDURE — 99213 OFFICE O/P EST LOW 20 MIN: CPT | Performed by: SURGERY

## 2024-03-26 ASSESSMENT — PAIN SCALES - GENERAL: PAINLEVEL: MODERATE PAIN (4)

## 2024-03-26 NOTE — LETTER
3/26/2024         RE: Rigo Joyner  [address] [facility] [address]        Dear Colleague,    Thank you for referring your patient, Rigo Joyner, to the [facility] SPINE AND NEUROSURGERY. Please see a copy of my visit note below.    NEUROSURGERY FOLLOW UP  NOTE    Rigo Joyner comes today in follow-up. Patient is a 52 yo male who is status post cervical 5-cervical 6 and cervical 6-cervical 7 anterior discectomy and artificial disc replacements on 11/10/23.  Reviewed his cervical x-ray which shows stable artificial disc replacements no evidence of hardware loosening or failure.  He is doing overall okay still has pain into his left shoulder.  He is going to continue physical therapy.  Will also continue gabapentin 1-2 tabs twice a day.  Recommend a left cervical 4-5 TFESI monitor for relief.  Left shoulder pain does not get better could consider decompression of left C5 nerve.      Since our last visit, he underwent a SNRI at left C5. Injection maybe helped for a day or two. Symptoms ongoing in left shoulder. Pins and needles and pain. This radiates down his arm to his wrist. Before surgery arm was sore in a different distribution. Following surgery the neck pain and that arm pain improved then he developed these new primarily shoulder symptoms. Injection confirms symptoms likely C5 nerve on left. He has not had any significant improvement of his symptoms with a steroid injection or physical therapy. We reviewed his MRI of his cervical spine which demonstrates now moderate to seer right and severe left foraminal narrowing at cervical 4-5. Right sided narrowing has worsened in the interval since his prior MRI. At this time he wishes for a surgical solution for his pain since he has not had any relief with conservative management. Agree at this point less likely to see additional gains from continued conservative management.  Recommend cervical 4-cervical 5 anterior  cervical decompression and fusion with plate. Risks of anterior neck surgery include but are not limited to inadequate symptom relief, nerve or spinal cord damage, durotomy, hematoma, infection, injury to trachea or esophagus, speech disturbance from injury to the recurrent laryngeal nerve, swallowing difficulties, failed fusion. He agreed to proceed.     Sandrita Purdy MD      CC:     Semmler, Steven Duane  [address] [place] [id number]      Again, thank you for allowing me to participate in the care of your patient.        Sincerely,        Sandrita Purdy MD

## 2024-03-26 NOTE — NURSING NOTE
"Rigo Joyner is a 53 year old male who presents for:  Chief Complaint   Patient presents with    Follow Up     Review MRI cervical  Left shoulder into arm pain  Injection provided only a couple days of relief     Low left back pain into left leg to heel, inside  Lumbar fusion by Dr Tobias, Sept 2020  Lumbar MRI 10/2023  LE EMG 10/2/2023        Initial Vitals:  /74   Pulse 75   SpO2 96%  Estimated body mass index is 31.75 kg/m  as calculated from the following:    Height as of 11/10/23: 5' 9\" (1.753 m).    Weight as of 11/10/23: 215 lb (97.5 kg).. There is no height or weight on file to calculate BSA. BP completed using cuff size: regular  Moderate Pain (4)      Neck Disability Index (  Omar PEREZ. and [employer]. 1991. All rights reserved.; used with permission)    SECTION 1 - PAIN INTENSITY: The pain is moderate at the moment.  SECTION 2 - PERSONAL CARE: I can look after myself normally without causing extra neck pain.  SECTION 3 - LIFTING: Neck pain prevents me from lifting heavy weights off the floor but I can manage if items are conveniently positioned, ie. on a table.  SECTION 4 - READING: I can read as much as I want with slight neck pain.  SECTION 5 - HEADACHES: I have slight headaches that come infrequently.  SECTION 6 - CONCENTRATION: I can concentrate fully with slight difficulty.  SECTION 7 - WORK: I can't do my usual work.  SECTION 8 - DRIVING: I can't drive as long as I want because of moderate neck pain.  SECTION 9 - SLEEPING: My sleep is greatly disturbed for up to 3-5 hours.  SECTION 10 - RECREATION: I am able to engage in most, but not all of my recreational activities because of pain in my neck.  Count: 10  Sum: 19  Raw Score: /50: 19  Neck Disability Index Score: (%): 38 %           Marcos Gastelum  "

## 2024-03-26 NOTE — PATIENT INSTRUCTIONS
Recommend anterior cervical decompression and fusion with plate at cervical 4-5.        Please review COMPLETE information about your proposed surgery, pre-operative requirements, post-operative course and expectations - available in a folder provided to you in clinic!    Your surgery scheduler will call you within 3 business days to begin the process of scheduling your surgery and appointments.     Pre-Operative    Pre-operative physical / Lab work with primary care physician within 30 days of surgical date. We prefer the pre-op exam to be done 2 weeks prior to surgery. We also prefer pre-op lab work be done at [facility] outpatient lab, 2 weeks prior to surgery.     If all pre-op appointments/test are not completed prior to your surgery date, you will be asked to reschedule your surgery.           As part of preparation for your upcoming procedure your primary care doctor may order a test to rule out a COVID-19 infection. This is no longer a requirement prior to surgery.     Readiness Visits    Prior to surgery, you may have a telephone or in person readiness visit with our RN team to discuss your upcomming surgery, results of your pre-op physical, and lab work.   If you will require a collar/neck brace after surgery, you will be fitted for one at your readiness visit prior to surgery (scheduled by the surgery scheduler).     Shower procedure    Hibiclens shower: Use one packet the night before surgery and one packet the morning of surgery for a whole body shower. Avoid face, hair, and genitals.      Eating/Drinking    Stop all solid foods 8 hours before surgery.  Stop all clear liquids 2 hours prior to arrival time     Clear liquids include water, clear juice, black coffee, or clear tea without milk, Gatorade, clear soda.     Medications - please refer to the pre-operative medication instructions sheet in your folder    Hold Aspirin, NSAIDs (Advil/Ibuprofen, Indocin,  Naproxen,Nuprin,Relafen/Nabumetone, Diclofenac,Meloxicam, Aleve, Celebrex) and all vitamins and supplements x 7-10 days prior to surgical date  You can take Tylenol (Acetaminophen) for pain up until the date of your surgery   Do not exceed 3,000 mg per day   Any other medications prescribed, please discuss with your primary care provider at your pre-operative physical. Please discuss when/if it is safe for you to stop taking blood thinners with your primary care provider.   We will NOT provide pain medications prior to surgery. We will prescribe post-op pain medications for up to 6 weeks after surgery.       FMLA/Short-term disability    If you are currently employed, you will likely need to be off work for 4-6 weeks for post-op recovery and healing.  Please fax any FMLA/short term disability paperwork to [phone], mail it into the clinic, drop it off in person, or send via a [employer] message.   You may also call our clinic with the date in which you'd like to return to work, and we can provide a work letter to your employer  We will support Short-Term Disability up to 12 weeks, beginning the date of your surgery. We do NOT support Long-Term Disability/Social Security Benefits.     Pain Management after surgery    Dealing with pain    As your body heals, you might feel a stabbing, burning, or aching pain. You may also have some numbness.  Everyone feels pain differently, we may ask you to rate your pain using a pain scale. This will let us know how much pain you feel.   Keep in mind that medicine won't take away all of your pain. It helps to try other ways to relax and ease pain.     Things to help with pain    After surgery, we will give you medicine for your pain. These medications work well, but they can make you drowsy, itchy, or sick to your stomach, and constipated. Try to take narcotics with food if they cause nausea.   For mild to moderate pain, you can take medication such as Tylenol or Ibuprofen. These  can be used with narcotics and muscle relaxants. *If you have had a fusion: do NOT use NSAIDs for 6 months after surgery.   Do NOT drive while taking narcotic pain medication  Do NOT drink alcohol while using narcotic pain medication  You can utilize ice as needed (no longer than 20 minutes at one time) you may apply this over your covered incision  Utilize heat for muscle spasms, do not apply heat over your incision  If a muscle relaxer is prescribed, please do NOT take it at the same time as your narcotic pain medication. Take them at least 90 minutes apart.   You may also use pain cream/patches on sore muscles. Do NOT apply these on your incision. Patches may be cut in 1/2 if needed.     *After your surgery, if you will be staying in-patient, a nursing team will be monitoring you closely throughout your stay and communicate your health status to your surgeon and other providers.  You will be seen by Advanced Practice Providers (e.g., nurse practitioners, clinic nurse specialists, and physician assistants) who will check on you regularly to assess the status of your surgical recovery.     Incision Care    Look at your incision site every day. You  may need a mirror, or family member to help you.   Do not submerge your incision in water such as pools, hot tubs, baths for at least 6 weeks or until incision is healed  You may get your incision wet in the shower. Allow water and soap to run over incision, and gently pat dry.   Remove the dressing the day after you are discharged from the hospital. Keep the incision clean and dry at all times. This may require several bandage changes.   Contact us right away if you have:   Fever over 101 degrees farenheit  Green or yellow drainage (pus) from your incision or increased bloody drainage   Redness, swelling, or warmth at your surgery site   Notify the clinic [phone].    Activity Restrictions    For the first 6 weeks, no lifting,pushing, or pulling > 5-10 pounds, no  bending, twisting.  Use the stairs in moderation   Walking: Walking is the best way to start exercise after surgery. Take short frequent walks. You may gradually increase the distance as tolerated. If you feel pain, decrease your activity, but DO NOT stop walking. Walking will help you regain strength.  Avoid prolonged positioning for longer than 30 minutes (change positions frequently while awake)  No contact sports until after follow up visit  No high impact activities such as; running/jogging, snowmobile or 4 penny riding or any other recreational vehicles until deemed safe by your surgeon/care team.   Please call the clinic if you develop any of the following symptoms:  Swelling and/or warmth in one or both legs  Pain or tenderness in your leg, ankle, foot, or arm   Red or discolored/pale skin     Post-Op Follow Up Appointments    We will call you to schedule these appointments after your discharge from the hospital.   Incision assessment within 2 weeks with a Registered Nurse   6 week post-op with a Nurse Practitioner/Physician Assistant. Your surgeon will be available on this day.

## 2024-03-26 NOTE — PROGRESS NOTES
NEUROSURGERY FOLLOW UP  NOTE    Rigo Joyner comes today in follow-up. Patient is a 52 yo male who is status post cervical 5-cervical 6 and cervical 6-cervical 7 anterior discectomy and artificial disc replacements on 11/10/23.  Reviewed his cervical x-ray which shows stable artificial disc replacements no evidence of hardware loosening or failure.  He is doing overall okay still has pain into his left shoulder.  He is going to continue physical therapy.  Will also continue gabapentin 1-2 tabs twice a day.  Recommend a left cervical 4-5 TFESI monitor for relief.  Left shoulder pain does not get better could consider decompression of left C5 nerve.      Since our last visit, he underwent a SNRI at left C5. Injection maybe helped for a day or two. Symptoms ongoing in left shoulder. Pins and needles and pain. This radiates down his arm to his wrist. Before surgery arm was sore in a different distribution. Following surgery the neck pain and that arm pain improved then he developed these new primarily shoulder symptoms. Injection confirms symptoms likely C5 nerve on left. He has not had any significant improvement of his symptoms with a steroid injection or physical therapy. We reviewed his MRI of his cervical spine which demonstrates now moderate to seer right and severe left foraminal narrowing at cervical 4-5. Right sided narrowing has worsened in the interval since his prior MRI. At this time he wishes for a surgical solution for his pain since he has not had any relief with conservative management. Agree at this point less likely to see additional gains from continued conservative management.  Recommend cervical 4-cervical 5 anterior cervical decompression and fusion with plate. Risks of anterior neck surgery include but are not limited to inadequate symptom relief, nerve or spinal cord damage, durotomy, hematoma, infection, injury to trachea or esophagus, speech disturbance from injury to the recurrent  laryngeal nerve, swallowing difficulties, failed fusion. He agreed to proceed.     Sandrita Purdy MD      CC:     Semmler, Steven Duane  [address] [facility] [address]

## 2024-03-27 ENCOUNTER — TELEPHONE (OUTPATIENT)
Dept: NEUROSURGERY | Facility: CLINIC | Age: 54
End: 2024-03-27
Payer: COMMERCIAL

## 2024-03-27 NOTE — TELEPHONE ENCOUNTER
Patient is scheduled for surgery on 4/17/24. I gave the patient surgery details, and he verbalized understanding.

## 2024-04-09 NOTE — H&P (VIEW-ONLY)
[facility]      Preoperative Consultation   Rigo Joyner   : 1970   Gender: male    Date of Encounter: 2024    Nursing Notes:   Porsha Davis  2024  1:01 PM  Sign at exiting of workspace  Rigo Joyner is a 53 y.o. male (1970) who presents for preop evaluation undergoing cervical fusion.    Date of Surgery: 24  Surgical Specialty: Neurosurgery  [facility]/Surgical Facility: [facility]  Fax number: [phone]   Surgery type: outpatient  Primary Physician: Semmler, Steven Duane Adena M. Henning CMA([facility]).................... 2024      12:53 PM         History of Present Illness   Cervical radiculopathy       Review of Systems   A comprehensive review of systems was negative except for items noted in HPI.    Patient Active Problem List   Diagnosis Code     Allergic rhinitis due to pollen J30.1     Benign non-nodular prostatic hyperplasia with lower urinary tract symptoms N40.1     Family history of diabetes mellitus (DM) Z83.3     Mixed hyperlipidemia E78.2     Prediabetes R73.03     Biliary dyskinesia K82.8     Spondylolisthesis of lumbar region M43.16     Acute postoperative pain G89.18     IBS (irritable bowel syndrome) K58.9     History of diverticulitis Z87.19     Ossification of posterior longitudinal ligament (HC) M48.8X9     Osteoarthritis of spine with radiculopathy, cervical region M47.22     Current Outpatient Medications   Medication Sig     acetaminophen (TYLENOL EXTRA STRGTH) 500 mg tablet Take 2 tablets by mouth every 6 hours. Max acetaminophen dose: 4000mg in 24 hrs.     artificial tears, peg 400 0.4%-propylene glycol 0.3%, (Systane, propylene glycoL,) ophthalmic Place 1 Drop into the eye(s).     aspirin (ECOTRIN) 81 mg enteric coated tablet Take 1 tablet by mouth once daily with a meal.     atorvastatin (Lipitor) 20 mg tablet Take 1 Tablet (20 mg) by mouth at bedtime.     cholecalciferol (VITAMIN D3)  "5,000 unit capsule Take 125 mcg by mouth.     clobetasol 0.05% (TEMOVATE 0.05% OINTMENT) 0.05 % ointment Apply topically to affected area(s) two times daily.     fluticasone (50 mcg per actuation) nasal solution (FLONASE) Inhale 1 Spray in the nostril(s) once daily if needed for Rhinitis.     gabapentin (NEURONTIN) 300 mg capsule Take 2 Capsules (600 mg) by mouth three times daily.     ketoconazole 2% shampoo (NIZORAL) 2 % shampoo      Lactobacillus rhamnosus GG (CULTURELLE) 15 billion cell capsule Take 1 Capsule by mouth once daily.     methocarbamoL (ROBAXIN) 750 mg tablet Take 1 Tablet by mouth once daily if needed.     psyllium husk, with sugar, (METAMUCIL) 3.4 gram packet Take 1 Packet by mouth once daily.     No current facility-administered medications for this visit.     Allergies   Allergen Reactions     Bee Pollen Cough, Headache and Itching     Past Surgical History:   . Laterality Date     CHOLECYSTECTOMY  2018     HIP ARTHROSCOPY W/ LABRAL DEBRIDEMENT Left      hip labral tear Right 11/2021     LAPAROSCOPIC CHOLECYSTECTOMY  11/01/2018     LUMBAR FUSION  09/29/2020    L3-4     RIGHT COLECTOMY  2021     Social History     Tobacco Use     Smoking status: Never     Smokeless tobacco: Never   Vaping Use     Vaping status: Never Used   Substance Use Topics     Alcohol use: Not Currently     Comment: rare     Drug use: Not Currently     Family History   Problem Relation Age of Onset     Cancer-colon Father         age 77     Diabetes Father      Heart Disease Father         55- MI         PAST DIFFICULTY WITH ANESTHESIA: None     Physical Exam   /74 (Cuff Site: Right Arm, Position: Sitting, Cuff Size: Adult Large)   Pulse 84   Ht 1.74 m (5' 8.5\")   Wt 101.6 kg (224 lb)   SpO2 99%   BMI 33.56 kg/m   Body mass index is 33.56 kg/m .    General Appearance: Pleasant, alert, appropriate appearance for age. No acute distress  CV: Regular rate and rhythm, no murmurs gallops or rubs  Lungs: Clear to " auscultation bilaterally, no wheezes or rales  Abdomen: Soft nondistended, nontender  Extremities: No significant edema.  Pulses present.     Assessment / Plan   Discussed preoperative medications and supplements.  Labs: pending  ECG: Up-to-date    ICD-10-CM    1. Pre-op exam  Z01.818           Patient is cleared for planned procedure.   Electronically Signed by:   Steven Duane Semmler, MD   4/9/2024

## 2024-04-10 ENCOUNTER — VIRTUAL VISIT (OUTPATIENT)
Dept: NEUROSURGERY | Facility: CLINIC | Age: 54
End: 2024-04-10
Payer: COMMERCIAL

## 2024-04-10 DIAGNOSIS — M54.12 CERVICAL RADICULOPATHY: Primary | ICD-10-CM

## 2024-04-10 PROCEDURE — 99207 PR NO CHARGE NURSE ONLY: CPT | Mod: 93

## 2024-04-10 NOTE — PROGRESS NOTES
Called patient, discussed surgery, post-op course, expectations, follow up plan.    Reviewed H&P from 04/09/2024 - cleared for surgery     Labs - WNL    MRI done on 03/20/2024 - in [place]    To OR as planned.     Check in - 0530    Nothing to eat or drink after midnight the night before surgery.     Reviewed with patient: Arrive 2 hours prior to scheduled surgery.    Continue to refrain from NSAIDS (Ibuprofen, Aleve, Naprosyn), ASA, Over the counter herbal medications or supplements, anti-coagulants and blood thinners.     Patient confirmed they have help/assistance in place at home upon discharge    Instructions: using a washcloth and a bottle of provided Hibiclens, wash your body, avoiding your face and genitals. Preferably, shower the night before surgery and the morning of surgery using a half a bottle each time for your whole body shower.        Patient was informed that we will provide up to 6 weeks prescription of pain medication for post-operative pain.      Instructed patient about the following: After your surgery, if you will be staying in-patient, a nursing provider team will be monitoring you closely throughout your stay and communicate your health status to your surgeon and other providers.  You will be seen by Advanced Practice Providers (e.g., nurse practitioners, clinic nurse specialist, and physician assistants) who will check on you regularly to assess the status of your surgery.     Mira Rios RN, CNRN

## 2024-04-16 ENCOUNTER — ANESTHESIA EVENT (OUTPATIENT)
Dept: SURGERY | Facility: HOSPITAL | Age: 54
End: 2024-04-16
Payer: COMMERCIAL

## 2024-04-17 ENCOUNTER — ANESTHESIA (OUTPATIENT)
Dept: SURGERY | Facility: HOSPITAL | Age: 54
End: 2024-04-17
Payer: COMMERCIAL

## 2024-04-17 ENCOUNTER — APPOINTMENT (OUTPATIENT)
Dept: RADIOLOGY | Facility: HOSPITAL | Age: 54
End: 2024-04-17
Attending: NURSE PRACTITIONER
Payer: COMMERCIAL

## 2024-04-17 ENCOUNTER — HOSPITAL ENCOUNTER (INPATIENT)
Facility: HOSPITAL | Age: 54
LOS: 1 days | Discharge: HOME | End: 2024-04-18
Attending: SURGERY | Admitting: SURGERY
Payer: COMMERCIAL

## 2024-04-17 ENCOUNTER — APPOINTMENT (OUTPATIENT)
Dept: PHYSICAL THERAPY | Facility: HOSPITAL | Age: 54
End: 2024-04-17
Attending: SURGERY
Payer: COMMERCIAL

## 2024-04-17 DIAGNOSIS — Z98.890 S/P CERVICAL DISC REPLACEMENT: ICD-10-CM

## 2024-04-17 DIAGNOSIS — M54.12 CERVICAL RADICULOPATHY: Primary | ICD-10-CM

## 2024-04-17 DIAGNOSIS — G56.02 CARPAL TUNNEL SYNDROME OF LEFT WRIST: ICD-10-CM

## 2024-04-17 LAB
CREAT SERPL-MCNC: 0.77 MG/DL (ref 0.67–1.17)
EGFRCR SERPLBLD CKD-EPI 2021: >90 ML/MIN/1.73M2
GLUCOSE BLDC GLUCOMTR-MCNC: 122 MG/DL (ref 70–99)
PLATELET # BLD AUTO: 267 10E3/UL (ref 150–450)

## 2024-04-17 PROCEDURE — 82565 ASSAY OF CREATININE: CPT | Performed by: SURGERY

## 2024-04-17 PROCEDURE — 360N000085 HC SURGERY LEVEL 5 W/ FLUORO, PER MIN: Performed by: SURGERY

## 2024-04-17 PROCEDURE — 85049 AUTOMATED PLATELET COUNT: CPT | Performed by: SURGERY

## 2024-04-17 PROCEDURE — 0RT30ZZ RESECTION OF CERVICAL VERTEBRAL DISC, OPEN APPROACH: ICD-10-PCS | Performed by: SURGERY

## 2024-04-17 PROCEDURE — 250N000011 HC RX IP 250 OP 636: Performed by: NURSE ANESTHETIST, CERTIFIED REGISTERED

## 2024-04-17 PROCEDURE — 0RG00A0 FUSION OF OCCIPITAL-CERVICAL JOINT WITH INTERBODY FUSION DEVICE, ANTERIOR APPROACH, ANTERIOR COLUMN, OPEN APPROACH: ICD-10-PCS | Performed by: SURGERY

## 2024-04-17 PROCEDURE — 710N000009 HC RECOVERY PHASE 1, LEVEL 1, PER MIN: Performed by: SURGERY

## 2024-04-17 PROCEDURE — 250N000011 HC RX IP 250 OP 636: Performed by: NURSE PRACTITIONER

## 2024-04-17 PROCEDURE — 258N000003 HC RX IP 258 OP 636: Performed by: NURSE ANESTHETIST, CERTIFIED REGISTERED

## 2024-04-17 PROCEDURE — C1713 ANCHOR/SCREW BN/BN,TIS/BN: HCPCS | Performed by: SURGERY

## 2024-04-17 PROCEDURE — 250N000013 HC RX MED GY IP 250 OP 250 PS 637: Performed by: ANESTHESIOLOGY

## 2024-04-17 PROCEDURE — 20930 SP BONE ALGRFT MORSEL ADD-ON: CPT | Performed by: SURGERY

## 2024-04-17 PROCEDURE — 97116 GAIT TRAINING THERAPY: CPT | Mod: GP

## 2024-04-17 PROCEDURE — 258N000003 HC RX IP 258 OP 636: Performed by: NURSE PRACTITIONER

## 2024-04-17 PROCEDURE — 22551 ARTHRD ANT NTRBDY CERVICAL: CPT | Performed by: SURGERY

## 2024-04-17 PROCEDURE — 370N000017 HC ANESTHESIA TECHNICAL FEE, PER MIN: Performed by: SURGERY

## 2024-04-17 PROCEDURE — 120N000001 HC R&B MED SURG/OB

## 2024-04-17 PROCEDURE — C1762 CONN TISS, HUMAN(INC FASCIA): HCPCS | Performed by: SURGERY

## 2024-04-17 PROCEDURE — G0378 HOSPITAL OBSERVATION PER HR: HCPCS

## 2024-04-17 PROCEDURE — 272N000001 HC OR GENERAL SUPPLY STERILE: Performed by: SURGERY

## 2024-04-17 PROCEDURE — 22853 INSJ BIOMECHANICAL DEVICE: CPT | Performed by: SURGERY

## 2024-04-17 PROCEDURE — 22845 INSERT SPINE FIXATION DEVICE: CPT | Mod: AS | Performed by: NURSE PRACTITIONER

## 2024-04-17 PROCEDURE — 258N000003 HC RX IP 258 OP 636: Performed by: ANESTHESIOLOGY

## 2024-04-17 PROCEDURE — 01N10ZZ RELEASE CERVICAL NERVE, OPEN APPROACH: ICD-10-PCS | Performed by: SURGERY

## 2024-04-17 PROCEDURE — 36415 COLL VENOUS BLD VENIPUNCTURE: CPT | Performed by: SURGERY

## 2024-04-17 PROCEDURE — 22845 INSERT SPINE FIXATION DEVICE: CPT | Mod: 59 | Performed by: SURGERY

## 2024-04-17 PROCEDURE — 250N000009 HC RX 250: Performed by: NURSE PRACTITIONER

## 2024-04-17 PROCEDURE — 250N000009 HC RX 250: Performed by: NURSE ANESTHETIST, CERTIFIED REGISTERED

## 2024-04-17 PROCEDURE — 999N000141 HC STATISTIC PRE-PROCEDURE NURSING ASSESSMENT: Performed by: SURGERY

## 2024-04-17 PROCEDURE — 250N000025 HC SEVOFLURANE, PER MIN: Performed by: SURGERY

## 2024-04-17 PROCEDURE — 97110 THERAPEUTIC EXERCISES: CPT | Mod: GP

## 2024-04-17 PROCEDURE — 97161 PT EVAL LOW COMPLEX 20 MIN: CPT | Mod: GP

## 2024-04-17 PROCEDURE — 22853 INSJ BIOMECHANICAL DEVICE: CPT | Mod: AS | Performed by: NURSE PRACTITIONER

## 2024-04-17 PROCEDURE — 250N000011 HC RX IP 250 OP 636: Performed by: ANESTHESIOLOGY

## 2024-04-17 PROCEDURE — 22551 ARTHRD ANT NTRBDY CERVICAL: CPT | Mod: AS | Performed by: NURSE PRACTITIONER

## 2024-04-17 PROCEDURE — 999N000182 XR SURGERY CARM FLUORO GREATER THAN 5 MIN

## 2024-04-17 PROCEDURE — 250N000013 HC RX MED GY IP 250 OP 250 PS 637: Performed by: NURSE PRACTITIONER

## 2024-04-17 DEVICE — INTERBODY FUSION DEVICE  6 DEGREE LARGE 8MM
Type: IMPLANTABLE DEVICE | Site: SPINE CERVICAL | Status: FUNCTIONAL
Brand: ENDOSKELETON® TC NANOLOCK® SURFACE TECHNOLOGY

## 2024-04-17 DEVICE — IMP PLATE CERV MEDT ZEVO 23MM 1 LVL 3001023: Type: IMPLANTABLE DEVICE | Site: SPINE CERVICAL | Status: FUNCTIONAL

## 2024-04-17 DEVICE — MAGNETOS EASYPACK PUTTY 1.5CC 1-2MM USA
Type: IMPLANTABLE DEVICE | Site: SPINE CERVICAL | Status: FUNCTIONAL
Brand: MAGNETOS

## 2024-04-17 DEVICE — GRAFT BONE FIBERS GRAFTON DBM DBF 1ML T50101: Type: IMPLANTABLE DEVICE | Site: SPINE CERVICAL | Status: FUNCTIONAL

## 2024-04-17 DEVICE — IMP SCR MEDT ZEVO 3.5X17MM SD VA 7713517: Type: IMPLANTABLE DEVICE | Site: SPINE CERVICAL | Status: FUNCTIONAL

## 2024-04-17 RX ORDER — METHOCARBAMOL 750 MG/1
750 TABLET, FILM COATED ORAL EVERY 6 HOURS PRN
Status: DISCONTINUED | OUTPATIENT
Start: 2024-04-17 | End: 2024-04-18 | Stop reason: HOSPADM

## 2024-04-17 RX ORDER — CEFAZOLIN SODIUM/WATER 2 G/20 ML
2 SYRINGE (ML) INTRAVENOUS SEE ADMIN INSTRUCTIONS
Status: DISCONTINUED | OUTPATIENT
Start: 2024-04-17 | End: 2024-04-17 | Stop reason: HOSPADM

## 2024-04-17 RX ORDER — PROPOFOL 10 MG/ML
INJECTION, EMULSION INTRAVENOUS PRN
Status: DISCONTINUED | OUTPATIENT
Start: 2024-04-17 | End: 2024-04-17

## 2024-04-17 RX ORDER — ACETAMINOPHEN 325 MG/1
975 TABLET ORAL ONCE
Status: COMPLETED | OUTPATIENT
Start: 2024-04-17 | End: 2024-04-17

## 2024-04-17 RX ORDER — BISACODYL 10 MG
10 SUPPOSITORY, RECTAL RECTAL DAILY PRN
Status: DISCONTINUED | OUTPATIENT
Start: 2024-04-17 | End: 2024-04-18 | Stop reason: HOSPADM

## 2024-04-17 RX ORDER — NALOXONE HYDROCHLORIDE 0.4 MG/ML
0.2 INJECTION, SOLUTION INTRAMUSCULAR; INTRAVENOUS; SUBCUTANEOUS
Status: DISCONTINUED | OUTPATIENT
Start: 2024-04-17 | End: 2024-04-18 | Stop reason: HOSPADM

## 2024-04-17 RX ORDER — MULTIVITAMIN,THERAPEUTIC
1 TABLET ORAL DAILY
Status: DISCONTINUED | OUTPATIENT
Start: 2024-04-18 | End: 2024-04-18 | Stop reason: HOSPADM

## 2024-04-17 RX ORDER — ACETAMINOPHEN 325 MG/1
650 TABLET ORAL EVERY 4 HOURS PRN
Status: DISCONTINUED | OUTPATIENT
Start: 2024-04-20 | End: 2024-04-18 | Stop reason: HOSPADM

## 2024-04-17 RX ORDER — FENTANYL CITRATE 50 UG/ML
INJECTION, SOLUTION INTRAMUSCULAR; INTRAVENOUS PRN
Status: DISCONTINUED | OUTPATIENT
Start: 2024-04-17 | End: 2024-04-17

## 2024-04-17 RX ORDER — HALOPERIDOL 5 MG/ML
1 INJECTION INTRAMUSCULAR
Status: DISCONTINUED | OUTPATIENT
Start: 2024-04-17 | End: 2024-04-17 | Stop reason: HOSPADM

## 2024-04-17 RX ORDER — SODIUM CHLORIDE, SODIUM LACTATE, POTASSIUM CHLORIDE, CALCIUM CHLORIDE 600; 310; 30; 20 MG/100ML; MG/100ML; MG/100ML; MG/100ML
INJECTION, SOLUTION INTRAVENOUS CONTINUOUS
Status: DISCONTINUED | OUTPATIENT
Start: 2024-04-17 | End: 2024-04-17 | Stop reason: HOSPADM

## 2024-04-17 RX ORDER — LIDOCAINE HYDROCHLORIDE 10 MG/ML
INJECTION, SOLUTION INFILTRATION; PERINEURAL PRN
Status: DISCONTINUED | OUTPATIENT
Start: 2024-04-17 | End: 2024-04-17

## 2024-04-17 RX ORDER — FENTANYL CITRATE 50 UG/ML
25 INJECTION, SOLUTION INTRAMUSCULAR; INTRAVENOUS EVERY 5 MIN PRN
Status: DISCONTINUED | OUTPATIENT
Start: 2024-04-17 | End: 2024-04-17 | Stop reason: HOSPADM

## 2024-04-17 RX ORDER — ATORVASTATIN CALCIUM 10 MG/1
20 TABLET, FILM COATED ORAL AT BEDTIME
Status: DISCONTINUED | OUTPATIENT
Start: 2024-04-17 | End: 2024-04-18 | Stop reason: HOSPADM

## 2024-04-17 RX ORDER — HYDROMORPHONE HYDROCHLORIDE 1 MG/ML
0.4 INJECTION, SOLUTION INTRAMUSCULAR; INTRAVENOUS; SUBCUTANEOUS EVERY 5 MIN PRN
Status: DISCONTINUED | OUTPATIENT
Start: 2024-04-17 | End: 2024-04-17 | Stop reason: HOSPADM

## 2024-04-17 RX ORDER — NALOXONE HYDROCHLORIDE 0.4 MG/ML
0.4 INJECTION, SOLUTION INTRAMUSCULAR; INTRAVENOUS; SUBCUTANEOUS
Status: DISCONTINUED | OUTPATIENT
Start: 2024-04-17 | End: 2024-04-18 | Stop reason: HOSPADM

## 2024-04-17 RX ORDER — PROCHLORPERAZINE MALEATE 10 MG
10 TABLET ORAL EVERY 6 HOURS PRN
Status: DISCONTINUED | OUTPATIENT
Start: 2024-04-17 | End: 2024-04-18 | Stop reason: HOSPADM

## 2024-04-17 RX ORDER — DEXAMETHASONE SODIUM PHOSPHATE 10 MG/ML
INJECTION, SOLUTION INTRAMUSCULAR; INTRAVENOUS PRN
Status: DISCONTINUED | OUTPATIENT
Start: 2024-04-17 | End: 2024-04-17

## 2024-04-17 RX ORDER — GABAPENTIN 300 MG/1
300 CAPSULE ORAL
Status: COMPLETED | OUTPATIENT
Start: 2024-04-17 | End: 2024-04-17

## 2024-04-17 RX ORDER — MAGNESIUM SULFATE 4 G/50ML
4 INJECTION INTRAVENOUS ONCE
Status: COMPLETED | OUTPATIENT
Start: 2024-04-17 | End: 2024-04-17

## 2024-04-17 RX ORDER — LIDOCAINE 40 MG/G
CREAM TOPICAL
Status: DISCONTINUED | OUTPATIENT
Start: 2024-04-17 | End: 2024-04-17 | Stop reason: HOSPADM

## 2024-04-17 RX ORDER — ENOXAPARIN SODIUM 100 MG/ML
40 INJECTION SUBCUTANEOUS EVERY 24 HOURS
Status: DISCONTINUED | OUTPATIENT
Start: 2024-04-19 | End: 2024-04-18 | Stop reason: HOSPADM

## 2024-04-17 RX ORDER — ONDANSETRON 2 MG/ML
4 INJECTION INTRAMUSCULAR; INTRAVENOUS EVERY 6 HOURS PRN
Status: DISCONTINUED | OUTPATIENT
Start: 2024-04-17 | End: 2024-04-18 | Stop reason: HOSPADM

## 2024-04-17 RX ORDER — HYDROMORPHONE HCL IN WATER/PF 6 MG/30 ML
0.2 PATIENT CONTROLLED ANALGESIA SYRINGE INTRAVENOUS
Status: DISCONTINUED | OUTPATIENT
Start: 2024-04-17 | End: 2024-04-18 | Stop reason: HOSPADM

## 2024-04-17 RX ORDER — ONDANSETRON 2 MG/ML
4 INJECTION INTRAMUSCULAR; INTRAVENOUS EVERY 30 MIN PRN
Status: DISCONTINUED | OUTPATIENT
Start: 2024-04-17 | End: 2024-04-17 | Stop reason: HOSPADM

## 2024-04-17 RX ORDER — ACETAMINOPHEN 325 MG/1
975 TABLET ORAL EVERY 8 HOURS
Qty: 27 TABLET | Refills: 0 | Status: DISCONTINUED | OUTPATIENT
Start: 2024-04-17 | End: 2024-04-18 | Stop reason: HOSPADM

## 2024-04-17 RX ORDER — KETAMINE HYDROCHLORIDE 10 MG/ML
INJECTION INTRAMUSCULAR; INTRAVENOUS PRN
Status: DISCONTINUED | OUTPATIENT
Start: 2024-04-17 | End: 2024-04-17

## 2024-04-17 RX ORDER — ONDANSETRON 2 MG/ML
INJECTION INTRAMUSCULAR; INTRAVENOUS PRN
Status: DISCONTINUED | OUTPATIENT
Start: 2024-04-17 | End: 2024-04-17

## 2024-04-17 RX ORDER — CEFAZOLIN SODIUM 2 G/100ML
2 INJECTION, SOLUTION INTRAVENOUS EVERY 8 HOURS
Qty: 300 ML | Refills: 0 | Status: COMPLETED | OUTPATIENT
Start: 2024-04-17 | End: 2024-04-18

## 2024-04-17 RX ORDER — AMOXICILLIN 250 MG
1 CAPSULE ORAL 2 TIMES DAILY
Status: DISCONTINUED | OUTPATIENT
Start: 2024-04-17 | End: 2024-04-18 | Stop reason: HOSPADM

## 2024-04-17 RX ORDER — OXYCODONE HYDROCHLORIDE 5 MG/1
5 TABLET ORAL EVERY 4 HOURS PRN
Status: DISCONTINUED | OUTPATIENT
Start: 2024-04-17 | End: 2024-04-18 | Stop reason: HOSPADM

## 2024-04-17 RX ORDER — PROPOFOL 10 MG/ML
INJECTION, EMULSION INTRAVENOUS CONTINUOUS PRN
Status: DISCONTINUED | OUTPATIENT
Start: 2024-04-17 | End: 2024-04-17

## 2024-04-17 RX ORDER — ONDANSETRON 4 MG/1
4 TABLET, ORALLY DISINTEGRATING ORAL EVERY 30 MIN PRN
Status: DISCONTINUED | OUTPATIENT
Start: 2024-04-17 | End: 2024-04-17 | Stop reason: HOSPADM

## 2024-04-17 RX ORDER — NALOXONE HYDROCHLORIDE 0.4 MG/ML
0.1 INJECTION, SOLUTION INTRAMUSCULAR; INTRAVENOUS; SUBCUTANEOUS
Status: DISCONTINUED | OUTPATIENT
Start: 2024-04-17 | End: 2024-04-17 | Stop reason: HOSPADM

## 2024-04-17 RX ORDER — CEFAZOLIN SODIUM/WATER 2 G/20 ML
2 SYRINGE (ML) INTRAVENOUS
Status: COMPLETED | OUTPATIENT
Start: 2024-04-17 | End: 2024-04-17

## 2024-04-17 RX ORDER — LORATADINE 10 MG/1
10 TABLET ORAL DAILY PRN
Status: DISCONTINUED | OUTPATIENT
Start: 2024-04-17 | End: 2024-04-18 | Stop reason: HOSPADM

## 2024-04-17 RX ORDER — HYDROMORPHONE HCL IN WATER/PF 6 MG/30 ML
0.4 PATIENT CONTROLLED ANALGESIA SYRINGE INTRAVENOUS
Status: DISCONTINUED | OUTPATIENT
Start: 2024-04-17 | End: 2024-04-18 | Stop reason: HOSPADM

## 2024-04-17 RX ORDER — POLYETHYLENE GLYCOL 3350 17 G/17G
17 POWDER, FOR SOLUTION ORAL DAILY
Status: DISCONTINUED | OUTPATIENT
Start: 2024-04-18 | End: 2024-04-18 | Stop reason: HOSPADM

## 2024-04-17 RX ORDER — SODIUM CHLORIDE 9 MG/ML
INJECTION, SOLUTION INTRAVENOUS CONTINUOUS
Status: DISCONTINUED | OUTPATIENT
Start: 2024-04-17 | End: 2024-04-18 | Stop reason: HOSPADM

## 2024-04-17 RX ORDER — HYDROMORPHONE HYDROCHLORIDE 1 MG/ML
0.2 INJECTION, SOLUTION INTRAMUSCULAR; INTRAVENOUS; SUBCUTANEOUS EVERY 5 MIN PRN
Status: DISCONTINUED | OUTPATIENT
Start: 2024-04-17 | End: 2024-04-17 | Stop reason: HOSPADM

## 2024-04-17 RX ORDER — GABAPENTIN 300 MG/1
600 CAPSULE ORAL 2 TIMES DAILY
Status: DISCONTINUED | OUTPATIENT
Start: 2024-04-17 | End: 2024-04-18 | Stop reason: HOSPADM

## 2024-04-17 RX ORDER — FENTANYL CITRATE 50 UG/ML
50 INJECTION, SOLUTION INTRAMUSCULAR; INTRAVENOUS EVERY 5 MIN PRN
Status: DISCONTINUED | OUTPATIENT
Start: 2024-04-17 | End: 2024-04-17 | Stop reason: HOSPADM

## 2024-04-17 RX ORDER — KETOROLAC TROMETHAMINE 30 MG/ML
15 INJECTION, SOLUTION INTRAMUSCULAR; INTRAVENOUS ONCE
Status: COMPLETED | OUTPATIENT
Start: 2024-04-17 | End: 2024-04-17

## 2024-04-17 RX ORDER — OXYCODONE HYDROCHLORIDE 5 MG/1
10 TABLET ORAL EVERY 4 HOURS PRN
Status: DISCONTINUED | OUTPATIENT
Start: 2024-04-17 | End: 2024-04-18 | Stop reason: HOSPADM

## 2024-04-17 RX ORDER — ONDANSETRON 4 MG/1
4 TABLET, ORALLY DISINTEGRATING ORAL EVERY 6 HOURS PRN
Status: DISCONTINUED | OUTPATIENT
Start: 2024-04-17 | End: 2024-04-18 | Stop reason: HOSPADM

## 2024-04-17 RX ADMIN — OXYCODONE HYDROCHLORIDE 10 MG: 5 TABLET ORAL at 16:08

## 2024-04-17 RX ADMIN — SODIUM CHLORIDE: 9 INJECTION, SOLUTION INTRAVENOUS at 13:06

## 2024-04-17 RX ADMIN — GABAPENTIN 600 MG: 300 CAPSULE ORAL at 14:15

## 2024-04-17 RX ADMIN — MAGNESIUM SULFATE HEPTAHYDRATE 4 G: 80 INJECTION, SOLUTION INTRAVENOUS at 07:00

## 2024-04-17 RX ADMIN — OXYCODONE HYDROCHLORIDE 10 MG: 5 TABLET ORAL at 13:05

## 2024-04-17 RX ADMIN — PROPOFOL 50 MCG/KG/MIN: 10 INJECTION, EMULSION INTRAVENOUS at 07:54

## 2024-04-17 RX ADMIN — KETAMINE HYDROCHLORIDE 50 MG: 10 INJECTION INTRAMUSCULAR; INTRAVENOUS at 07:54

## 2024-04-17 RX ADMIN — Medication 2 G: at 07:24

## 2024-04-17 RX ADMIN — KETOROLAC TROMETHAMINE 15 MG: 30 INJECTION, SOLUTION INTRAMUSCULAR; INTRAVENOUS at 11:40

## 2024-04-17 RX ADMIN — Medication 1 LOZENGE: at 14:15

## 2024-04-17 RX ADMIN — OXYCODONE HYDROCHLORIDE 10 MG: 5 TABLET ORAL at 20:34

## 2024-04-17 RX ADMIN — ROCURONIUM BROMIDE 10 MG: 50 INJECTION, SOLUTION INTRAVENOUS at 09:14

## 2024-04-17 RX ADMIN — GABAPENTIN 300 MG: 300 CAPSULE ORAL at 06:59

## 2024-04-17 RX ADMIN — PHENYLEPHRINE HYDROCHLORIDE 100 MCG: 10 INJECTION INTRAVENOUS at 07:58

## 2024-04-17 RX ADMIN — PROPOFOL 150 MG: 10 INJECTION, EMULSION INTRAVENOUS at 07:36

## 2024-04-17 RX ADMIN — SENNOSIDES AND DOCUSATE SODIUM 1 TABLET: 8.6; 5 TABLET ORAL at 20:34

## 2024-04-17 RX ADMIN — LIDOCAINE HYDROCHLORIDE 5 ML: 10 INJECTION, SOLUTION INFILTRATION; PERINEURAL at 07:37

## 2024-04-17 RX ADMIN — FENTANYL CITRATE 50 MCG: 50 INJECTION, SOLUTION INTRAMUSCULAR; INTRAVENOUS at 10:37

## 2024-04-17 RX ADMIN — ROCURONIUM BROMIDE 20 MG: 50 INJECTION, SOLUTION INTRAVENOUS at 08:49

## 2024-04-17 RX ADMIN — ONDANSETRON 4 MG: 2 INJECTION INTRAMUSCULAR; INTRAVENOUS at 09:21

## 2024-04-17 RX ADMIN — ACETAMINOPHEN 975 MG: 325 TABLET ORAL at 19:07

## 2024-04-17 RX ADMIN — SODIUM CHLORIDE, POTASSIUM CHLORIDE, SODIUM LACTATE AND CALCIUM CHLORIDE: 600; 310; 30; 20 INJECTION, SOLUTION INTRAVENOUS at 07:00

## 2024-04-17 RX ADMIN — SODIUM CHLORIDE, POTASSIUM CHLORIDE, SODIUM LACTATE AND CALCIUM CHLORIDE: 600; 310; 30; 20 INJECTION, SOLUTION INTRAVENOUS at 09:24

## 2024-04-17 RX ADMIN — DEXAMETHASONE SODIUM PHOSPHATE 5 MG: 10 INJECTION, SOLUTION INTRAMUSCULAR; INTRAVENOUS at 07:54

## 2024-04-17 RX ADMIN — ATORVASTATIN CALCIUM 20 MG: 10 TABLET, FILM COATED ORAL at 20:34

## 2024-04-17 RX ADMIN — ACETAMINOPHEN 975 MG: 325 TABLET ORAL at 11:43

## 2024-04-17 RX ADMIN — PHENYLEPHRINE HYDROCHLORIDE 0.2 MCG/KG/MIN: 10 INJECTION INTRAVENOUS at 08:08

## 2024-04-17 RX ADMIN — DEXMEDETOMIDINE HYDROCHLORIDE 20 MCG: 100 INJECTION, SOLUTION INTRAVENOUS at 08:13

## 2024-04-17 RX ADMIN — CEFAZOLIN SODIUM 2 G: 2 INJECTION, SOLUTION INTRAVENOUS at 22:27

## 2024-04-17 RX ADMIN — SUGAMMADEX 200 MG: 100 INJECTION, SOLUTION INTRAVENOUS at 09:36

## 2024-04-17 RX ADMIN — GABAPENTIN 600 MG: 300 CAPSULE ORAL at 20:34

## 2024-04-17 RX ADMIN — SENNOSIDES AND DOCUSATE SODIUM 1 TABLET: 8.6; 5 TABLET ORAL at 14:15

## 2024-04-17 RX ADMIN — CEFAZOLIN SODIUM 2 G: 2 INJECTION, SOLUTION INTRAVENOUS at 14:16

## 2024-04-17 RX ADMIN — MIDAZOLAM 2 MG: 1 INJECTION INTRAMUSCULAR; INTRAVENOUS at 07:24

## 2024-04-17 RX ADMIN — ROCURONIUM BROMIDE 50 MG: 50 INJECTION, SOLUTION INTRAVENOUS at 07:37

## 2024-04-17 RX ADMIN — FENTANYL CITRATE 100 MCG: 50 INJECTION INTRAMUSCULAR; INTRAVENOUS at 07:35

## 2024-04-17 ASSESSMENT — ACTIVITIES OF DAILY LIVING (ADL)
ADLS_ACUITY_SCORE: 23
ADLS_ACUITY_SCORE: 21
ADLS_ACUITY_SCORE: 23
DRESSING/BATHING_DIFFICULTY: NO
ADLS_ACUITY_SCORE: 38
FALL_HISTORY_WITHIN_LAST_SIX_MONTHS: NO
ADLS_ACUITY_SCORE: 21
ADLS_ACUITY_SCORE: 21
WEAR_GLASSES_OR_BLIND: NO
ADLS_ACUITY_SCORE: 23
ADLS_ACUITY_SCORE: 21
DIFFICULTY_COMMUNICATING: NO
CONCENTRATING,_REMEMBERING_OR_MAKING_DECISIONS_DIFFICULTY: NO
ADLS_ACUITY_SCORE: 21
ADLS_ACUITY_SCORE: 20
ADLS_ACUITY_SCORE: 23
CHANGE_IN_FUNCTIONAL_STATUS_SINCE_ONSET_OF_CURRENT_ILLNESS/INJURY: NO
DIFFICULTY_EATING/SWALLOWING: NO
ADLS_ACUITY_SCORE: 21
ADLS_ACUITY_SCORE: 23
ADLS_ACUITY_SCORE: 23
WALKING_OR_CLIMBING_STAIRS_DIFFICULTY: NO
TOILETING_ISSUES: NO
ADLS_ACUITY_SCORE: 21
ADLS_ACUITY_SCORE: 23
DOING_ERRANDS_INDEPENDENTLY_DIFFICULTY: NO

## 2024-04-17 NOTE — PLAN OF CARE
Problem: Spinal Surgery  Goal: Optimal Coping with Surgery  Outcome: Progressing   Goal Outcome Evaluation:    Patient reports pain in neck and shoulders and was administered oxycodone and ice applied. Drain intact and draining bloody drainage. Dressing is clean, dry, and intact. Patient up with stand by assist, soft collar in place when OOB. Patient alert and oriented on room air. Vital signs stable.

## 2024-04-17 NOTE — ANESTHESIA POSTPROCEDURE EVALUATION
Patient: Rigo Joyner    Procedure: Procedure(s):  CERVICAL FOUR TO CERVICAL FIVE ANTERIOR CERCIVAL DECOMPRESSION AND FUSION WITH PLATE       Anesthesia Type:  General    Note:  Disposition: Outpatient   Postop Pain Control: Uneventful            Sign Out: Well controlled pain   PONV: No   Neuro/Psych: Uneventful            Sign Out: Acceptable/Baseline neuro status   Airway/Respiratory: Uneventful            Sign Out: Acceptable/Baseline resp. status   CV/Hemodynamics: Uneventful            Sign Out: Acceptable CV status; No obvious hypovolemia; No obvious fluid overload   Other NRE: NONE   DID A NON-ROUTINE EVENT OCCUR? No    Event details/Postop Comments:  headache           Last vitals:  Vitals Value Taken Time   /81 04/17/24 1130   Temp 36  C (96.8  F) 04/17/24 1045   Pulse 77 04/17/24 1132   Resp 15 04/17/24 1132   SpO2 96 % 04/17/24 1132   Vitals shown include unfiled device data.    Electronically Signed By: Jimmy Gómez MD  April 17, 2024  11:33 AM

## 2024-04-17 NOTE — BRIEF OP NOTE
[facility]    Brief Operative Note    Pre-operative diagnosis: Cervical radiculopathy [M54.12]  Post-operative diagnosis Same as pre-operative diagnosis    Procedure: CERVICAL FOUR TO CERVICAL FIVE ANTERIOR CERCIVAL DECOMPRESSION AND FUSION WITH PLATE, Bilateral - Spine    Surgeon: Surgeons and Role:     * Sandrita Purdy MD - Primary  Anesthesia: General   Estimated Blood Loss: 25 cc    Drains: Guilherme-Strickland  Specimens: * No specimens in log *  Findings:   Foraminal narrowing L>R  Complications: None.  Implants:   Implant Name Type Inv. Item Serial No.  Lot No. LRB No. Used Action   BONE GRAFT PUTTY MAGNETOS EASYPACK PUTTY 1.5CC [id number] - SNA  BONE GRAFT PUTTY MAGNETOS EASYPACK PUTTY 1.5CC [id number] NA [employer]  N/A 1 Implanted   GRAFT BONE FIBERS JOCE DBM DBF 1ML [id number] Bone/Tissue/Biologic GRAFT BONE FIBERS JOCE DBM DBF 1ML [id number] [employer] NA N/A 1 Implanted   CAGE SPNL 18X8MM ENDOSKELETON TC 6D 16MM LG TI [id number] - SNA Metallic Hardware/[place] CAGE SPNL 18X8MM ENDOSKELETON TC 6D 16MM LG TI [id number] NA [employer] [id number] N/A 1 Implanted   IMP PLATE CERV MEDT ZEVO 23MM 1 LVL [id number] - SNA Metallic Hardware/[place] IMP PLATE CERV MEDT ZEVO 23MM 1 LVL [id number] NA [employer] NA N/A 1 Implanted   IMP SCR MEDT ZEVO 3.5X17MM SD VA [id number] - SNA Metallic Hardware/[place] IMP SCR MEDT ZEVO 3.5X17MM SD VA [id number] NA [employer] NA N/A 1 Implanted

## 2024-04-17 NOTE — PHARMACY-ADMISSION MEDICATION HISTORY
Pharmacist Admission Medication History    Admission medication history is complete. The information provided in this note is only as accurate as the sources available at the time of the update.    Information Source(s): Patient, Clinic records, and CareEverywhere/SureScripts via in-person    Pertinent Information: none    Allergies reviewed with patient and updates made in EHR: yes    Medication History Completed By: Myla Buchanan RPH 4/17/2024 6:52 AM    PTA Med List   Medication Sig Last Dose    acetaminophen (TYLENOL) 500 MG tablet Take 1,000 mg by mouth every 6 hours as needed Past Week    aspirin 81 MG EC tablet Take 1 tablet (81 mg) by mouth daily Resume 48 hours after surgery 4/4/2024    atorvastatin (LIPITOR) 20 MG tablet Take 20 mg by mouth at bedtime 4/16/2024 at 1700    cholecalciferol (VITAMIN D3) 125 mcg (5000 units) capsule Take 125 mcg by mouth daily 4/13/2024    fluticasone (FLONASE) 50 MCG/ACT nasal spray Spray 1 spray in nostril daily as needed 4/16/2024    gabapentin (NEURONTIN) 300 MG capsule Take 600 mg by mouth 2 times daily 4/16/2024 at PM    ketoconazole (NIZORAL) 2 % external shampoo Use every 1-2 days when flared. Leave in few minutes before rinsing. Use twice weekly to prevent flares. Past Month    lactobacillus rhamnosus (GG) (CULTURELL) capsule Take 1 capsule by mouth every morning Past Week    methocarbamol (ROBAXIN) 750 MG tablet Take 1 tablet (750 mg) by mouth every 6 hours as needed for muscle spasms Past Week    polyethylene glycol-propylene glycol (SYSTANE ULTRA) 0.4-0.3 % SOLN ophthalmic solution Place 1 drop into both eyes 4 times daily as needed for dry eyes 4/16/2024

## 2024-04-17 NOTE — INTERVAL H&P NOTE
"I have reviewed the surgical (or preoperative) H&P that is linked to this encounter, and examined the patient. There are no significant changes    Clinical Conditions Present on Arrival:  Clinically Significant Risk Factors Present on Admission                 # Drug Induced Platelet Defect: home medication list includes an antiplatelet medication  # Obesity: Estimated body mass index is 32.64 kg/m  as calculated from the following:    Height as of this encounter: 5' 9\" (1.753 m).    Weight as of this encounter: 221 lb (100.2 kg).       "

## 2024-04-17 NOTE — PROGRESS NOTES
04/17/24 1500   Appointment Info   Signing Clinician's Name / Credentials (PT) Kaylin Nobles, PT   Rehab Comments (PT) Hx of back lumbar surg.   Living Environment   People in Home spouse   Current Living Arrangements house  ([place])   Home Accessibility stairs to enter home;stairs within home   Number of Stairs, Main Entrance 4   Stair Railings, Main Entrance railing on right side (ascending)   Transportation Anticipated family or friend will provide   Living Environment Comments walk in shower in the basement.  (tub /shower on the main level.)   Self-Care   Usual Activity Tolerance excellent   Current Activity Tolerance good   Regular Exercise Yes   Activity/Exercise Type walking   Exercise Amount/Frequency 3-5 times/wk   Equipment Currently Used at Home none   Fall history within last six months no   Activity/Exercise/Self-Care Comment Indep with mobility, and indep with all ADLs.  Pt does clean.  Indep with IADLs.   General Information   Onset of Illness/Injury or Date of Surgery 04/17/24   Referring Physician Erin Pineda, APRN CNP   Patient/Family Therapy Goals Statement (PT) To go home   Pertinent History of Current Problem (include personal factors and/or comorbidities that impact the POC) Per the chart, damian Joyner is a  52 yo male who is status post cervical 5-cervical 6 and cervical 6-cervical 7 anterior discectomy and artificial disc replacements on 11/10/23 who now presents with left C5 radiculopathy. We reviewed his MRI of his cervical spine which demonstrates now moderate to seer right and severe left foraminal narrowing at cervical 4-5. Right sided narrowing has worsened in the interval since his prior MRI. At this time he wishes for a surgical solution for his pain since he has not had any relief with conservative management. Agree at this point less likely to see additional gains from continued conservative management.  Recommend cervical 4-cervical 5 anterior cervical decompression  and fusion with plate. Risks of anterior neck surgery include but are not limited to inadequate symptom relief, nerve or spinal cord damage, durotomy, hematoma, infection, injury to trachea or esophagus, speech disturbance from injury to the recurrent laryngeal nerve, swallowing difficulties, failed fusion. He agreed to proceed.   Existing Precautions/Restrictions fall;spinal  (soft cervical collar OOB)   Weight-Bearing Status - LUE weight-bearing as tolerated   Weight-Bearing Status - RUE weight-bearing as tolerated   Weight-Bearing Status - LLE weight-bearing as tolerated   Weight-Bearing Status - RLE weight-bearing as tolerated   Cognition   Orientation Status (Cognition) oriented x 4   Pain Assessment   Patient Currently in Pain   (bilat shoulder area pain.  4 to 5/10)   Posture    Posture Comments good posture   Range of Motion (ROM)   Range of Motion ROM is WNL   ROM Comment Bilat LEs   Strength (Manual Muscle Testing)   Strength (Manual Muscle Testing) strength is WNL   Strength Comments Bilat LEs   Bed Mobility   Comment, (Bed Mobility) Supine<>sit with supervision with cues for spine precautions.  Pt did jose and doff his cervical coller indep.  On OOB.   Transfers   Comment, (Transfers) Sit<>stand with no AD with SBA with cues for hand placement.   Gait/Stairs (Locomotion)   [place]ris Level (Gait)   (SBA)   Assistive Device (Gait) other (see comments)  (none)   Distance in Feet (Gait) 10' x2   Pattern (Gait) step-through;swing-through   Deviations/Abnormal Patterns (Gait) gait speed decreased   Balance   Balance Comments SBA without AD   Sensory Examination   Sensory Perception WNL   Sensory Perception Comments Pt could feel light touch bilat hands and LEs   Clinical Impression   Criteria for Skilled Therapeutic Intervention Yes, treatment indicated   PT Diagnosis (PT) Impaired functional mobility   Influenced by the following impairments dec bal, increased pain, Pt is not at his PLOF   Functional  limitations due to impairments bed mobility, transfers, gait and steps.   Clinical Presentation (PT Evaluation Complexity) stable   Clinical Presentation Rationale Pt presents medically diagnosed.   Clinical Decision Making (Complexity) low complexity   Planned Therapy Interventions (PT) bed mobility training;gait training;home exercise program;stair training;strengthening;transfer training   Risk & Benefits of therapy have been explained care plan/treatment goals reviewed;risks/benefits reviewed;patient;current/potential barriers reviewed;participants voiced agreement with care plan   PT Total Evaluation Time   PT Eval, Low Complexity Minutes (37504) 15   Physical Therapy Goals   PT Frequency 2x/day   PT Predicted Duration/Target Date for Goal Attainment 04/24/24   PT Goals Bed Mobility;Transfers;Gait;Stairs;PT Goal 1   PT: Bed Mobility Independent;Supine to/from sit;Rolling;Within precautions   PT: Transfers Independent;Sit to/from stand;Bed to/from chair   PT: Gait Independent;Greater than 200 feet;Within precautions   PT: Stairs Greater than 10 stairs;Modified independent  (with a rail.)   PT: Goal 1 Pt to be indep with his HEP and to know his spine precautions.   Interventions   Interventions Quick Adds Gait Training;Therapeutic Activity;Therapeutic Procedure   Therapeutic Procedure/Exercise   Ther. Procedure: strength, endurance, ROM, flexibillity Minutes (46799) 10   Treatment Detail/Skilled Intervention PT did review spine precaution and issueed HEP.  Pt did supine AP, QS and GS x 10 reps with cues for technique.   Gait Training   Gait Training Minutes (54416) 10   Symptoms Noted During/After Treatment (Gait Training) fatigue   Treatment Detail/Skilled Intervention Pt  walked 120' with no AD with SBA. No LOB.  Pt did have his neck brace on.   Distance in Feet 120'   [place] Level (Gait Training) stand-by assist   Physical Assistance Level (Gait Training) verbal cues;1 person assist   Weight Bearing  (Gait Training) weight-bearing as tolerated   Assistive Device (Gait Training) other (see comments)  (none)   Pattern Analysis (Gait Training) swing-through gait   Gait Analysis Deviations decreased samuel   Impairments (Gait Analysis/Training) balance impaired;pain   PT Discharge Planning   PT Plan gait w/o AD, bed mobility, spine ex. steps.   PT Discharge Recommendation (DC Rec) home with assist   PT Rationale for DC Rec The pt did well with mobility today and did not use any AD. No LOB.  He should be able to dc to home with the family to assist.   PT Brief overview of current status PT eval. bed mobility with supervision, transfers and gait without AD with SBA.  Supine spine ex and issued and reviewed spine precautions and HEP.   PT Equipment Needed at Discharge other (see comments)  (none)   Total Session Time   Timed Code Treatment Minutes 20   Total Session Time (sum of timed and untimed services) 35

## 2024-04-17 NOTE — ANESTHESIA CARE TRANSFER NOTE
Patient: Rigo Joyner    Procedure: Procedure(s):  CERVICAL FOUR TO CERVICAL FIVE ANTERIOR CERCIVAL DECOMPRESSION AND FUSION WITH PLATE       Diagnosis: Cervical radiculopathy [M54.12]  Diagnosis Additional Information: No value filed.    Anesthesia Type:   General     Note:    Oropharynx: oropharynx clear of all foreign objects and spontaneously breathing  Level of Consciousness: awake  Oxygen Supplementation: face mask  Level of Supplemental Oxygen (L/min / FiO2): 8  Independent Airway: airway patency satisfactory and stable  Dentition: dentition unchanged  Vital Signs Stable: post-procedure vital signs reviewed and stable  Report to RN Given: handoff report given  Patient transferred to: PACU    Handoff Report: Identifed the Patient, Identified the Reponsible Provider, Reviewed the pertinent medical history, Discussed the surgical course, Reviewed Intra-OP anesthesia mangement and issues during anesthesia, Set expectations for post-procedure period and Allowed opportunity for questions and acknowledgement of understanding  Vitals:  Vitals Value Taken Time   /90 04/17/24 0948   Temp     Pulse 79 04/17/24 0958   Resp 18 04/17/24 0958   SpO2 95 % 04/17/24 0958   Vitals shown include unfiled device data.    Electronically Signed By: ARINA Rene CRNA  April 17, 2024  9:59 AM

## 2024-04-17 NOTE — OP NOTE
NEUROSURGERY OPERATIVE REPORT     DATE OF SERVICE: 4/17/2024     PREOPERATIVE DIAGNOSES:  Cervical radiculopathy      POSTOPERATIVE DIAGNOSES:  same    PROCEDURE:   1.  Anterior cervical discectomy and arthrodesis at cervical 4-cervical 5  2.  Use of Titan TC endoskeleton interbody with use of allograft at cervical 4-cervcial 5  3.  Use of anterior cervical plate,   Zevo (Medtronic)  4.  Use of operating room microscope.     SURGEON: Sandrita Purdy MD    ASSISTANT: Erin Pineda PA-C     INDICATIONS:  Rigo Joyner is a  54 yo male who is status post cervical 5-cervical 6 and cervical 6-cervical 7 anterior discectomy and artificial disc replacements on 11/10/23 who now presents with left C5 radiculopathy. We reviewed his MRI of his cervical spine which demonstrates now moderate to seer right and severe left foraminal narrowing at cervical 4-5. Right sided narrowing has worsened in the interval since his prior MRI. At this time he wishes for a surgical solution for his pain since he has not had any relief with conservative management. Agree at this point less likely to see additional gains from continued conservative management.  Recommend cervical 4-cervical 5 anterior cervical decompression and fusion with plate. Risks of anterior neck surgery include but are not limited to inadequate symptom relief, nerve or spinal cord damage, durotomy, hematoma, infection, injury to trachea or esophagus, speech disturbance from injury to the recurrent laryngeal nerve, swallowing difficulties, failed fusion. He agreed to proceed.      PROCEDURE:  After obtaining informed consent, the patient was brought to the operating room with  pneumatic stockings in place.  IV antibiotics was administered. He was intubated under general endotracheal anesthesia. He was positioned supine on the operating room table with a bump under the shoulders support behind the neck and the head cradled in a soft headrest leaving the neck  in a slightly extended position.  His shoulder were taped and retracted as needed to expose the anterior neck for a skin incision. He was prepped and draped in the usual sterile fashion.        The right sided anterior neck incision was opened sharply and extended down to the platysma.  The platysma was opened in one layer with sharp dissection and undercut superiorly and inferiorly for ease of re approximation at the end of the procedure.  We next continued the dissection medial to this sternocleidomastoid muscle.   After identifying the carotid and gently retracting it laterally, as well as identifying the trachea and esophagus which were gently retracted medially with hand-held retractors, to open the dissection.  We continued the dissection sharply and once reaching the loose  Areolar plane we used blunt dissection down to the anterior surface of the vertebral bodies.   We placed a short marker needle in the disc space to verify levels with a lateral x-ray, cervical 4-5.     Once levels were confirmed we then proceeded to elevate the longus coli muscles bilaterally and placed our retractors.  We then used a [place] retractor system  into the anterior surface of cervical 4 and cervical 5 used to achieve distraction after cutting the anterior ligament. The operating microscope was the utilized for the remainder of the case.  The disc was removed with a combination of high speed air drill, Kerrison, curettes and pituitary grabbers until the end plates were free of disc material. The posterior longitudinal ligament was opened with a blunt nerve hook. The remainder of the disc was removed with Kerrison rongeurs. We verified that the foramen were open with a blunt tip nerve hook. Next we trialed our interbody graft.  An interbody graft pre packed was tapped into position using a slight amount of distraction on the [place] pins.  With the graft in good position we released the [place] pins and verified a nice solid fit.   We removed the [place] distractor pins and filled the screw holes with bone wax.   We next fitted an anterior cervical plate which was secured into position with four screws drilled into the anterior surfaces of cervical 4 and cervical 5.  AP and lateral x-ray were taken prior to the patient leaving the operating room.      Assistant was needed for positioning, retraction, closure.    Once the construct was in good position, we copiously irrigated the incision with antibiotic-containing saline and achieved hemostasis , and remove the retractor system.  We verified good pulsation in the carotid.  We verified no injury to the trachea /esophagus.  A drain was placed. We closed the platysma with interrupted 2-0 Vicryl ,  inverted 2-0 Vicryl to approximate the subcutaneous tissues and Monocryl to approximate the skin edges. Dermabond was placed. Sponge and needle counts were correct prior to closure x 2.      Patient tolerated the procedure well, was taken to the recovery room at neurological baseline with no new deficits appreciated.     ESTIMATED BLOOD LOSS: 25 ml    SPECIMENS: none    FINDINGS:Foraminal narrowing L>R     IMPLANT:   Implant Name Type Inv. Item Serial No.  Lot No. LRB No. Used Action   BONE GRAFT PUTTY MAGNETOS EASYPACK PUTTY 1.5CC [id number] - SNA  BONE GRAFT PUTTY MAGNETOS EASYPACK PUTTY 1.5CC [id number] NA My [employer]  N/A 1 Implanted   GRAFT BONE FIBERS JOCE DBM DBF 1ML [id number] Bone/Tissue/Biologic GRAFT BONE FIBERS JOCE DBM DBF 1ML [id number] [employer] NA N/A 1 Implanted   CAGE SPNL 18X8MM ENDOSKELETON TC 6D 16MM LG TI [id number] - SNA Metallic Hardware/[place] CAGE SPNL 18X8MM ENDOSKELETON TC 6D 16MM LG TI [id number] NA [employer] [id number] N/A 1 Implanted   IMP PLATE CERV MEDT ZEVO 23MM 1 LVL [id number] - SNA Metallic Hardware/[place] IMP PLATE CERV MEDT ZEVO 23MM 1 LVL [id number] NA [employer] NA N/A 1 Implanted   IMP SCR MEDT ZEVO 3.5X17MM SD  [place] [id number] - SNA Metallic Hardware/[place] IMP SCR MEDT ZEVO 3.5X17MM SD [place] [id number] NA [employer] NA N/A 1 Implanted     DRAIN: danny x 1      Sandrita Purdy MD    Cc: Semmler, Steven Duane

## 2024-04-17 NOTE — ANESTHESIA PREPROCEDURE EVALUATION
Anesthesia Pre-Procedure Evaluation    Patient: Rigo Joyner   MRN: [id number] : 1970        Procedure : Procedure(s):  cervical 5-cervical 6 and cervical 6-cervical 7 anterior discectomy and artificial disc replacements          Past Medical History:   Diagnosis Date    Cervical radiculopathy     Hyperlipemia     Irritable bowel syndrome     Obese     PONV (postoperative nausea and vomiting)       Past Surgical History:   Procedure Laterality Date    BACK SURGERY      BILATERAL HIP ARTHROSCOPY      CHOLECYSTECTOMY      DISCECTOMY, SPINE, CERVICAL, 2 LEVELS, ANTERIOR APPROACH, WITH INTERVERTEBRAL DISC REPLACEMENT Bilateral 11/10/2023    Procedure: cervical 5-cervical 6 and cervical 6-cervical 7 anterior discectomy and artificial disc replacements;  Surgeon: Sandrita Purdy MD;  Location: [facility] Main OR    GI SURGERY      LAPAROSCOPIC LEFT COLON RESECTION      LUMBAR FUSION      RELEASE CARPAL TUNNEL Left 2022    Procedure: Left carpal tunnel release;  Surgeon: Sandrita Purdy MD;  Location: [facility] Main OR      Allergies   Allergen Reactions    Bee Pollen Cough, Headache and Itching    Pollen Extract Cough, Headache and Itching    Seasonal Allergies Headache, Itching, Rash and Visual Disturbance      Social History     Tobacco Use    Smoking status: Never    Smokeless tobacco: Former   Substance Use Topics    Alcohol use: Not Currently     Comment: Very rare      Wt Readings from Last 1 Encounters:   24 100.2 kg (221 lb)        Anesthesia Evaluation   Pt has had prior anesthetic. Type: General.    History of anesthetic complications  - PONV.      ROS/MED HX  ENT/Pulmonary:  - neg pulmonary ROS     Neurologic:  - neg neurologic ROS     Cardiovascular:     (+) Dyslipidemia - -   -  - -                                      METS/Exercise Tolerance:     Hematologic:  - neg hematologic  ROS     Musculoskeletal:       GI/Hepatic:  - neg GI/hepatic ROS     Renal/Genitourinary:  -  "neg Renal ROS     Endo:     (+)               Obesity,       Psychiatric/Substance Use:  - neg psychiatric ROS     Infectious Disease:  - neg infectious disease ROS     Malignancy:       Other:      (+)  , H/O Chronic Pain,         Physical Exam    Airway        Mallampati: II   TM distance: > 3 FB   Neck ROM: full   Mouth opening: > 3 cm    Respiratory Devices and Support         Dental           Cardiovascular   cardiovascular exam normal          Pulmonary   pulmonary exam normal                OUTSIDE LABS:  CBC:   Lab Results   Component Value Date    WBC 5.9 10/27/2023    WBC 6.1 05/26/2022    HGB 15.1 11/10/2023    HGB 16.5 10/27/2023    HCT 48.9 10/27/2023    HCT 44.2 05/26/2022     10/27/2023     05/26/2022     BMP:   Lab Results   Component Value Date     10/27/2023     05/26/2022    POTASSIUM 4.2 10/27/2023    POTASSIUM 4.4 05/26/2022    CHLORIDE 107 10/27/2023    CHLORIDE 106 05/26/2022    CO2 23 10/27/2023    CO2 25 05/26/2022    BUN 11.4 10/27/2023    BUN 13 05/26/2022    CR 0.79 10/27/2023    CR 0.79 05/26/2022     (H) 04/17/2024     (H) 11/11/2023     COAGS:   Lab Results   Component Value Date    PTT 28 10/27/2023    INR 1.03 10/27/2023     POC: No results found for: \"BGM\", \"HCG\", \"HCGS\"  HEPATIC:   Lab Results   Component Value Date    ALBUMIN 4.0 07/20/2021    PROTTOTAL 7.0 07/20/2021    ALT 52 07/20/2021    AST 34 07/20/2021    ALKPHOS 48 07/20/2021    BILITOTAL 1.0 07/20/2021     OTHER:   Lab Results   Component Value Date    LACT 1.6 06/10/2021    JOSE 9.4 10/27/2023    LIPASE 119 07/24/2019    CRP 0.2 09/01/2022    SED 5 09/01/2022       Anesthesia Plan    ASA Status:  2    NPO Status:  NPO Appropriate    Anesthesia Type: General.     - Airway: ETT   Induction: Intravenous.   Maintenance: Balanced.   Techniques and Equipment:     - Airway: Video-Laryngoscope       Consents    Anesthesia Plan(s) and associated risks, benefits, and realistic alternatives " discussed. Questions answered and patient/representative(s) expressed understanding.     - Discussed:     - Discussed with:  Patient      - Extended Intubation/Ventilatory Support Discussed: No.      - Patient is DNR/DNI Status: No     Use of blood products discussed: No .     Postoperative Care    Pain management: IV analgesics, Oral pain medications, Multi-modal analgesia.   PONV prophylaxis: Ondansetron (or other 5HT-3), Dexamethasone or Solumedrol, Background Propofol Infusion     Comments:                Jimmy Gómez MD

## 2024-04-17 NOTE — ANESTHESIA PROCEDURE NOTES
Airway       Patient location during procedure: OR       Procedure Start/Stop Times: 4/17/2024 7:42 AM  Staff -        CRNA: Mandy Trivedi APRN CRNA       Performed By: CRNA  Consent for Airway        Urgency: elective  Indications and Patient Condition       Indications for airway management: francis-procedural       Induction type:intravenous       Mask difficulty assessment: 3 - difficult mask (inadequate, unstable, or two providers) +/- NMBA (2 person mask, recessed mandible)    Final Airway Details       Final airway type: endotracheal airway       Successful airway: ETT - single and Oral  Endotracheal Airway Details        ETT size (mm): 7.5       Cuffed: yes       Cuff volume (mL): 6       Successful intubation technique: video laryngoscopy       VL Blade Size: Glidescope 4       Grade View of Cords: 1       Adjucts: stylet       Position: Right       Measured from: lips       Secured at (cm): 23       Bite block used: None    Post intubation assessment        Placement verified by: capnometry, equal breath sounds and chest rise        Number of attempts at approach: 1       Number of other approaches attempted: 0       Secured with: tape       Ease of procedure: easy       Dentition: Intact and Unchanged    Medication(s) Administered   Medication Administration Time: 4/17/2024 7:42 AM

## 2024-04-18 ENCOUNTER — APPOINTMENT (OUTPATIENT)
Dept: RADIOLOGY | Facility: HOSPITAL | Age: 54
End: 2024-04-18
Attending: NURSE PRACTITIONER
Payer: COMMERCIAL

## 2024-04-18 ENCOUNTER — APPOINTMENT (OUTPATIENT)
Dept: PHYSICAL THERAPY | Facility: HOSPITAL | Age: 54
End: 2024-04-18
Attending: SURGERY
Payer: COMMERCIAL

## 2024-04-18 ENCOUNTER — APPOINTMENT (OUTPATIENT)
Dept: OCCUPATIONAL THERAPY | Facility: HOSPITAL | Age: 54
End: 2024-04-18
Attending: NURSE PRACTITIONER
Payer: COMMERCIAL

## 2024-04-18 VITALS
TEMPERATURE: 97.7 F | OXYGEN SATURATION: 97 % | HEIGHT: 69 IN | BODY MASS INDEX: 32.73 KG/M2 | SYSTOLIC BLOOD PRESSURE: 131 MMHG | DIASTOLIC BLOOD PRESSURE: 76 MMHG | WEIGHT: 221 LBS | HEART RATE: 74 BPM | RESPIRATION RATE: 20 BRPM

## 2024-04-18 PROCEDURE — 250N000011 HC RX IP 250 OP 636: Performed by: NURSE PRACTITIONER

## 2024-04-18 PROCEDURE — 97530 THERAPEUTIC ACTIVITIES: CPT | Mod: GP

## 2024-04-18 PROCEDURE — 250N000013 HC RX MED GY IP 250 OP 250 PS 637: Performed by: NURSE PRACTITIONER

## 2024-04-18 PROCEDURE — 97110 THERAPEUTIC EXERCISES: CPT | Mod: GP

## 2024-04-18 PROCEDURE — 72040 X-RAY EXAM NECK SPINE 2-3 VW: CPT

## 2024-04-18 PROCEDURE — 97116 GAIT TRAINING THERAPY: CPT | Mod: GP

## 2024-04-18 PROCEDURE — G0378 HOSPITAL OBSERVATION PER HR: HCPCS

## 2024-04-18 PROCEDURE — 97166 OT EVAL MOD COMPLEX 45 MIN: CPT | Mod: GO

## 2024-04-18 PROCEDURE — 97535 SELF CARE MNGMENT TRAINING: CPT | Mod: GO

## 2024-04-18 RX ORDER — OXYCODONE HYDROCHLORIDE 5 MG/1
5 TABLET ORAL EVERY 6 HOURS PRN
Qty: 40 TABLET | Refills: 0 | Status: SHIPPED | OUTPATIENT
Start: 2024-04-18 | End: 2024-07-18

## 2024-04-18 RX ORDER — ASPIRIN 81 MG/1
81 TABLET ORAL DAILY
COMMUNITY
Start: 2024-04-25

## 2024-04-18 RX ORDER — AMOXICILLIN 250 MG
1 CAPSULE ORAL 2 TIMES DAILY PRN
Qty: 28 TABLET | Refills: 1 | Status: SHIPPED | OUTPATIENT
Start: 2024-04-18 | End: 2024-07-18

## 2024-04-18 RX ORDER — METHOCARBAMOL 750 MG/1
750 TABLET, FILM COATED ORAL 4 TIMES DAILY
Qty: 42 TABLET | Refills: 0 | Status: SHIPPED | OUTPATIENT
Start: 2024-04-18

## 2024-04-18 RX ADMIN — OXYCODONE HYDROCHLORIDE 10 MG: 5 TABLET ORAL at 10:48

## 2024-04-18 RX ADMIN — ACETAMINOPHEN 975 MG: 325 TABLET ORAL at 11:22

## 2024-04-18 RX ADMIN — OXYCODONE HYDROCHLORIDE 10 MG: 5 TABLET ORAL at 06:02

## 2024-04-18 RX ADMIN — ACETAMINOPHEN 975 MG: 325 TABLET ORAL at 02:07

## 2024-04-18 RX ADMIN — METHOCARBAMOL 750 MG: 750 TABLET ORAL at 06:02

## 2024-04-18 RX ADMIN — GABAPENTIN 600 MG: 300 CAPSULE ORAL at 08:58

## 2024-04-18 RX ADMIN — THERA TABS 1 TABLET: TAB at 08:58

## 2024-04-18 RX ADMIN — HYDROMORPHONE HYDROCHLORIDE 0.2 MG: 0.2 INJECTION, SOLUTION INTRAMUSCULAR; INTRAVENOUS; SUBCUTANEOUS at 01:06

## 2024-04-18 RX ADMIN — OXYCODONE HYDROCHLORIDE 10 MG: 5 TABLET ORAL at 02:07

## 2024-04-18 RX ADMIN — SENNOSIDES AND DOCUSATE SODIUM 1 TABLET: 8.6; 5 TABLET ORAL at 08:58

## 2024-04-18 RX ADMIN — CEFAZOLIN SODIUM 2 G: 2 INJECTION, SOLUTION INTRAVENOUS at 06:27

## 2024-04-18 ASSESSMENT — ACTIVITIES OF DAILY LIVING (ADL)
ADLS_ACUITY_SCORE: 21
ADLS_ACUITY_SCORE: 20
ADLS_ACUITY_SCORE: 21
ADLS_ACUITY_SCORE: 20
ADLS_ACUITY_SCORE: 21

## 2024-04-18 NOTE — PLAN OF CARE
Problem: Adult Inpatient Plan of Care  Goal: Plan of Care Review  Outcome: Progressing     Problem: Adult Inpatient Plan of Care  Goal: Patient-Specific Goal (Individualized)  Outcome: Progressing    Problem: Adult Inpatient Plan of Care  Goal: Optimal Comfort and Wellbeing  Intervention: Monitor Pain and Promote Comfort  Recent Flowsheet Documentation  Taken 4/17/2024 1700 by Adegun, Oluwadamilola, RN  Pain Management Interventions: cold applied  Taken 4/17/2024 1608 by Adegun, Oluwadamilola, RN  Pain Management Interventions: medication (see MAR)      Goal Outcome Evaluation: Pt is alert and oriented, able to make needs known. VSS. Pt c/o headache, scheduled tylenol effective, pain at incision site managed with PRN oxy 10mg X2 on this shift. Pt eating and voiding well. Pt up SBA, soft collar in place when OOB.

## 2024-04-18 NOTE — PLAN OF CARE
Problem: Spinal Surgery  Goal: Optimal Pain Control and Function  Outcome: Progressing   Goal Outcome Evaluation:Pain controlled with prn oxycodone, Dilaudid & Robaxin  and ice.     Problem: Spinal Surgery  Goal: Effective Oxygenation and Ventilation  Outcome: Progressing  Intervention: Optimize Oxygenation and Ventilation  Recent Flowsheet Documentation  Taken 4/18/2024 0110 by Martha Carter RN  Head of Bed (HOB) Positioning: HOB at 20-30 degrees  VSS on room air.     Patient doing very well post op. Voiding, eating, walking in l[place], steady on feet, wearing soft collar when out of bed.  ESEQUIEL=5mL

## 2024-04-18 NOTE — DISCHARGE SUMMARY
DISCHARGE SUMMARY   Patient ID:   Rigo Joyner   [id number]   53 year old   1970     Admit date: 4/17/2024     Discharge date: 04/18/2024    Admission Diagnoses: Cervical radiculopathy [M54.12]     Procedure:     1.  Anterior cervical discectomy and arthrodesis at cervical 4-cervical 5  2.  Use of Titan TC endoskeleton interbody with use of allograft at cervical 4-cervcial 5  3.  Use of anterior cervical plate,   Zevo (Medtronic)  4.  Use of operating room microscope.     Post op Diagnosis:     Cervical radiculopathy      Surgeon: Dr. Purdy    Exam:   Pt in chair. Collar intact. Drain with minimal drainage.   He appears comfortable and in no apparent distress, moving all extremities.   CN II-XII intact, alert and appropriate with conversation and following commands.   Bilateral upper and lower extremities with appropriate strength. DTR's WNL. Spine is non tender to palpation throughout. Gill's and Babinski sign neg. Sensation intact throughout. Acceptable ROM.   Calves soft and non-tender bilaterally.   Cervical dressing changed. Incision intact. Absent for edema, erythema or drainage.   He is ambulating with a steady gait and without assistive devices. Tolerating regular diet without nausea or vomiting. Pain managed with oral medication. Voiding without difficulty. He is on room air with O2 sats greater than 90%.     Disposition: Home     Rigo Joyner verbalizes understanding and is in agreement with discharge.     Done while pt still in hospital bed      Review of your medicines        START taking        Dose / Directions   oxyCODONE 5 MG tablet  Commonly known as: ROXICODONE  Used for: Cervical radiculopathy      Dose: 5 mg  Take 1 tablet (5 mg) by mouth every 6 hours as needed for moderate pain  Quantity: 40 tablet  Refills: 0     senna-docusate 8.6-50 MG tablet  Commonly known as: SENOKOT-S/PERICOLACE  Used for: Cervical radiculopathy      Dose: 1 tablet  Take 1 tablet by mouth 2  times daily as needed for constipation  Quantity: 28 tablet  Refills: 1            CONTINUE these medicines which may have CHANGED, or have new prescriptions. If we are uncertain of the size of tablets/capsules you have at home, strength may be listed as something that might have changed.        Dose / Directions   aspirin 81 MG EC tablet  This may have changed: These instructions start on April 25, 2024. If you are unsure what to do until then, ask your doctor or other care provider.  Used for: Carpal tunnel syndrome of left wrist      Dose: 81 mg  Start taking on: April 25, 2024  Take 1 tablet (81 mg) by mouth daily Resume 48 hours after surgery  Refills: 0     methocarbamol 750 MG tablet  Commonly known as: ROBAXIN  This may have changed:   when to take this  reasons to take this  Used for: S/P cervical disc replacement      Dose: 750 mg  Take 1 tablet (750 mg) by mouth 4 times daily  Quantity: 42 tablet  Refills: 0            CONTINUE these medicines which have NOT CHANGED        Dose / Directions   acetaminophen 500 MG tablet  Commonly known as: TYLENOL      Dose: 1,000 mg  Take 1,000 mg by mouth every 6 hours as needed  Refills: 0     atorvastatin 20 MG tablet  Commonly known as: LIPITOR      Dose: 20 mg  Take 20 mg by mouth at bedtime  Refills: 0     cholecalciferol 125 mcg (5000 units) capsule      Dose: 125 mcg  Take 125 mcg by mouth daily  Refills: 0     fluticasone 50 MCG/ACT nasal spray  Commonly known as: FLONASE      Dose: 1 spray  Spray 1 spray in nostril daily as needed  Refills: 0     gabapentin 300 MG capsule  Commonly known as: NEURONTIN      Dose: 600 mg  Take 600 mg by mouth 2 times daily  Refills: 0     ketoconazole 2 % external shampoo  Commonly known as: NIZORAL  Used for: Dermatitis, seborrheic      Use every 1-2 days when flared. Leave in few minutes before rinsing. Use twice weekly to prevent flares.  Quantity: 120 mL  Refills: 11     lactobacillus rhamnosus (GG) capsule      Dose: 1  capsule  Take 1 capsule by mouth every morning  Refills: 0     polyethylene glycol-propylene glycol 0.4-0.3 % Soln ophthalmic solution  Commonly known as: SYSTANE ULTRA      Dose: 1 drop  Place 1 drop into both eyes 4 times daily as needed for dry eyes  Refills: 0               Where to get your medicines        These medications were sent to [facility] [place] [id number] [facility]  [address] [place] [address] [address], [facility] [address]      Phone: [phone]   methocarbamol 750 MG tablet  oxyCODONE 5 MG tablet  senna-docusate 8.6-50 MG tablet          Discharge Procedure Orders   Reason for your hospital stay   Order Comments: 1.  Anterior cervical discectomy and arthrodesis at cervical 4-cervical 5  2.  Use of Titan TC endoskeleton interbody with use of allograft at cervical 4-cervcial 5  3.  Use of anterior cervical plate,   Zevo (Medtronic)  4.  Use of operating room microscope.     Follow-up and recommended labs and tests    Order Comments: Your Neurosurgical follow-up appointments have been scheduled. You may call [phone] to make, confirm or change your appointment dates and/or times.     Activity   Order Comments: Please limit your lifting to no more that ten pounds and avoid excessive bending, twisting and turning at the lumbar spine. You should also avoid excessive jostling and jarring activities.     Order Specific Question Answer Comments   Is discharge order? Yes      Wound care and dressings   Order Comments: Instructions to care for your wound at home: ice to area for comfort, keep wound clean and dry, and may get incision wet in shower but do not soak or scrub.     Diet   Order Comments: Follow this diet upon discharge: Advance to a regular diet as tolerated.     Order Specific Question Answer Comments   Is discharge order? Yes           Respectfully,   YARIEL Nieves, PAKimC

## 2024-04-18 NOTE — PLAN OF CARE
Occupational Therapy Discharge Summary    Reason for therapy discharge:    All goals and outcomes met, no further needs identified.    Progress towards therapy goal(s). See goals on Care Plan in [facility] electronic health record for goal details.  Goals met    Therapy recommendation(s):    No further therapy is recommended.    Archana Colon, OTR/L 4/18/24

## 2024-04-18 NOTE — PLAN OF CARE
Problem: Adult Inpatient Plan of Care  Goal: Readiness for Transition of Care  Outcome: Adequate for Care Transition   Goal Outcome Evaluation:       Patient discharging home. Discharge instructions gone over with patient and spouse including restrictions, medications, and  follow up. Patient verbalized understanding. Spouse to transport.

## 2024-04-18 NOTE — PROGRESS NOTES
"   04/18/24 0743   Appointment Info   Signing Clinician's Name / Credentials (OT) Archana Darlene, OTR/L   Living Environment   People in Home spouse   Current Living Arrangements house  (rambler)   Home Accessibility stairs to enter home;stairs within home   Number of Stairs, Main Entrance 4   Stair Railings, Main Entrance railing on right side (ascending)   Living Environment Comments Walk-in shower in the basement that pt typically uses. No shower chair or grab bar.   Self-Care   Equipment Currently Used at Home none   Fall history within last six months no   Activity/Exercise/Self-Care Comment Independent with mobility and ADLs.   Instrumental Activities of Daily Living (IADL)   IADL Comments Independent with IADLs, retired.   General Information   Onset of Illness/Injury or Date of Surgery 04/17/24   Referring Physician Erin Pineda, ARINA CNP   Patient/Family Therapy Goal Statement (OT) none stated   Additional Occupational Profile Info/Pertinent History of Current Problem Per chart review, pt \"is a  52 yo male who is status post cervical 5-cervical 6 and cervical 6-cervical 7 anterior discectomy and artificial disc replacements on 11/10/23 who now presents with left C5 radiculopathy. We reviewed his MRI of his cervical spine which demonstrates now moderate to seer right and severe left foraminal narrowing at cervical 4-5. Right sided narrowing has worsened in the interval since his prior MRI. At this time he wishes for a surgical solution for his pain since he has not had any relief with conservative management. Agree at this point less likely to see additional gains from continued conservative management.\" now s/p C5-C7 decompression and fusion.   Existing Precautions/Restrictions sternal;brace worn when out of bed   Cognitive Status Examination   Orientation Status orientation to person, place and time  (A&Ox4)   Pain Assessment   Patient Currently in Pain Yes, see Vital Sign flowsheet   Range of Motion " Comprehensive   General Range of Motion no range of motion deficits identified   Strength Comprehensive (MMT)   General Manual Muscle Testing (MMT) Assessment no strength deficits identified   Bed Mobility   Bed Mobility supine-sit   Supine-Sit Waucoma (Bed Mobility) supervision;verbal cues   Assistive Device (Bed Mobility) bed rails   Transfers   Transfers sit-stand transfer   Sit-Stand Transfer   Sit-Stand Waucoma (Transfers) supervision   Balance   Balance Comments SBA functional mobility no AD   Activities of Daily Living   BADL Assessment/Intervention lower body dressing;toileting;grooming;bathing;upper body dressing   Bathing Assessment/Intervention   Waucoma Level (Bathing) not tested   Comment, (Bathing) Per clinical reasoning, anticipate pt's ability to perform bathing will be impacted by spinal precautions.   Upper Body Dressing Assessment/Training   Position (Upper Body Dressing) supine   Waucoma Level (Upper Body Dressing) supervision;set up;verbal cues   Lower Body Dressing Assessment/Training   Comment, (Lower Body Dressing) Pt declined to perform, states he uses sock aid and reacher at baseline.   Grooming Assessment/Training   Position (Grooming) unsupported standing;sink side   Waucoma Level (Grooming) supervision;verbal cues   Toileting   Position (Toileting) unsupported standing   Waucoma Level (Toileting) supervision   Clinical Impression   Criteria for Skilled Therapeutic Interventions Met (OT) Yes, treatment indicated   OT Diagnosis Impaired ability to perform ADLs, IADLs, and functional mobility.   Influenced by the following impairments s/p cervical decompression and fusion   OT Problem List-Impairments impacting ADL problems related to;activity tolerance impaired;post-surgical precautions;pain   Assessment of Occupational Performance 3-5 Performance Deficits   Identified Performance Deficits bathing, grooming/hygiene, lower body dressing, upper body dressing    Planned Therapy Interventions (OT) ADL retraining;balance training;strengthening;transfer training;home program guidelines;progressive activity/exercise;risk factor education   Clinical Decision Making Complexity (OT) detailed assessment/moderate complexity   Risk & Benefits of therapy have been explained evaluation/treatment results reviewed;care plan/treatment goals reviewed;risks/benefits reviewed;current/potential barriers reviewed;participants voiced agreement with care plan;participants included;patient   OT Total Evaluation Time   OT Eval, Moderate Complexity Minutes (48131) 13   OT Goals   Therapy Frequency (OT) One time eval and treatment   OT Predicted Duration/Target Date for Goal Attainment 04/25/24   OT Goals Hygiene/Grooming;Upper Body Dressing;Lower Body Dressing;Toilet Transfer/Toileting;OT Goal 1   OT: Hygiene/Grooming modified independent;using adaptive equipment;within precautions;while standing;Completed   OT: Upper Body Dressing Modified independent;including orthotic;within precautions;Completed   OT: Lower Body Dressing Modified independent;using adaptive equipment;within precautions;including set-up/clothing retrieval;Completed   OT: Toilet Transfer/Toileting Modified independent;toilet transfer;cleaning and garment management;using adaptive equipment;within precautions   OT: Goal 1 Pt will verbalize safe technique for bathing within precautions. Goal met.   Self-Care/Home Management   Self-Care/Home Mgmt/ADL, Compensatory, Meal Prep Minutes (02640) 15   Symptoms Noted During/After Treatment (Meal Preparation/Planning Training) fatigue   Treatment Detail/Skilled Intervention Educated pt on spinal precautions, patient required reminders throughout session. Educated pt on need to wear cervical collar at all times OOB, pt asked appropriate questions. Pt tolerated standing at sink for >5 min with SBA, required education on adherence to spinal precautions with G/H tasks. Provided pt with ADLs  within spinal precautions booklet and reviewed briefly with patient. Recommended use of shower chair vs grab bar in shower to increase safety and adherence to spinal precautions with bathing, pt verbalized understanding.   OT Discharge Planning   OT Plan discharge OT   OT Discharge Recommendation (DC Rec) home with assist   OT Rationale for DC Rec Pt moving well and completing ADLs within precautions with SBA, has assistance available from wife with IADLs.   OT Brief overview of current status SBA/Ind ADLs and functional mobility within precautions   Total Session Time   Timed Code Treatment Minutes 15   Total Session Time (sum of timed and untimed services) 28

## 2024-05-02 ENCOUNTER — ALLIED HEALTH/NURSE VISIT (OUTPATIENT)
Dept: NEUROSURGERY | Facility: CLINIC | Age: 54
End: 2024-05-02
Payer: COMMERCIAL

## 2024-05-02 VITALS
DIASTOLIC BLOOD PRESSURE: 96 MMHG | TEMPERATURE: 98.3 F | SYSTOLIC BLOOD PRESSURE: 142 MMHG | OXYGEN SATURATION: 96 % | HEART RATE: 88 BPM

## 2024-05-02 DIAGNOSIS — Z98.1 S/P CERVICAL SPINAL FUSION: Primary | ICD-10-CM

## 2024-05-02 PROCEDURE — 99207 PR NO CHARGE NURSE ONLY: CPT

## 2024-05-02 NOTE — PROGRESS NOTES
POSTOPERATIVE FOLLOW-UP NURSE VISIT NOTE    Procedure: CERVICAL FOUR TO CERVICAL FIVE BILATERAL ANTERIOR CERVICAL DECOMPRESSION AND FUSION WITH PLATE     Date: 4/17/24    Surgeon: Gurwinder    Pre-op symptoms: nerve pain in left arm and shoulder    Post-op symptoms: nerve pain has improved    Pain and medications: 3/10  Methocarbamol 750 mg Q6 PRN: taking ~1x/day  Oxycodone 5 mg Q6 PRN: not taking    Bracing compliance: states compliant    Medications refilled: None    Imaging ordered: XR cervical    Wound: CDI, approximated, no drainage, minimal erythema, mild temperature change compared to surrounding tissue, non tender. Dermabond no longer in place, no other closures detected.

## 2024-05-02 NOTE — PATIENT INSTRUCTIONS
Follow-up 5/30/24 9:45 AM with x-ray prior.    A dressing is not required.    Keep the wound clean.    Wash your hands before touching the wound.  Ensure that anyone assisting you in the care of your wound washes her/his hands before touching the wound. Good handwashing can decrease the risk of serious infection.    If you are unable to see your wound, have someone check the wound daily for redness, swelling,or drainage. A small amount of drainage is normal.    You may shower.  Pat the wound dry. Do not rub.    No tub baths until the wound is well healed.  Usually 5-6 weeks.     If you develop redness, swelling, drainage, or temp 101 or greater, call our clinic.    * No lifting, pushing or pulling greater than 5-10 pound (this is about a gallon of milk) for the first 6 weeks after surgery .  *No repetitive bending, twisting, or jarring activities for 6 weeks.  *No overhead work  *No aerobic or strenuous activity  *No activities with increased risk of falls  *You may move about your home as tolerated  *You may walk up and down stairs as tolerated  *You may increase your activity slowly over the next 6 weeks    WALKING PROGRAM: As you can tolerate, walk daily-start with 5-10 minutes of continuous walking. This is in addition to the walking that you do as part of your daily activities. Increase the time that you walk by 5 minutes every couple of days. Do not exceed 30-45 minutes of continuous walking until seen in follow-up. Walking is the best exercise after surgery.  **Listen to your body, if you find that you are more painful or fatigued, you may need to proceed more slowly.    **Do not smoke or expose yourself to second hand smoke. Cigarette smoke can delay healing and cause complications.     DRIVING:  We recommend that you do not drive while taking medications for pain or muscle spasms. Always read and follow the advice on your prescription bottle. If you have questions, speak with your pharmacist.  We recommend  that you do not drive while wearing a brace, as it could limit your range of motion.    WORK: If you plan to return to work before your 6 week post-op appointment, call and discuss with one of the nurses in the neurosurgery office.     WEARING THE COLLAR:   Rakesh PARRA: (collar with blue pads) wear at all times   * You should have two sets of blue pads with your collar.  Wash and exchange   the pads at least daily. Wash the pads in mild soapand lay flat to dry.   *The collar should fit snuggly.   *Clean and check the skin under your collar at least daily.   *Cleaning the skin under the collar with a mild astringent (witch hazel) will      decreases the likelihood of skin irritation.  DO NOT apply to incision.  Always     check for allergy before using.  Veronica: (pink foam collar): Use for showers.    *You should sit to shower to limitthe risk of falling.    Soft collar: If you were given soft white collar use only when sleeping.

## 2024-05-30 ENCOUNTER — OFFICE VISIT (OUTPATIENT)
Dept: NEUROSURGERY | Facility: CLINIC | Age: 54
End: 2024-05-30
Payer: COMMERCIAL

## 2024-05-30 ENCOUNTER — HOSPITAL ENCOUNTER (OUTPATIENT)
Dept: RADIOLOGY | Facility: HOSPITAL | Age: 54
Discharge: HOME | End: 2024-05-30
Attending: SURGERY | Admitting: SURGERY
Payer: COMMERCIAL

## 2024-05-30 VITALS — SYSTOLIC BLOOD PRESSURE: 138 MMHG | OXYGEN SATURATION: 96 % | DIASTOLIC BLOOD PRESSURE: 71 MMHG | HEART RATE: 75 BPM

## 2024-05-30 DIAGNOSIS — M54.12 CERVICAL RADICULOPATHY: Primary | ICD-10-CM

## 2024-05-30 DIAGNOSIS — Z98.1 S/P CERVICAL SPINAL FUSION: ICD-10-CM

## 2024-05-30 PROCEDURE — 72040 X-RAY EXAM NECK SPINE 2-3 VW: CPT

## 2024-05-30 PROCEDURE — 99024 POSTOP FOLLOW-UP VISIT: CPT | Performed by: PHYSICIAN ASSISTANT

## 2024-05-30 ASSESSMENT — PAIN SCALES - GENERAL: PAINLEVEL: MILD PAIN (3)

## 2024-05-30 NOTE — LETTER
5/30/2024         RE: Rigo Joyner  [address] [facility] [address]        Dear Colleague,    Thank you for referring your patient, Rigo Joyner, to the [facility]. Please see a copy of my visit note below.    NEUROSURGERY FOLLOW UP  NOTE 5/30/2024     Mr. Joyner is a 53 year old comes today in follow-up. postoperative Anterior cervical discectomy and arthrodesis at cervical 4-cervical 5 on 4/17/2024 by Dr. Purdy   status post cervical 5-cervical 6 and cervical 6-cervical 7 anterior discectomy and artificial disc replacements on 11/10/23     Presently patient states that he wishes he was doing better. He has complaint of posterior cervical neck stiffness that extends into his left shoulder, though appears to be less severe as preop. He also notes severe pain into his medial forearm that is constant and will get worst at random. He denies any radicular pain into his hand but feels his hand is stiff. He denies any gait instability or changes in bowel or bladder habits.  He states that he has been wearing his soft collar on occasion.        PHYSICAL EXAM:   /71   Pulse 75   SpO2 96%        Mental Status: A & O in no acute distress.  Affect is appropriate.  Speech is fluent.  Recent and remote memory are intact.  Attention span and concentration are normal.     Motor:  Normal bulk and tone all muscle groups of lower extremities.     Sensory: Sensation intact bilaterally to light touch in LE     Reflexes; knee/ ankle jerk 1+     Incision: well healed without erythema, induration, or drainage     IMAGING:   I personally reviewed all radiographic images            IMPRESSION: C4-5 ACDF. Anatomic alignment of the fusion construct. No hardware complication. Disc prostheses at C5-6 and C6-7 appear well positioned. No bone fractures. Mild prevertebral soft tissue swelling. Negative for subsidence. Unremarkable facet   joints.     CONSULTATION  ASSESSMENT AND PLAN:    Patient has been advised to begin physical therapy focusing on cervical mobility and strength. Discussed that we could repeat EMG but also give more time to recover. Discussed increasing his gabapentin as he currently takes it twice a day. Will plan to follow up in 6 weeks with cervical xray for 3 month postop with Dr. Purdy.      Elizabeth Alonso PA-C  [facility]  O: [phone]                Again, thank you for allowing me to participate in the care of your patient.        Sincerely,        Elizabeth Alonso PA-C

## 2024-05-30 NOTE — NURSING NOTE
"Rigo Joyner is a 53 year old male who presents for:  Chief Complaint   Patient presents with    Surgical Followup     6 week post op   Left side shoulder/arm pain        Initial Vitals:  /71   Pulse 75   SpO2 96%  Estimated body mass index is 32.64 kg/m  as calculated from the following:    Height as of 4/17/24: 5' 9\" (1.753 m).    Weight as of 4/17/24: 221 lb (100.2 kg).. There is no height or weight on file to calculate BSA. BP completed using cuff size: regular  Mild Pain (3)    Neck Disability Index (  [employer]. 1991. All rights reserved.; used with permission)    SECTION 1 - PAIN INTENSITY: The pain is moderate at the moment.  SECTION 2 - PERSONAL CARE: I can look after myself normally without causing extra neck pain.  SECTION 3 - LIFTING: Neck pain prevents me from lifting heavy weights, but I can manage light weights if they are conveniently positioned.  SECTION 4 - READING: I can read as much as I want with moderate neck pain.  SECTION 5 - HEADACHES: I have slight headaches that come infrequently.  SECTION 6 - CONCENTRATION: I can concentrate fully without difficulty.  SECTION 7 - WORK: I can't do my usual work.  SECTION 8 - DRIVING: I can drive as long as I want with moderate neck pain.  SECTION 9 - SLEEPING: My sleep is mildly disturbed for up to 1-2 hours.  SECTION 10 - RECREATION: I am able to engage in only a few of my recreational activities because of neck pain.  Count: 10  Sum: 18  Raw Score: /50: 18  Neck Disability Index Score: (%): 36 %       Marcos Gastelum"

## 2024-05-30 NOTE — PROGRESS NOTES
NEUROSURGERY FOLLOW UP  NOTE 5/30/2024     Mr. Joyner is a 53 year old comes today in follow-up. postoperative Anterior cervical discectomy and arthrodesis at cervical 4-cervical 5 on 4/17/2024 by Dr. Purdy   status post cervical 5-cervical 6 and cervical 6-cervical 7 anterior discectomy and artificial disc replacements on 11/10/23     Presently patient states that he wishes he was doing better. He has complaint of posterior cervical neck stiffness that extends into his left shoulder, though appears to be less severe as preop. He also notes severe pain into his medial forearm that is constant and will get worst at random. He denies any radicular pain into his hand but feels his hand is stiff. He denies any gait instability or changes in bowel or bladder habits.  He states that he has been wearing his soft collar on occasion.        PHYSICAL EXAM:   /71   Pulse 75   SpO2 96%        Mental Status: A & O in no acute distress.  Affect is appropriate.  Speech is fluent.  Recent and remote memory are intact.  Attention span and concentration are normal.     Motor:  Normal bulk and tone all muscle groups of lower extremities.     Sensory: Sensation intact bilaterally to light touch in LE     Reflexes; knee/ ankle jerk 1+     Incision: well healed without erythema, induration, or drainage     IMAGING:   I personally reviewed all radiographic images            IMPRESSION: C4-5 ACDF. Anatomic alignment of the fusion construct. No hardware complication. Disc prostheses at C5-6 and C6-7 appear well positioned. No bone fractures. Mild prevertebral soft tissue swelling. Negative for subsidence. Unremarkable facet   joints.     CONSULTATION ASSESSMENT AND PLAN:    Patient has been advised to begin physical therapy focusing on cervical mobility and strength. Discussed that we could repeat EMG but also give more time to recover. Discussed increasing his gabapentin as he currently takes it twice a day. Will plan to  follow up in 6 weeks with cervical xray for 3 month postop with Dr. Purdy.      Elizabeth Alonso PA-C  [facility]  O: [phone]

## 2024-06-05 ENCOUNTER — THERAPY VISIT (OUTPATIENT)
Dept: PHYSICAL THERAPY | Facility: CLINIC | Age: 54
End: 2024-06-05
Attending: PHYSICIAN ASSISTANT
Payer: COMMERCIAL

## 2024-06-05 DIAGNOSIS — M54.12 CERVICAL RADICULOPATHY: ICD-10-CM

## 2024-06-05 PROCEDURE — 97140 MANUAL THERAPY 1/> REGIONS: CPT | Mod: GP | Performed by: PHYSICAL THERAPIST

## 2024-06-05 PROCEDURE — 97110 THERAPEUTIC EXERCISES: CPT | Mod: GP | Performed by: PHYSICAL THERAPIST

## 2024-06-05 PROCEDURE — 97161 PT EVAL LOW COMPLEX 20 MIN: CPT | Mod: GP | Performed by: PHYSICAL THERAPIST

## 2024-06-05 NOTE — PROGRESS NOTES
"PHYSICAL THERAPY EVALUATION  Type of Visit: Evaluation    See electronic medical record for Abuse and Falls Screening details.    Subjective   Has had neck pain for the past 3 years. Post op anterior cervical disectomy and arthorodesis of C4-5 on 4/17/24 by Dr. Purdy.  Status post C5-6-7 anterior disectomy and artificial disc replacement on 11/10/23.  Has continues cervical neck stiffness that extends into his L shoulder. Still having severe pain into his medial forearm that is constant.  Pain/tightness/zingers feels like it follows the ulnar nerve distribution down to the pinky finger. Strength feels good.  No numbness or tingling.   Had PT in January for neck and back pain.  Never improved the \"stiffness\" in the left side of the neck.       Presenting condition or subjective complaint: Cervical fusion  Date of onset:      Relevant medical history:     Dates & types of surgery: Nov 2023 april 2024 cervical surgery    Prior diagnostic imaging/testing results: MRI     Prior therapy history for the same diagnosis, illness or injury: Yes      Prior Level of Function  Transfers:   Ambulation:   ADL:   IADL:     Living Environment  Social support: With a significant other or spouse   Type of home: House   Stairs to enter the home: Yes 4 Is there a railing: Yes   Ramp: No   Stairs inside the home: Yes 13 Is there a railing: Yes   Help at home: None  Equipment owned:       Employment: No    Hobbies/Interests:  yard work, paint siding, fishing, maintaining his land,     Patient goals for therapy: Work on shoulder pain stickness    Pain assessment:      Objective   CERVICAL SPINE EVALUATION  PAIN: Pain Level at Rest: 4/10  Pain Level with Use: 9/10  Pain Location: cervical spine and shoulder  Pain Quality: Aching  Pain Frequency: intermittent  Pain is Worst: daytime  Pain is Exacerbated By: sitting, using arm   Pain is Relieved By: rest ice  INTEGUMENTARY (edema, incisions):  superior incision on neck mild scar tissue, " inferior incision on neck minimal to no scar tissue build up.   POSTURE: Sitting Posture: Forward head, Thoracic kyphosis increased  GAIT:   Gait Deviations:  hip drop   ROM:   (Degrees) Left AROM Right AROM    Cervical Flexion 20    Cervical Extension 20    Cervical Side bend 20 (painful) 15    Cervical Rotation 45 50    Cervical Protrusion     Cervical Retraction No increase in pain     Thoracic Flexion 25% limited     Thoracic Extension 50% limited    Thoracic Rotation 25% limited 30% limited     Left AROM Left PROM Right AROM Right PROM   Shoulder Flexion 160 (mild soreness)       Shoulder Extension       Shoulder Abduction 140 (mild soreness )      Shoulder Adduction       Shoulder IR L1 (mild soreness)       Shoulder ER T3      Pain:   End Feel:     MYOTOMES:    Left Right   C1-2 (Neck Flexion)     C3 (Neck Side Bend)      C4 (Shrug) 5 5   C5 (Deltoid) 5 5   C6 (Biceps) 5 5   C7 (Triceps) 5 5   C8 (Thumb Ext) 5 5   T1 (Intrinsics) 5 5     DERMATOMES: WNL  NEURAL TENSION:    Left Right   Median Negative     Ulnar Positive    Radial  Negative       FLEXIBILITY:    SPECIAL TESTS:   Obriens +, speeds +    empty can - , full can -, HK - ,  Neers -,   PALPATION:   ttp over the biceps tendon, increased tone throughout UT and levator on L, ttp along scalenes and 1st rib on the left   SPINAL SEGMENTAL CONCLUSIONS: C2-3 mod hypomobility side glide B,  C5-7 mod hypomobility.  Thoraicic mobility mod hypomobility       Assessment & Plan   CLINICAL IMPRESSIONS  Medical Diagnosis:      Treatment Diagnosis:     Impression/Assessment: Patient is a 53 year old male with s/p C4-5 ACDF on 4/17/24 with continues L shoulder pain and stiffness.  The following significant findings have been identified: Pain, Decreased ROM/flexibility, Decreased joint mobility, Impaired muscle performance, and Decreased activity tolerance. These impairments interfere with their ability to perform self care tasks, recreational activities, and driving   as compared to previous level of function.     Clinical Decision Making (Complexity):  Clinical Presentation: Stable/Uncomplicated  Clinical Presentation Rationale: based on medical and personal factors listed in PT evaluation  Clinical Decision Making (Complexity): Low complexity    PLAN OF CARE  Treatment Interventions:  Modalities: E-stim, Ultrasound  Interventions: Manual Therapy, Neuromuscular Re-education, Therapeutic Activity, Therapeutic Exercise, Self-Care/Home Management    Long Term Goals            Frequency of Treatment:    Duration of Treatment:      Recommended Referrals to Other Professionals:   Education Assessment:        Risks and benefits of evaluation/treatment have been explained.   Patient/Family/caregiver agrees with Plan of Care.     Evaluation Time:             Signing Clinician: eLidy Julio, PT

## 2024-06-11 NOTE — PHARMACY-ADMISSION MEDICATION HISTORY
Pharmacist Admission Medication History    Admission medication history is complete. The information provided in this note is only as accurate as the sources available at the time of the update.    Information Source(s): Patient and CareEverywhere/SureScripts via in-person    Pertinent Information: n/a    Changes made to PTA medication list:  Added: vitamin D, systane, allergy eye drops  Deleted: turmeric  Changed: updated gabapentin    Medication Affordability:  Not including over the counter (OTC) medications, was there a time in the past 3 months when you did not take your medications as prescribed because of cost?: No    Allergies reviewed with patient and updates made in EHR: yes    Medication History Completed By: Archana Kc [facility] 11/10/2023 9:57 AM    PTA Med List   Medication Sig Note Last Dose    acetaminophen (TYLENOL) 500 MG tablet Take 1,000 mg by mouth every 6 hours as needed  Unknown at prn    aspirin 81 MG EC tablet Take 1 tablet (81 mg) by mouth daily Resume 48 hours after surgery 11/7/2023: LD 10/20 10/20/2023 at am    atorvastatin (LIPITOR) 20 MG tablet Take 20 mg by mouth at bedtime  11/9/2023 at hs    cholecalciferol (VITAMIN D3) 125 mcg (5000 units) capsule Take 125 mcg by mouth daily  11/9/2023 at am    fluticasone (FLONASE) 50 MCG/ACT nasal spray Spray 1 spray in nostril daily as needed  Unknown at prn    gabapentin (NEURONTIN) 300 MG capsule Take 600 mg by mouth 2 times daily  11/10/2023 at am    ketoconazole (NIZORAL) 2 % external shampoo Use every 1-2 days when flared. Leave in few minutes before rinsing. Use twice weekly to prevent flares.  Past Month at pn    ketotifen fumarate 0.035% 0.035 % SOLN ophthalmic solution Place 1 drop into both eyes 2 times daily as needed for itching or dry eyes  Past Month at prn    lactobacillus rhamnosus (GG) (CULTURELL) capsule Take 1 capsule by mouth every morning  11/9/2023 at am    polyethylene glycol-propylene glycol (SYSTANE ULTRA) 0.4-0.3 % SOLN  ophthalmic solution Place 1 drop into both eyes 4 times daily as needed for dry eyes  Past Week at prn        Detail Level: Detailed Initiate Treatment: Suggested options of: Cerave/amlactin/eucerin/cetaphil cream daily after showers. Gentle cleansers discussed like cetaphil vs vanicream. Basic skin care reviewed. Suggested increasing water intake as well. Render In Strict Bullet Format?: No

## 2024-06-12 ENCOUNTER — THERAPY VISIT (OUTPATIENT)
Dept: PHYSICAL THERAPY | Facility: CLINIC | Age: 54
End: 2024-06-12
Attending: PHYSICIAN ASSISTANT
Payer: COMMERCIAL

## 2024-06-12 DIAGNOSIS — M54.12 CERVICAL RADICULOPATHY: Primary | ICD-10-CM

## 2024-06-12 PROCEDURE — 97110 THERAPEUTIC EXERCISES: CPT | Mod: GP | Performed by: PHYSICAL THERAPIST

## 2024-06-12 PROCEDURE — 97140 MANUAL THERAPY 1/> REGIONS: CPT | Mod: GP | Performed by: PHYSICAL THERAPIST

## 2024-06-18 ENCOUNTER — THERAPY VISIT (OUTPATIENT)
Dept: PHYSICAL THERAPY | Facility: CLINIC | Age: 54
End: 2024-06-18
Attending: PHYSICIAN ASSISTANT
Payer: COMMERCIAL

## 2024-06-18 DIAGNOSIS — M54.12 CERVICAL RADICULOPATHY: Primary | ICD-10-CM

## 2024-06-18 PROCEDURE — 97110 THERAPEUTIC EXERCISES: CPT | Mod: GP | Performed by: PHYSICAL THERAPIST

## 2024-06-18 PROCEDURE — 97140 MANUAL THERAPY 1/> REGIONS: CPT | Mod: GP | Performed by: PHYSICAL THERAPIST

## 2024-06-26 ENCOUNTER — THERAPY VISIT (OUTPATIENT)
Dept: PHYSICAL THERAPY | Facility: CLINIC | Age: 54
End: 2024-06-26
Attending: PHYSICIAN ASSISTANT
Payer: COMMERCIAL

## 2024-06-26 DIAGNOSIS — M54.12 CERVICAL RADICULOPATHY: Primary | ICD-10-CM

## 2024-06-26 PROCEDURE — 97140 MANUAL THERAPY 1/> REGIONS: CPT | Mod: GP | Performed by: PHYSICAL THERAPIST

## 2024-07-03 ENCOUNTER — THERAPY VISIT (OUTPATIENT)
Dept: PHYSICAL THERAPY | Facility: CLINIC | Age: 54
End: 2024-07-03
Attending: PHYSICIAN ASSISTANT
Payer: COMMERCIAL

## 2024-07-03 DIAGNOSIS — M54.12 CERVICAL RADICULOPATHY: Primary | ICD-10-CM

## 2024-07-03 PROCEDURE — 97110 THERAPEUTIC EXERCISES: CPT | Mod: GP | Performed by: PHYSICAL THERAPIST

## 2024-07-18 ENCOUNTER — HOSPITAL ENCOUNTER (OUTPATIENT)
Dept: RADIOLOGY | Facility: HOSPITAL | Age: 54
Discharge: HOME | End: 2024-07-18
Attending: PHYSICIAN ASSISTANT | Admitting: PHYSICIAN ASSISTANT
Payer: COMMERCIAL

## 2024-07-18 ENCOUNTER — OFFICE VISIT (OUTPATIENT)
Dept: NEUROSURGERY | Facility: CLINIC | Age: 54
End: 2024-07-18
Payer: COMMERCIAL

## 2024-07-18 VITALS — DIASTOLIC BLOOD PRESSURE: 82 MMHG | HEART RATE: 93 BPM | OXYGEN SATURATION: 97 % | SYSTOLIC BLOOD PRESSURE: 146 MMHG

## 2024-07-18 DIAGNOSIS — M54.12 CERVICAL RADICULOPATHY: Primary | ICD-10-CM

## 2024-07-18 DIAGNOSIS — Z98.1 S/P CERVICAL SPINAL FUSION: ICD-10-CM

## 2024-07-18 DIAGNOSIS — M54.12 CERVICAL RADICULOPATHY: ICD-10-CM

## 2024-07-18 PROCEDURE — 72040 X-RAY EXAM NECK SPINE 2-3 VW: CPT

## 2024-07-18 PROCEDURE — 99213 OFFICE O/P EST LOW 20 MIN: CPT | Performed by: SURGERY

## 2024-07-18 RX ORDER — METHOCARBAMOL 750 MG/1
750 TABLET, FILM COATED ORAL 4 TIMES DAILY PRN
Qty: 40 TABLET | Refills: 0 | Status: SHIPPED | OUTPATIENT
Start: 2024-07-18 | End: 2024-10-01

## 2024-07-18 ASSESSMENT — PAIN SCALES - GENERAL: PAINLEVEL: MODERATE PAIN (4)

## 2024-07-18 NOTE — NURSING NOTE
S/P CERVICAL FOUR TO CERVICAL FIVE BILATERAL ANTERIOR CERCIVAL DECOMPRESSION AND FUSION WITH PLATE CERVICAL FOUR TO CERVICAL FIVE BILATERAL ANTERIOR CERCIVAL DECOMPRESSION AND FUSION WITH PLATE on 4/17/24  Ongoing Left wrist burning sensation  Left shoulder soreness  Katherine Sanches, CMA

## 2024-07-18 NOTE — PATIENT INSTRUCTIONS
Recommend MRI of your left shoulder.       Increase the lifting restriction to < 50 pounds.     Continue spine exercises at home.     Follow up in 3 months with a new cervical xray for 6 month post operative follow up.

## 2024-07-18 NOTE — LETTER
7/18/2024      Rigo Joyner  [address] [facility] [address]      Dear Colleague,    Thank you for referring your patient, Rigo Joyner, to the [facility] SPINE AND NEUROSURGERY. Please see a copy of my visit note below.    NEUROSURGERY FOLLOW UP  NOTE    Rigo Joyner comes today in follow-up. 53 year old comes today in follow-up. He is status post anterior cervical discectomy and arthrodesis at cervical 4-cervical 5 on 4/17/2024 and cervical 5-cervical 6 and cervical 6-cervical 7 anterior discectomy and artificial disc replacements on 11/10/23. He feels a different feeling in his shoulder and in his forearms. Different then before surgery but not completely resolved. When rotating wrist can feel paresthesias in forearm.     PHYSICAL EXAM:   Constitutional: BP (!) 146/82   Pulse 93   SpO2 97%      Mental Status: A & O in no acute distress.  Affect is appropriate.  Speech is fluent.  Recent and remote memory are intact.  Attention span and concentration are normal.     Motor:  Normal bulk and tone all muscle groups of upper and lower extremities. 5/5     Sensory: Sensation intact bilaterally to light touch.     Reflexes; supinator, biceps, triceps, knee/ ankle jerk intact 1+.      Incision CDI    IMAGING:   I personally reviewed all radiographic images        CONSULTATION ASSESSMENT AND PLAN:    Given ongoing symptoms recommend MRI of his left shoulder. Increase the lifting restriction but recommend lifting less then 50 pounds. Continue spine exercises at home. Follow up in 3 months with a new cervical xray 2-3 view for 6 month post operative follow up.     Sandrita Purdy MD      CC:     Semmler, Steven Duane  [address] [place] [id number]      Again, thank you for allowing me to participate in the care of your patient.        Sincerely,        Sandrita Purdy MD

## 2024-08-01 ENCOUNTER — TELEPHONE (OUTPATIENT)
Dept: NEUROSURGERY | Facility: CLINIC | Age: 54
End: 2024-08-01
Payer: COMMERCIAL

## 2024-08-01 DIAGNOSIS — M25.512 PAIN IN JOINT OF LEFT SHOULDER: Primary | ICD-10-CM

## 2024-08-01 NOTE — TELEPHONE ENCOUNTER
Attempted to call patient he will need to complete shoulder xray as MRI was declined by insurance     Xray order has been entered    Elizabeth Alonso PA-C  [facility]  Tel [phone]

## 2024-08-14 PROBLEM — M54.12 CERVICAL RADICULOPATHY: Status: RESOLVED | Noted: 2024-01-12 | Resolved: 2024-08-14

## 2024-08-14 NOTE — PROGRESS NOTES
"    PHYSICAL THERAPY DISCHARGE NOTE    Assessment: Ari was seen for 5 visits in PT over the last month. PT has worked on improving joint mobility in the thoracic spine, improving cervical mobility at non fused sites, improving muscle tension, posture strength and mobility and gait mechanics. Despite all of these interventions, pt still reporting pain in the left shoulder and down the arm with tingling sensations. At this time due to lack of progress and no further skilled intervention needed, this episode of care is being discharged.       DISCHARGE  Reason for Discharge: No further expectation of progress.    Equipment Issued: THERABAND    Discharge Plan: Patient to continue home program.    Referring Provider:  Elizabeth Alonso       07/03/24 0500   Appointment Info   Signing clinician's name / credentials Leidy Julio, PT DPT   Visits Used 5   Medical Diagnosis Cervical radiculopathy   PT Tx Diagnosis decreased cervical and thoracic mobility   Progress Note/Certification   Onset of illness/injury or Date of Surgery 04/17/24   Therapy Frequency 1x/week   Predicted Duration 8 weeks   Progress Note Completed Date 06/05/24   GOALS   PT Goals 2;3;4   PT Goal 1   Goal Identifier 1   Goal Description Patient will improve cervical rotation ROM to 60 degrees B in order to optimize neck mobility.   Rationale to maximize safety and independence with performance of ADLs and functional tasks   Goal Progress not met yet   Target Date 07/03/24   PT Goal 2   Goal Identifier 2   Goal Description Patient will be independent in HEP in order to improve strength, ROM and pain outside of PT.   Target Date 07/03/24   Date Met 06/18/24   PT Goal 3   Goal Identifier 3   Goal Description Patient will report less than a 3/10 pain in L shoulder after doing 1 hour of ADL's/chores.   Goal Progress still painful   Target Date 07/31/24   PT Goal 4   Goal Identifier 4   Goal Description Patient will report no \"zinging\" pain down the left arm with " ADL's   Goal Progress primary symptom remains pain going down the arm   Target Date 07/31/24   Subjective Report   Subjective Report Feels like it's very related to his posture. Nerve glides continue to feel like they flair it up.   Objective Measures   Objective Measures Objective Measure 1;Objective Measure 3;Objective Measure 2;Objective Measure 4   Objective Measure 1   Objective Measure cervical rotation   Details 50 B, sore and tight at end range   Objective Measure 2   Objective Measure gait   Details decreased thoracic rotation, thumbs pointing inwards indicating rolled shoulders, hip drop on L   Objective Measure 3   Objective Measure UT   Details increased tension B   Objective Measure 4   Objective Measure hip abd strength   Details 4+/5 right,   4-/5 left   Treatment Interventions (PT)   Interventions Therapeutic Procedure/Exercise;Manual Therapy   Therapeutic Procedure/Exercise   Therapeutic Procedures: strength, endurance, ROM, flexibility minutes (26476) 32   Therapeutic Procedures Ther Proc 2   Ther Proc 1 clamshell   Ther Proc 1 - Details 10 x 2 on L   Ther Proc 2 hip abd   Ther Proc 2 - Details 10 x 2 on L   PTRx Ther Proc 1 thoracic rotation stretch sidelying bow and arrow   PTRx Ther Proc 1 - Details 30 sec x 2 wtih cues to turn chest up towards ceiling more   PTRx Ther Proc 2 thoracic ext   PTRx Ther Proc 2 - Details ed pt on improtanct of continuing these due to stiffness till in the thoracic spine   Skilled Intervention progress HEP for improving ROM, strength and pain.  add in hip strengthening to help improve gait as patient has hip drop on L that disrupts up the chain for spinal mechanics   Patient Response/Progress Pt understands HEP moving forward, no further questions.   Education   Learner/Method Patient   Education Comments educated on role of PT, POC and HEP   Plan   Home program PTRX code in snap shot, phone   Plan for next session d/c chart in 30 days   Comments   Comments Ari ramos  seen for 5 visits in PT over the last month. PT has worked on improving joint mobility in the thoracic spine, improving cervical mobility at non fused sites, improving muscle tension, posture strength and mobility and gait mechanics. Despite all of these interventions, pt still reporting pain in the left shoulder and down the arm with tingling sensations.  At this time due to lack of progress and no further skilled intervention needed, this episode of care is being discharged.   Total Session Time   Timed Code Treatment Minutes 32   Total Treatment Time (sum of timed and untimed services) 32           Leidy Julio  PT, DPT       8/14/2024   [address] [address]   [email or web address]  Voicemail: [phone]

## 2024-08-15 ENCOUNTER — ANCILLARY PROCEDURE (OUTPATIENT)
Dept: GENERAL RADIOLOGY | Facility: CLINIC | Age: 54
End: 2024-08-15
Attending: PHYSICIAN ASSISTANT
Payer: COMMERCIAL

## 2024-08-15 ENCOUNTER — TELEPHONE (OUTPATIENT)
Dept: NEUROSURGERY | Facility: CLINIC | Age: 54
End: 2024-08-15

## 2024-08-15 DIAGNOSIS — M25.512 PAIN IN JOINT OF LEFT SHOULDER: ICD-10-CM

## 2024-08-15 DIAGNOSIS — Z98.1 S/P CERVICAL SPINAL FUSION: ICD-10-CM

## 2024-08-15 DIAGNOSIS — M54.12 CERVICAL RADICULOPATHY: Primary | ICD-10-CM

## 2024-08-15 PROCEDURE — 73030 X-RAY EXAM OF SHOULDER: CPT | Mod: LT | Performed by: STUDENT IN AN ORGANIZED HEALTH CARE EDUCATION/TRAINING PROGRAM

## 2024-08-15 NOTE — TELEPHONE ENCOUNTER
Left message to return call to discuss results of xray: no fracture or joint malalignment. Moderate acromioclavicular osteoarthritis. Partially imaged cervical spinal hardware. Remainder negative.  May consider referral to ortho vs re-order MRI.  Mira Rios RN, CNRN

## 2024-08-15 NOTE — TELEPHONE ENCOUNTER
[facility] Call Center    Phone Message    May a detailed message be left on voicemail: yes     Reason for Call: Other: Patient called back to discuss. Please call when available.      Action Taken: Message routed to:  Other: Neurosurgery    Travel Screening: Not Applicable     Date of Service:

## 2024-08-15 NOTE — TELEPHONE ENCOUNTER
Called Ari with results and recommendations. He voted to proceed with an updated c-spine MRI.  Mira Rios RN, CNRN

## 2024-09-05 PROBLEM — M51.369 DDD (DEGENERATIVE DISC DISEASE), LUMBAR: Status: RESOLVED | Noted: 2024-01-25 | Resolved: 2024-09-05

## 2024-09-05 NOTE — PROGRESS NOTES
DISCHARGE  Reason for Discharge: No further expectation of progress.  Patient chooses to discontinue therapy.    Discharge Plan: Patient to continue home program.    Referring Provider:  Elizabeth Alonso

## 2024-09-17 ENCOUNTER — HOSPITAL ENCOUNTER (OUTPATIENT)
Dept: MRI IMAGING | Facility: CLINIC | Age: 54
Discharge: HOME | End: 2024-09-17
Attending: SURGERY | Admitting: SURGERY
Payer: COMMERCIAL

## 2024-09-17 DIAGNOSIS — Z98.1 S/P CERVICAL SPINAL FUSION: ICD-10-CM

## 2024-09-17 DIAGNOSIS — M54.12 CERVICAL RADICULOPATHY: ICD-10-CM

## 2024-09-17 PROCEDURE — 255N000002 HC RX 255 OP 636: Performed by: SURGERY

## 2024-09-17 PROCEDURE — A9585 GADOBUTROL INJECTION: HCPCS | Performed by: SURGERY

## 2024-09-17 PROCEDURE — 72156 MRI NECK SPINE W/O & W/DYE: CPT

## 2024-09-17 RX ORDER — GADOBUTROL 604.72 MG/ML
10 INJECTION INTRAVENOUS ONCE
Status: COMPLETED | OUTPATIENT
Start: 2024-09-17 | End: 2024-09-17

## 2024-09-17 RX ADMIN — GADOBUTROL 10 ML: 604.72 INJECTION INTRAVENOUS at 09:45

## 2024-09-19 ENCOUNTER — TELEPHONE (OUTPATIENT)
Dept: NEUROSURGERY | Facility: CLINIC | Age: 54
End: 2024-09-19
Payer: COMMERCIAL

## 2024-09-19 DIAGNOSIS — M25.519 PAIN IN JOINT, SHOULDER REGION: Primary | ICD-10-CM

## 2024-09-19 NOTE — CONFIDENTIAL NOTE
Called to let Ari know Dr. Purdy would like his to follow up in clinic as scheduled - he verbalized agreement.  Mira Rios RN, CNRN

## 2024-09-20 ENCOUNTER — PATIENT OUTREACH (OUTPATIENT)
Dept: CARE COORDINATION | Facility: CLINIC | Age: 54
End: 2024-09-20
Payer: COMMERCIAL

## 2024-09-23 ENCOUNTER — PATIENT OUTREACH (OUTPATIENT)
Dept: CARE COORDINATION | Facility: CLINIC | Age: 54
End: 2024-09-23
Payer: COMMERCIAL

## 2024-09-24 ENCOUNTER — TELEPHONE (OUTPATIENT)
Dept: NEUROSURGERY | Facility: CLINIC | Age: 54
End: 2024-09-24

## 2024-09-24 ENCOUNTER — OFFICE VISIT (OUTPATIENT)
Dept: NEUROSURGERY | Facility: CLINIC | Age: 54
End: 2024-09-24
Payer: COMMERCIAL

## 2024-09-24 VITALS — HEART RATE: 66 BPM | DIASTOLIC BLOOD PRESSURE: 78 MMHG | OXYGEN SATURATION: 97 % | SYSTOLIC BLOOD PRESSURE: 128 MMHG

## 2024-09-24 DIAGNOSIS — Z98.1 S/P CERVICAL SPINAL FUSION: Primary | ICD-10-CM

## 2024-09-24 DIAGNOSIS — M25.512 PAIN IN JOINT OF LEFT SHOULDER: Primary | ICD-10-CM

## 2024-09-24 PROCEDURE — 99213 OFFICE O/P EST LOW 20 MIN: CPT | Performed by: SURGERY

## 2024-09-24 ASSESSMENT — PAIN SCALES - GENERAL: PAINLEVEL: MODERATE PAIN (4)

## 2024-09-24 NOTE — PROGRESS NOTES
NEUROSURGERY FOLLOW UP  NOTE    Rigo Joyner comes today in follow-up. 53 year old comes today in follow-up. He is status post anterior cervical discectomy and arthrodesis at cervical 4-cervical 5 on 4/17/2024 and cervical 5-cervical 6 and cervical 6-cervical 7 anterior discectomy and artificial disc replacements on 11/10/23. Also underwent left CTR on 6/8/22.    Currently pain in his shoulder and down his deltoid to his forearm. Feels crawling or zing like feeling at times. No significant radiation into fingers except at night. Band sensation around wrist. During physical therapy they thought maybe it could be related to ulnar nerve impingement. We reviewed previous EMG  which showed only carpal tunnel impingement of median nerve without cervical radiculopathy or ulnar nerve impingement at the elbow.     PHYSICAL EXAM:   Constitutional: /78   Pulse 66   SpO2 97%      Mental Status: A & O in no acute distress.  Affect is appropriate.  Speech is fluent.  Recent and remote memory are intact.  Attention span and concentration are normal.     Motor:  Normal bulk and tone all muscle groups of upper and lower extremities. 5/5 in all extremities      Sensory: Sensation intact bilaterally to light touch in all extremities      Reflexes; supinator, biceps, triceps, knee/ ankle jerk 2+    Incision CDI     IMAGING:   I personally reviewed all radiographic images        CONSULTATION ASSESSMENT AND PLAN:    We reviewed MRI of his cervical spine as well a left shoulder xray from 8/15/24 demonstrates moderate acromioclavicular osteoarthritics. Ongoing left shoulder pain appears more related to his shoulder arthritis. No neck pain. Radiation mostly in his shoulder and a band around the wrist. Recommend proceeding with left shoulder steroid injection He has upcoming appointment with orthopedics as well to get new opinion. He will let us know how this goes and if he has any pain relief. If no relief he will return to  clinic sooner otherwise we will see him in October after his 6 month post op xray.     Sandrita Purdy MD      CC:     Semmler, Steven Duane  [address] [facility] [address]

## 2024-09-24 NOTE — LETTER
9/24/2024      Rigo Joyner  [address] [facility] [address]      Dear Colleague,    Thank you for referring your patient, Rigo Joyner, to the [facility] SPINE AND NEUROSURGERY. Please see a copy of my visit note below.    NEUROSURGERY FOLLOW UP  NOTE    Rigo Joyner comes today in follow-up. 53 year old comes today in follow-up. He is status post anterior cervical discectomy and arthrodesis at cervical 4-cervical 5 on 4/17/2024 and cervical 5-cervical 6 and cervical 6-cervical 7 anterior discectomy and artificial disc replacements on 11/10/23. Also underwent left CTR on 6/8/22.    Currently pain in his shoulder and down his deltoid to his forearm. Feels crawling or zing like feeling at times. No significant radiation into fingers except at night. Band sensation around wrist. During physical therapy they thought maybe it could be related to ulnar nerve impingement. We reviewed previous EMG  which showed only carpal tunnel impingement of median nerve without cervical radiculopathy or ulnar nerve impingement at the elbow.     PHYSICAL EXAM:   Constitutional: /78   Pulse 66   SpO2 97%      Mental Status: A & O in no acute distress.  Affect is appropriate.  Speech is fluent.  Recent and remote memory are intact.  Attention span and concentration are normal.     Motor:  Normal bulk and tone all muscle groups of upper and lower extremities. 5/5 in all extremities      Sensory: Sensation intact bilaterally to light touch in all extremities      Reflexes; supinator, biceps, triceps, knee/ ankle jerk 2+    Incision CDI     IMAGING:   I personally reviewed all radiographic images        CONSULTATION ASSESSMENT AND PLAN:    We reviewed MRI of his cervical spine as well a left shoulder xray from 8/15/24 demonstrates moderate acromioclavicular osteoarthritics. Ongoing left shoulder pain appears more related to his shoulder arthritis. No neck pain. Radiation mostly in his  shoulder and a band around the wrist. Recommend proceeding with left shoulder steroid injection He has upcoming appointment with orthopedics as well to get new opinion. He will let us know how this goes and if he has any pain relief. If no relief he will return to clinic sooner otherwise we will see him in October after his 6 month post op xray.     Sandrita Purdy MD      CC:     Semmler, Steven Duane  [address] [place] [id number]      Again, thank you for allowing me to participate in the care of your patient.        Sincerely,        Sandrita Purdy MD

## 2024-09-24 NOTE — NURSING NOTE
"Rigo Joyner is a 53 year old male who presents for:  Chief Complaint   Patient presents with    Follow Up     Review MRI  Left shoulder to wrist pain          Initial Vitals:  /78   Pulse 66   SpO2 97%  Estimated body mass index is 32.64 kg/m  as calculated from the following:    Height as of 4/17/24: 5' 9\" (1.753 m).    Weight as of 4/17/24: 221 lb (100.2 kg).. There is no height or weight on file to calculate BSA. BP completed using cuff size: regular  Moderate Pain (4)    Neck Disability Index (  [employer]. 1991. All rights reserved.; used with permission)    SECTION 1 - PAIN INTENSITY: The pain is moderate at the moment.  SECTION 2 - PERSONAL CARE: I can look after myself normally, but it causes extra neck pain.  SECTION 3 - LIFTING: Neck pain prevents me from lifting heavy weights off the floor but I can manage if items are conveniently positioned, ie. on a table.  SECTION 4 - READING: I can read as much as I want with slight neck pain.  SECTION 5 - HEADACHES: I have slight headaches that come infrequently.  SECTION 6 - CONCENTRATION: I can concentrate fully with slight difficulty.  SECTION 7 - WORK: I can do most of my usual work, but no more.  SECTION 8 - DRIVING: I can't drive as long as I want because of moderate neck pain.  SECTION 9 - SLEEPING: My sleep is moderately disturbed for up to 2-3 hours.  SECTION 10 - RECREATION: I am able to engage in only a few of my recreational activities because of neck pain.  Count: 10  Sum: 19  Raw Score: /50: 19  Neck Disability Index Score: (%): 38 %       Marcos Gastelum  "

## 2024-09-24 NOTE — PROGRESS NOTES
ASSESSMENT & PLAN     Today we discussed the underlying etiology/pathology of patient's   1. Pain of left upper extremity- chronic   2. DJD of left AC (acromioclavicular) joint    3. S/P cervical disc replacement- status post anterior cervical discectomy arthrodesis cervical 4/5 on 4/17/2024, cervical 5/6 -cervical 6/7 anterior discectomy, artificial disc replacements on 11/10/23    4. History of left carpal tunnel release 06/2022- Dr. Purdy      Discussed diagnosis and treatment options with the patient today. A shared decision making model was used. The patient's values and choices were respected. The following represents what was discussed and decided upon by the provider and the patient.   -We discussed the patient has a long history in regards to left upper extremity complaints of pain and numbness.  - Today we reviewed his previous left upper extremity surgery history including cervical spine surgeries as well as left carpal tunnel release all done by Dr. Purdy.  - We reviewed that the patient states that none of these surgeries including cervical procedures and carpal tunnel surgery have improved his chronic left upper extremity symptoms.  Also all previous cervical spine injection did not significantly improve his left upper extremity condition.  Patient had a cervical spine C7/T1 interlaminar epidural steroid injection done at [facility] on 11/8/2021.  Patient states that he has had some corticosteroid injection done to the left shoulder years ago which he thought helped temporarily. I cannot find record of that injection.  - I reviewed patient's EMG studies of the upper and lower extremities.  Left upper extremity EMG from 09/13/2021 done at [facility] shows mild carpal tunnel syndrome with no evidence of cervical radiculopathy.  I do not find any updated EMG studies since 2021 of the left upper extremity.  - At this time we ordered a left shoulder ultrasound-guided corticosteroid  injection for diagnostic and therapeutic purposes.  In my opinion corticosteroid injection should target the left AC joint as well as subacromial space with aggressive physical therapy program for at least 6-8 weeks for the shoulder (rotator cuff)  to determine response.  -Orders placed for ultrasound-guided left shoulder AC joint and subacromial space corticosteroid injection as well as referral to formal physical therapy for the left shoulder.  Patient should be contacted by central scheduling in the near future to find mutual times for schedule appointments.  - We did discuss that his main complaint is pain/numbness that radiates from the left posterior/upper trapezius shoulder girdle wrapping down the upper arm more so along the medial axillary region down to the ulnar border of the forearm but does not involve his hand.  This does not follow a clear dermatomal pattern.   - We did discuss that repeat left upper extremity EMG could be of benefit to evaluate for cervical versus peripheral nerve involvement but I would defer this test until patient's response to left shoulder corticosteroid injection and PT treatments have been completed.  This could be ordered either by Dr. Purdy or by Dr. Alisa Das if patient prefers to follow-up with physical medicine rehabilitation provider  - If patient does not get response from corticosteroid injection for the left shoulder I did suggest that seeking another opinion with Dr. Alisa Das could be of benefit to evaluate for possible nerve blocks or trigger point injection therapy.  Patient would call [phone] to schedule an appointment with her.  - Cervical spine MRI reviewed but imaging is somewhat distorted by a previous history of surgery.  Radiologist report does indicate multilevel foraminal stenosis which still could be contributing to patient's left upper extremity symptoms  -Patient to continue with gabapentin twice daily which he takes mainly due to chronic  lower extremity symptoms after history of lumbar spine surgery.  Patient takes methocarbamol as needed and does not utilize any over-the-counter medications on a regular basis for current pain control her symptoms.      -Call direct clinic number [[phone]] at any time with questions or concerns in regards to your recent office visit with me.     Vasu Ceja PA-C  [facility]    This note was completed in part using a voice recognition software, any grammatical or context distortion are unintentional and inherent to the software.           SUBJECTIVE  Rigo Joyner is a/an 53 year old male who is seen in consultation at the request of  Sandrita Purdy M.D. for evaluation of left shoulder pain to rule out shoulder as a pain generator. The patient is seen by themselves.        Onset: at least 2 year(s) ago. Reports insidious onset without acute precipitating event. Symptoms present prior and unchanged since cervical spine 4/17/24 ACD/ arthrodesis C4-5 and C5-6 and left carpal tunnel release in 2022  Location of Pain: left posterior upper trap and radiating down arm to ulnar side of wrist.  Rating of Pain at worst: 8/10  Rating of Pain Currently: 6/10  Worsened by: driving, mowing but always present even without use of arm  Better with: questionable temporary relief with shoulder injection (unknown date or provider)   Treatments tried: Shoulder steroid injection - didn't help much. For neck/shoulder, reports doing years of PT with [facility], also did neck injections which didn't help. He has done a massage gun. gabapentin  Quality: aching, throbbing, numb, shooting pricking  Associated symptoms: numbness and tingling  Orthopedic history: NO- first reported shoulder pain 3-4 years ago.  Relevant surgical history: anterior cervical discectomy and arthrodesis at cervical 4-cervical 5 on 4/17/2024 and cervical 5-cervical 6 and cervical 6-cervical 7 anterior discectomy and artificial  disc replacements on 11/10/23. CTS release left wrist 06/2022.   Social history: social history: Retired.     Past Medical History:   Diagnosis Date    Cervical radiculopathy     Hyperlipemia     Irritable bowel syndrome     Obese     PONV (postoperative nausea and vomiting)      Social History     Socioeconomic History    Marital status:    Tobacco Use    Smoking status: Never    Smokeless tobacco: Former   Vaping Use    Vaping status: Never Used   Substance and Sexual Activity    Alcohol use: Not Currently     Comment: Very rare    Drug use: Never     Social Determinants of Health     Financial Resource Strain: Low Risk  (2/24/2024)    [facility]    Financial Resource Strain     Difficulty of Paying Living Expenses: 3   Food Insecurity: No Food Insecurity (2/24/2024)    [facility] from [facility], [facility]    Food Insecurity     Worried About Running Out of Food in the Last Year: 1   Transportation Needs: No Transportation Needs (2/24/2024)    [facility], [facility]    Transportation Needs     Lack of Transportation (Medical): 1   Physical Activity: Insufficiently Active (10/21/2023)    Received from [facility], [facility]    Exercise Vital Sign     Days of Exercise per Week: 3 days     Minutes of Exercise per Session: 40 min   Stress: Stress Concern Present (5/7/2021)    Received from [facility], [facility]    [employer] [place] [employer] - Occupational Stress Questionnaire     Feeling of Stress : To some extent   Social Connections: Socially Integrated (2/24/2024)    Received from [facility], [facility]    Social Connections     Frequency of Communication with Friends and Family: 0    Housing Stability: Low Risk  (2/24/2024)    Received from [facility], [facility]    Housing Stability     Unable to Pay for Housing in the Last Year: 1         Patient's past medical, surgical, social, and family histories were personally reviewed today and no changes are noted.    REVIEW OF SYSTEMS:  10 point ROS is negative other than symptoms noted above in HPI, Past Medical History or as stated below  Constitutional: NEGATIVE for fever, chills, change in weight  Skin: NEGATIVE for worrisome rashes, moles or lesions  GI/: NEGATIVE for bowel or bladder changes  Neuro: NEGATIVE for weakness, dizziness or paresthesias    OBJECTIVE:  Vital signs as noted in EPIC for 9/24/2024  General: healthy, alert and in no distress  HEENT: no scleral icterus or conjunctival erythema  Skin: no suspicious lesions or rash. No jaundice.  CV: no pedal edema  Resp: normal respiratory effort without conversational dyspnea   Psych: normal mood and affect  Gait: normal steady gait with appropriate coordination and balance  Neuro: Normal light sensory exam of lower extremity      MSK:  Exam shows a pleasant 53-year-old male who ambulates full weightbearing without assistive device.  He is alert and orientated x 3.  Examination of the cervical spine shows no posterior surgical incisions.  Patient has 2 surgical incisions over the right anterior neck off midline of the trachea well-healed.  Patient is able to forward flex his chin to his chest without reproduction of symptoms as well as sniff test without symptoms.  He does have loss of lateral head tilt to the right and left which she describes more as a pulling sensation in the C-spine and upper trapezius musculature of the shoulders.  Range of motion C-spine does not seem to exacerbate any left upper extremity symptoms.  Patient states he has pain as well as numbness involving the posterior left trap region which  radiates into the axillary medial aspect of the arm down the ulnar border to the ulnar styloid process at rest.  With elbow flexed and repetitive pronation supination this seems to stir up his symptoms a bit on the left arm.  Patient shows no pain to palpation throughout the paraspinous muscles of the posterior cervical column or the spinous processes themselves.  No particular tenderness to the trapezius musculature bilaterally or the medial border of the scapula or elevators.  Patient can do repetitive wall push-ups without scapular winging or maltracking or crepitation.  No reproduction of symptoms.  Patient is nontender at the sternum, SC joint, clavicle bilaterally.  Patient does show AC joint tenderness on the left side with increased aggravation of this region on the top of the shoulder with cross body adduction.  Patient has very mild impingement testing on the left shoulder but bow drawing test does not seem to aggravate it much.  Patient shows adequate rotator cuff tone in all planes with no pain with resisted internal or external rotation at waist height with the rotator cuff.  Empty can positioning and empty can testing of the supraspinatus does not show weakness or significant pain.  Patient shows 5 out of 5 motor tone of the biceps and triceps as well as forearm musculature with normal resistance of the wrist in all planes.  Intrinsic testing of the hand shows no weakness of the intrinsics.  Biceps and tricep strength is normal without bicep deformity.  No particular pain in the anterior subacromial space or proximal bicipital groove.  5 out of 5 motor tone noted of the deltoid with no deltoid muscle or deltoid tuberosity tenderness.  Patient is range of motion of the shoulders are symmetric with internal rotation with some mild discomfort over the anterior subacromial space with terminal internal rotation.  Patient lacks terminal abduction by 10 degrees on the left shoulder compared to the right with  some mild deltoid discomfort.  Forward flexion is symmetric with patient having some discomfort in the left upper trapezius region.  No evidence of adhesive capsulitis.  Sulcus sign is negative.  Negative apprehension sign.  Speeds and liftoff test negative.  Patient has surgical incision over the volar aspect of the carpus consistent with carpal tunnel release which is well-healed.  He has negative Tinel and Phalen's at the wrist level for any reproduction of symptoms along the median nerve.  Compression of Guyon's canal does not reproduce any symptoms on the ulnar nerve.  Prolonged flexion test as well as Tinel's over the cubital tunnel does not generate any symptoms or radicular symptoms.  With all physical exam testing today he has intermittent symptoms subjectively of pain along the upper posterior trapezius region with symptoms radiating down the medial border of the humerus down into the ulnar border of the forearm not aggravated by any physical exam testing today.            Personal Independent visualization of the below images done today:  Patient's previous EMG testing of the upper and lower extremities which are in the EMR are fully reviewed today dating back to 2021.  Patient cervical spine MRI which is noted below is reviewed.    X-rays of the patient's left shoulder are reviewed and reviewed with the patient.  Patient shows some moderate AC joint arthrosis with slight enlargement and possible impingement pathology.  No evidence of high-grade arthritis of the glenohumeral joint.  Subacromial space is maintained without calcific changes.  No evidence of fracture or dislocation.    MR CERVICAL SPINE WITHOUT AND WITH CONTRAST September 17, 2024 10:20  AM      HISTORY: Neck pain. History of spine surgery. No suspected hardware  failure. No suspected cervical disc disease. Cervical radiculopathy.  Status post cervical spinal fusion.        COMPARISON: Plain radiographs 7/18/2024.     TECHNIQUE: Multiplanar  multisequence cervical MR without and with  contrast.     CONTRAST: 10 mL Gadavist.     FINDINGS: Normal cervical vertebral alignment.      Postsurgical changes of instrumented anterior spinal fusion C4-C7.     Normal marrow signal. No cord signal abnormality.      Multilevel degenerative changes with loss of disc height, osteophyte  formation and facet arthropathy. The findings on a level by level  basis are as follows:     C2-C3: Bilateral facet arthropathy. Mild to moderate bilateral neural  foraminal stenosis. No spinal canal stenosis.     C3-C4: Mild disc height loss and disc degeneration. Left eccentric  disc osteophyte complex and uncinate spurring. Bilateral facet  arthropathy. Mild to moderate bilateral neural foraminal stenosis. No  spinal canal stenosis.     C4-C5: Fusion level. Uncovertebral spurring and bilateral facet  arthropathy. Mild to moderate left and mild right neural foraminal  stenosis. Mild spinal canal stenosis.      C5-C6: Fusion level. Uncovertebral spurring and bilateral facet  arthropathy. Mild spinal canal stenosis. Moderate to severe bilateral  neural foraminal stenosis.     C6-C7: Fusion level. Uncovertebral spurring. Bilateral facet  arthropathy. Mild spinal canal stenosis. Moderate bilateral neural  foraminal stenosis.     C7-T1: No spinal canal or neural foraminal stenosis.     The visualized skull base, posterior fossa, and paraspinal soft  tissues are within normal limits.                                                                      IMPRESSION:  1. No abnormal spinal cord signal.  2. No abnormal contrast enhancement.  3. Postsurgical changes of instrumented anterior spinal fusion C4-C7.  4. Multilevel cervical spondylosis as detailed above.     MANINDER MERCHANT MD   Patient's conditions were thoroughly discussed during today's visit with total time reviewing patient's previous medical records/history/radiology, face-to-face examination and discussion and plan of care with  the patient and documentation being 50 minutes for today's clinical visit  Vasu Ceja PA-C  [facility]    This note was completed in part using a voice recognition software, any grammatical or context distortion are unintentional and inherent to the software.

## 2024-09-24 NOTE — TELEPHONE ENCOUNTER
Procedure ordered? YES    What insurance are we billing for this procedure?  [employer]  IF SCHEDULING AT [facility] PLEASE SCHEDULE AT LEAST 7-10 BUSINESS DAYS OUT SO A PA CAN BE OBTAINED    Is a  PAIN  order available to link to injection appointment or does one need to be transcribed?  YES: PAIN Joint Injection Major Joint Left    If needed, route to CN to assist in doing so.    Is  needed?: No   If YES, please initiate in-person  request.    Will patient have a ?  Yes    All fluoro procedures require a .  These US procedures also require a : piriformis, pudendal, scalene, & carpal tunnel.    Is patient taking any blood thinners (i.e. Plavix, coumadin, jantoven, warfarin, heparin, Xarelto, Pradaxa, Eliquis, Brilinta, or Effient, etc)? No   If YES, schedule out 2 weeks, and route to RN pool to determine if medication hold is needed.    Is patient taking aspirin? Yes - BABY ASPIRIN   For CERVICAL procedures, route message to Care Navigation so they can seek approval from managing provider to hold aspirin for 6 days prior to the procedure.    Does patient have an allergy to contrast dye or iodine?  No  If YES, OK to schedule. Route to RN pool AND add allergy information to appointment notes    Is patient diabetic? No If YES, blood glucose level will be checked on day of procedure and will need to be 300mg/dL or below (for steroid injections).    Does patient have an active infection or treated for one within the past week? No   If YES, do NOT schedule and route to Care Navigation.     Is patient currently taking any antibiotics?  No  For patients on chronic, preventative, or prophylactic antibiotics, procedures may be scheduled.   For patients on antibiotics for active or recent infection: antibiotic course must have been completed and symptom-free of infection to safely proceed with injection.  Send to Care Navigation if unsure.    Is patient actively being treated for  cancer or immunocompromised? No  If YES, do NOT schedule and route to RN pool.     Any chance of pregnancy? Not Applicable   If YES, do NOT schedule and route to RN pool.    Reminders:  -  If you are started on any steroids or antibiotics between now and your appointment, you must contact us because it may affect our ability to perform your procedure.   reviewed and discussed briefly    -  Informed patient that it is OK to take normal medications before the procedure and must hold blood thinners as instructed.  reviewed and discussed briefly    -  Patients scheduled for MBBs should not take pain meds prior to injection and pain rating needs to be 5/10 or greater the day of the procedure.    -  Informed cervical injection patients that an IV will be placed as a precautionary measure.  reviewed and not discussed    -  Patients should eat a light meal prior to their procedure appointment.  reviewed and discussed briefly    -  All radiofrequency ablations are in a 40 minute time slot and not to be scheduled until in-basket message has been sent by the procedure team that the PA has been  received.  reviewed and not discussed     -  Spinal cord stimulator trial scheduling is coordinated by the procedure team.    -  Care Navigation (#[phone]) is available to assist patients with additional questions.  reviewed and discussed briefly    -  Cervical TFESIs with Dr. Brooks only.

## 2024-09-26 ENCOUNTER — OFFICE VISIT (OUTPATIENT)
Dept: DERMATOLOGY | Facility: CLINIC | Age: 54
End: 2024-09-26
Payer: COMMERCIAL

## 2024-09-26 DIAGNOSIS — L73.9 FOLLICULITIS: Primary | ICD-10-CM

## 2024-09-26 PROCEDURE — 99214 OFFICE O/P EST MOD 30 MIN: CPT | Performed by: NURSE PRACTITIONER

## 2024-09-26 RX ORDER — FLUOCINONIDE TOPICAL SOLUTION USP, 0.05% 0.5 MG/ML
SOLUTION TOPICAL 2 TIMES DAILY
Qty: 60 ML | Refills: 2 | Status: SHIPPED | OUTPATIENT
Start: 2024-09-26

## 2024-09-26 RX ORDER — CLINDAMYCIN PHOSPHATE 11.9 MG/ML
SOLUTION TOPICAL
Qty: 60 ML | Refills: 11 | Status: SHIPPED | OUTPATIENT
Start: 2024-09-26

## 2024-09-26 NOTE — PROGRESS NOTES
[facility] Dermatology Note  Encounter Date: Sep 26, 2024  Office Visit     Reviewed patients past medical history and pertinent chart review prior to patients visit today.     Dermatology Problem List:  Scalp folliculitis  -Ketoconazole 2% shampoo was ineffective  -Currently trialing fluocinonide 0.05% solution, clindamycin 1% solution, and alternating Neutrogena T-Sal shampoo with an antidandruff shampoo over-the-counter  ____________________________________________    Assessment & Plan:     Scalp folliculitis, Discussed that there are many causes of scalp folliculitis and most times it is managed and not fully cured.  Patient Instructions   Neutrogena T-sal shampoo alternating with an antidandruff shampoo, leave on a few minutes before rinsing.     Use the fluocinonide 0.05% solution daily for the next 2-3 weeks then stop and use as needed for flares only.     Clindamycin 1% solution to use 1-2 times daily on an ongoing basis.      -Consider oral antibiotics if above treatment is not effective    -Encouraged cessation of picking scratching rubbing and touching of the persistent areas    Follow-up in 2 to 3 months if not well-controlled      Zoraida Cifuentes CNP  Dermatology    _______________________________________    CC: Derm Problem (Prurigo nodularis on biopsy to scalp but area cont to be itchy and sore. Using Ketoconazole 3-5 x a week shampoo /PCP ordered Clobetasol 0.5% oint and tried for 10 days and no resolution/)    HPI:  Mr. Rigo Joyner is a(n) 53 year old male who presents today for follow-up  for scalp bumps. He has had these for years. He gets acne like bumps on the scalp here and there, and has some bumps on the top of the scalp that seem like they never go away.     Patient is otherwise feeling well, without additional skin concerns.      Physical Exam:  SKIN: Focused examination of scalp face neck was performed.  -A few inflammatory acneiform like papules on the parietal and  temporal scalp  -1 thickened scabbed area of the left vertex scalp  -No generalized flaking or erythema    - No other lesions of concern on areas examined.     Medications:  Current Outpatient Medications   Medication Sig Dispense Refill    clindamycin (CLEOCIN T) 1 % external solution Apply to the problem areas on the scalp daily, increase to twice daily with flares 60 mL 11    fluocinonide (LIDEX) 0.05 % external solution Apply topically 2 times daily. For 2 weeks to problem area of scalp then stop and use PRN for flares 60 mL 2    ketoconazole (NIZORAL) 2 % external shampoo Use every 1-2 days when flared. Leave in few minutes before rinsing. Use twice weekly to prevent flares. 120 mL 11    acetaminophen (TYLENOL) 500 MG tablet Take 1,000 mg by mouth every 6 hours as needed      aspirin 81 MG EC tablet Take 1 tablet (81 mg) by mouth daily Resume 48 hours after surgery      atorvastatin (LIPITOR) 20 MG tablet Take 20 mg by mouth at bedtime      cholecalciferol (VITAMIN D3) 125 mcg (5000 units) capsule Take 125 mcg by mouth daily      fluticasone (FLONASE) 50 MCG/ACT nasal spray Spray 1 spray in nostril daily as needed      gabapentin (NEURONTIN) 300 MG capsule Take 600 mg by mouth 2 times daily      lactobacillus rhamnosus (GG) (CULTURELL) capsule Take 1 capsule by mouth every morning      methocarbamol (ROBAXIN) 750 MG tablet Take 1 tablet (750 mg) by mouth 4 times daily as needed for muscle spasms 40 tablet 0    methocarbamol (ROBAXIN) 750 MG tablet Take 1 tablet (750 mg) by mouth 4 times daily 42 tablet 0    polyethylene glycol-propylene glycol (SYSTANE ULTRA) 0.4-0.3 % SOLN ophthalmic solution Place 1 drop into both eyes 4 times daily as needed for dry eyes       No current facility-administered medications for this visit.      Past Medical History:   Patient Active Problem List   Diagnosis    Sigmoid diverticulitis    Acute postoperative pain    Allergic rhinitis due to pollen    Benign prostatic hyperplasia     Other specified diseases of gallbladder    Diverticulitis    Diverticulitis of large intestine    Dry eyes, bilateral    Family history of diabetes mellitus    Hemorrhoids    History of diverticulitis    Diverticular disease of colon    Mixed hyperlipidemia    Osteoarthritis of spine with radiculopathy, cervical region    Other visual disturbances    Other chronic pain    Polyp of colon    Prediabetes    Pseudoarthrosis of spine    Right hip pain    Tinnitus    Status post laparoscopic cholecystectomy    Spondylolisthesis of lumbar region    Sinus disease    Nonspecific abdominal pain    Imaging of gastrointestinal tract abnormal    History of adenomatous polyp of colon    Hemorrhage of rectum and anus    S/P cervical disc replacement     Past Medical History:   Diagnosis Date    Cervical radiculopathy     Hyperlipemia     Irritable bowel syndrome     Obese     PONV (postoperative nausea and vomiting)        CC Referred Self, MD  No address on file on close of this encounter.

## 2024-09-26 NOTE — TELEPHONE ENCOUNTER
Entered in order for US guided injection and changed appt to reflect US guided procedure. Cancelled old order.

## 2024-09-26 NOTE — PATIENT INSTRUCTIONS
Neutrogena T-sal shampoo alternating with an antidandruff shampoo, leave on a few minutes before rinsing.     Use the fluocinonide 0.05% solution daily for the next 2-3 weeks then stop and use as needed for flares only.     Clindamycin 1% solution to use 1-2 times daily on an ongoing basis.

## 2024-09-26 NOTE — LETTER
9/26/2024      Rigo Joyner  [id number] [id number]      Dear Colleague,    Thank you for referring your patient, Rigo Joyner, to the [facility]. Please see a copy of my visit note below.    [facility] Dermatology Note  Encounter Date: Sep 26, 2024  Office Visit     Reviewed patients past medical history and pertinent chart review prior to patients visit today.     Dermatology Problem List:  Scalp folliculitis  -Ketoconazole 2% shampoo was ineffective  -Currently trialing fluocinonide 0.05% solution, clindamycin 1% solution, and alternating Neutrogena T-Sal shampoo with an antidandruff shampoo over-the-counter  ____________________________________________    Assessment & Plan:     Scalp folliculitis, Discussed that there are many causes of scalp folliculitis and most times it is managed and not fully cured.  Patient Instructions   Neutrogena T-sal shampoo alternating with an antidandruff shampoo, leave on a few minutes before rinsing.     Use the fluocinonide 0.05% solution daily for the next 2-3 weeks then stop and use as needed for flares only.     Clindamycin 1% solution to use 1-2 times daily on an ongoing basis.      -Consider oral antibiotics if above treatment is not effective    -Encouraged cessation of picking scratching rubbing and touching of the persistent areas    Follow-up in 2 to 3 months if not well-controlled      Zoraida Cifuentes CNP  Dermatology    _______________________________________    CC: Derm Problem (Prurigo nodularis on biopsy to scalp but area cont to be itchy and sore. Using Ketoconazole 3-5 x a week shampoo /PCP ordered Clobetasol 0.5% oint and tried for 10 days and no resolution/)    HPI:  Mr. Rigo Joyner is a(n) 53 year old male who presents today for follow-up  for scalp bumps. He has had these for years. He gets acne like bumps on the scalp here and there, and has some bumps on the top of the scalp  that seem like they never go away.     Patient is otherwise feeling well, without additional skin concerns.      Physical Exam:  SKIN: Focused examination of scalp face neck was performed.  -A few inflammatory acneiform like papules on the parietal and temporal scalp  -1 thickened scabbed area of the left vertex scalp  -No generalized flaking or erythema    - No other lesions of concern on areas examined.     Medications:  Current Outpatient Medications   Medication Sig Dispense Refill     clindamycin (CLEOCIN T) 1 % external solution Apply to the problem areas on the scalp daily, increase to twice daily with flares 60 mL 11     fluocinonide (LIDEX) 0.05 % external solution Apply topically 2 times daily. For 2 weeks to problem area of scalp then stop and use PRN for flares 60 mL 2     ketoconazole (NIZORAL) 2 % external shampoo Use every 1-2 days when flared. Leave in few minutes before rinsing. Use twice weekly to prevent flares. 120 mL 11     acetaminophen (TYLENOL) 500 MG tablet Take 1,000 mg by mouth every 6 hours as needed       aspirin 81 MG EC tablet Take 1 tablet (81 mg) by mouth daily Resume 48 hours after surgery       atorvastatin (LIPITOR) 20 MG tablet Take 20 mg by mouth at bedtime       cholecalciferol (VITAMIN D3) 125 mcg (5000 units) capsule Take 125 mcg by mouth daily       fluticasone (FLONASE) 50 MCG/ACT nasal spray Spray 1 spray in nostril daily as needed       gabapentin (NEURONTIN) 300 MG capsule Take 600 mg by mouth 2 times daily       lactobacillus rhamnosus (GG) (CULTURELL) capsule Take 1 capsule by mouth every morning       methocarbamol (ROBAXIN) 750 MG tablet Take 1 tablet (750 mg) by mouth 4 times daily as needed for muscle spasms 40 tablet 0     methocarbamol (ROBAXIN) 750 MG tablet Take 1 tablet (750 mg) by mouth 4 times daily 42 tablet 0     polyethylene glycol-propylene glycol (SYSTANE ULTRA) 0.4-0.3 % SOLN ophthalmic solution Place 1 drop into both eyes 4 times daily as needed for  dry eyes       No current facility-administered medications for this visit.      Past Medical History:   Patient Active Problem List   Diagnosis     Sigmoid diverticulitis     Acute postoperative pain     Allergic rhinitis due to pollen     Benign prostatic hyperplasia     Other specified diseases of gallbladder     Diverticulitis     Diverticulitis of large intestine     Dry eyes, bilateral     Family history of diabetes mellitus     Hemorrhoids     History of diverticulitis     Diverticular disease of colon     Mixed hyperlipidemia     Osteoarthritis of spine with radiculopathy, cervical region     Other visual disturbances     Other chronic pain     Polyp of colon     Prediabetes     Pseudoarthrosis of spine     Right hip pain     Tinnitus     Status post laparoscopic cholecystectomy     Spondylolisthesis of lumbar region     Sinus disease     Nonspecific abdominal pain     Imaging of gastrointestinal tract abnormal     History of adenomatous polyp of colon     Hemorrhage of rectum and anus     S/P cervical disc replacement     Past Medical History:   Diagnosis Date     Cervical radiculopathy      Hyperlipemia      Irritable bowel syndrome      Obese      PONV (postoperative nausea and vomiting)        CC Referred Self, MD  No address on file on close of this encounter.       Again, thank you for allowing me to participate in the care of your patient.        Sincerely,        Sandrita Cifuentes, ARINA CNP

## 2024-10-01 ENCOUNTER — MYC MEDICAL ADVICE (OUTPATIENT)
Dept: ORTHOPEDICS | Facility: CLINIC | Age: 54
End: 2024-10-01

## 2024-10-01 ENCOUNTER — OFFICE VISIT (OUTPATIENT)
Dept: ORTHOPEDICS | Facility: CLINIC | Age: 54
End: 2024-10-01
Attending: SURGERY
Payer: COMMERCIAL

## 2024-10-01 DIAGNOSIS — Z98.890 S/P CERVICAL DISC REPLACEMENT: ICD-10-CM

## 2024-10-01 DIAGNOSIS — M19.012 DJD OF LEFT AC (ACROMIOCLAVICULAR) JOINT: ICD-10-CM

## 2024-10-01 DIAGNOSIS — Z98.890 HISTORY OF CARPAL TUNNEL RELEASE: ICD-10-CM

## 2024-10-01 DIAGNOSIS — M79.602 PAIN OF LEFT UPPER EXTREMITY: Primary | ICD-10-CM

## 2024-10-01 PROCEDURE — 99204 OFFICE O/P NEW MOD 45 MIN: CPT | Performed by: PHYSICIAN ASSISTANT

## 2024-10-01 NOTE — PATIENT INSTRUCTIONS
ASSESSMENT & PLAN     Today we discussed the underlying etiology/pathology of patient's   1. Pain of left upper extremity- chronic   2. DJD of left AC (acromioclavicular) joint    3. S/P cervical disc replacement- status post anterior cervical discectomy arthrodesis cervical 4/5 on 4/17/2024, cervical 5/6 -cervical 6/7 anterior discectomy, artificial disc replacements on 11/10/23    4. History of left carpal tunnel release 06/2022- Dr. Purdy      Discussed diagnosis and treatment options with the patient today. A shared decision making model was used. The patient's values and choices were respected. The following represents what was discussed and decided upon by the provider and the patient.   -We discussed the patient has a long history in regards to left upper extremity complaints of pain and numbness.  - Today we reviewed his previous left upper extremity surgery history including cervical spine surgeries as well as left carpal tunnel release all done by Dr. Purdy.  - We reviewed that the patient states that none of these surgeries including cervical procedures and carpal tunnel surgery have improved his chronic left upper extremity symptoms.  Also all previous cervical spine injection did not significantly improve his left upper extremity condition.  Patient had a cervical spine C7/T1 interlaminar epidural steroid injection done at [facility] on 11/8/2021.  Patient states that he has had some corticosteroid injection done to the left shoulder years ago which he thought helped temporarily. I cannot find record of that injection.  - I reviewed patient's EMG studies of the upper and lower extremities.  Left upper extremity EMG from 09/13/2021 done at [facility] shows mild carpal tunnel syndrome with no evidence of cervical radiculopathy.  I do not find any updated EMG studies since 2021 of the left upper extremity.  - At this time we ordered a left shoulder ultrasound-guided corticosteroid  injection for diagnostic and therapeutic purposes.  In my opinion corticosteroid injection should target the left AC joint as well as subacromial space with aggressive physical therapy program for at least 6-8 weeks for the shoulder (rotator cuff)  to determine response.  -Orders placed for ultrasound-guided left shoulder AC joint and subacromial space corticosteroid injection as well as referral to formal physical therapy for the left shoulder.  Patient should be contacted by central scheduling in the near future to find mutual times for schedule appointments.  - We did discuss that his main complaint is pain/numbness that radiates from the left posterior/upper trapezius shoulder girdle wrapping down the upper arm more so along the medial axillary region down to the ulnar border of the forearm but does not involve his hand.  This does not follow a clear dermatomal pattern.   - We did discuss that repeat left upper extremity EMG could be of benefit to evaluate for cervical versus peripheral nerve involvement but I would defer this test until patient's response to left shoulder corticosteroid injection and PT treatments have been completed.  This could be ordered either by Dr. Purdy or by Dr. Alisa Das if patient prefers to follow-up with physical medicine rehabilitation provider  - If patient does not get response from corticosteroid injection for the left shoulder I did suggest that seeking another opinion with Dr. Alisa Das could be of benefit to evaluate for possible nerve blocks or trigger point injection therapy.  Patient would call [phone] to schedule an appointment with her.  - Cervical spine MRI reviewed but imaging is somewhat distorted by a previous history of surgery.  Radiologist report does indicate multilevel foraminal stenosis which still could be contributing to patient's left upper extremity symptoms  -Patient to continue with gabapentin twice daily which he takes mainly due to chronic  lower extremity symptoms after history of lumbar spine surgery.  Patient takes methocarbamol as needed and does not utilize any over-the-counter medications on a regular basis for current pain control her symptoms.      -Call direct clinic number [[phone]] at any time with questions or concerns in regards to your recent office visit with me.     Vasu Ceja PA-C  [facility]    This note was completed in part using a voice recognition software, any grammatical or context distortion are unintentional and inherent to the software.

## 2024-10-01 NOTE — LETTER
10/1/2024      Rigo Joyner  [address] [facility] [address]      Dear Colleague,    Thank you for referring your patient, Rigo Joyner, to the [facility]. Please see a copy of my visit note below.    ASSESSMENT & PLAN     Today we discussed the underlying etiology/pathology of patient's   1. Pain of left upper extremity    2. DJD of left AC (acromioclavicular) joint    3. S/P cervical disc replacement- status post anterior cervical discectomy arthrodesis cervical 4/5 on 4/17/2024, cervical 5/6 -cervical 6/7 anterior discectomy, artificial disc replacements on 11/10/23    4. History of left carpal tunnel release 06/2022- Dr. Purdy      Discussed diagnosis and treatment options with the patient today. A shared decision making model was used. The patient's values and choices were respected. The following represents what was discussed and decided upon by the provider and the patient.   -We discussed the patient has a long history in regards to left upper extremity complaints of pain and numbness.  - Today we reviewed his previous left upper extremity surgery history including cervical spine surgeries as well as left carpal tunnel release all done by Dr. Purdy.  - We reviewed the patient states that none of these surgeries including cervical procedures and carpal tunnel surgery have improved his chronic left upper extremity symptoms.  Also all previous cervical spine injection did not significantly improve his left upper extremity condition.  Patient had a cervical spine C7/T1 interlaminar epidural steroid injection done at [facility] on 11/8/2021.  Patient states that he has had some corticosteroid injection done to the left shoulder years ago which he thought helped temporarily.  - I reviewed patient's EMG studies of the upper and lower extremities.  Left upper extremity EMG from 09/13/2021 done at [facility] shows mild  carpal tunnel syndrome with no evidence of cervical radiculopathy.  I do not find any updated EMG studies since 2021 of the left upper extremity.  - At this time patient is scheduled for left shoulder ultrasound-guided corticosteroid injection on 10/10/24 with Dr. Brooks which I agree patient should proceed with for diagnostic and therapeutic purposes.  In my opinion corticosteroid injection should target the left AC joint as well as subacromial space with aggressive physical therapy program for at least 6-8 weeks for the shoulder (rotator cuff)  to determine response  - We did discuss that his main complaint is pain/numbness that radiates from the left posterior/upper trapezius shoulder girdle wrapping down the upper arm more so along the medial axillary region down to the ulnar border of the forearm but does not involve his hand.  This does not follow a clear dermatomal pattern.   - We did discuss that repeat left upper extremity EMG could be of benefit to evaluate for cervical versus peripheral nerve involvement but I would defer this test until patient's response to left shoulder corticosteroid injection treatments have been completed.  This could be ordered either by Dr. Purdy or by Dr. Alisa Das if patient prefers to follow-up with physical medicine rehabilitation provider  - If patient does not get response from corticosteroid injection for the left shoulder I did suggest that seeking another opinion with Dr. Alisa Das could be of benefit to evaluate for possible nerve blocks or trigger point injection therapy.  Patient would call [phone] to schedule an appointment with her.  - Cervical spine MRI reviewed but imaging is somewhat distorted by a previous history of surgery.  Radiologist report does indicate multilevel foraminal stenosis which still could be contributing to patient's left upper extremity symptoms  -Patient to continue with gabapentin twice daily which he takes mainly due to  chronic lower extremity symptoms after history of lumbar spine surgery.  Patient takes methocarbamol as needed and does not utilize any over-the-counter medications on a regular basis for current pain control her symptoms.      -Call direct clinic number [[phone]] at any time with questions or concerns in regards to your recent office visit with me.     Vasu Ceja PA-C  [facility]    This note was completed in part using a voice recognition software, any grammatical or context distortion are unintentional and inherent to the software.           SUBJECTIVE  Rigo Joyner is a/an 53 year old male who is seen in consultation at the request of  Sandrita Purdy M.D. for evaluation of left shoulder pain to rule out shoulder as a pain generator. The patient is seen by themselves.        Onset: at least 2 year(s) ago. Reports insidious onset without acute precipitating event. Symptoms present prior and unchanged since cervical spine 4/17/24 ACD/ arthrodesis C4-5 and C5-6 and left carpal tunnel release in 2022  Location of Pain: left posterior upper trap and radiating down arm to ulnar side of wrist.  Rating of Pain at worst: 8/10  Rating of Pain Currently: 6/10  Worsened by: driving, mowing but always present even without use of arm  Better with: questionable temporary relief with shoulder injection (unknown date or provider)   Treatments tried: Shoulder steroid injection - didn't help much. For neck/shoulder, reports doing years of PT with [facility], also did neck injections which didn't help. He has done a massage gun. gabapentin  Quality: aching, throbbing, numb, shooting pricking  Associated symptoms: numbness and tingling  Orthopedic history: NO- first reported shoulder pain 3-4 years ago.  Relevant surgical history: anterior cervical discectomy and arthrodesis at cervical 4-cervical 5 on 4/17/2024 and cervical 5-cervical 6 and cervical 6-cervical 7 anterior discectomy and  artificial disc replacements on 11/10/23. CTS release left wrist 06/2022.   Social history: social history: Retired.     Past Medical History:   Diagnosis Date     Cervical radiculopathy      Hyperlipemia      Irritable bowel syndrome      Obese      PONV (postoperative nausea and vomiting)      Social History     Socioeconomic History     Marital status:    Tobacco Use     Smoking status: Never     Smokeless tobacco: Former   Vaping Use     Vaping status: Never Used   Substance and Sexual Activity     Alcohol use: Not Currently     Comment: Very rare     Drug use: Never     Social Determinants of Health     Financial Resource Strain: Low Risk  (2/24/2024)    [facility]    Financial Resource Strain      Difficulty of Paying Living Expenses: 3   Food Insecurity: No Food Insecurity (2/24/2024)    Received from [facility], [facility]    Food Insecurity      Worried About Running Out of Food in the Last Year: 1   Transportation Needs: No Transportation Needs (2/24/2024)    [facility], [facility] [employer] [facility]    Transportation Needs      Lack of Transportation (Medical): 1   Physical Activity: Insufficiently Active (10/21/2023)    Received from [facility], [facility]    Exercise Vital Sign      Days of Exercise per Week: 3 days      Minutes of Exercise per Session: 40 min   Stress: Stress Concern Present (5/7/2021)    Received from [facility], [facility]    [employer] - Occupational Stress Questionnaire      Feeling of Stress : To some extent   Social Connections: Socially Integrated (2/24/2024)    Received from [facility], [facility] [employer] [facility]    Social Connections      Frequency of Communication  with Friends and Family: 0   Housing Stability: Low Risk  (2/24/2024)    Received from [facility], [facility]    Housing Stability      Unable to Pay for Housing in the Last Year: 1         Patient's past medical, surgical, social, and family histories were personally reviewed today and no changes are noted.    REVIEW OF SYSTEMS:  10 point ROS is negative other than symptoms noted above in HPI, Past Medical History or as stated below  Constitutional: NEGATIVE for fever, chills, change in weight  Skin: NEGATIVE for worrisome rashes, moles or lesions  GI/: NEGATIVE for bowel or bladder changes  Neuro: NEGATIVE for weakness, dizziness or paresthesias    OBJECTIVE:  Vital signs as noted in EPIC for 9/24/2024  General: healthy, alert and in no distress  HEENT: no scleral icterus or conjunctival erythema  Skin: no suspicious lesions or rash. No jaundice.  CV: no pedal edema  Resp: normal respiratory effort without conversational dyspnea   Psych: normal mood and affect  Gait: normal steady gait with appropriate coordination and balance  Neuro: Normal light sensory exam of lower extremity      MSK:  Exam shows a pleasant 53-year-old male who ambulates full weightbearing without assistive device.  He is alert and orientated x 3.  Examination of the cervical spine shows no posterior surgical incisions.  Patient has 2 surgical incisions over the right anterior neck off midline of the trachea well-healed.  Patient is able to forward flex his chin to his chest without reproduction of symptoms as well as sniff test without symptoms.  He does have loss of lateral head tilt to the right and left which she describes more as a pulling sensation in the C-spine and upper trapezius musculature of the shoulders.  Range of motion C-spine does not seem to exacerbate any left upper extremity symptoms.  Patient states he has pain as well as numbness involving the posterior  left trap region which radiates into the axillary medial aspect of the arm down the ulnar border to the ulnar styloid process at rest.  With elbow flexed and repetitive pronation supination this seems to stir up his symptoms a bit on the left arm.  Patient shows no pain to palpation throughout the paraspinous muscles of the posterior cervical column or the spinous processes themselves.  No particular tenderness to the trapezius musculature bilaterally or the medial border of the scapula or elevators.  Patient can do repetitive wall push-ups without scapular winging or maltracking or crepitation.  No reproduction of symptoms.  Patient is nontender at the sternum, SC joint, clavicle bilaterally.  Patient does show AC joint tenderness on the left side with increased aggravation of this region on the top of the shoulder with cross body adduction.  Patient has very mild impingement testing on the left shoulder but bow drawing test does not seem to aggravate it much.  Patient shows adequate rotator cuff tone in all planes with no pain with resisted internal or external rotation at waist height with the rotator cuff.  Empty can positioning and empty can testing of the supraspinatus does not show weakness or significant pain.  Patient shows 5 out of 5 motor tone of the biceps and triceps as well as forearm musculature with normal resistance of the wrist in all planes.  Intrinsic testing of the hand shows no weakness of the intrinsics.  Biceps and tricep strength is normal without bicep deformity.  No particular pain in the anterior subacromial space or proximal bicipital groove.  5 out of 5 motor tone noted of the deltoid with no deltoid muscle or deltoid tuberosity tenderness.  Patient is range of motion of the shoulders are symmetric with internal rotation with some mild discomfort over the anterior subacromial space with terminal internal rotation.  Patient lacks terminal abduction by 10 degrees on the left shoulder  compared to the right with some mild deltoid discomfort.  Forward flexion is symmetric with patient having some discomfort in the left upper trapezius region.  No evidence of adhesive capsulitis.  Sulcus sign is negative.  Negative apprehension sign.  Speeds and liftoff test negative.  Patient has surgical incision over the volar aspect of the carpus consistent with carpal tunnel release which is well-healed.  He has negative Tinel and Phalen's at the wrist level for any reproduction of symptoms along the median nerve.  Compression of Guyon's canal does not reproduce any symptoms on the ulnar nerve.  Prolonged flexion test as well as Tinel's over the cubital tunnel does not generate any symptoms or radicular symptoms.  With all physical exam testing today he has intermittent symptoms subjectively of pain along the upper posterior trapezius region with symptoms radiating down the medial border of the humerus down into the ulnar border of the forearm not aggravated by any physical exam testing today.            Personal Independent visualization of the below images done today:  Patient's previous EMG testing of the upper and lower extremities which are in the EMR are fully reviewed today dating back to 2021.  Patient cervical spine MRI which is noted below is reviewed.    X-rays of the patient's left shoulder are reviewed and reviewed with the patient.  Patient shows some moderate AC joint arthrosis with slight enlargement and possible impingement pathology.  No evidence of high-grade arthritis of the glenohumeral joint.  Subacromial space is maintained without calcific changes.  No evidence of fracture or dislocation.    MR CERVICAL SPINE WITHOUT AND WITH CONTRAST September 17, 2024 10:20  AM      HISTORY: Neck pain. History of spine surgery. No suspected hardware  failure. No suspected cervical disc disease. Cervical radiculopathy.  Status post cervical spinal fusion.        COMPARISON: Plain radiographs  7/18/2024.     TECHNIQUE: Multiplanar multisequence cervical MR without and with  contrast.     CONTRAST: 10 mL Gadavist.     FINDINGS: Normal cervical vertebral alignment.      Postsurgical changes of instrumented anterior spinal fusion C4-C7.     Normal marrow signal. No cord signal abnormality.      Multilevel degenerative changes with loss of disc height, osteophyte  formation and facet arthropathy. The findings on a level by level  basis are as follows:     C2-C3: Bilateral facet arthropathy. Mild to moderate bilateral neural  foraminal stenosis. No spinal canal stenosis.     C3-C4: Mild disc height loss and disc degeneration. Left eccentric  disc osteophyte complex and uncinate spurring. Bilateral facet  arthropathy. Mild to moderate bilateral neural foraminal stenosis. No  spinal canal stenosis.     C4-C5: Fusion level. Uncovertebral spurring and bilateral facet  arthropathy. Mild to moderate left and mild right neural foraminal  stenosis. Mild spinal canal stenosis.      C5-C6: Fusion level. Uncovertebral spurring and bilateral facet  arthropathy. Mild spinal canal stenosis. Moderate to severe bilateral  neural foraminal stenosis.     C6-C7: Fusion level. Uncovertebral spurring. Bilateral facet  arthropathy. Mild spinal canal stenosis. Moderate bilateral neural  foraminal stenosis.     C7-T1: No spinal canal or neural foraminal stenosis.     The visualized skull base, posterior fossa, and paraspinal soft  tissues are within normal limits.                                                                      IMPRESSION:  1. No abnormal spinal cord signal.  2. No abnormal contrast enhancement.  3. Postsurgical changes of instrumented anterior spinal fusion C4-C7.  4. Multilevel cervical spondylosis as detailed above.     MANNIDER MERCHANT MD   Patient's conditions were thoroughly discussed during today's visit with total time reviewing patient's previous medical records/history/radiology, face-to-face  examination and discussion and plan of care with the patient and documentation being 50 minutes for today's clinical visit  Vasu Ceja PA-C  [facility]    This note was completed in part using a voice recognition software, any grammatical or context distortion are unintentional and inherent to the software.       Again, thank you for allowing me to participate in the care of your patient.        Sincerely,        Vasu Ceja PA-C

## 2024-10-02 ENCOUNTER — PATIENT OUTREACH (OUTPATIENT)
Dept: CARE COORDINATION | Facility: CLINIC | Age: 54
End: 2024-10-02
Payer: COMMERCIAL

## 2024-10-02 ASSESSMENT — ACTIVITIES OF DAILY LIVING (ADL)
PUTTING_ON_A_SHIRT_THAT_BUTTONS_DOWN_THE_FRONT: 2
TOUCHING_THE_BACK_OF_YOUR_NECK: 4
WASHING_YOUR_HAIR?: 2
WHEN_LYING_ON_THE_INVOLVED_SIDE: 5
PUSHING_WITH_THE_INVOLVED_ARM: 4
PLACING_AN_OBJECT_ON_A_HIGH_SHELF: 4
REMOVING_SOMETHING_FROM_YOUR_BACK_POCKET: 3
WASHING_YOUR_BACK: 2
AT_ITS_WORST?: 7
PLEASE_INDICATE_YOR_PRIMARY_REASON_FOR_REFERRAL_TO_THERAPY:: SHOULDER
PUTTING_ON_AN_UNDERSHIRT_OR_A_PULLOVER_SWEATER: 4
PUTTING_ON_YOUR_PANTS: 4
REACHING_FOR_SOMETHING_ON_A_HIGH_SHELF: 4
CARRYING_A_HEAVY_OBJECT_OF_10_POUNDS: 7

## 2024-10-04 ENCOUNTER — THERAPY VISIT (OUTPATIENT)
Dept: PHYSICAL THERAPY | Facility: CLINIC | Age: 54
End: 2024-10-04
Attending: PHYSICIAN ASSISTANT
Payer: COMMERCIAL

## 2024-10-04 DIAGNOSIS — M79.602 PAIN OF LEFT UPPER EXTREMITY: ICD-10-CM

## 2024-10-04 DIAGNOSIS — M19.012 DJD OF LEFT AC (ACROMIOCLAVICULAR) JOINT: ICD-10-CM

## 2024-10-04 PROCEDURE — 97110 THERAPEUTIC EXERCISES: CPT | Mod: GP

## 2024-10-04 PROCEDURE — 97161 PT EVAL LOW COMPLEX 20 MIN: CPT | Mod: GP

## 2024-10-04 PROCEDURE — 97530 THERAPEUTIC ACTIVITIES: CPT | Mod: GP

## 2024-10-04 NOTE — PROGRESS NOTES
PHYSICAL THERAPY EVALUATION  Type of Visit: Evaluation       Fall Risk Screen:  Fall screen completed by: PT  Have you fallen 2 or more times in the past year?: No  Have you fallen and had an injury in the past year?: No  Is patient a fall risk?: No    Subjective       Presenting condition or subjective complaint: Shoulder pain down arm. Pain started fall 2020 prior to spinal surgery. His shoulder continues to be painful through recovery. After an injection that was slightly symptom relieving his pain suddenly changed to radiate all the way to his left hand. Dx with carpal tunnel and cervical spinal concerns. Cervical injections helped with neck pain slightly and then progressed to the 2 level disc replacement followed by a 2 level fusion (6 months ago).   Currently he is able to increase his symptoms if in a forward head and rounded shoulder posture with L UE in a flexed position on steering wheel. He describes the pain as an annoying burn.   Date of onset: 10/04/24 (Chronic condition. Date of Eval.)    Relevant medical history:     Dates & types of surgery: Back fusion neck  fusion hips scopes    Prior diagnostic imaging/testing results: X-ray     Prior therapy history for the same diagnosis, illness or injury: Yes Afterneck surgery in [place]    Living Environment  Social support: Alone   Type of home: House   Stairs to enter the home: Yes 4 Is there a railing: Yes     Ramp: No   Stairs inside the home: Yes 13 Is there a railing: Yes     Help at home: None  Equipment owned:       Employment: No    Hobbies/Interests:      Patient goals for therapy: Locate source of pain    Pain assessment: See objective evaluation for additional pain details     Objective   SHOULDER EVALUATION  PAIN: Pain Location: posterior shoulder that wraps down along ulnar distribution. He also has AJ joint pain and anterior shoulder.   Pain is Exacerbated By: using left arm.     POSTURE:  slight forward head and rounded shoulders.   ROM:   -  Cervical: limited to 50% in all direction. Tightness and pain.   - shoulder motion WNL B.   STRENGTH:   strength 85# B (pain in left wrist). Strength 5/5 with slight soreness globally.   FLEXIBILITY:  slight restriction for cervical motion due to UT/LS/cervical PS tightness.   SPECIAL TESTS:  positive ulnar nerve and radial nerve tension.   PALPATION:  Increased tension and TTP at L ACJ, deltoid tuberosity, ulnar nerve distribution, wrist extensor mm bulk, olecranon process of L elbow.   JOINT MOBILITY:  hypomobile left ACJ.   CERVICAL SCREEN: WFL    Assessment & Plan   CLINICAL IMPRESSIONS  Medical Diagnosis: Pain of Left Upper Extremity, DJD of Left AC joint.    Treatment Diagnosis: Pain of Left Upper Extremity, DJD of Left AC joint.   Impression/Assessment: Patient is a 54 year old male with left upper arm pain and altered sensation complaints.  The following significant findings have been identified: Pain, Decreased joint mobility, Decreased strength, Decreased proprioception, Impaired sensation, Impaired muscle performance, Decreased activity tolerance, and Impaired posture. These impairments interfere with their ability to perform self care tasks, recreational activities, household chores, driving , household mobility, and community mobility as compared to previous level of function.     Clinical Decision Making (Complexity):  Clinical Presentation: Stable/Uncomplicated  Clinical Presentation Rationale: based on medical and personal factors listed in PT evaluation  Clinical Decision Making (Complexity): Low complexity    PLAN OF CARE  Treatment Interventions:  Modalities: Cryotherapy, E-stim, Hot Pack, Ultrasound  Interventions: Gait Training, Manual Therapy, Neuromuscular Re-education, Therapeutic Activity, Therapeutic Exercise    Long Term Goals     PT Goal 1  Goal Identifier: LTG 1  Goal Description: Pt will report no more than 2/10 symptom intensity with driving for at least 30 minutes in order to  improve tolerance to required transportation.  Target Date: 12/13/24      Frequency of Treatment: 1x/week  Duration of Treatment: 10 weeks    Education Assessment:   Learner/Method: Patient;No Barriers to Learning    Risks and benefits of evaluation/treatment have been explained.   Patient/Family/caregiver agrees with Plan of Care.     Evaluation Time:     PT Eval, Low Complexity Minutes (75102): 15     Signing Clinician: Edwige Good PT

## 2024-10-07 PROBLEM — M19.012 DJD OF LEFT AC (ACROMIOCLAVICULAR) JOINT: Status: ACTIVE | Noted: 2024-10-07

## 2024-10-10 ENCOUNTER — RADIOLOGY INJECTION OFFICE VISIT (OUTPATIENT)
Dept: PHYSICAL MEDICINE AND REHAB | Facility: CLINIC | Age: 54
End: 2024-10-10
Attending: SURGERY
Payer: COMMERCIAL

## 2024-10-10 VITALS
SYSTOLIC BLOOD PRESSURE: 138 MMHG | HEART RATE: 66 BPM | OXYGEN SATURATION: 96 % | DIASTOLIC BLOOD PRESSURE: 84 MMHG | TEMPERATURE: 97.3 F

## 2024-10-10 DIAGNOSIS — M25.512 PAIN IN JOINT OF LEFT SHOULDER: ICD-10-CM

## 2024-10-10 PROCEDURE — 20611 DRAIN/INJ JOINT/BURSA W/US: CPT | Mod: LT | Performed by: PAIN MEDICINE

## 2024-10-10 RX ORDER — LIDOCAINE HYDROCHLORIDE 10 MG/ML
INJECTION, SOLUTION EPIDURAL; INFILTRATION; INTRACAUDAL; PERINEURAL
Status: COMPLETED | OUTPATIENT
Start: 2024-10-10 | End: 2024-10-10

## 2024-10-10 RX ORDER — ROPIVACAINE HYDROCHLORIDE 5 MG/ML
INJECTION, SOLUTION EPIDURAL; INFILTRATION; PERINEURAL
Status: COMPLETED | OUTPATIENT
Start: 2024-10-10 | End: 2024-10-10

## 2024-10-10 RX ORDER — TRIAMCINOLONE ACETONIDE 40 MG/ML
INJECTION, SUSPENSION INTRA-ARTICULAR; INTRAMUSCULAR
Status: COMPLETED | OUTPATIENT
Start: 2024-10-10 | End: 2024-10-10

## 2024-10-10 RX ADMIN — TRIAMCINOLONE ACETONIDE 40 MG: 40 INJECTION, SUSPENSION INTRA-ARTICULAR; INTRAMUSCULAR at 10:26

## 2024-10-10 RX ADMIN — LIDOCAINE HYDROCHLORIDE 2 ML: 10 INJECTION, SOLUTION EPIDURAL; INFILTRATION; INTRACAUDAL; PERINEURAL at 10:26

## 2024-10-10 RX ADMIN — ROPIVACAINE HYDROCHLORIDE 3 ML: 5 INJECTION, SOLUTION EPIDURAL; INFILTRATION; PERINEURAL at 10:26

## 2024-10-10 ASSESSMENT — PAIN SCALES - GENERAL: PAINLEVEL: MODERATE PAIN (5)

## 2024-10-10 NOTE — PATIENT INSTRUCTIONS
DISCHARGE INSTRUCTIONS  During office hours (8:00 a.m.- 4:00 p.m.) questions or concerns may be answered  by calling [facility] Center Navigation Nurses at  [phone].  Messages received after hours will be returned the following business day.      In the case of an emergency, please dial 911 or seek assistance at the nearest Emergency Room/Urgent Care facility.     You may experience an increase in your symptoms for the first 2 days (It may take anywhere between 2 days- 2 weeks for the steroid to have maximum effect).    You may use ice on the injection site, as frequently as 20 minutes each hour if needed.    You may take your pain medicine.    You may shower. No swimming, tub bath or hot tub for 48 hours.  You may remove your bandaid/bandage as soon as you are home.    You may resume light activities, as tolerated.    Resume your usual diet as tolerated.    POSSIBLE STEROID SIDE EFFECTS (If steroid/cortisone was used for your procedure)  -If you experience these symptoms, it should only last for a short period  Swelling of the legs              Skin redness (flushing)     Mouth (oral) irritation   Blood sugar (glucose) levels            Sweats                    Mood changes  Headache  Sleeplessness  Weakened immune system for up to 14 days, which could increase the risk of rashid the COVID-19 virus and/or experiencing more severe symptoms of the disease, if exposed.  Decreased effectiveness of the flu vaccine if given within 2 weeks of the steroid.         POSSIBLE PROCEDURE SIDE EFFECTS  -Call the Spine Center if you are concerned  Increased Pain           Increased numbness/tingling      Nausea/Vomiting          Bruising/bleeding at site      Redness or swelling                                              Difficulty walking      Weakness           Fever greater than 100.5

## 2024-10-12 ENCOUNTER — TELEPHONE (OUTPATIENT)
Dept: ORTHOPEDICS | Facility: CLINIC | Age: 54
End: 2024-10-12
Payer: COMMERCIAL

## 2024-10-12 NOTE — CONFIDENTIAL NOTE
I spoke with Ari to see how he is doing status post intra-articular left shoulder glenohumeral corticosteroid injection done 2 days ago.  He states unfortunately 2 days before his shoulder injection he was burning a brush pile and tripped and fell resulting in him needing the brace his fall with his left arm on the ground.  He denies a direct blow to the shoulder.  He states his left shoulder with certainly flared up and more painful than it had been prior to the fall.  He also had attended physical therapy and was doing nerve glides and he thought maybe his symptoms were improving.  Patient has not noticed any significant improvement overall with corticosteroid injection at this time.  He still has abnormal sensation of the left upper extremity.  Patient will follow-up with Dr. Purdy on the 22nd as scheduled for their opinion as well as they have a follow-up appointment with Dr. Das on the 28th.  Vasu Ceja PA-C

## 2024-10-14 ENCOUNTER — HOSPITAL ENCOUNTER (OUTPATIENT)
Dept: RADIOLOGY | Facility: HOSPITAL | Age: 54
Discharge: HOME | End: 2024-10-14
Attending: SURGERY | Admitting: SURGERY
Payer: COMMERCIAL

## 2024-10-14 DIAGNOSIS — M54.12 CERVICAL RADICULOPATHY: ICD-10-CM

## 2024-10-14 PROCEDURE — 72040 X-RAY EXAM NECK SPINE 2-3 VW: CPT

## 2024-10-15 ENCOUNTER — VIRTUAL VISIT (OUTPATIENT)
Dept: NEUROSURGERY | Facility: CLINIC | Age: 54
End: 2024-10-15
Payer: COMMERCIAL

## 2024-10-15 DIAGNOSIS — M54.12 CERVICAL RADICULOPATHY: ICD-10-CM

## 2024-10-15 DIAGNOSIS — Z98.1 S/P CERVICAL SPINAL FUSION: Primary | ICD-10-CM

## 2024-10-15 PROCEDURE — 99441 PR PHYSICIAN TELEPHONE EVALUATION 5-10 MIN: CPT | Mod: 93 | Performed by: SURGERY

## 2024-10-15 NOTE — PROGRESS NOTES
Billable Telephone Visit    54 year old comes today in follow-up. He is status post anterior cervical discectomy and arthrodesis at cervical 4-cervical 5 on 4/17/2024 and cervical 5-cervical 6 and cervical 6-cervical 7 anterior discectomy and artificial disc replacements on 11/10/23. Also underwent left CTR on 6/8/22.     Reviewed new cervical xray. No new hardware malfunction. He was grabbing brush in his yard and feel and then landed on his shoulder. Recent injection did not help much but not sure if the fall contributed. He feels he is not getting better. His nerves feels more active since surgery L>R. Right shoulder pain only.  More crawling and flaring down his left arm. He is going to restart therapy as well to see if that helps. Recommend a left upper extremity EMG to see if we can better pinpoint where symptoms are related to. He will follow up after EMG obtained.     Location of Dr Purdy: [facility] [place], [place]  Location of patient: home in [place], [place]   Length of Visit: 10 minutes    Sandrita Purdy MD

## 2024-10-15 NOTE — LETTER
10/15/2024      Rigo Joyner  [id number]  [facility] [address]      Dear Colleague,    Thank you for referring your patient, Rigo Joyner, to the [facility] SPINE AND NEUROSURGERY. Please see a copy of my visit note below.    Billable Telephone Visit    54 year old comes today in follow-up. He is status post anterior cervical discectomy and arthrodesis at cervical 4-cervical 5 on 4/17/2024 and cervical 5-cervical 6 and cervical 6-cervical 7 anterior discectomy and artificial disc replacements on 11/10/23. Also underwent left CTR on 6/8/22.     Reviewed new cervical xray. No new hardware malfunction. He was grabbing brush in his yard and feel and then landed on his shoulder. Recent injection did not help much but not sure if the fall contributed. He feels he is not getting better. His nerves feels more active since surgery L>R. Right shoulder pain only.  More crawling and flaring down his left arm. He is going to restart therapy as well to see if that helps. Recommend a left upper extremity EMG to see if we can better pinpoint where symptoms are related to. He will follow up after EMG obtained.     Location of Dr Purdy: [facility] [place], [place]  Location of patient: home in [place], [place]   Length of Visit: 10 minutes    Sandrita Purdy MD       Again, thank you for allowing me to participate in the care of your patient.        Sincerely,        Sandrita Purdy MD

## 2024-10-16 ENCOUNTER — TELEPHONE (OUTPATIENT)
Dept: NEUROSURGERY | Facility: CLINIC | Age: 54
End: 2024-10-16
Payer: COMMERCIAL

## 2024-10-25 ENCOUNTER — THERAPY VISIT (OUTPATIENT)
Dept: PHYSICAL THERAPY | Facility: CLINIC | Age: 54
End: 2024-10-25
Payer: COMMERCIAL

## 2024-10-25 DIAGNOSIS — M19.012 DJD OF LEFT AC (ACROMIOCLAVICULAR) JOINT: Primary | ICD-10-CM

## 2024-10-25 PROCEDURE — 97140 MANUAL THERAPY 1/> REGIONS: CPT | Mod: GP

## 2024-10-25 PROCEDURE — 97530 THERAPEUTIC ACTIVITIES: CPT | Mod: GP

## 2024-10-25 PROCEDURE — 97110 THERAPEUTIC EXERCISES: CPT | Mod: GP

## 2024-10-29 ENCOUNTER — OFFICE VISIT (OUTPATIENT)
Dept: PHYSICAL MEDICINE AND REHAB | Facility: CLINIC | Age: 54
End: 2024-10-29
Attending: SURGERY
Payer: COMMERCIAL

## 2024-10-29 DIAGNOSIS — M54.12 CERVICAL RADICULOPATHY: ICD-10-CM

## 2024-10-29 DIAGNOSIS — Z98.1 S/P CERVICAL SPINAL FUSION: ICD-10-CM

## 2024-10-29 PROCEDURE — 95909 NRV CNDJ TST 5-6 STUDIES: CPT | Performed by: PHYSICAL MEDICINE & REHABILITATION

## 2024-10-29 PROCEDURE — 95886 MUSC TEST DONE W/N TEST COMP: CPT | Performed by: PHYSICAL MEDICINE & REHABILITATION

## 2024-10-29 NOTE — LETTER
10/29/2024      Rigo Joyner  [address] [facility] [address]      Dear Colleague,    Thank you for referring your patient, Rigo Joyner, to the [facility] SPINE AND NEUROSURGERY. Please see a copy of my visit note below.    [facility]  [address] [place] [address] [address]  [place] [address]  Office: [phone] Fax: [phone]    Electromyography and Nerve Conduction Study Report        Indication: Patient presents at the request of Dr. Sandrita Purdy for left upper extremity EMG.  Status post cervical fusion.  Left sided neck pain shoulder pain and arm pain to the medial wrist with paresthesias.  On exam, normal sensation to light touch of the upper extremities, 1+ bicep, tricep, brachioradialis reflexes with negative Cami's, normal muscle strength throughout the major muscle groups of the bilateral upper extremities.        Pt Exam Discussion (Communication Barriers):  Electromyography and nerve conduction testing, including associated discomfort, risks, benefits, and alternatives was discussed with the patient prior to the procedure.  No learning/ communication barriers; patient verbalized understanding of procedure.  Informed consent was obtained.           Pt Assessment:  Testing was successfully completed; patient tolerated testing well.       Blood Thinners: ASA Skin Temperature: Warmed 32.1                     EMG/NCS  results:     Nerve Conduction Studies  Motor Sites      Segment Distal Latency Neg. Amp CV F-Latency F-Estimate Comment   Site  (ms) (mV) (m/s) (ms) (ms)    Left Median (APB) Motor   Wrist Wrist-APB 3.3 9.9       Elbow Elbow-Wrist 7.3 7.5 59      Left Ulnar (ADM) Motor   Wrist  2.5 11.0       Bel Elbow Bel Elbow-Wrist 6.2 10.7 65      Ab Elbow Ab Elbow-Wrist 7.8 10.4 64        Sensory Sites      Onset Lat Peak Lat Amp CV Comment   Site (ms) (ms) ( V) (m/s)    Left Median Anti Sensory   Wrist-Dig II 2.4 3.0 31 54    Left  Median-Ulnar Palmar Sensory        Median   Palm-Wrist 1.43 1.88 88 56         Ulnar   Palm-Wrist 1.18 1.68 20 68    Left Radial Sensory   Forearm-Wrist 1.35 2.0 23 74    Left Ulnar Anti Sensory   Wrist-Dig V 1.95 2.5 30 56        NCS Waveforms:    Motor         Sensory                  Electromyography     Side Muscle Nerve Root Ins Act Fibs Psw Fasc Recrt Dur Amp Poly Comment   Left Deltoid Axillary C5-6 Nml Nml Nml Nml Nml Nml Nml 0    Left Triceps Radial C6-7-8 Nml Nml Nml Nml Nml Nml Nml 0    Left PronatorTeres Median C6-7 Nml Nml Nml Nml Nml Nml Nml 0    Left 1stDorInt Ulnar C8-T1 Nml Nml Nml Nml Nml Nml Nml 0    Left Biceps Musculocut C5-6 Nml Nml Nml Nml Nml Nml Nml 0        Comment NCS: Normal study  1.  Normal nerve conduction studies left upper extremity.  This includes normal left median ulnar and radial SNAPs, median and ulnar transcarpal studies, and median and ulnar CMAP's.    Comment EMG: Normal study  1.  Normal needle EMG left upper extremity.    Interpretation: Normal study    1. There is no electrodiagnostic evidence of cervical radiculopathy, brachial plexopathy, or focal neuropathy in the left upper extremity.  Specifically, there is no electrodiagnostic evidence of a left C5-6 radiculopathy.    The testing was completed in its entirety by the physician.       It was our pleasure caring for your patient today, if there any questions or concerns please do not hesitate to contact us.    Again, thank you for allowing me to participate in the care of your patient.        Sincerely,        Rick Byrne DO

## 2024-10-29 NOTE — PATIENT INSTRUCTIONS
Thank you for choosing the [facility] for your EMG testing.    The ordering provider will receive your final EMG results within the next few days.  Please follow up with your provider for the results and further treatment recommendations.

## 2024-10-29 NOTE — PROGRESS NOTES
[facility]  [address] [place] [address] [address]  [place] [address]  Office: [phone] Fax: [phone]    Electromyography and Nerve Conduction Study Report        Indication: Patient presents at the request of Dr. Sandrita Purdy for left upper extremity EMG.  Status post cervical fusion.  Left sided neck pain shoulder pain and arm pain to the medial wrist with paresthesias.  On exam, normal sensation to light touch of the upper extremities, 1+ bicep, tricep, brachioradialis reflexes with negative Cami's, normal muscle strength throughout the major muscle groups of the bilateral upper extremities.        Pt Exam Discussion (Communication Barriers):  Electromyography and nerve conduction testing, including associated discomfort, risks, benefits, and alternatives was discussed with the patient prior to the procedure.  No learning/ communication barriers; patient verbalized understanding of procedure.  Informed consent was obtained.           Pt Assessment:  Testing was successfully completed; patient tolerated testing well.       Blood Thinners: ASA Skin Temperature: Warmed 32.1                     EMG/NCS  results:     Nerve Conduction Studies  Motor Sites      Segment Distal Latency Neg. Amp CV F-Latency F-Estimate Comment   Site  (ms) (mV) (m/s) (ms) (ms)    Left Median (APB) Motor   Wrist Wrist-APB 3.3 9.9       Elbow Elbow-Wrist 7.3 7.5 59      Left Ulnar (ADM) Motor   Wrist  2.5 11.0       Bel Elbow Bel Elbow-Wrist 6.2 10.7 65      Ab Elbow Ab Elbow-Wrist 7.8 10.4 64        Sensory Sites      Onset Lat Peak Lat Amp CV Comment   Site (ms) (ms) ( V) (m/s)    Left Median Anti Sensory   Wrist-Dig II 2.4 3.0 31 54    Left Median-Ulnar Palmar Sensory        Median   Palm-Wrist 1.43 1.88 88 56         Ulnar   Palm-Wrist 1.18 1.68 20 68    Left Radial Sensory   Forearm-Wrist 1.35 2.0 23 74    Left Ulnar Anti Sensory   Wrist-Dig V 1.95 2.5 30 56        NCS Waveforms:    Motor          Sensory                  Electromyography     Side Muscle Nerve Root Ins Act Fibs Psw Fasc Recrt Dur Amp Poly Comment   Left Deltoid Axillary C5-6 Nml Nml Nml Nml Nml Nml Nml 0    Left Triceps Radial C6-7-8 Nml Nml Nml Nml Nml Nml Nml 0    Left PronatorTeres Median C6-7 Nml Nml Nml Nml Nml Nml Nml 0    Left 1stDorInt Ulnar C8-T1 Nml Nml Nml Nml Nml Nml Nml 0    Left Biceps Musculocut C5-6 Nml Nml Nml Nml Nml Nml Nml 0        Comment NCS: Normal study  1.  Normal nerve conduction studies left upper extremity.  This includes normal left median ulnar and radial SNAPs, median and ulnar transcarpal studies, and median and ulnar CMAP's.    Comment EMG: Normal study  1.  Normal needle EMG left upper extremity.    Interpretation: Normal study    1. There is no electrodiagnostic evidence of cervical radiculopathy, brachial plexopathy, or focal neuropathy in the left upper extremity.  Specifically, there is no electrodiagnostic evidence of a left C5-6 radiculopathy.    The testing was completed in its entirety by the physician.       It was our pleasure caring for your patient today, if there any questions or concerns please do not hesitate to contact us.

## 2024-11-01 ENCOUNTER — THERAPY VISIT (OUTPATIENT)
Dept: PHYSICAL THERAPY | Facility: CLINIC | Age: 54
End: 2024-11-01
Payer: COMMERCIAL

## 2024-11-01 DIAGNOSIS — M19.012 DJD OF LEFT AC (ACROMIOCLAVICULAR) JOINT: Primary | ICD-10-CM

## 2024-11-01 PROCEDURE — 97530 THERAPEUTIC ACTIVITIES: CPT | Mod: GP

## 2024-11-01 PROCEDURE — 97110 THERAPEUTIC EXERCISES: CPT | Mod: GP

## 2024-11-01 NOTE — PROGRESS NOTES
"   11/01/24 0500   Appointment Info   Signing clinician's name / credentials Bailey Good, PT, DPT   Total/Authorized Visits E & T   Visits Used 3   Medical Diagnosis Pain of Left Upper Extremity, DJD of Left AC joint.   PT Tx Diagnosis Pain of Left Upper Extremity, DJD of Left AC joint.   Progress Note/Certification   Onset of illness/injury or Date of Surgery 10/04/24  (Chronic condition. Date of Eval.)   Therapy Frequency 1x/week   Predicted Duration 10 weeks   PT Goal 1   Goal Identifier LTG 1   Goal Description Pt will report no more than 2/10 symptom intensity with driving for at least 30 minutes in order to improve tolerance to required transportation.   Goal Progress no change.   Target Date 12/13/24   Subjective Report   Subjective Report Ari shares that he is finding the nerve glides are increasing his symptoms. He got an EMG and it indicated the nerves functioning properly and no signs of cervical radiculopathy.   Objective Measures   Objective Measures Objective Measure 3   Objective Measure 1   Objective Measure SHoulder ROM   Details Flex: 120 (able to go to 125), ABD: 115   Objective Measure 2   Objective Measure  Strength   Details L: 90 R: 85   Objective Measure 3   Objective Measure Shoulder testing   Details positive empty can, positive pain with AC joint mobility. Increased AP mobility in AC joint on Left.  (Progressive symptoms in shoulder following a \"stumble and catch himself\" event on 10/7/24. Testing today was more positive than initial evaluation and orthopedic evaluation previously.)   Treatment Interventions (PT)   Interventions Therapeutic Procedure/Exercise;Therapeutic Activity;Manual Therapy   Therapeutic Procedure/Exercise   Therapeutic Procedures: strength, endurance, ROM, flexibility minutes (43428) 23   Ther Proc 1 Foam Roller Shoulder Flexion/Horizontal Abduction   Ther Proc 1 - Details x 1 min against wall.   PTRx Ther Proc 1 Cervical Retraction   PTRx Ther Proc 1 - Details " x 10   PTRx Ther Proc 2 Shoulder Scapular Retraction with Tubing   PTRx Ther Proc 2 - Details x 10 YTB   PTRx Ther Proc 3 Upper Trapezius Stretch   PTRx Ther Proc 3 - Details x 30 sec   PTRx Ther Proc 4 Levator Scapulae Stretch   PTRx Ther Proc 4 - Details x 30 sec   PTRx Ther Proc 5 Nerve Gliding Proximal Ulnar   PTRx Ther Proc 5 - Details x 5   PTRx Ther Proc 6 Nerve Gliding Proximal Radial   PTRx Ther Proc 6 - Details x 5   PTRx Ther Proc 7 Forearm Passive Range of Motion Advanced Extensor Stretch   PTRx Ther Proc 7 - Details x 20 sec   PTRx Ther Proc 8 Forearm PROM Advanced Flexor Stretch   PTRx Ther Proc 8 - Details No Notes   PTRx Ther Proc 9 Isometric Shoulder Flexion   PTRx Ther Proc 9 - Details 10 - 5 sec hold .   PTRx Ther Proc 10 Isometric Shoulder Abduction    PTRx Ther Proc 10 - Details 10 - 5 sec hold .   PTRx Ther Proc 11 Isometric Shoulder External Rotation   PTRx Ther Proc 11 - Details 10 - 5 sec hold.    PTRx Ther Proc 12 Shoulder Isometric Internal Rotation   PTRx Ther Proc 12 - Details 10 - 5 sec hold   PTRx Ther Proc 13 Shoulder Horizontal Abduction Standing   PTRx Ther Proc 13 - Details 5# x 5   PTRx Ther Proc 14 Shoulder Extension in Standing   PTRx Ther Proc 14 - Details 5# x 5   Skilled Intervention Initiated mobility and stretching HEP.   Patient Response/Progress Pt demonstrated well. mild irritation of symptoms.   Therapeutic Activity   Therapeutic Activities: dynamic activities to improve functional performance minutes (97876) 8   Ther Act 1 Education:   Ther Act 1 - Details Reviewed anatomy using diagram to show possible impingement points and review of exercises that are addressing each.   PTRx Ther Act 1 discussion of potential invovled structures with testing of the shoulder today.   PTRx Ther Act 1 - Details Review adjusted HEP and POC to progress with shoulder stabilization and gentle nerve glides to address pt's symptoms.   Patient Response/Progress Pt asked relevant questions for  "understanding.   Manual Therapy   Manual Therapy Manual Therapy 3   Manual Therapy 1 Supine GHJ/ACJ mobs   Manual Therapy 1 - Details Grade 3 for mobility inferior/posterior motion.   Manual Therapy 2 Side lying scapular mobs   Manual Therapy 2 - Details retraction and depression   Manual Therapy 3 STM   Manual Therapy 3 - Details UT, LS, anterior deltoid, middle trap, rhomboid.   Skilled Intervention Improve joint motion and tissue texture   Patient Response/Progress Pt tolerated well.   Education   Learner/Method Patient;No Barriers to Learning   Plan   Home program Initiated at [facility]. HO provided   Plan for next session Continue to progress nerve mobility, MT as indicated for joint (cervical facet, AC, GHJ) mobility, and mm tension. Progress postural stabilization. consider  strength and wrist exercises.   Comments   Comments Progressive symptoms in shoulder following a \"stumble and catch himself\" event on 10/7/24. Testing today was more positive than initial evaluation and orthopedic evaluation previously. Pt would benefit from further evaluation and imaging to rule out supraspinatus and AC joint injury.       PLAN  Progressive symptoms in shoulder following a \"stumble and catch himself\" event on 10/7/24. Testing today was more positive than initial evaluation and orthopedic evaluation previously. Pt would benefit from further evaluation and imaging to rule out supraspinatus and AC joint injury.     Beginning/End Dates of Progress Note Reporting Period:    to 11/01/2024    Referring Provider:  Vasu Ceja    "

## 2024-11-07 ENCOUNTER — OFFICE VISIT (OUTPATIENT)
Dept: NEUROSURGERY | Facility: CLINIC | Age: 54
End: 2024-11-07
Payer: COMMERCIAL

## 2024-11-07 VITALS
WEIGHT: 221.5 LBS | OXYGEN SATURATION: 97 % | SYSTOLIC BLOOD PRESSURE: 126 MMHG | BODY MASS INDEX: 32.81 KG/M2 | HEART RATE: 73 BPM | DIASTOLIC BLOOD PRESSURE: 80 MMHG | HEIGHT: 69 IN

## 2024-11-07 DIAGNOSIS — Z98.1 S/P CERVICAL SPINAL FUSION: Primary | ICD-10-CM

## 2024-11-07 DIAGNOSIS — M54.12 CERVICAL RADICULOPATHY: ICD-10-CM

## 2024-11-07 DIAGNOSIS — M25.512 PAIN IN JOINT OF LEFT SHOULDER: ICD-10-CM

## 2024-11-07 PROCEDURE — 99213 OFFICE O/P EST LOW 20 MIN: CPT | Performed by: SURGERY

## 2024-11-07 RX ORDER — METHYLPREDNISOLONE 4 MG/1
TABLET ORAL
Qty: 21 TABLET | Refills: 0 | Status: SHIPPED | OUTPATIENT
Start: 2024-11-07

## 2024-11-07 RX ORDER — METHOCARBAMOL 750 MG/1
750 TABLET, FILM COATED ORAL 4 TIMES DAILY PRN
Qty: 40 TABLET | Refills: 0 | Status: SHIPPED | OUTPATIENT
Start: 2024-11-07

## 2024-11-07 ASSESSMENT — PAIN SCALES - GENERAL: PAINLEVEL_OUTOF10: MODERATE PAIN (5)

## 2024-11-07 NOTE — PROGRESS NOTES
"NEUROSURGERY FOLLOW UP  NOTE    Rigo Joyner comes today in follow-up. We reviewed EMG which demonstrated there is no electrodiagnostic evidence of cervical radiculopathy, brachial plexopathy, or focal neuropathy in the left upper extremity.  Specifically, there is no electrodiagnostic evidence of a left C5-6 radiculopathy.  Patient also has a diagnosis of moderate acromioclavicular osteoarthritis.  He underwent a left glenohumeral joint arthritis injection on 10/10/24. He did not feel much relief from that    Ongoing pain mostly in his left shoulder.  Get numbness in his fingers at night on the left hand.  Takes his hand in the morning he feels like his numbness will resolve.  There is some radiation of his pain past the shoulder into his arm.  His shoulder was feeling better overall and it worsened after he fell in his yard and caught himself with his left arm.      PHYSICAL EXAM:   Constitutional: /80   Pulse 73   Ht 5' 9\" (1.753 m)   Wt 221 lb 8 oz (100.5 kg)   SpO2 97%   BMI 32.71 kg/m       Mental Status: A & O in no acute distress.  Affect is appropriate.  Speech is fluent.  Recent and remote memory are intact.  Attention span and concentration are normal.     Motor:  Normal bulk and tone all muscle groups of upper and lower extremities. 5/5 x 4     Sensory: Sensation intact bilaterally to light touch.     Reflexes; supinator, biceps, triceps, knee/ ankle jerk 1+    IMAGING:   I personally reviewed all radiographic images        CONSULTATION ASSESSMENT AND PLAN:    EMG negative. Recommend MRI of left shoulder and possible re consultation with orthopedics. He did not get much relief from left glenohumeral joint arthritis injection. He has known osteoarthritis. He had a past injection at [facility] in 2022 where had more relief. Injection was in a slightly different location then current per patient. I do not have those records to confirm.  He will continue in physical therapy as well. We will " update him to the results of his shoulder MRI     Sandrita Purdy MD      CC:     Semmler, Steven Duane  [facility] [address]

## 2024-11-07 NOTE — LETTER
"11/7/2024      Rigo Joyner  [address] [facility] [address]      Dear Colleague,    Thank you for referring your patient, Rigo Joyner, to the [facility] SPINE AND NEUROSURGERY. Please see a copy of my visit note below.    NEUROSURGERY FOLLOW UP  NOTE    Rigo Joyner comes today in follow-up. We reviewed EMG which demonstrated there is no electrodiagnostic evidence of cervical radiculopathy, brachial plexopathy, or focal neuropathy in the left upper extremity.  Specifically, there is no electrodiagnostic evidence of a left C5-6 radiculopathy.  Patient also has a diagnosis of moderate acromioclavicular osteoarthritis.  He underwent a left glenohumeral joint arthritis injection on 10/10/24. He did not feel much relief from that    Ongoing pain mostly in his left shoulder.  Get numbness in his fingers at night on the left hand.  Takes his hand in the morning he feels like his numbness will resolve.  There is some radiation of his pain past the shoulder into his arm.  His shoulder was feeling better overall and it worsened after he fell in his yard and caught himself with his left arm.      PHYSICAL EXAM:   Constitutional: /80   Pulse 73   Ht 5' 9\" (1.753 m)   Wt 221 lb 8 oz (100.5 kg)   SpO2 97%   BMI 32.71 kg/m       Mental Status: A & O in no acute distress.  Affect is appropriate.  Speech is fluent.  Recent and remote memory are intact.  Attention span and concentration are normal.     Motor:  Normal bulk and tone all muscle groups of upper and lower extremities. 5/5 x 4     Sensory: Sensation intact bilaterally to light touch.     Reflexes; supinator, biceps, triceps, knee/ ankle jerk 1+    IMAGING:   I personally reviewed all radiographic images        CONSULTATION ASSESSMENT AND PLAN:    EMG negative. Recommend MRI of left shoulder and possible re consultation with orthopedics. He did not get much relief from left glenohumeral joint arthritis injection. He " has known osteoarthritis. He had a past injection at [place] in 2022 where had more relief. Injection was in a slightly different location then current per patient. I do not have those records to confirm.  He will continue in physical therapy as well. We will update him to the results of his shoulder MRI     Sandrita Purdy MD      CC:     Charlee Martinezen Duane  [facility] [address]      Again, thank you for allowing me to participate in the care of your patient.        Sincerely,        Sandrita Purdy MD

## 2024-11-07 NOTE — PATIENT INSTRUCTIONS
Left shoulder MRI. We will call you with results.   Continue physical therapy. Robaxin and medrol dose pack refilled.   1 year follow up in April of 2025 with a new cervical xray.

## 2024-11-07 NOTE — NURSING NOTE
"Rigo Joyner is a 54 year old male who presents for:  Chief Complaint   Patient presents with    RECHECK     EMG F/U - pain in left shoulder, neck stiffness. Tingling down left arm. Pain lvl 6.        Initial Vitals:  /80   Pulse 73   Ht 5' 9\" (1.753 m)   Wt 221 lb 8 oz (100.5 kg)   SpO2 97%   BMI 32.71 kg/m   Estimated body mass index is 32.71 kg/m  as calculated from the following:    Height as of this encounter: 5' 9\" (1.753 m).    Weight as of this encounter: 221 lb 8 oz (100.5 kg).. Body surface area is 2.21 meters squared. BP completed using cuff size: large  Moderate Pain (5)    Nursing Comments:       Yvonne Troy    "

## 2024-11-08 ENCOUNTER — THERAPY VISIT (OUTPATIENT)
Dept: PHYSICAL THERAPY | Facility: CLINIC | Age: 54
End: 2024-11-08
Payer: COMMERCIAL

## 2024-11-08 DIAGNOSIS — M19.012 DJD OF LEFT AC (ACROMIOCLAVICULAR) JOINT: Primary | ICD-10-CM

## 2024-11-08 PROCEDURE — 97140 MANUAL THERAPY 1/> REGIONS: CPT | Mod: GP

## 2024-11-08 PROCEDURE — 97110 THERAPEUTIC EXERCISES: CPT | Mod: GP

## 2024-11-15 ENCOUNTER — THERAPY VISIT (OUTPATIENT)
Dept: PHYSICAL THERAPY | Facility: CLINIC | Age: 54
End: 2024-11-15
Payer: COMMERCIAL

## 2024-11-15 DIAGNOSIS — M19.012 DJD OF LEFT AC (ACROMIOCLAVICULAR) JOINT: Primary | ICD-10-CM

## 2024-11-15 PROCEDURE — 97140 MANUAL THERAPY 1/> REGIONS: CPT | Mod: GP

## 2024-11-15 PROCEDURE — 97110 THERAPEUTIC EXERCISES: CPT | Mod: GP

## 2024-11-27 ENCOUNTER — THERAPY VISIT (OUTPATIENT)
Dept: PHYSICAL THERAPY | Facility: CLINIC | Age: 54
End: 2024-11-27
Payer: COMMERCIAL

## 2024-11-27 DIAGNOSIS — M19.012 DJD OF LEFT AC (ACROMIOCLAVICULAR) JOINT: Primary | ICD-10-CM

## 2024-11-27 PROCEDURE — 97140 MANUAL THERAPY 1/> REGIONS: CPT | Mod: GP | Performed by: PHYSICAL THERAPIST

## 2024-11-27 PROCEDURE — 97110 THERAPEUTIC EXERCISES: CPT | Mod: GP | Performed by: PHYSICAL THERAPIST

## 2025-03-25 ENCOUNTER — OFFICE VISIT (OUTPATIENT)
Dept: NEUROSURGERY | Facility: CLINIC | Age: 55
End: 2025-03-25
Payer: COMMERCIAL

## 2025-03-25 VITALS
WEIGHT: 225.4 LBS | HEIGHT: 69 IN | OXYGEN SATURATION: 96 % | DIASTOLIC BLOOD PRESSURE: 76 MMHG | HEART RATE: 69 BPM | SYSTOLIC BLOOD PRESSURE: 133 MMHG | BODY MASS INDEX: 33.38 KG/M2

## 2025-03-25 DIAGNOSIS — M54.12 CERVICAL RADICULOPATHY: Primary | ICD-10-CM

## 2025-03-25 DIAGNOSIS — Z98.1 S/P CERVICAL SPINAL FUSION: ICD-10-CM

## 2025-03-25 ASSESSMENT — PAIN SCALES - GENERAL: PAINLEVEL_OUTOF10: MODERATE PAIN (4)

## 2025-03-25 NOTE — NURSING NOTE
"Rigo Joyner is a 54 year old male who presents for:  Chief Complaint   Patient presents with    RECHECK     Patient has pain in the left shoulder and runs down the left arm to the wrist. Pins and needles in the left arm. Lvl 4.        Initial Vitals:  /76   Pulse 69   Ht 5' 9\" (1.753 m)   Wt 225 lb 6.4 oz (102.2 kg)   SpO2 96%   BMI 33.29 kg/m   Estimated body mass index is 33.29 kg/m  as calculated from the following:    Height as of this encounter: 5' 9\" (1.753 m).    Weight as of this encounter: 225 lb 6.4 oz (102.2 kg).. Body surface area is 2.23 meters squared. BP completed using cuff size: large  Moderate Pain (4)    Nursing Comments:       Yvonne Troy    "

## 2025-03-25 NOTE — LETTER
"3/25/2025      Rigo Joyner  [address] [facility] [address]      Dear Colleague,    Thank you for referring your patient, Rigo Joyner, to the [facility] SPINE [facility] NEUROSURGERY. Please see a copy of my visit note below.    NEUROSURGERY FOLLOW UP  NOTE    Rigo Joyner comes today in follow-up.   He went through 3 injections of his shoulder. No relief. Told it was more his neck.   Feels at this time his arm symptoms are related to his neck flexed or rotated to right shoulder. Shoulder pain increase with neck flexion. Fell in October, now with soreness in his shoulder which got better. Heaviness and pain down triceps/bicep into forearm from shoulder. No extension into hands or fingers. Headaches with neck stretching. Tylenol dose help and gabapentin he takes for low back.     PHYSICAL EXAM:   Constitutional: /76   Pulse 69   Ht 5' 9\" (1.753 m)   Wt 225 lb 6.4 oz (102.2 kg)   SpO2 96%   BMI 33.29 kg/m       Mental Status: A & O in no acute distress.  Affect is appropriate.  Speech is fluent.  Recent and remote memory are intact.  Attention span and concentration are normal.     Motor:  Normal bulk and tone all muscle groups of upper and lower extremities. 5/5 in UE     Sensory: Sensation intact bilaterally to light touch except altered sensation over bicep <triceps.        IMAGING:   I personally reviewed all radiographic images        CONSULTATION ASSESSMENT AND PLAN:    Recommend getting a cervical xray but changing to flexion and extension to evaluate his artifical disc movement. Also will get a CT of his cervical spine to evaluate for full healing of cervical 4-5 given ongoing neck pain. He will follow up after these update imaging is completed.     Sandrita Purdy MD      CC:     Semmler, Steven Duane  [address] [address]      Again, thank you for allowing me to participate in the care of your patient.        Sincerely,        Sandrita" ERIKA Purdy MD    Electronically signed

## 2025-03-25 NOTE — PROGRESS NOTES
"NEUROSURGERY FOLLOW UP  NOTE    Rigo Joyner comes today in follow-up.   He went through 3 injections of his shoulder. No relief. Told it was more his neck.   Feels at this time his arm symptoms are related to his neck flexed or rotated to right shoulder. Shoulder pain increase with neck flexion. Fell in October, now with soreness in his shoulder which got better. Heaviness and pain down triceps/bicep into forearm from shoulder. No extension into hands or fingers. Headaches with neck stretching. Tylenol dose help and gabapentin he takes for low back.     PHYSICAL EXAM:   Constitutional: /76   Pulse 69   Ht 5' 9\" (1.753 m)   Wt 225 lb 6.4 oz (102.2 kg)   SpO2 96%   BMI 33.29 kg/m       Mental Status: A & O in no acute distress.  Affect is appropriate.  Speech is fluent.  Recent and remote memory are intact.  Attention span and concentration are normal.     Motor:  Normal bulk and tone all muscle groups of upper and lower extremities. 5/5 in UE     Sensory: Sensation intact bilaterally to light touch except altered sensation over bicep <triceps.        IMAGING:   I personally reviewed all radiographic images        CONSULTATION ASSESSMENT AND PLAN:    Recommend getting a cervical xray but changing to flexion and extension to evaluate his artifical disc movement. Also will get a CT of his cervical spine to evaluate for full healing of cervical 4-5 given ongoing neck pain. He will follow up after these update imaging is completed.     Sandrita Purdy MD      CC:     Semmler, Steven Duane  [address] [place] [id number]  "

## 2025-03-25 NOTE — PATIENT INSTRUCTIONS
Recommend getting a cervical xray but changing to flexion and extension to evaluate his artifical disc movement. Also will get a CT of his cervical spine to evaluate for full healing of cervical 4-5 given ongoing neck pain. He will follow up after these update imaging is completed.

## 2025-04-01 ENCOUNTER — HOSPITAL ENCOUNTER (OUTPATIENT)
Dept: CT IMAGING | Facility: HOSPITAL | Age: 55
Discharge: HOME | End: 2025-04-01
Attending: SURGERY
Payer: COMMERCIAL

## 2025-04-01 ENCOUNTER — HOSPITAL ENCOUNTER (OUTPATIENT)
Dept: RADIOLOGY | Facility: HOSPITAL | Age: 55
Discharge: HOME | End: 2025-04-01
Attending: SURGERY
Payer: COMMERCIAL

## 2025-04-01 DIAGNOSIS — Z98.1 S/P CERVICAL SPINAL FUSION: ICD-10-CM

## 2025-04-01 DIAGNOSIS — M54.12 CERVICAL RADICULOPATHY: ICD-10-CM

## 2025-04-01 PROCEDURE — 72125 CT NECK SPINE W/O DYE: CPT

## 2025-04-01 PROCEDURE — 72050 X-RAY EXAM NECK SPINE 4/5VWS: CPT

## 2025-04-03 ENCOUNTER — OFFICE VISIT (OUTPATIENT)
Dept: NEUROSURGERY | Facility: CLINIC | Age: 55
End: 2025-04-03
Payer: COMMERCIAL

## 2025-04-03 VITALS
HEART RATE: 77 BPM | BODY MASS INDEX: 32.91 KG/M2 | HEIGHT: 69 IN | DIASTOLIC BLOOD PRESSURE: 84 MMHG | OXYGEN SATURATION: 97 % | WEIGHT: 222.2 LBS | SYSTOLIC BLOOD PRESSURE: 133 MMHG

## 2025-04-03 DIAGNOSIS — M54.12 CERVICAL RADICULOPATHY: Primary | ICD-10-CM

## 2025-04-03 PROCEDURE — 99213 OFFICE O/P EST LOW 20 MIN: CPT | Performed by: SURGERY

## 2025-04-03 PROCEDURE — 1125F AMNT PAIN NOTED PAIN PRSNT: CPT | Performed by: SURGERY

## 2025-04-03 PROCEDURE — 3079F DIAST BP 80-89 MM HG: CPT | Performed by: SURGERY

## 2025-04-03 PROCEDURE — 3075F SYST BP GE 130 - 139MM HG: CPT | Performed by: SURGERY

## 2025-04-03 ASSESSMENT — PAIN SCALES - GENERAL: PAINLEVEL_OUTOF10: MODERATE PAIN (4)

## 2025-04-03 NOTE — LETTER
"4/3/2025      Rigo Joyner  [address] [facility] [id number]      Dear Colleague,    Thank you for referring your patient, Rigo Joyner, to the [facility]ERY. Please see a copy of my visit note below.    NEUROSURGERY FOLLOW UP  NOTE    Rigo Joyner comes today in follow-up. Aching mostly in neck and proximal shoulder. Also feels an aching in his left wrist. A wrist splint at night does help. Mostly neck and proximal shoulder and aching into left arm.     PHYSICAL EXAM:   Constitutional: /84   Pulse 77   Ht 1.753 m (5' 9\")   Wt 100.8 kg (222 lb 3.2 oz)   SpO2 97%   BMI 32.81 kg/m       Mental Status: A & O in no acute distress.  Affect is appropriate.  Speech is fluent.  Recent and remote memory are intact.  Attention span and concentration are normal.     Motor:  Normal bulk and tone all muscle groups of upper and lower extremities.    IMAGING:   I personally reviewed all radiographic images        CONSULTATION ASSESSMENT AND PLAN:    Possible his symptoms are coming from C6 radiculopathy. Appears to have more osteophytes in bilateral foramen. Also possible neck pain is related to C4 radiculopathy. Recommend left cervical 5-6 transforaminal epidural steroid injection. Consider left cervical 3-4 pending results of the first injection. Return to clinic after injection.       Sandrita Purdy MD      CC:     Semmler, Steven Duane  [address] [place] [id number]      Again, thank you for allowing me to participate in the care of your patient.        Sincerely,        Sandrita Purdy MD    Electronically signed"

## 2025-04-03 NOTE — PROGRESS NOTES
"NEUROSURGERY FOLLOW UP  NOTE    Rigo Joyner comes today in follow-up. Aching mostly in neck and proximal shoulder. Also feels an aching in his left wrist. A wrist splint at night does help. Mostly neck and proximal shoulder and aching into left arm.     PHYSICAL EXAM:   Constitutional: /84   Pulse 77   Ht 1.753 m (5' 9\")   Wt 100.8 kg (222 lb 3.2 oz)   SpO2 97%   BMI 32.81 kg/m       Mental Status: A & O in no acute distress.  Affect is appropriate.  Speech is fluent.  Recent and remote memory are intact.  Attention span and concentration are normal.     Motor:  Normal bulk and tone all muscle groups of upper and lower extremities.    IMAGING:   I personally reviewed all radiographic images        CONSULTATION ASSESSMENT AND PLAN:    Possible his symptoms are coming from C6 radiculopathy. Appears to have more osteophytes in bilateral foramen. Also possible neck pain is related to C4 radiculopathy. Recommend left cervical 5-6 transforaminal epidural steroid injection. Consider left cervical 3-4 pending results of the first injection. Return to clinic after injection.       Sandrita Purdy MD      CC:     Semmler, Steven Duane  [address] [facility] [id number]  "

## 2025-04-03 NOTE — NURSING NOTE
"Rigo Joyner is a 54 year old male who presents for:  Chief Complaint   Patient presents with    RECHECK     Patient has pain in the neck and left shoulder down the left arm. Numbness in the left arm sometimes. Lvl 4.        Initial Vitals:  /84   Pulse 77   Ht 5' 9\" (1.753 m)   Wt 222 lb 3.2 oz (100.8 kg)   SpO2 97%   BMI 32.81 kg/m   Estimated body mass index is 32.81 kg/m  as calculated from the following:    Height as of this encounter: 5' 9\" (1.753 m).    Weight as of this encounter: 222 lb 3.2 oz (100.8 kg).. Body surface area is 2.22 meters squared. BP completed using cuff size: large  Moderate Pain (4)    Nursing Comments:       Yvonne Troy    "

## 2025-04-03 NOTE — PATIENT INSTRUCTIONS
Recommend left cervical 5-6 transforaminal epidural steroid injection. Consider left cervical 3-4 pending results of the first injection. Return to clinic after injection.

## 2025-04-30 ENCOUNTER — RADIOLOGY INJECTION OFFICE VISIT (OUTPATIENT)
Dept: PHYSICAL MEDICINE AND REHAB | Facility: CLINIC | Age: 55
End: 2025-04-30
Attending: SURGERY
Payer: COMMERCIAL

## 2025-04-30 VITALS
DIASTOLIC BLOOD PRESSURE: 82 MMHG | OXYGEN SATURATION: 95 % | HEART RATE: 81 BPM | SYSTOLIC BLOOD PRESSURE: 132 MMHG | TEMPERATURE: 97.3 F | RESPIRATION RATE: 16 BRPM

## 2025-04-30 DIAGNOSIS — M54.12 CERVICAL RADICULOPATHY: ICD-10-CM

## 2025-04-30 PROCEDURE — 64479 NJX AA&/STRD TFRM EPI C/T 1: CPT | Mod: LT | Performed by: PAIN MEDICINE

## 2025-04-30 RX ORDER — LIDOCAINE HYDROCHLORIDE 10 MG/ML
INJECTION, SOLUTION EPIDURAL; INFILTRATION; INTRACAUDAL; PERINEURAL
Status: COMPLETED | OUTPATIENT
Start: 2025-04-30 | End: 2025-04-30

## 2025-04-30 RX ORDER — DEXAMETHASONE SODIUM PHOSPHATE 10 MG/ML
INJECTION, SOLUTION INTRAMUSCULAR; INTRAVENOUS
Status: COMPLETED | OUTPATIENT
Start: 2025-04-30 | End: 2025-04-30

## 2025-04-30 RX ADMIN — LIDOCAINE HYDROCHLORIDE 2 ML: 10 INJECTION, SOLUTION EPIDURAL; INFILTRATION; INTRACAUDAL; PERINEURAL at 09:35

## 2025-04-30 RX ADMIN — DEXAMETHASONE SODIUM PHOSPHATE 10 MG: 10 INJECTION, SOLUTION INTRAMUSCULAR; INTRAVENOUS at 09:35

## 2025-04-30 ASSESSMENT — PAIN SCALES - GENERAL
PAINLEVEL_OUTOF10: MILD PAIN (2)
PAINLEVEL_OUTOF10: MODERATE PAIN (4)

## 2025-04-30 NOTE — PATIENT INSTRUCTIONS
Follow-up visit with Dr. Purdy of [facility] (#[phone]) in 2 weeks to discuss injection outcome and determine care plan going forward.    Learning About a Cervical Epidural Injection  What is a cervical epidural steroid injection?     A cervical epidural steroid injection is a shot of medicine into the area around the spinal cord in your neck. You may get it to help with pain, tingling, or numbness in your neck, shoulder, or arm. It may have a steroid to reduce swelling and pain and a local anesthetic to numb the nerves.  How is a cervical epidural steroid injection done?  The doctor will use a tiny needle to numb the skin where you are getting the injection.  After the skin is numb, your doctor will use a larger needle for the epidural injection. X-ray or ultrasound may be used to help guide the needle. You may feel some pressure. But you should not feel pain.  How long does an epidural steroid injection take?  The procedure will take 5 to 15 minutes. You will go home about an hour later.  What can you expect after a cervical epidural steroid injection?  If your injection included local anesthetic medicine, your neck, shoulder, arm, or hand may feel heavy or numb right after the shot.  With a local anesthetic, your pain may be gone right away. But it may return after a few hours. This is because the steroid hasn't started working yet. Before the steroid starts to work, your neck, shoulder, or arm may be sore for a few days.  These injections don't always work. When they do, it takes 1 to 5 days. The pain relief can last for several days to a few months or longer.  Some people are dizzy or feel sick to their stomach after getting this shot. These symptoms usually don't last very long.  You may want to do less than normal for a few days. But you may also be able to return to your daily routine.  If your pain is better, you may be able to keep doing your normal activities or  physical therapy. But try not to overdo it, even if your pain has improved a lot. If your pain is only a little better or if it comes back, your doctor may want you to get another injection in a few weeks. If your pain has not changed, talk to your doctor about other treatment choices.  Follow-up care is a key part of your treatment and safety. Be sure to make and go to all appointments, and call your doctor if you are having problems. It's also a good idea to know your test results and keep a list of the medicines you take.       DISCHARGE INSTRUCTIONS    During office hours (8:00 a.m.- 4:00 p.m.) questions or concerns may be answered  by calling [facility] Center Navigation Nurses at  [phone].  Messages received after hours will be returned the following business day.      In the case of an emergency, please dial 911 or seek assistance at the nearest Emergency Room/Urgent Care facility.     All Patients:    You may experience an increase in your symptoms for the first 2 days (It may take anywhere between 2 days - 2 weeks for the steroid to have maximum effect).    You may use ice on the injection site, as frequently as 20 minutes each hour if needed.    You may take your pain medicine.    You may continue taking your regular medication after your injection. If you have had a medial branch block you may resume pain medication once your pain diary is completed.    You may shower. No swimming, tub bath, or hot tub for 48 hours.  You may remove your bandaid/bandage as soon as you are home.    You may resume light activities, as tolerated.    Resume your usual diet as tolerated.    If you were told to hold any blood thinning medications you may resume taking them 24 hours after your procedure as prescribed.    It is strongly advised that you do not drive for 1-3 hours post injection.    If you have had oral sedation:  Do not drive for 8 hours post injection.        POSSIBLE STEROID SIDE EFFECTS (If steroid/cortisone  was used for your procedure    Swelling of the legs              Skin redness (flushing)     Mouth (oral) irritation   Increased blood sugar (glucose) levels            Sweats                    Mood changes  Headache  Sleeplessness  Weakened immune system for up to 14 days, which could increase the risk of rashid the COVID-19 virus and/or experiencing more severe symptoms of the disease, if exposed.  Decreased effectiveness of vaccines if given within 2 weeks of the steroid.    -If you experience these symptoms, it should only last for a short period         POSSIBLE PROCEDURE SIDE EFFECTS    Increased Pain           Increased numbness/tingling      Nausea/Vomiting          Bruising/bleeding at site      Redness or swelling                                              Difficulty walking      Weakness           Fever greater than 100.5    -Call the Spine Center if you are concerned    *In the event of a severe headache after an epidural steroid injection that is relieved by lying down, please call the [facility] to speak with a clinical staff member*

## 2025-05-13 ENCOUNTER — TELEPHONE (OUTPATIENT)
Dept: NEUROSURGERY | Facility: CLINIC | Age: 55
End: 2025-05-13
Payer: COMMERCIAL

## 2025-05-13 NOTE — TELEPHONE ENCOUNTER
Called Ari to check in how he is doing post left C5-6 TFESI on 04/30/2025. He continues to have pain in his neck and left shoulder. He would like to discuss left C3-4 TFESI as the next step of his treatment.  Mira Rios RN, CNRN

## 2025-05-15 ENCOUNTER — TELEPHONE (OUTPATIENT)
Dept: NEUROSURGERY | Facility: CLINIC | Age: 55
End: 2025-05-15

## 2025-05-15 ENCOUNTER — OFFICE VISIT (OUTPATIENT)
Dept: NEUROSURGERY | Facility: CLINIC | Age: 55
End: 2025-05-15
Payer: COMMERCIAL

## 2025-05-15 VITALS
DIASTOLIC BLOOD PRESSURE: 74 MMHG | WEIGHT: 219.4 LBS | HEIGHT: 69 IN | SYSTOLIC BLOOD PRESSURE: 114 MMHG | BODY MASS INDEX: 32.5 KG/M2 | OXYGEN SATURATION: 96 % | HEART RATE: 89 BPM

## 2025-05-15 DIAGNOSIS — M54.12 CERVICAL RADICULOPATHY: Primary | ICD-10-CM

## 2025-05-15 PROCEDURE — 99212 OFFICE O/P EST SF 10 MIN: CPT | Performed by: SURGERY

## 2025-05-15 PROCEDURE — 3078F DIAST BP <80 MM HG: CPT | Performed by: SURGERY

## 2025-05-15 PROCEDURE — 1125F AMNT PAIN NOTED PAIN PRSNT: CPT | Performed by: SURGERY

## 2025-05-15 PROCEDURE — 3074F SYST BP LT 130 MM HG: CPT | Performed by: SURGERY

## 2025-05-15 RX ORDER — PREDNISONE 20 MG/1
20 TABLET ORAL
COMMUNITY
Start: 2025-05-09

## 2025-05-15 RX ORDER — MONTELUKAST SODIUM 10 MG/1
10 TABLET ORAL DAILY
COMMUNITY
Start: 2025-05-09

## 2025-05-15 ASSESSMENT — PAIN SCALES - GENERAL: PAINLEVEL_OUTOF10: MODERATE PAIN (4)

## 2025-05-15 NOTE — PATIENT INSTRUCTIONS
Arm pain is episodic and exacerbated by certain positions, such as driving or holding a phone. Previous interventions, including injections and nerve medications like gabapentin, have provided limited relief. There is a concern about bone spurs affecting the nerves, but EMG tests have not shown significant findings and selective nerve root injections showed minimal relief. Consider a second opinion from PM&R colleagues.  Schedule an appointment with Dr. Bailey for further evaluation and proceed with injection at the 3-4 level.

## 2025-05-15 NOTE — TELEPHONE ENCOUNTER
Procedure ordered? Yes     What insurance are we billing for this procedure?  [employer]   IF SCHEDULING AT [facility] PLEASE SCHEDULE AT LEAST 7-10 BUSINESS DAYS OUT SO A PA CAN BE OBTAINED    Is a  PAIN  order available to link to injection appointment or does one need to be transcribed?  YES: Left C3-4 TFESI    If needed, route to CN to assist in doing so.    Is  needed?: No   If YES, please initiate in-person  request.    Will patient have a ?  Yes    All fluoro procedures require a .  These US procedures also require a : piriformis, pudendal, scalene, & carpal tunnel.    Is patient taking any blood thinners (i.e. Plavix, coumadin, jantoven, warfarin, heparin, Xarelto, Pradaxa, Eliquis, Brilinta, or Effient, etc)? No   If YES, schedule out 2 weeks, and route to RN pool to determine if medication hold is needed.    Is patient taking aspirin? Yes - 81 mg    For CERVICAL or INTERLAMINAR procedures, route message to Care Navigation so they can seek approval from managing provider to hold aspirin for 6 days prior to the procedure.    Does patient have an allergy to contrast dye or iodine?  No  If YES, OK to schedule. Route to RN pool AND add allergy information to appointment notes    Is patient diabetic? No If YES, blood glucose level will be checked on day of procedure and will need to be 300mg/dL or below (for steroid injections).    Does patient have an active infection or treated for one within the past week? No   If YES, do NOT schedule and route to Care Navigation.     Is patient currently taking any antibiotics or have an active infection?  No  For patients on chronic, preventative, or prophylactic antibiotics, procedures may be scheduled.   For patients on antibiotics for active or recent infection: antibiotic course must have been completed and symptom-free of infection to safely proceed with injection.  Send to Care Navigation if unsure.    Is patient actively  being treated for cancer or immunocompromised? No  If YES, do NOT schedule and route to RN pool.     Any chance of pregnancy? Not Applicable   If YES, do NOT schedule and route to RN pool.    Have you had any vaccines in the last 2 weeks? NO  If YES, please route to Care Navigation to discuss before scheduling.     Reminders:  -  If you are started on any steroids or antibiotics between now and your appointment, you must contact us because it may affect our ability to perform your procedure.   reviewed    -  Informed patient that it is OK to take normal medications before the procedure and must hold blood thinners as instructed.  reviewed    -  Patients scheduled for MBBs should not take pain meds prior to injection and pain rating needs to be 5/10 or greater the day of the procedure.    -  Informed cervical injection patients that an IV will be placed as a precautionary measure.  reviewed    -  Patients should eat a light meal prior to their procedure appointment.  reviewed    -  All radiofrequency ablations are in a 40 minute time slot and not to be scheduled until in-basket message has been sent by the procedure team that the PA has been  received.  reviewed and not discussed     -  Spinal cord stimulator trial scheduling is coordinated by the procedure team.    -  Care Navigation (#[phone]) is available to assist patients with additional questions.  reviewed    -  Cervical TFESIs with Dr. Brooks only.    *PLEASE FORWARD TO CARE NAVIGATION IF INDICATED.  PATIENT SHOULD BE INFORMED THAT THEY WILL BE CONTACTED BY CARE NAVIGATION ONLY IF CHANGES TO MEDICATION/CARE PLAN RECOMMENDATIONS ARE NEEDED.  IF THEY DO NOT HEAR BACK FROM CARE NAVIGATION, THEY ARE FINE TO CONTINUE WITH THEIR CURRENT MEDICATIONS/CARE PLAN.*

## 2025-05-15 NOTE — NURSING NOTE
"Rigo Joyner is a 54 year old male who presents for:  Chief Complaint   Patient presents with    RECHECK     Patient is here today after his injection, and talk about getting another one. Patient has pain in the left arm from the shoulder to the wrist. Level 4. Can get to a 7.        Initial Vitals:  /74   Pulse 89   Ht 5' 9\" (1.753 m)   Wt 219 lb 6.4 oz (99.5 kg)   SpO2 96%   BMI 32.40 kg/m   Estimated body mass index is 32.4 kg/m  as calculated from the following:    Height as of this encounter: 5' 9\" (1.753 m).    Weight as of this encounter: 219 lb 6.4 oz (99.5 kg). Body surface area is 2.2 meters squared. BP completed using cuff size: large  Moderate Pain (4)        Yvonne Troy   "

## 2025-05-15 NOTE — LETTER
5/15/2025      Rigo Joyner  [address] [facility] [id number]      Dear Colleague,    Thank you for referring your patient, Rigo Joyner, to the [facility] SPINE AND NEUROSURGERY. Please see a copy of my visit note below.    Spine NEUROSURGERY FOLLOW UP  NOTE    Ari Joyner, a 54-year-old male, reports experiencing arm pain that intensifies after driving for more than 20 minutes. He notes that the pain is episodic and can be severe, affecting his ability to find relief. He mentions that the pain is not just a minor shoulder ache but extends to the fingers, particularly when in certain positions, such as lying in bed or holding a phone.  He has tried gabapentin, taking 200-300 mg in the morning, which helps take the edge off the pain, though it does not always provide relief. He has had injections in the past, which have not been significantly beneficial, and EMG tests have not shown any conclusive results. Ari expresses concern about undergoing further surgery due to the potential pain and uncertainty of improvement.     Arm pain is episodic and exacerbated by certain positions, such as driving or holding a phone. Previous interventions, including injections and nerve medications like gabapentin, have provided limited relief. There is a concern about foraminal stenosis, but EMG tests have not shown significant findings and selective nerve root injections showed minimal relief. Consider a second opinion from PM&R colleagues.  Schedule an appointment with Dr. Byrne for further evaluation and proceed with injection at the 3-4 level.    Sandrita Purdy MD      CC:     Semmler, Steven Duane  [address] [place] [phone]    Again, thank you for allowing me to participate in the care of your patient.        Sincerely,        Sandrita Purdy MD    Electronically signed

## 2025-05-15 NOTE — PROGRESS NOTES
Spine NEUROSURGERY FOLLOW UP  NOTE    Ari Joyner, a 54-year-old male, reports experiencing arm pain that intensifies after driving for more than 20 minutes. He notes that the pain is episodic and can be severe, affecting his ability to find relief. He mentions that the pain is not just a minor shoulder ache but extends to the fingers, particularly when in certain positions, such as lying in bed or holding a phone.  He has tried gabapentin, taking 200-300 mg in the morning, which helps take the edge off the pain, though it does not always provide relief. He has had injections in the past, which have not been significantly beneficial, and EMG tests have not shown any conclusive results. Ari expresses concern about undergoing further surgery due to the potential pain and uncertainty of improvement.     Arm pain is episodic and exacerbated by certain positions, such as driving or holding a phone. Previous interventions, including injections and nerve medications like gabapentin, have provided limited relief. There is a concern about foraminal stenosis, but EMG tests have not shown significant findings and selective nerve root injections showed minimal relief. Consider a second opinion from PM&R colleagues.  Schedule an appointment with Dr. Byrne for further evaluation and proceed with injection at the 3-4 level.    Sandrita Purdy MD      CC:     Semmler, Steven Duane  [address] [facility] [id number]

## 2025-05-19 ENCOUNTER — HOSPITAL ENCOUNTER (EMERGENCY)
Facility: CLINIC | Age: 55
Discharge: HOME | End: 2025-05-19
Payer: COMMERCIAL

## 2025-05-19 VITALS
DIASTOLIC BLOOD PRESSURE: 77 MMHG | RESPIRATION RATE: 18 BRPM | HEART RATE: 103 BPM | TEMPERATURE: 99.4 F | SYSTOLIC BLOOD PRESSURE: 132 MMHG | OXYGEN SATURATION: 97 %

## 2025-05-19 DIAGNOSIS — J32.9 SINUSITIS: ICD-10-CM

## 2025-05-19 PROCEDURE — G0463 HOSPITAL OUTPT CLINIC VISIT: HCPCS

## 2025-05-19 RX ORDER — BENZONATATE 100 MG/1
200 CAPSULE ORAL 3 TIMES DAILY PRN
Qty: 15 CAPSULE | Refills: 0 | Status: SHIPPED | OUTPATIENT
Start: 2025-05-19 | End: 2025-05-24

## 2025-05-19 RX ORDER — AZITHROMYCIN 250 MG/1
TABLET, FILM COATED ORAL
Qty: 6 TABLET | Refills: 0 | Status: SHIPPED | OUTPATIENT
Start: 2025-05-19 | End: 2025-05-24

## 2025-05-19 ASSESSMENT — COLUMBIA-SUICIDE SEVERITY RATING SCALE - C-SSRS

## 2025-05-19 ASSESSMENT — ACTIVITIES OF DAILY LIVING (ADL): ADLS_ACUITY_SCORE: 60

## 2025-05-19 NOTE — ED PROVIDER NOTES
History     Chief Complaint   Patient presents with    Sinusitis     HPI  Rigo Joyner is a 54 year old male who since urgent care with chief complaints of cough and nasal congestion for 4 weeks.  Patient reports symptoms for started with nasal congestion and sore throat.  He was seen by primary care provider who prescribed him prednisone.  He did feel slightly better while on the prednisone however symptoms returned after stopping it.  He does have seasonal allergies which he intermittently takes antiallergy medication for.  He has been attempting use of over-the-counter cold and flu medications and Flonase without relief.  Patient endorses bilateral sinus pain, pressure, pressure in the ears, sore throat and productive cough.  Denies fever, chills, abdominal pain, nausea, vomiting, chest pain.    Allergies:  Allergies   Allergen Reactions    Bee Pollen Cough, Headache and Itching    Pollen Extract Cough, Headache and Itching    Seasonal Allergies Headache, Itching, Rash and Visual Disturbance       Problem List:    Patient Active Problem List    Diagnosis Date Noted    DJD of left AC (acromioclavicular) joint 10/07/2024     Priority: Medium    Pain of left upper extremity 10/04/2024     Priority: Medium    S/P cervical disc replacement 11/10/2023     Priority: Medium    Acute postoperative pain 06/28/2023     Priority: Medium    Other chronic pain 06/28/2023     Priority: Medium    Pseudoarthrosis of spine 06/28/2023     Priority: Medium    Osteoarthritis of spine with radiculopathy, cervical region 12/13/2022     Priority: Medium     Apr 12, 2023 Entered By: SUZAN HANCOCK Comment: cervical spine formaminal stenosis lc3-4,lc4-c5 and billateral c5-6,c6-7      Hemorrhage of rectum and anus 01/19/2022     Priority: Medium    Other visual disturbances 05/10/2021     Priority: Medium    Polyp of colon 05/10/2021     Priority: Medium     Formatting of this note might be different from the original.  Oct 29,  2018 Entered By: SUZAN HANCOCK Comment: 2/4/2015 colonoscopy : serrated adenoma  transverse colon  6,mmOct 29, 2018 Entered By: SUZAN HANCOCK Comment: colonoscopy 2018 repeat 5 years  no polyps. randome bx  no diagnostic abnormalitiesOct 29, 2018 Entered By: SUZAN HANCOCK Comment: family hx colon cancer in his fatherOct 29, 2018 Entered By: SUZAN HANCOCK Comment: DUE In 2023( done previously [facility], 2019 Entered By: SUZAN HANCOCK Comment: diverticulittis 2/3 2018Jun 06, 2019 Entered By: SUZAN HANCOCK Comment: Flex sig 7/2018 was having digestive issues and was determined to be GBOct 11, 2019 Entered By: SUZAN HANCOCK Comment: [facility] 9/2019 colonoscopy :diverticulosis  Oct 29, 2018 Entered By: SUZAN HANCOCK Comment: 2/4/2015 colonoscopy : serrated adenoma  transverse colon  6,mmOct 29, 2018 Entered By: SUZAN HANCOCK Comment: colonoscopy 2018 repeat 5 years  no polyps. randome bx  no diagnostic abnormalitiesOct 29, 2018 Entered By: SUZAN HANCOCK Comment: family hx colon cancer in his fatherOct 29, 2018 Entered By: SUZAN HANCOCK Comment: DUE In 2023( done previously [facility], 2019 Entered By: SUZAN HANCOCK Comment: diverticulittis 2/3 2018Jun 06, 2019 Entered By: SUZAN HANCOCK Comment: Flex sig 7/2018 was having digestive issues and was determined to be GBOct 11, 2019 Entered By: SUZAN HANCOCK Comment: [facility] 9/2019 colonoscopy :diverticulosis      Tinnitus 05/10/2021     Priority: Medium    Status post laparoscopic cholecystectomy 05/10/2021     Priority: Medium     Formatting of this note might be different from the original.  Jun 06, 2019 Entered By: SUZAN HANCOCK Comment: s/p lap cholecystectomyJun 06, 2019 Entered By: SUZAN HANCOCK Comment: 11/2018      History of diverticulitis 09/30/2020     Priority: Medium    Spondylolisthesis of lumbar region 09/29/2020     Priority: Medium    Other specified diseases of gallbladder 10/29/2018     Priority: Medium    Imaging of  gastrointestinal tract abnormal 10/19/2018     Priority: Medium    Sigmoid diverticulitis 07/27/2018     Priority: Medium    Diverticulitis of large intestine 07/27/2018     Priority: Medium    Diverticulitis 07/23/2018     Priority: Medium     Jun 05, 2018 Entered By: IZZY WILLIS Comment: DIVERTICULOSIS      Nonspecific abdominal pain 07/23/2018     Priority: Medium    History of adenomatous polyp of colon 03/02/2018     Priority: Medium    Hemorrhoids 02/28/2018     Priority: Medium     Formatting of this note might be different from the original.  Jun 05, 2018 Entered By: IZZY WILLIS Comment: S/P BANDING IN 2018 Jun 05, 2018 Entered By: IZZY WILLIS Comment: S/P BANDING IN 2018      Dry eyes, bilateral 04/22/2016     Priority: Medium    Mixed hyperlipidemia 02/24/2016     Priority: Medium    Prediabetes 02/24/2016     Priority: Medium    Diverticular disease of colon 02/04/2015     Priority: Medium    Family history of diabetes mellitus 11/12/2013     Priority: Medium    Benign prostatic hyperplasia 09/06/2012     Priority: Medium    Allergic rhinitis due to pollen 10/24/2007     Priority: Medium    Sinus disease 10/13/1991     Priority: Medium    Right hip pain 01/01/1989     Priority: Medium     Formatting of this note might be different from the original.  Jun 05, 2018 Entered By: IZZY WILLIS Comment: LEFT HIP PAIN  Jun 05, 2018 Entered By: IZZY WILLIS Comment: LEFT HIP PAIN          Past Medical History:    Past Medical History:   Diagnosis Date    Cervical radiculopathy     Hyperlipemia     Irritable bowel syndrome     Obese     PONV (postoperative nausea and vomiting)        Past Surgical History:    Past Surgical History:   Procedure Laterality Date    BACK SURGERY      BILATERAL HIP ARTHROSCOPY      CHOLECYSTECTOMY      DISCECTOMY, SPINE, CERVICAL, 2 LEVELS, ANTERIOR APPROACH, WITH INTERVERTEBRAL DISC REPLACEMENT Bilateral 11/10/2023    Procedure: cervical 5-cervical 6 and cervical  6-cervical 7 anterior discectomy and artificial disc replacements;  Surgeon: Sandrita Purdy MD;  Location: [facility] Main OR    FUSION CERVICAL ANTERIOR ONE LEVEL Bilateral 4/17/2024    Procedure: CERVICAL FOUR TO CERVICAL FIVE BILATERAL ANTERIOR CERCIVAL DECOMPRESSION AND FUSION WITH PLATE;  Surgeon: Sandrita Purdy MD;  Location: [facility] Main OR    GI SURGERY      LAPAROSCOPIC LEFT COLON RESECTION      LUMBAR FUSION      RELEASE CARPAL TUNNEL Left 06/08/2022    Procedure: Left carpal tunnel release;  Surgeon: Sandrita Purdy MD;  Location: [facility] Main OR       Family History:    Family History   Problem Relation Age of Onset    Coronary Artery Disease Mother     Hypertension Mother     Depression Mother     Thyroid Disease Mother     Coronary Artery Disease Father     Colon Cancer Father     Thyroid Disease Father        Social History:  Marital Status:   [2]  Social History     Tobacco Use    Smoking status: Never    Smokeless tobacco: Former   Vaping Use    Vaping status: Never Used   Substance Use Topics    Alcohol use: Not Currently     Comment: Very rare    Drug use: Never        Medications:    azithromycin (ZITHROMAX) 250 MG tablet  benzonatate (TESSALON) 100 MG capsule  acetaminophen (TYLENOL) 500 MG tablet  aspirin 81 MG EC tablet  atorvastatin (LIPITOR) 20 MG tablet  cholecalciferol (VITAMIN D3) 125 mcg (5000 units) capsule  clindamycin (CLEOCIN T) 1 % external solution  fluocinonide (LIDEX) 0.05 % external solution  fluticasone (FLONASE) 50 MCG/ACT nasal spray  gabapentin (NEURONTIN) 300 MG capsule  ketoconazole (NIZORAL) 2 % external shampoo  lactobacillus rhamnosus (GG) (CULTURELL) capsule  methocarbamol (ROBAXIN) 750 MG tablet  methocarbamol (ROBAXIN) 750 MG tablet  methylPREDNISolone (MEDROL DOSEPAK) 4 MG tablet therapy pack  montelukast (SINGULAIR) 10 MG tablet  polyethylene glycol-propylene glycol (SYSTANE ULTRA) 0.4-0.3 % SOLN ophthalmic solution  predniSONE  (DELTASONE) 20 MG tablet          Review of Systems   All other systems reviewed and are negative.      Physical Exam   BP: 132/77  Pulse: 103  Temp: 99.4  F (37.4  C)  Resp: 18  SpO2: 97 %      Physical Exam  Vitals and nursing note reviewed.   Constitutional:       General: He is not in acute distress.     Appearance: Normal appearance. He is not ill-appearing.   HENT:      Head: Normocephalic.      Right Ear: Tympanic membrane, ear canal and external ear normal.      Left Ear: Tympanic membrane, ear canal and external ear normal.      Nose: Rhinorrhea present.      Mouth/Throat:      Mouth: Mucous membranes are moist.      Pharynx: Posterior oropharyngeal erythema present. No oropharyngeal exudate.   Cardiovascular:      Rate and Rhythm: Normal rate and regular rhythm.      Pulses: Normal pulses.   Pulmonary:      Effort: Pulmonary effort is normal. No respiratory distress.      Breath sounds: Normal breath sounds. No stridor. No wheezing, rhonchi or rales.   Neurological:      Mental Status: He is alert.   Psychiatric:         Mood and Affect: Mood normal.         ED Course        Procedures           No results found for this or any previous visit (from the past 24 hours).    Medications - No data to display    Assessments & Plan (with Medical Decision Making)     I have reviewed the nursing notes.    I have reviewed the findings, diagnosis, plan and need for follow up with the patient.        Medical Decision Making     54 year old male who since urgent care with chief complaints of cough and nasal congestion for 4 weeks.  Patient reports symptoms for started with nasal congestion and sore throat.  He was seen by primary care provider who prescribed him prednisone.  He did feel slightly better while on the prednisone however symptoms returned after stopping it.  He does have seasonal allergies which he intermittently takes antiallergy medication for.  He has been attempting use of over-the-counter cold and flu  medications and Flonase without relief.  Patient endorses bilateral sinus pain, pressure, pressure in the ears, sore throat and productive cough.  Denies fever, chills, abdominal pain, nausea, vomiting, chest pain.    On exam patient in no acute distress and vitals are benign.  Oropharynx erythematous without exudates.  Lungs CTAB.     Given acute onset of symptoms for 4 weeks I did recommend trial of antibiotics with azithromycin.  We did discuss probable underlying allergies which I recommended taking consistent antiallergy such as Zyrtec.  Continue Mucinex and Flonase.    If no improvement with symptoms over the next 5 days please follow-up with primary care provider.    Prior to making a final disposition on this patient the results of patient's tests and other diagnostic studies were discussed with the patient. All questions were answered. Patient expressed understanding of the plan and was amenable to it.     Disclaimer: This note consists of symbols derived from keyboarding, dictation and/or voice recognition software. As a result, there may be errors in the script that have gone undetected. Please consider this when interpreting information found in this chart.      Discharge Medication List as of 5/19/2025  4:29 PM        START taking these medications    Details   azithromycin (ZITHROMAX) 250 MG tablet Take 2 tablets (500 mg) by mouth daily for 1 day, THEN 1 tablet (250 mg) daily for 4 days., Disp-6 tablet, R-0, E-Prescribe      benzonatate (TESSALON) 100 MG capsule Take 2 capsules (200 mg) by mouth 3 times daily as needed for cough., Disp-15 capsule, R-0, E-Prescribe             Final diagnoses:   Sinusitis       5/19/2025   [facility] EMERGENCY DEPT       Mishel Sharpe PA-C  05/20/25 2146

## 2025-05-19 NOTE — ED TRIAGE NOTES
Pt reports x4 weeks cough - was on prednisone felt better at the time   Has sinus infection concerns

## 2025-05-27 ENCOUNTER — HOSPITAL ENCOUNTER (EMERGENCY)
Facility: CLINIC | Age: 55
Discharge: HOME | End: 2025-05-27
Attending: NURSE PRACTITIONER | Admitting: NURSE PRACTITIONER
Payer: COMMERCIAL

## 2025-05-27 VITALS
OXYGEN SATURATION: 99 % | TEMPERATURE: 98.8 F | SYSTOLIC BLOOD PRESSURE: 148 MMHG | RESPIRATION RATE: 16 BRPM | HEART RATE: 87 BPM | DIASTOLIC BLOOD PRESSURE: 84 MMHG

## 2025-05-27 DIAGNOSIS — J01.90 ACUTE SINUS INFECTION: ICD-10-CM

## 2025-05-27 PROCEDURE — G0463 HOSPITAL OUTPT CLINIC VISIT: HCPCS | Performed by: NURSE PRACTITIONER

## 2025-05-27 PROCEDURE — 99213 OFFICE O/P EST LOW 20 MIN: CPT | Performed by: NURSE PRACTITIONER

## 2025-05-27 ASSESSMENT — COLUMBIA-SUICIDE SEVERITY RATING SCALE - C-SSRS
2. HAVE YOU ACTUALLY HAD ANY THOUGHTS OF KILLING YOURSELF IN THE PAST MONTH?: NO
1. IN THE PAST MONTH, HAVE YOU WISHED YOU WERE DEAD OR WISHED YOU COULD GO TO SLEEP AND NOT WAKE UP?: NO
6. HAVE YOU EVER DONE ANYTHING, STARTED TO DO ANYTHING, OR PREPARED TO DO ANYTHING TO END YOUR LIFE?: NO

## 2025-05-27 NOTE — ED PROVIDER NOTES
ED Provider Note  [facility]      History   No chief complaint on file.    HPI  Rigo Joyner is a 54 year old male who presents today reporting that he has had a cough for 5 weeks.  He was seen and treated in urgent care on 5/19/2025 with azithromycin for a sinus infection.  He reports that he did not have any improvement in his symptoms.  He has significant seasonal allergies he takes Nasonex, Flonase on a daily basis without improvement.  Patient patient is seen and followed in the [facility] clinic for primary care services.            Allergies:  Allergies   Allergen Reactions    Bee Pollen Cough, Headache and Itching    Pollen Extract Cough, Headache and Itching    Seasonal Allergies Headache, Itching, Rash and Visual Disturbance       Problem List:    Patient Active Problem List    Diagnosis Date Noted    DJD of left AC (acromioclavicular) joint 10/07/2024     Priority: Medium    Pain of left upper extremity 10/04/2024     Priority: Medium    S/P cervical disc replacement 11/10/2023     Priority: Medium    Acute postoperative pain 06/28/2023     Priority: Medium    Other chronic pain 06/28/2023     Priority: Medium    Pseudoarthrosis of spine 06/28/2023     Priority: Medium    Osteoarthritis of spine with radiculopathy, cervical region 12/13/2022     Priority: Medium     Apr 12, 2023 Entered By: SUZAN HANCOCK Comment: cervical spine formaminal stenosis lc3-4,lc4-c5 and billateral c5-6,c6-7      Hemorrhage of rectum and anus 01/19/2022     Priority: Medium    Other visual disturbances 05/10/2021     Priority: Medium    Polyp of colon 05/10/2021     Priority: Medium     Formatting of this note might be different from the original.  Oct 29, 2018 Entered By: SUZAN HANCOCK Comment: 2/4/2015 colonoscopy : serrated adenoma  transverse colon  6,mmOct 29, 2018 Entered By: SUZAN HANCOCK Comment: colonoscopy 2018 repeat 5 years  no polyps. randome bx  no diagnostic  abnormalitiesOct 29, 2018 Entered By: SUZAN HANCOCK Comment: family hx colon cancer in his fatherOct 29, 2018 Entered By: SUZAN HANCOCK Comment: DUE In 2023( done previously [facility], 2019 Entered By: SUZAN HANCOCK Comment: diverticulittis 2/3 2018Jun 06, 2019 Entered By: SUZAN HANCOCK Comment: Flex sig 7/2018 was having digestive issues and was determined to be GBOct 11, 2019 Entered By: SUZAN HANCOCK Comment: [facility] 9/2019 colonoscopy :diverticulosis  Oct 29, 2018 Entered By: SUZAN HANCOCK Comment: 2/4/2015 colonoscopy : serrated adenoma  transverse colon  6,mmOct 29, 2018 Entered By: SUZAN HANCOCK Comment: colonoscopy 2018 repeat 5 years  no polyps. randome bx  no diagnostic abnormalitiesOct 29, 2018 Entered By: SUZAN HANCOCK Comment: family hx colon cancer in his fatherOct 29, 2018 Entered By: SUZAN HANCOCK Comment: DUE In 2023( done previously [facility], 2019 Entered By: SUZAN HANCOCK Comment: diverticulittis 2/3 2018Jun 06, 2019 Entered By: SUZAN HANCOCK Comment: Flex sig 7/2018 was having digestive issues and was determined to be GBOct 11, 2019 Entered By: SUZAN HANCOCK Comment: [facility] 9/2019 colonoscopy :diverticulosis      Tinnitus 05/10/2021     Priority: Medium    Status post laparoscopic cholecystectomy 05/10/2021     Priority: Medium     Formatting of this note might be different from the original.  Jun 06, 2019 Entered By: SUZAN HANCOCK Comment: s/p lap cholecystectomyJun 06, 2019 Entered By: SUZAN HANCOCK Comment: 11/2018      History of diverticulitis 09/30/2020     Priority: Medium    Spondylolisthesis of lumbar region 09/29/2020     Priority: Medium    Other specified diseases of gallbladder 10/29/2018     Priority: Medium    Imaging of gastrointestinal tract abnormal 10/19/2018     Priority: Medium    Sigmoid diverticulitis 07/27/2018     Priority: Medium    Diverticulitis of large intestine 07/27/2018     Priority: Medium    Diverticulitis 07/23/2018      Priority: Medium     Jun 05, 2018 Entered By: IZZY WILLIS Comment: DIVERTICULOSIS      Nonspecific abdominal pain 07/23/2018     Priority: Medium    History of adenomatous polyp of colon 03/02/2018     Priority: Medium    Hemorrhoids 02/28/2018     Priority: Medium     Formatting of this note might be different from the original.  Jun 05, 2018 Entered By: IZZY WILLIS Comment: S/P BANDING IN 2018  Jun 05, 2018 Entered By: IZZY WILLIS Comment: S/P BANDING IN 2018      Dry eyes, bilateral 04/22/2016     Priority: Medium    Mixed hyperlipidemia 02/24/2016     Priority: Medium    Prediabetes 02/24/2016     Priority: Medium    Diverticular disease of colon 02/04/2015     Priority: Medium    Family history of diabetes mellitus 11/12/2013     Priority: Medium    Benign prostatic hyperplasia 09/06/2012     Priority: Medium    Allergic rhinitis due to pollen 10/24/2007     Priority: Medium    Sinus disease 10/13/1991     Priority: Medium    Right hip pain 01/01/1989     Priority: Medium     Formatting of this note might be different from the original.  Jun 05, 2018 Entered By: IZZY WILLIS Comment: LEFT HIP PAIN  Jun 05, 2018 Entered By: IZZY WILLIS Comment: LEFT HIP PAIN          Past Medical History:    Past Medical History:   Diagnosis Date    Cervical radiculopathy     Hyperlipemia     Irritable bowel syndrome     Obese     PONV (postoperative nausea and vomiting)        Past Surgical History:    Past Surgical History:   Procedure Laterality Date    BACK SURGERY      BILATERAL HIP ARTHROSCOPY      CHOLECYSTECTOMY      DISCECTOMY, SPINE, CERVICAL, 2 LEVELS, ANTERIOR APPROACH, WITH INTERVERTEBRAL DISC REPLACEMENT Bilateral 11/10/2023    Procedure: cervical 5-cervical 6 and cervical 6-cervical 7 anterior discectomy and artificial disc replacements;  Surgeon: Sandrita Purdy MD;  Location: [facility] Main OR    FUSION CERVICAL ANTERIOR ONE LEVEL Bilateral 4/17/2024    Procedure: CERVICAL FOUR TO CERVICAL  FIVE BILATERAL ANTERIOR CERCIVAL DECOMPRESSION AND FUSION WITH PLATE;  Surgeon: Sandrita Purdy MD;  Location: [facility] Main OR    GI SURGERY      LAPAROSCOPIC LEFT COLON RESECTION      LUMBAR FUSION      RELEASE CARPAL TUNNEL Left 06/08/2022    Procedure: Left carpal tunnel release;  Surgeon: Sandrita Purdy MD;  Location: [facility] Main OR       Social History:  Marital Status:   [2]  Social History     Tobacco Use    Smoking status: Never    Smokeless tobacco: Former   Vaping Use    Vaping status: Never Used   Substance Use Topics    Alcohol use: Not Currently     Comment: Very rare    Drug use: Never        Medications:    amoxicillin-clavulanate (AUGMENTIN) 875-125 MG tablet  acetaminophen (TYLENOL) 500 MG tablet  aspirin 81 MG EC tablet  atorvastatin (LIPITOR) 20 MG tablet  cholecalciferol (VITAMIN D3) 125 mcg (5000 units) capsule  clindamycin (CLEOCIN T) 1 % external solution  fluocinonide (LIDEX) 0.05 % external solution  fluticasone (FLONASE) 50 MCG/ACT nasal spray  gabapentin (NEURONTIN) 300 MG capsule  ketoconazole (NIZORAL) 2 % external shampoo  lactobacillus rhamnosus (GG) (CULTURELL) capsule  methocarbamol (ROBAXIN) 750 MG tablet  methocarbamol (ROBAXIN) 750 MG tablet  methylPREDNISolone (MEDROL DOSEPAK) 4 MG tablet therapy pack  montelukast (SINGULAIR) 10 MG tablet  polyethylene glycol-propylene glycol (SYSTANE ULTRA) 0.4-0.3 % SOLN ophthalmic solution  predniSONE (DELTASONE) 20 MG tablet          Review of Systems  A medically appropriate review of systems was performed with pertinent positives and negatives noted in the HPI, and all other systems negative.    Physical Exam   Patient Vitals for the past 24 hrs:   BP Temp Temp src Pulse Resp SpO2   05/27/25 1306 (!) 148/84 98.8  F (37.1  C) Tympanic 87 16 99 %          Physical Exam  General: alert and in no acute distress on arrival  Head: atraumatic, normocephalic, pain over maxillary and frontal and nasal sinuses.ENT: Left Ear:  TM intact, middle ear is not erythremic, no purulence, canal patent. Right Ear: TM intact, middle ear is not erythremic, no purulence, canal patent. Nose: No erythema or edema patent nostrils bilateral. Throat: midline uvula, non-erythremic, non-enlarged tonsils, without exudate.  No cervical adenopathy.  Lungs:  nonlabored, CTA bilateral throughout  CV: Regular rate and rhythm, extremities warm and perfused, no edema in lower extremities.  Skin: no rashes, no diaphoresis and skin color normal  Neuro: Patient awake, alert, speech is fluent, no focal deficits  Psychiatric: affect/mood normal, appropriate historian      ED Course                 Procedures                    No results found for this or any previous visit (from the past 48 hours).    MEDICATIONS GIVEN IN THE EMERGENCY DEPARTMENT:  Medications - No data to display             Assessments & Plan (with Medical Decision Making)  54 year old male who presents to the Urgent Care for evaluation of ongoing sinus infection symptoms reported aching azithromycin after being seen less than 2 weeks ago has not had significant improvement of his symptoms.  Denies any fever or chills today tolerating oral fluid intake denies any nausea vomiting diarrhea abdominal pain or skin rash.  Has pain over frontal maxillary and nasal sinuses bilateral.  Purulent postnasal drainage present on exam.  Diagnosis: Acute sinus infection  Treatment plan: Augmentin ordered twice daily for 10 days advised if this is not effective recommend follow-up in his primary clinic he may need to be further evaluated by ear nose and throat provider if 2 rounds of antibiotics have not been successful he treating his sinus infection.  Patient discharged home in stable condition.      Patient verbalized agreement with and understanding of the rational for the diagnosis and treatment plan.  All questions were answered to best of my ability and the patient's satisfaction. The patient was advised to  contact or return to their primary clinic or UC/ED with any questions that may arise after this visit.       I have reviewed the nursing notes.    I have reviewed the findings, diagnosis, plan and need for follow up with the patient.        NEW PRESCRIPTIONS STARTED AT TODAY'S ER VISIT  New Prescriptions    AMOXICILLIN-CLAVULANATE (AUGMENTIN) 875-125 MG TABLET    Take 1 tablet by mouth 2 times daily for 10 days.       Final diagnoses:   Acute sinus infection       5/27/2025   [facility] EMERGENCY DEPT    Disclaimer: This note consists of symbols derived from keyboarding, dictation, and/or voice recognition software. As a result, there may be errors in the script that have gone undetected.  Please consider this when interpreting information found in the chart.      Marisel Shirley, APRN CNP  05/29/25 0227

## 2025-05-27 NOTE — DISCHARGE INSTRUCTIONS
Your lungs sound clear on exam today you have purulent drainage draining from your sinuses on exam today.  Recommend if you are using your nasal antihistamines that you are using and the chin tilt on technique for improving the way that this works so that does not go to the back your throat.  I have ordered Augmentin for you twice daily for 10 days recommend that you take this as directed increase oral fluid intake if your symptoms have not resolved in 10 days recommend follow-up in your primary care clinic you may need to be seen in an ear nose and throat clinic for sinus complications

## 2025-06-03 NOTE — PROGRESS NOTES
Assessment/Plan:      Ari was seen today for neck pain.    Diagnoses and all orders for this visit:    TOS (thoracic outlet syndrome)  -     US Up Ex Art [employer] Nicholas Thor Outlet Syn Left Non CSC; Future  -     nortriptyline (PAMELOR) 10 MG capsule; Take 1 capsule (10 mg) by mouth at bedtime.  -     MR Brachial Plexus Left wo & w Contrast; Future    S/P cervical spinal fusion    Arm paresthesia, left  -     nortriptyline (PAMELOR) 10 MG capsule; Take 1 capsule (10 mg) by mouth at bedtime.    Other orders  -     Spine  Referral         Assessment: Pleasant 54 year old male with a past medical history of BPH, diverticulitis, hyperlipidemia with:    1.  Chronic left shoulder and arm pain with paresthesias toward the medial wrist consistent with thoracic outlet syndrome.  Positive Rosey test and Adson's maneuver.  Also is having some cervical spine pain potential cervical radicular pain .  Status post C4-5 ACDF and C5-6 and 6-7 disc arthroplasties.  Has had extensive PT in the past along with shoulder injections followed by [facility] and cervical epidural steroid injections through neurosurgery without much benefit.    2.  Myofascial pain.      Discussion:    1.  We discussed the diagnosis and treatment options.  We discussed the options of further imaging and diagnostics given the duration of this in the severity of the pain.  We discussed medications as well.    2.  Thoracic outlet evaluation with ultrasound.    3.  MRI of the brachial plexus with contrast to evaluate for compression of the brachial plexus.    4.  Trial nortriptyline for myofascial pain and neuropathic pain.    5.  Consideration for pectoralis minor and/or scalene trigger point injection under ultrasound after imaging.    6.  Follow-up 1 month.          It was our pleasure caring for your patient today, if there any questions or concerns please do not hesitate to contact us.      Subjective:   Patient ID: Rigo Joyner is a 54 year  "old male.    History of Present Illness: Patient presents for an evaluation of left arm pain and paresthesias and upper trapezius/shoulder pain.  Has a longstanding history of neck pain with left parascapular pain upper trapezius pain left arm pain and paresthesias to the medial wrist.  Has had this pain for several years has had neck pain in the past and is status post cervical fusion with disc arthroplasties.  The neck pain has improved but occasional \"zingers \"to his parascapular region but the majority of the pain is from the anterior shoulder midclavicular line down the arm medial elbow with paresthesias in certain positions worse with certain activities such as driving or talking on the phone but no paresthesias into the hands other than occasional digit 5.  Better with rest.  Pain is a 6/10 at worst 3/10 today 1/10 at best.  Has tried numerous medications in the past including gabapentin.  Reports trying Lyrica in the past as well at the pain center.  Recent C5-6 TFESI in the left was not helpful.    Patient underwent a left C5-6 TFESI as ordered by Dr. Purdy April 30, 2025.  Then was seen by her in follow-up May 15.  I reviewed the note from that visit.  Continues to have pain worse with driving she recommended referral back here for conservative management and evaluation.    EMG January 2024 and subsequently October 2024 reviewed.  Normal study no electrodiagnostic evidence of cervical radiculopathy in the left upper extremity.    Imaging: MRI report and images were personally reviewed and discussed with the patient.  A plastic model was utilized during the discussion.  MRI of the cervical spine images from September 2024 reviewed and CT scan imaging and report from April 1 reviewed.  MRI reveals fusion C4-5 C5-6 C6-7.  C7-T1 no central canal or foraminal stenosis.  Facet arthropathy C6-7 moderate foraminal stenosis.  Moderate-severe bilateral foraminal stenosis C5-6 as well as mild to moderate at C4-5 " facet arthropathy C3-4 mild to moderate foraminal stenosis.    CT scan reveals ACDF C4-5 through C6-7 with disc arthroplasties at the C5-6 and 6-7 no loosening of the hardware.    Review of Systems: Complains of paresthesias headaches difficulty with coordination denies weakness bowel or bladder incontinence falls fevers unintentional weight loss.           Current Outpatient Medications   Medication Sig Dispense Refill    acetaminophen (TYLENOL) 500 MG tablet Take 1,000 mg by mouth every 6 hours as needed      amoxicillin-clavulanate (AUGMENTIN) 875-125 MG tablet Take 1 tablet by mouth 2 times daily for 10 days. 20 tablet 0    aspirin 81 MG EC tablet Take 1 tablet (81 mg) by mouth daily Resume 48 hours after surgery      atorvastatin (LIPITOR) 20 MG tablet Take 20 mg by mouth at bedtime      cholecalciferol (VITAMIN D3) 125 mcg (5000 units) capsule Take 125 mcg by mouth daily      clindamycin (CLEOCIN T) 1 % external solution Apply to the problem areas on the scalp daily, increase to twice daily with flares 60 mL 11    fluocinonide (LIDEX) 0.05 % external solution Apply topically 2 times daily. For 2 weeks to problem area of scalp then stop and use PRN for flares 60 mL 2    fluticasone (FLONASE) 50 MCG/ACT nasal spray Spray 1 spray in nostril daily as needed      gabapentin (NEURONTIN) 300 MG capsule Take 600 mg by mouth 2 times daily      ketoconazole (NIZORAL) 2 % external shampoo Use every 1-2 days when flared. Leave in few minutes before rinsing. Use twice weekly to prevent flares. 120 mL 11    lactobacillus rhamnosus (GG) (CULTURELL) capsule Take 1 capsule by mouth every morning      methocarbamol (ROBAXIN) 750 MG tablet Take 1 tablet (750 mg) by mouth 4 times daily as needed for muscle spasms. 40 tablet 0    methocarbamol (ROBAXIN) 750 MG tablet Take 1 tablet (750 mg) by mouth 4 times daily (Patient not taking: Reported on 5/15/2025) 42 tablet 0    methylPREDNISolone (MEDROL DOSEPAK) 4 MG tablet therapy pack  Follow Package Directions 21 tablet 0    montelukast (SINGULAIR) 10 MG tablet Take 10 mg by mouth daily.      polyethylene glycol-propylene glycol (SYSTANE ULTRA) 0.4-0.3 % SOLN ophthalmic solution Place 1 drop into both eyes 4 times daily as needed for dry eyes      predniSONE (DELTASONE) 20 MG tablet Take 20 mg by mouth. (Patient not taking: Reported on 5/15/2025)       No current facility-administered medications for this visit.       Past Medical History:   Diagnosis Date    Cervical radiculopathy     Hyperlipemia     Irritable bowel syndrome     Obese     PONV (postoperative nausea and vomiting)        The following portions of the patient's history were reviewed and updated as appropriate: allergies, current medications, past family history, past medical history, past social history, past surgical history and problem list.           Objective:   Physical Exam:    /86   Pulse 75   There is no height or weight on file to calculate BMI.      General: Alert and oriented with normal affect. Attention, knowledge, memory, and language are intact. No acute distress.   Eyes: Sclerae are clear.  Respirations: Unlabored. CV: No lower extremity edema.    Gait:  Nonantalgic  Full range of motion of the shoulders and abduction negative arm empty can speeds test.  Full internal/external rotation of the left shoulder.  Negative Garcia and Neer's.  Positive Adson's maneuver on the left and Rosey test on the left.  Hypertonic tissue textures left pectoral muscle with tenderness along the anterior shoulder.  Full range of motion of the cervical spine all planes with negative Rankin's/Spurling's bilaterally.  Sensation is intact to light touch throughout the upper  extremities.  Reflexes are 2+ and symmetric in the biceps triceps and brachioradialis with negative Hoffmans.      Manual muscle testing reveals:  Right /Left out of 5  5/5 shoulder abductors  5/5 elbow flexors  5/5 elbow extensors  5/5 wrist extensors  5/5  interosseus  5/5 finger flexors

## 2025-06-04 ENCOUNTER — OFFICE VISIT (OUTPATIENT)
Dept: PHYSICAL MEDICINE AND REHAB | Facility: CLINIC | Age: 55
End: 2025-06-04
Attending: SURGERY
Payer: COMMERCIAL

## 2025-06-04 VITALS — DIASTOLIC BLOOD PRESSURE: 86 MMHG | SYSTOLIC BLOOD PRESSURE: 139 MMHG | HEART RATE: 75 BPM

## 2025-06-04 DIAGNOSIS — Z98.1 S/P CERVICAL SPINAL FUSION: ICD-10-CM

## 2025-06-04 DIAGNOSIS — R20.2 ARM PARESTHESIA, LEFT: ICD-10-CM

## 2025-06-04 DIAGNOSIS — G54.0 TOS (THORACIC OUTLET SYNDROME): Primary | ICD-10-CM

## 2025-06-04 PROCEDURE — G2211 COMPLEX E/M VISIT ADD ON: HCPCS | Performed by: PHYSICAL MEDICINE & REHABILITATION

## 2025-06-04 PROCEDURE — 1125F AMNT PAIN NOTED PAIN PRSNT: CPT | Performed by: PHYSICAL MEDICINE & REHABILITATION

## 2025-06-04 PROCEDURE — 3075F SYST BP GE 130 - 139MM HG: CPT | Performed by: PHYSICAL MEDICINE & REHABILITATION

## 2025-06-04 PROCEDURE — 99214 OFFICE O/P EST MOD 30 MIN: CPT | Performed by: PHYSICAL MEDICINE & REHABILITATION

## 2025-06-04 PROCEDURE — 3079F DIAST BP 80-89 MM HG: CPT | Performed by: PHYSICAL MEDICINE & REHABILITATION

## 2025-06-04 RX ORDER — NORTRIPTYLINE HYDROCHLORIDE 10 MG/1
10 CAPSULE ORAL AT BEDTIME
Qty: 30 CAPSULE | Refills: 1 | Status: SHIPPED | OUTPATIENT
Start: 2025-06-04

## 2025-06-04 ASSESSMENT — PAIN SCALES - GENERAL: PAINLEVEL_OUTOF10: MILD PAIN (3)

## 2025-06-04 NOTE — PATIENT INSTRUCTIONS
An MRI and ultrasound was ordered for you today.  You will be contacted by scheduling within 3 days.    If you are not contacted, please call Radiology at [phone].    Trial Nortriptyline 10mg at bedtime for nerve pain.

## 2025-06-04 NOTE — LETTER
6/4/2025      Rigo Joyner  [id number] [address] [address]      Dear Colleague,    Thank you for referring your patient, Rigo Joyner, to the [facility]. Please see a copy of my visit note below.    Assessment/Plan:      Ari was seen today for neck pain.    Diagnoses and all orders for this visit:    TOS (thoracic outlet syndrome)  -     US Up Ex Art [employer] Nicholas Thor Outlet Syn Left Non CSC; Future  -     nortriptyline (PAMELOR) 10 MG capsule; Take 1 capsule (10 mg) by mouth at bedtime.  -     MR Brachial Plexus Left wo & w Contrast; Future    S/P cervical spinal fusion    Arm paresthesia, left  -     nortriptyline (PAMELOR) 10 MG capsule; Take 1 capsule (10 mg) by mouth at bedtime.    Other orders  -     Spine  Referral         Assessment: Pleasant 54 year old male with a past medical history of BPH, diverticulitis, hyperlipidemia with:    1.  Chronic left shoulder and arm pain with paresthesias toward the medial wrist consistent with thoracic outlet syndrome.  Positive Rosey test and Adson's maneuver.  Also is having some cervical spine pain potential cervical radicular pain .  Status post C4-5 ACDF and C5-6 and 6-7 disc arthroplasties.  Has had extensive PT in the past along with shoulder injections followed by [facility] and cervical epidural steroid injections through neurosurgery without much benefit.    2.  Myofascial pain.      Discussion:    1.  We discussed the diagnosis and treatment options.  We discussed the options of further imaging and diagnostics given the duration of this in the severity of the pain.  We discussed medications as well.    2.  Thoracic outlet evaluation with ultrasound.    3.  MRI of the brachial plexus with contrast to evaluate for compression of the brachial plexus.    4.  Trial nortriptyline for myofascial pain and neuropathic pain.    5.  Consideration for pectoralis minor and/or scalene trigger point  "injection under ultrasound after imaging.    6.  Follow-up 1 month.          It was our pleasure caring for your patient today, if there any questions or concerns please do not hesitate to contact us.      Subjective:   Patient ID: Rigo Joyner is a 54 year old male.    History of Present Illness: Patient presents for an evaluation of left arm pain and paresthesias and upper trapezius/shoulder pain.  Has a longstanding history of neck pain with left parascapular pain upper trapezius pain left arm pain and paresthesias to the medial wrist.  Has had this pain for several years has had neck pain in the past and is status post cervical fusion with disc arthroplasties.  The neck pain has improved but occasional \"zingers \"to his parascapular region but the majority of the pain is from the anterior shoulder midclavicular line down the arm medial elbow with paresthesias in certain positions worse with certain activities such as driving or talking on the phone but no paresthesias into the hands other than occasional digit 5.  Better with rest.  Pain is a 6/10 at worst 3/10 today 1/10 at best.  Has tried numerous medications in the past including gabapentin.  Reports trying Lyrica in the past as well at the pain center.  Recent C5-6 TFESI in the left was not helpful.    Patient underwent a left C5-6 TFESI as ordered by Dr. Purdy April 30, 2025.  Then was seen by her in follow-up May 15.  I reviewed the note from that visit.  Continues to have pain worse with driving she recommended referral back here for conservative management and evaluation.    EMG January 2024 and subsequently October 2024 reviewed.  Normal study no electrodiagnostic evidence of cervical radiculopathy in the left upper extremity.    Imaging: MRI report and images were personally reviewed and discussed with the patient.  A plastic model was utilized during the discussion.  MRI of the cervical spine images from September 2024 reviewed and CT scan " imaging and report from April 1 reviewed.  MRI reveals fusion C4-5 C5-6 C6-7.  C7-T1 no central canal or foraminal stenosis.  Facet arthropathy C6-7 moderate foraminal stenosis.  Moderate-severe bilateral foraminal stenosis C5-6 as well as mild to moderate at C4-5 facet arthropathy C3-4 mild to moderate foraminal stenosis.    CT scan reveals ACDF C4-5 through C6-7 with disc arthroplasties at the C5-6 and 6-7 no loosening of the hardware.    Review of Systems: Complains of paresthesias headaches difficulty with coordination denies weakness bowel or bladder incontinence falls fevers unintentional weight loss.           Current Outpatient Medications   Medication Sig Dispense Refill     acetaminophen (TYLENOL) 500 MG tablet Take 1,000 mg by mouth every 6 hours as needed       amoxicillin-clavulanate (AUGMENTIN) 875-125 MG tablet Take 1 tablet by mouth 2 times daily for 10 days. 20 tablet 0     aspirin 81 MG EC tablet Take 1 tablet (81 mg) by mouth daily Resume 48 hours after surgery       atorvastatin (LIPITOR) 20 MG tablet Take 20 mg by mouth at bedtime       cholecalciferol (VITAMIN D3) 125 mcg (5000 units) capsule Take 125 mcg by mouth daily       clindamycin (CLEOCIN T) 1 % external solution Apply to the problem areas on the scalp daily, increase to twice daily with flares 60 mL 11     fluocinonide (LIDEX) 0.05 % external solution Apply topically 2 times daily. For 2 weeks to problem area of scalp then stop and use PRN for flares 60 mL 2     fluticasone (FLONASE) 50 MCG/ACT nasal spray Spray 1 spray in nostril daily as needed       gabapentin (NEURONTIN) 300 MG capsule Take 600 mg by mouth 2 times daily       ketoconazole (NIZORAL) 2 % external shampoo Use every 1-2 days when flared. Leave in few minutes before rinsing. Use twice weekly to prevent flares. 120 mL 11     lactobacillus rhamnosus (GG) (CULTURELL) capsule Take 1 capsule by mouth every morning       methocarbamol (ROBAXIN) 750 MG tablet Take 1 tablet  (750 mg) by mouth 4 times daily as needed for muscle spasms. 40 tablet 0     methocarbamol (ROBAXIN) 750 MG tablet Take 1 tablet (750 mg) by mouth 4 times daily (Patient not taking: Reported on 5/15/2025) 42 tablet 0     methylPREDNISolone (MEDROL DOSEPAK) 4 MG tablet therapy pack Follow Package Directions 21 tablet 0     montelukast (SINGULAIR) 10 MG tablet Take 10 mg by mouth daily.       polyethylene glycol-propylene glycol (SYSTANE ULTRA) 0.4-0.3 % SOLN ophthalmic solution Place 1 drop into both eyes 4 times daily as needed for dry eyes       predniSONE (DELTASONE) 20 MG tablet Take 20 mg by mouth. (Patient not taking: Reported on 5/15/2025)       No current facility-administered medications for this visit.       Past Medical History:   Diagnosis Date     Cervical radiculopathy      Hyperlipemia      Irritable bowel syndrome      Obese      PONV (postoperative nausea and vomiting)        The following portions of the patient's history were reviewed and updated as appropriate: allergies, current medications, past family history, past medical history, past social history, past surgical history and problem list.           Objective:   Physical Exam:    /86   Pulse 75   There is no height or weight on file to calculate BMI.      General: Alert and oriented with normal affect. Attention, knowledge, memory, and language are intact. No acute distress.   Eyes: Sclerae are clear.  Respirations: Unlabored. CV: No lower extremity edema.    Gait:  Nonantalgic  Full range of motion of the shoulders and abduction negative arm empty can speeds test.  Full internal/external rotation of the left shoulder.  Negative Garcia and Neer's.  Positive Adson's maneuver on the left and Rosey test on the left.  Hypertonic tissue textures left pectoral muscle with tenderness along the anterior shoulder.  Full range of motion of the cervical spine all planes with negative Rankin's/Spurling's bilaterally.  Sensation is intact to light  touch throughout the upper  extremities.  Reflexes are 2+ and symmetric in the biceps triceps and brachioradialis with negative Hoffmans.      Manual muscle testing reveals:  Right /Left out of 5  5/5 shoulder abductors  5/5 elbow flexors  5/5 elbow extensors  5/5 wrist extensors  5/5 interosseus  5/5 finger flexors       Again, thank you for allowing me to participate in the care of your patient.        Sincerely,        Rick Byrne, DO    Electronically signed

## 2025-06-23 ENCOUNTER — HOSPITAL ENCOUNTER (OUTPATIENT)
Dept: MRI IMAGING | Facility: CLINIC | Age: 55
Discharge: HOME | End: 2025-06-23
Attending: PHYSICAL MEDICINE & REHABILITATION
Payer: COMMERCIAL

## 2025-06-23 ENCOUNTER — HOSPITAL ENCOUNTER (OUTPATIENT)
Dept: ULTRASOUND IMAGING | Facility: CLINIC | Age: 55
Discharge: HOME | End: 2025-06-23
Attending: PHYSICAL MEDICINE & REHABILITATION
Payer: COMMERCIAL

## 2025-06-23 DIAGNOSIS — G54.0 TOS (THORACIC OUTLET SYNDROME): ICD-10-CM

## 2025-06-23 PROCEDURE — 72156 MRI NECK SPINE W/O & W/DYE: CPT

## 2025-06-23 PROCEDURE — 255N000002 HC RX 255 OP 636: Performed by: PHYSICAL MEDICINE & REHABILITATION

## 2025-06-23 PROCEDURE — 93971 EXTREMITY STUDY: CPT

## 2025-06-23 PROCEDURE — A9585 GADOBUTROL INJECTION: HCPCS | Performed by: PHYSICAL MEDICINE & REHABILITATION

## 2025-06-23 RX ORDER — GADOBUTROL 604.72 MG/ML
10 INJECTION INTRAVENOUS ONCE
Status: COMPLETED | OUTPATIENT
Start: 2025-06-23 | End: 2025-06-23

## 2025-06-23 RX ADMIN — GADOBUTROL 10 ML: 604.72 INJECTION INTRAVENOUS at 16:24

## 2025-06-24 ENCOUNTER — RESULTS FOLLOW-UP (OUTPATIENT)
Dept: PHYSICAL MEDICINE AND REHAB | Facility: CLINIC | Age: 55
End: 2025-06-24

## 2025-06-25 DIAGNOSIS — G54.0 TOS (THORACIC OUTLET SYNDROME): Primary | ICD-10-CM

## 2025-06-26 ENCOUNTER — ANCILLARY PROCEDURE (OUTPATIENT)
Dept: GENERAL RADIOLOGY | Facility: CLINIC | Age: 55
End: 2025-06-26
Attending: INTERNAL MEDICINE
Payer: COMMERCIAL

## 2025-06-26 ENCOUNTER — TELEPHONE (OUTPATIENT)
Dept: OTHER | Facility: CLINIC | Age: 55
End: 2025-06-26
Payer: COMMERCIAL

## 2025-06-26 DIAGNOSIS — G54.0 TOS (THORACIC OUTLET SYNDROME): Primary | ICD-10-CM

## 2025-06-26 DIAGNOSIS — G54.0 TOS (THORACIC OUTLET SYNDROME): ICD-10-CM

## 2025-06-26 NOTE — TELEPHONE ENCOUNTER
Patient scheduled for imaging and consult with Dr Astudillo 08/05/25. Transferred patient to [phone] to schedule Chest X-ray.

## 2025-06-26 NOTE — TELEPHONE ENCOUNTER
Referral received via [employer] on 6/25/25.    Referred by Dr. Rick Byrne for TOS    Previous imaging completed (pertinent to referral):  6/23/25 - US Up Ex Art [employer] Nicholas Thor Outlet Syn LEFT  6/23/25 - MR brachial plexus LEFT     Routing to scheduling to coordinate the following:  US bilateral upper extremity arterial   US bilateral upper extremity venous  Chest x-ray  NEW VASCULAR PATIENT consult with Dr. Astudillo  Please schedule this at next available    Appt note: Referred by Dr. Rick Byrne for TOS    Mayra Galan RN  [facility]  [facility]ascular [facility]  Office: [phone]  Fax: [phone]

## 2025-06-27 ENCOUNTER — TELEPHONE (OUTPATIENT)
Dept: OTHER | Facility: CLINIC | Age: 55
End: 2025-06-27
Payer: COMMERCIAL

## 2025-06-27 DIAGNOSIS — R91.8 OPACITY OF LUNG ON IMAGING STUDY: Primary | ICD-10-CM

## 2025-06-27 NOTE — TELEPHONE ENCOUNTER
RN received a critical result from X-Ray.     Component   Radiologist flags  Rad-Urgent Abnormal (Urgent)   Left lower lobe opacity.       Routing to covering provider.    Mayra Galan RN  [facility]  Office: [phone]  Fax: [phone]

## 2025-06-27 NOTE — TELEPHONE ENCOUNTER
"Reviewed chest x-ray, this was ordered for TOS cervical rib etc.  No cervical rib noted  Found focal opacities in the left lower lobe and radiologist suggested either CT scan of the chest or follow-up 4- 6-week interval chest x-ray.  This can be addressed by Dr. Astudillo next week.      \" CXR 6/26/25                                                                    IMPRESSION: No cervical ribs are identified. There are focal opacities in the left lower lung laterally which are nonspecific. Further characterization is recommended with a contrast-enhanced CT of the chest. Alternatively, if there are signs or symptoms   of infection, a follow-up chest x-ray 4-6 weeks after appropriate therapy could be considered. Normal heart size and pulmonary vascularity.\"    I will forward this note to Dr. Wilda Adam MD    "

## 2025-06-30 ENCOUNTER — RESULTS FOLLOW-UP (OUTPATIENT)
Dept: OTHER | Facility: CLINIC | Age: 55
End: 2025-06-30

## 2025-06-30 NOTE — TELEPHONE ENCOUNTER
This patient has not been seen by you yet.  They are scheduled for a new consult on 8/5/25 for TOS.   Please explain reasoning for CT chest so this can be explained to patient who is not yet a [facility] patient.    Eulalia JIMENEZ, RN    [facility]  Office: [phone]  Fax: [phone]

## 2025-06-30 NOTE — TELEPHONE ENCOUNTER
The CXR revealed pulmonary opacities. We have no idea what these are due to. A CT chest will help clarify this further. I can not comment upon the CXRs significance until seeing CT results, which are more sensitive than a CXR. Have patient see me sooner if they want more explanations about this.

## 2025-06-30 NOTE — TELEPHONE ENCOUNTER
Patient contacted via [employer] in regards to this and is agreeable to complete CT scan.    Routing to scheduling to coordinate the following:    CT chest with contrast   Please schedule this at least a week prior to 8/5/25 consult with Dr Wilda JIMENEZ, RN    [facility]  Office: [phone]  Fax: [phone]

## 2025-07-17 NOTE — PROGRESS NOTES
Assessment/Plan:      Ari was seen today for neck pain.    Diagnoses and all orders for this visit:    TOS (thoracic outlet syndrome)  -     DULoxetine (CYMBALTA) 20 MG capsule; Take 1 capsule (20 mg) by mouth daily.    Arm paresthesia, left  -     DULoxetine (CYMBALTA) 20 MG capsule; Take 1 capsule (20 mg) by mouth daily.    S/P cervical spinal fusion    Left tennis elbow         Assessment: Pleasant 54 year old male with a past medical history of BPH, diverticulitis, hyperlipidemia with:     1.  Chronic left shoulder and arm pain with paresthesias toward the medial wrist consistent with thoracic outlet syndrome.  Positive Rosey test and Adson's maneuver.  Also is having some cervical spine pain potential cervical radicular pain .  Status post C4-5 ACDF and C5-6 and 6-7 disc arthroplasties.  MRI of the brachial plexus is normal/no compressive lesions.  Thoracic outlet ultrasound shows near complete venous occlusion during provocative maneuvers.  No arterial occlusion.  Has had extensive PT in the past along with shoulder injections followed by [facility] and cervical epidural steroid injections through neurosurgery without much benefit.  Nortriptyline caused urinary retention     2.  Myofascial pain.     3.  Lateral epicondylitis left elbow.  Recommend rest and he has a tennis elbow strap at home.    Discussion:    1.  We discussed the diagnosis and treatment options.  He is scheduled to see vascular in the next week or so.  We discussed options of scalene trigger point injections which could lead to Botox along with the vascular evaluation given the venous flow insufficiency with positioning on ultrasound.    2.  Continue plan to see vascular surgery for evaluation of thoracic outlet syndrome    3.  Trial duloxetine 20 mg daily for neuropathic pain.    4.  Wear tennis elbow strap and ice in the left arm.    5.  Follow-up with me in 6 weeks.    It was our pleasure caring for your patient today, if there any  questions or concerns please do not hesitate to contact us.      Subjective:   Patient ID: Rigo Joyner is a 54 year old male.    History of Present Illness: Patient presents for follow-up evaluation of left shoulder arm pain with paresthesias into the left cervical spine at the cervical thoracic junction and left shoulder.  Continues to have 4/10 pain worse with activities involving his shoulder such as using his arm.  Having increased left lateral elbow pain as well with use has been sleeping on his left side which has increased the left elbow pain.  Has had diagnosis of lateral epicondylitis in the past has not been using his tennis elbow strap.  Continues to take gabapentin for the left arm pain.  Tried nortriptyline was having some urinary retention has stopped that medication and the side effects resolved.  Has had ultrasound examination of the left arm since last visit which was reviewed.  Has evaluation with vascular surgery in the next week or so.      Imaging: MRI of the brachial plexus along with upper extremity vascular ultrasound was reviewed.  Imaging and report personally reviewed for the brachial plexus MRI.  This reveals no compressive lesion of the left brachial plexus.    Upper extremity vascular ultrasound reveals near complete cessation of flow with the arms at 180 degrees of abduction and of the venous system arterial duplex showed no findings to suggest thoracic outlet from an arterial standpoint.    Review of Systems: Complains of paresthesias weakness headaches coordination problems of the left arm.  Denies bowel or bladder incontinence fevers unintentional weight loss           Current Outpatient Medications   Medication Sig Dispense Refill    acetaminophen (TYLENOL) 500 MG tablet Take 1,000 mg by mouth every 6 hours as needed      aspirin 81 MG EC tablet Take 1 tablet (81 mg) by mouth daily Resume 48 hours after surgery      atorvastatin (LIPITOR) 20 MG tablet Take 20 mg by mouth  at bedtime      cholecalciferol (VITAMIN D3) 125 mcg (5000 units) capsule Take 125 mcg by mouth daily      clindamycin (CLEOCIN T) 1 % external solution Apply to the problem areas on the scalp daily, increase to twice daily with flares 60 mL 11    fluocinonide (LIDEX) 0.05 % external solution Apply topically 2 times daily. For 2 weeks to problem area of scalp then stop and use PRN for flares 60 mL 2    fluticasone (FLONASE) 50 MCG/ACT nasal spray Spray 1 spray in nostril daily as needed      gabapentin (NEURONTIN) 300 MG capsule Take 600 mg by mouth 2 times daily      lactobacillus rhamnosus (GG) (CULTURELL) capsule Take 1 capsule by mouth every morning      methocarbamol (ROBAXIN) 750 MG tablet Take 1 tablet (750 mg) by mouth 4 times daily as needed for muscle spasms. 40 tablet 0    methocarbamol (ROBAXIN) 750 MG tablet Take 1 tablet (750 mg) by mouth 4 times daily (Patient not taking: Reported on 6/4/2025) 42 tablet 0    montelukast (SINGULAIR) 10 MG tablet Take 10 mg by mouth daily.      nortriptyline (PAMELOR) 10 MG capsule Take 1 capsule (10 mg) by mouth at bedtime. 30 capsule 1    polyethylene glycol-propylene glycol (SYSTANE ULTRA) 0.4-0.3 % SOLN ophthalmic solution Place 1 drop into both eyes 4 times daily as needed for dry eyes      predniSONE (DELTASONE) 20 MG tablet Take 20 mg by mouth. (Patient not taking: Reported on 5/15/2025)       No current facility-administered medications for this visit.       Past Medical History:   Diagnosis Date    Cervical radiculopathy     Hyperlipemia     Irritable bowel syndrome     Obese     PONV (postoperative nausea and vomiting)        The following portions of the patient's history were reviewed and updated as appropriate: allergies, current medications, past family history, past medical history, past social history, past surgical history and problem list.           Objective:   Physical Exam:    BP (!) 145/91   Pulse 73   There is no height or weight on file to  calculate BMI.      General: Alert and oriented with normal affect. Attention, knowledge, memory, and language are intact. No acute distress.   Eyes: Sclerae are clear.  Respirations: Unlabored. CV: No lower extremity edema.  Skin: No rashes seen.    Gait:  Nonantalgic    Sensation is intact to light touch throughout the upper and lower extremities.  Reflexes are 2+ and symmetric in the biceps triceps and brachioradialis with negative Hoffmans. 2+ patellar and Achilles with downgoing toes.    Manual muscle testing reveals:  Right /Left out of 5     5/5 elbow flexors  5/5 elbow extensors  5/5 wrist extensors  5/5 interosseus  5/5 finger flexors

## 2025-07-23 DIAGNOSIS — G54.0 TOS (THORACIC OUTLET SYNDROME): ICD-10-CM

## 2025-07-23 DIAGNOSIS — R20.2 ARM PARESTHESIA, LEFT: ICD-10-CM

## 2025-07-24 RX ORDER — NORTRIPTYLINE HYDROCHLORIDE 10 MG/1
10 CAPSULE ORAL AT BEDTIME
Qty: 30 CAPSULE | Refills: 1 | Status: SHIPPED | OUTPATIENT
Start: 2025-07-24

## 2025-07-28 ENCOUNTER — OFFICE VISIT (OUTPATIENT)
Dept: PHYSICAL MEDICINE AND REHAB | Facility: CLINIC | Age: 55
End: 2025-07-28
Payer: COMMERCIAL

## 2025-07-28 VITALS — SYSTOLIC BLOOD PRESSURE: 145 MMHG | HEART RATE: 73 BPM | DIASTOLIC BLOOD PRESSURE: 91 MMHG

## 2025-07-28 DIAGNOSIS — R20.2 ARM PARESTHESIA, LEFT: ICD-10-CM

## 2025-07-28 DIAGNOSIS — M77.12 LEFT TENNIS ELBOW: ICD-10-CM

## 2025-07-28 DIAGNOSIS — G54.0 TOS (THORACIC OUTLET SYNDROME): Primary | ICD-10-CM

## 2025-07-28 DIAGNOSIS — Z98.1 S/P CERVICAL SPINAL FUSION: ICD-10-CM

## 2025-07-28 PROCEDURE — 99214 OFFICE O/P EST MOD 30 MIN: CPT | Performed by: PHYSICAL MEDICINE & REHABILITATION

## 2025-07-28 PROCEDURE — 1125F AMNT PAIN NOTED PAIN PRSNT: CPT | Performed by: PHYSICAL MEDICINE & REHABILITATION

## 2025-07-28 PROCEDURE — 3077F SYST BP >= 140 MM HG: CPT | Performed by: PHYSICAL MEDICINE & REHABILITATION

## 2025-07-28 PROCEDURE — 3080F DIAST BP >= 90 MM HG: CPT | Performed by: PHYSICAL MEDICINE & REHABILITATION

## 2025-07-28 RX ORDER — DULOXETIN HYDROCHLORIDE 20 MG/1
20 CAPSULE, DELAYED RELEASE ORAL DAILY
Qty: 30 CAPSULE | Refills: 1 | Status: SHIPPED | OUTPATIENT
Start: 2025-07-28

## 2025-07-28 ASSESSMENT — PAIN SCALES - GENERAL: PAINLEVEL_OUTOF10: MODERATE PAIN (4)

## 2025-07-28 NOTE — PATIENT INSTRUCTIONS
See Vascular surgery to evaluate thoracic outlet syndrome  We can consider a scalene muscle injection  Start Duloxetine 20mg daily

## 2025-07-28 NOTE — LETTER
7/28/2025      Rigo Joyner  [address] [facility] [id number]      Dear Colleague,    Thank you for referring your patient, Rigo Joyner, to the [facility]. Please see a copy of my visit note below.    Assessment/Plan:      Ari was seen today for neck pain.    Diagnoses and all orders for this visit:    TOS (thoracic outlet syndrome)  -     DULoxetine (CYMBALTA) 20 MG capsule; Take 1 capsule (20 mg) by mouth daily.    Arm paresthesia, left  -     DULoxetine (CYMBALTA) 20 MG capsule; Take 1 capsule (20 mg) by mouth daily.    S/P cervical spinal fusion    Left tennis elbow         Assessment: Pleasant 54 year old male with a past medical history of BPH, diverticulitis, hyperlipidemia with:     1.  Chronic left shoulder and arm pain with paresthesias toward the medial wrist consistent with thoracic outlet syndrome.  Positive Rosey test and Adson's maneuver.  Also is having some cervical spine pain potential cervical radicular pain .  Status post C4-5 ACDF and C5-6 and 6-7 disc arthroplasties.  MRI of the brachial plexus is normal/no compressive lesions.  Thoracic outlet ultrasound shows near complete venous occlusion during provocative maneuvers.  No arterial occlusion.  Has had extensive PT in the past along with shoulder injections followed by [facility] and cervical epidural steroid injections through neurosurgery without much benefit.  Nortriptyline caused urinary retention     2.  Myofascial pain.     3.  Lateral epicondylitis left elbow.  Recommend rest and he has a tennis elbow strap at home.    Discussion:    1.  We discussed the diagnosis and treatment options.  He is scheduled to see vascular in the next week or so.  We discussed options of scalene trigger point injections which could lead to Botox along with the vascular evaluation given the venous flow insufficiency with positioning on ultrasound.    2.  Continue plan to see vascular surgery  for evaluation of thoracic outlet syndrome    3.  Trial duloxetine 20 mg daily for neuropathic pain.    4.  Wear tennis elbow strap and ice in the left arm.    5.  Follow-up with me in 6 weeks.    It was our pleasure caring for your patient today, if there any questions or concerns please do not hesitate to contact us.      Subjective:   Patient ID: Rigo Joyner is a 54 year old male.    History of Present Illness: Patient presents for follow-up evaluation of left shoulder arm pain with paresthesias into the left cervical spine at the cervical thoracic junction and left shoulder.  Continues to have 4/10 pain worse with activities involving his shoulder such as using his arm.  Having increased left lateral elbow pain as well with use has been sleeping on his left side which has increased the left elbow pain.  Has had diagnosis of lateral epicondylitis in the past has not been using his tennis elbow strap.  Continues to take gabapentin for the left arm pain.  Tried nortriptyline was having some urinary retention has stopped that medication and the side effects resolved.  Has had ultrasound examination of the left arm since last visit which was reviewed.  Has evaluation with vascular surgery in the next week or so.      Imaging: MRI of the brachial plexus along with upper extremity vascular ultrasound was reviewed.  Imaging and report personally reviewed for the brachial plexus MRI.  This reveals no compressive lesion of the left brachial plexus.    Upper extremity vascular ultrasound reveals near complete cessation of flow with the arms at 180 degrees of abduction and of the venous system arterial duplex showed no findings to suggest thoracic outlet from an arterial standpoint.    Review of Systems: Complains of paresthesias weakness headaches coordination problems of the left arm.  Denies bowel or bladder incontinence fevers unintentional weight loss           Current Outpatient Medications   Medication Sig  Dispense Refill     acetaminophen (TYLENOL) 500 MG tablet Take 1,000 mg by mouth every 6 hours as needed       aspirin 81 MG EC tablet Take 1 tablet (81 mg) by mouth daily Resume 48 hours after surgery       atorvastatin (LIPITOR) 20 MG tablet Take 20 mg by mouth at bedtime       cholecalciferol (VITAMIN D3) 125 mcg (5000 units) capsule Take 125 mcg by mouth daily       clindamycin (CLEOCIN T) 1 % external solution Apply to the problem areas on the scalp daily, increase to twice daily with flares 60 mL 11     fluocinonide (LIDEX) 0.05 % external solution Apply topically 2 times daily. For 2 weeks to problem area of scalp then stop and use PRN for flares 60 mL 2     fluticasone (FLONASE) 50 MCG/ACT nasal spray Spray 1 spray in nostril daily as needed       gabapentin (NEURONTIN) 300 MG capsule Take 600 mg by mouth 2 times daily       lactobacillus rhamnosus (GG) (CULTURELL) capsule Take 1 capsule by mouth every morning       methocarbamol (ROBAXIN) 750 MG tablet Take 1 tablet (750 mg) by mouth 4 times daily as needed for muscle spasms. 40 tablet 0     methocarbamol (ROBAXIN) 750 MG tablet Take 1 tablet (750 mg) by mouth 4 times daily (Patient not taking: Reported on 6/4/2025) 42 tablet 0     montelukast (SINGULAIR) 10 MG tablet Take 10 mg by mouth daily.       nortriptyline (PAMELOR) 10 MG capsule Take 1 capsule (10 mg) by mouth at bedtime. 30 capsule 1     polyethylene glycol-propylene glycol (SYSTANE ULTRA) 0.4-0.3 % SOLN ophthalmic solution Place 1 drop into both eyes 4 times daily as needed for dry eyes       predniSONE (DELTASONE) 20 MG tablet Take 20 mg by mouth. (Patient not taking: Reported on 5/15/2025)       No current facility-administered medications for this visit.       Past Medical History:   Diagnosis Date     Cervical radiculopathy      Hyperlipemia      Irritable bowel syndrome      Obese      PONV (postoperative nausea and vomiting)        The following portions of the patient's history were  reviewed and updated as appropriate: allergies, current medications, past family history, past medical history, past social history, past surgical history and problem list.           Objective:   Physical Exam:    BP (!) 145/91   Pulse 73   There is no height or weight on file to calculate BMI.      General: Alert and oriented with normal affect. Attention, knowledge, memory, and language are intact. No acute distress.   Eyes: Sclerae are clear.  Respirations: Unlabored. CV: No lower extremity edema.  Skin: No rashes seen.    Gait:  Nonantalgic    Sensation is intact to light touch throughout the upper and lower extremities.  Reflexes are 2+ and symmetric in the biceps triceps and brachioradialis with negative Hoffmans. 2+ patellar and Achilles with downgoing toes.    Manual muscle testing reveals:  Right /Left out of 5     5/5 elbow flexors  5/5 elbow extensors  5/5 wrist extensors  5/5 interosseus  5/5 finger flexors       Again, thank you for allowing me to participate in the care of your patient.        Sincerely,        Rick Byrne DO    Electronically signed

## 2025-08-01 ENCOUNTER — HOSPITAL ENCOUNTER (OUTPATIENT)
Dept: CT IMAGING | Facility: CLINIC | Age: 55
Discharge: HOME | End: 2025-08-01
Attending: INTERNAL MEDICINE | Admitting: INTERNAL MEDICINE
Payer: COMMERCIAL

## 2025-08-01 ENCOUNTER — HOSPITAL ENCOUNTER (EMERGENCY)
Facility: CLINIC | Age: 55
Discharge: HOME | End: 2025-08-01
Attending: EMERGENCY MEDICINE | Admitting: EMERGENCY MEDICINE
Payer: COMMERCIAL

## 2025-08-01 ENCOUNTER — APPOINTMENT (OUTPATIENT)
Dept: CT IMAGING | Facility: CLINIC | Age: 55
End: 2025-08-01
Attending: EMERGENCY MEDICINE
Payer: COMMERCIAL

## 2025-08-01 VITALS
SYSTOLIC BLOOD PRESSURE: 134 MMHG | RESPIRATION RATE: 18 BRPM | OXYGEN SATURATION: 96 % | HEART RATE: 59 BPM | TEMPERATURE: 98.2 F | DIASTOLIC BLOOD PRESSURE: 86 MMHG

## 2025-08-01 DIAGNOSIS — R03.0 ELEVATED BLOOD PRESSURE READING WITHOUT DIAGNOSIS OF HYPERTENSION: Primary | ICD-10-CM

## 2025-08-01 DIAGNOSIS — R93.89 ABNORMAL CT OF THE CHEST: ICD-10-CM

## 2025-08-01 DIAGNOSIS — R91.8 OPACITY OF LUNG ON IMAGING STUDY: ICD-10-CM

## 2025-08-01 DIAGNOSIS — R93.89 ABNORMAL ULTRASOUND: ICD-10-CM

## 2025-08-01 LAB
ANION GAP SERPL CALCULATED.3IONS-SCNC: 12 MMOL/L (ref 7–15)
BUN SERPL-MCNC: 14.9 MG/DL (ref 6–20)
CALCIUM SERPL-MCNC: 8.7 MG/DL (ref 8.8–10.4)
CHLORIDE SERPL-SCNC: 102 MMOL/L (ref 98–107)
CREAT SERPL-MCNC: 0.78 MG/DL (ref 0.67–1.17)
EGFRCR SERPLBLD CKD-EPI 2021: >90 ML/MIN/1.73M2
GLUCOSE SERPL-MCNC: 121 MG/DL (ref 70–99)
HCO3 SERPL-SCNC: 23 MMOL/L (ref 22–29)
POTASSIUM SERPL-SCNC: 4.3 MMOL/L (ref 3.4–5.3)
SODIUM SERPL-SCNC: 137 MMOL/L (ref 135–145)

## 2025-08-01 PROCEDURE — 96361 HYDRATE IV INFUSION ADD-ON: CPT

## 2025-08-01 PROCEDURE — 36415 COLL VENOUS BLD VENIPUNCTURE: CPT | Performed by: EMERGENCY MEDICINE

## 2025-08-01 PROCEDURE — 258N000003 HC RX IP 258 OP 636: Performed by: EMERGENCY MEDICINE

## 2025-08-01 PROCEDURE — 96360 HYDRATION IV INFUSION INIT: CPT

## 2025-08-01 PROCEDURE — 80048 BASIC METABOLIC PNL TOTAL CA: CPT | Performed by: EMERGENCY MEDICINE

## 2025-08-01 PROCEDURE — 71260 CT THORAX DX C+: CPT

## 2025-08-01 PROCEDURE — 70450 CT HEAD/BRAIN W/O DYE: CPT

## 2025-08-01 PROCEDURE — 99284 EMERGENCY DEPT VISIT MOD MDM: CPT | Performed by: EMERGENCY MEDICINE

## 2025-08-01 PROCEDURE — 250N000009 HC RX 250: Performed by: RADIOLOGY

## 2025-08-01 PROCEDURE — 250N000011 HC RX IP 250 OP 636: Performed by: RADIOLOGY

## 2025-08-01 PROCEDURE — 99284 EMERGENCY DEPT VISIT MOD MDM: CPT | Mod: 25 | Performed by: EMERGENCY MEDICINE

## 2025-08-01 RX ORDER — IOPAMIDOL 755 MG/ML
55 INJECTION, SOLUTION INTRAVASCULAR ONCE
Status: DISCONTINUED | OUTPATIENT
Start: 2025-08-01 | End: 2025-08-01 | Stop reason: HOSPADM

## 2025-08-01 RX ORDER — IOPAMIDOL 755 MG/ML
107 INJECTION, SOLUTION INTRAVASCULAR ONCE
Status: COMPLETED | OUTPATIENT
Start: 2025-08-01 | End: 2025-08-01

## 2025-08-01 RX ADMIN — SODIUM CHLORIDE 500 ML: 0.9 INJECTION, SOLUTION INTRAVENOUS at 09:43

## 2025-08-01 RX ADMIN — IOPAMIDOL 107 ML: 755 INJECTION, SOLUTION INTRAVENOUS at 08:49

## 2025-08-01 RX ADMIN — SODIUM CHLORIDE 67 ML: 9 INJECTION, SOLUTION INTRAVENOUS at 08:49

## 2025-08-01 ASSESSMENT — COLUMBIA-SUICIDE SEVERITY RATING SCALE - C-SSRS
1. IN THE PAST MONTH, HAVE YOU WISHED YOU WERE DEAD OR WISHED YOU COULD GO TO SLEEP AND NOT WAKE UP?: NO
2. HAVE YOU ACTUALLY HAD ANY THOUGHTS OF KILLING YOURSELF IN THE PAST MONTH?: NO
6. HAVE YOU EVER DONE ANYTHING, STARTED TO DO ANYTHING, OR PREPARED TO DO ANYTHING TO END YOUR LIFE?: NO

## 2025-08-01 ASSESSMENT — ENCOUNTER SYMPTOMS
CARDIOVASCULAR NEGATIVE: 1
EYES NEGATIVE: 1
PSYCHIATRIC NEGATIVE: 1
GASTROINTESTINAL NEGATIVE: 1
ALLERGIC/IMMUNOLOGIC NEGATIVE: 1
HEADACHES: 1
ENDOCRINE NEGATIVE: 1
NECK PAIN: 1
HEMATOLOGIC/LYMPHATIC NEGATIVE: 1
RESPIRATORY NEGATIVE: 1

## 2025-08-01 ASSESSMENT — ACTIVITIES OF DAILY LIVING (ADL)
ADLS_ACUITY_SCORE: 60

## 2025-08-05 ENCOUNTER — OFFICE VISIT (OUTPATIENT)
Dept: OTHER | Facility: CLINIC | Age: 55
End: 2025-08-05
Attending: INTERNAL MEDICINE
Payer: COMMERCIAL

## 2025-08-05 ENCOUNTER — HOSPITAL ENCOUNTER (OUTPATIENT)
Dept: ULTRASOUND IMAGING | Facility: CLINIC | Age: 55
Discharge: HOME | End: 2025-08-05
Attending: INTERNAL MEDICINE
Payer: COMMERCIAL

## 2025-08-05 VITALS
SYSTOLIC BLOOD PRESSURE: 142 MMHG | BODY MASS INDEX: 31.6 KG/M2 | WEIGHT: 214 LBS | HEART RATE: 65 BPM | OXYGEN SATURATION: 100 % | DIASTOLIC BLOOD PRESSURE: 89 MMHG

## 2025-08-05 DIAGNOSIS — H93.A1 PULSATILE TINNITUS OF RIGHT EAR: ICD-10-CM

## 2025-08-05 DIAGNOSIS — G54.0 TOS (THORACIC OUTLET SYNDROME): ICD-10-CM

## 2025-08-05 DIAGNOSIS — M79.602 PAIN OF LEFT UPPER EXTREMITY: Primary | ICD-10-CM

## 2025-08-05 DIAGNOSIS — R91.8 PULMONARY NODULES: ICD-10-CM

## 2025-08-05 PROBLEM — K58.9 IRRITABLE BOWEL SYNDROME, UNSPECIFIED: Status: ACTIVE | Noted: 2020-09-30

## 2025-08-05 PROBLEM — H52.4 PRESBYOPIA: Status: ACTIVE | Noted: 2025-08-05

## 2025-08-05 PROBLEM — S32.009S: Status: ACTIVE | Noted: 2025-03-03

## 2025-08-05 PROBLEM — Z77.020: Status: ACTIVE | Noted: 2025-08-05

## 2025-08-05 PROBLEM — Z77.29 EXPOSURE TO POTENTIALLY HAZARDOUS SUBSTANCE: Status: ACTIVE | Noted: 2024-03-07

## 2025-08-05 PROCEDURE — 99213 OFFICE O/P EST LOW 20 MIN: CPT | Performed by: INTERNAL MEDICINE

## 2025-08-05 PROCEDURE — 99417 PROLNG OP E/M EACH 15 MIN: CPT | Performed by: INTERNAL MEDICINE

## 2025-08-05 PROCEDURE — 99205 OFFICE O/P NEW HI 60 MIN: CPT | Performed by: INTERNAL MEDICINE

## 2025-08-05 PROCEDURE — 3079F DIAST BP 80-89 MM HG: CPT | Performed by: INTERNAL MEDICINE

## 2025-08-05 PROCEDURE — G2211 COMPLEX E/M VISIT ADD ON: HCPCS | Performed by: INTERNAL MEDICINE

## 2025-08-05 PROCEDURE — 93930 UPPER EXTREMITY STUDY: CPT

## 2025-08-05 PROCEDURE — 93970 EXTREMITY STUDY: CPT

## 2025-08-05 PROCEDURE — 3075F SYST BP GE 130 - 139MM HG: CPT | Performed by: INTERNAL MEDICINE

## 2025-08-06 ENCOUNTER — TELEPHONE (OUTPATIENT)
Dept: OTHER | Facility: CLINIC | Age: 55
End: 2025-08-06
Payer: COMMERCIAL

## 2025-08-06 DIAGNOSIS — G54.0 TOS (THORACIC OUTLET SYNDROME): Primary | ICD-10-CM

## 2025-08-07 DIAGNOSIS — G54.0 THORACIC OUTLET SYNDROME: Primary | ICD-10-CM

## 2025-08-13 ENCOUNTER — TELEPHONE (OUTPATIENT)
Dept: ANESTHESIOLOGY | Facility: CLINIC | Age: 55
End: 2025-08-13
Payer: COMMERCIAL

## 2025-08-13 PROBLEM — G54.0 THORACIC OUTLET SYNDROME: Status: ACTIVE | Noted: 2025-08-07

## 2025-08-17 ENCOUNTER — HOSPITAL ENCOUNTER (EMERGENCY)
Facility: CLINIC | Age: 55
Discharge: HOME | End: 2025-08-17
Attending: PHYSICIAN ASSISTANT | Admitting: PHYSICIAN ASSISTANT
Payer: COMMERCIAL

## 2025-08-17 VITALS
TEMPERATURE: 97.4 F | HEART RATE: 65 BPM | RESPIRATION RATE: 16 BRPM | DIASTOLIC BLOOD PRESSURE: 84 MMHG | OXYGEN SATURATION: 98 % | SYSTOLIC BLOOD PRESSURE: 138 MMHG

## 2025-08-17 DIAGNOSIS — R09.81 CONGESTION OF PARANASAL SINUS: ICD-10-CM

## 2025-08-17 DIAGNOSIS — H60.502 ACUTE OTITIS EXTERNA OF LEFT EAR, UNSPECIFIED TYPE: Primary | ICD-10-CM

## 2025-08-17 DIAGNOSIS — H92.01 RIGHT EAR PAIN: ICD-10-CM

## 2025-08-17 PROCEDURE — G0463 HOSPITAL OUTPT CLINIC VISIT: HCPCS | Performed by: PHYSICIAN ASSISTANT

## 2025-08-17 PROCEDURE — 99213 OFFICE O/P EST LOW 20 MIN: CPT | Performed by: PHYSICIAN ASSISTANT

## 2025-08-17 RX ORDER — PREDNISONE 20 MG/1
TABLET ORAL
Qty: 10 TABLET | Refills: 0 | Status: SHIPPED | OUTPATIENT
Start: 2025-08-17

## 2025-08-17 RX ORDER — OFLOXACIN 3 MG/ML
10 SOLUTION AURICULAR (OTIC) 2 TIMES DAILY
Qty: 5 ML | Refills: 0 | Status: SHIPPED | OUTPATIENT
Start: 2025-08-17 | End: 2025-08-24

## 2025-08-17 ASSESSMENT — ENCOUNTER SYMPTOMS
SINUS PRESSURE: 1
SORE THROAT: 0
PSYCHIATRIC NEGATIVE: 1
CARDIOVASCULAR NEGATIVE: 1
RHINORRHEA: 1
FATIGUE: 0
FEVER: 0
CONSTITUTIONAL NEGATIVE: 1
VOICE CHANGE: 0
TROUBLE SWALLOWING: 0
MUSCULOSKELETAL NEGATIVE: 1
EYES NEGATIVE: 1
RESPIRATORY NEGATIVE: 1
GASTROINTESTINAL NEGATIVE: 1

## 2025-08-21 ENCOUNTER — HOSPITAL ENCOUNTER (OUTPATIENT)
Dept: PET IMAGING | Facility: HOSPITAL | Age: 55
End: 2025-08-21
Attending: INTERNAL MEDICINE
Payer: COMMERCIAL

## 2025-08-21 DIAGNOSIS — R91.8 PULMONARY NODULES: ICD-10-CM

## 2025-08-21 LAB — GLUCOSE BLDC GLUCOMTR-MCNC: 124 MG/DL (ref 70–99)

## 2025-08-21 PROCEDURE — 78815 PET IMAGE W/CT SKULL-THIGH: CPT | Mod: PI

## 2025-08-21 PROCEDURE — A9552 F18 FDG: HCPCS | Performed by: INTERNAL MEDICINE

## 2025-08-21 PROCEDURE — 82962 GLUCOSE BLOOD TEST: CPT

## 2025-08-21 PROCEDURE — 343N000001 HC RX 343 MED OP 636: Performed by: INTERNAL MEDICINE

## 2025-08-21 RX ORDER — FLUDEOXYGLUCOSE F 18 200 MCI/ML
7-18 INJECTION, SOLUTION INTRAVENOUS ONCE
Status: COMPLETED | OUTPATIENT
Start: 2025-08-21 | End: 2025-08-21

## 2025-08-21 RX ADMIN — FLUDEOXYGLUCOSE F 18 12.79 MILLICURIE: 200 INJECTION, SOLUTION INTRAVENOUS at 08:47

## 2025-08-28 ASSESSMENT — ACTIVITIES OF DAILY LIVING (ADL)
WASHING_YOUR_HAIR?: 3
WASHING_YOUR_BACK: 4
PLEASE_INDICATE_YOR_PRIMARY_REASON_FOR_REFERRAL_TO_THERAPY:: SHOULDER
PUTTING_ON_YOUR_PANTS: 2
PUTTING_ON_A_SHIRT_THAT_BUTTONS_DOWN_THE_FRONT: 2
REMOVING_SOMETHING_FROM_YOUR_BACK_POCKET: 1
PUTTING_ON_AN_UNDERSHIRT_OR_A_PULLOVER_SWEATER: 5
TOUCHING_THE_BACK_OF_YOUR_NECK: 4
PUSHING_WITH_THE_INVOLVED_ARM: 6
REACHING_FOR_SOMETHING_ON_A_HIGH_SHELF: 6
WHEN_LYING_ON_THE_INVOLVED_SIDE: 7
PLACING_AN_OBJECT_ON_A_HIGH_SHELF: 4
CARRYING_A_HEAVY_OBJECT_OF_10_POUNDS: 6
AT_ITS_WORST?: 6

## 2025-09-02 ENCOUNTER — THERAPY VISIT (OUTPATIENT)
Dept: PHYSICAL THERAPY | Facility: CLINIC | Age: 55
End: 2025-09-02
Attending: INTERNAL MEDICINE
Payer: COMMERCIAL

## 2025-09-02 ENCOUNTER — VIRTUAL VISIT (OUTPATIENT)
Dept: OTHER | Facility: CLINIC | Age: 55
End: 2025-09-02
Payer: COMMERCIAL

## 2025-09-02 DIAGNOSIS — G54.0 TOS (THORACIC OUTLET SYNDROME): ICD-10-CM

## 2025-09-02 DIAGNOSIS — H93.A1 PULSATILE TINNITUS OF RIGHT EAR: ICD-10-CM

## 2025-09-02 DIAGNOSIS — M79.602 PAIN OF LEFT UPPER EXTREMITY: ICD-10-CM

## 2025-09-02 DIAGNOSIS — R91.8 PULMONARY NODULES: Primary | ICD-10-CM

## 2025-09-02 PROCEDURE — 97140 MANUAL THERAPY 1/> REGIONS: CPT | Mod: GP | Performed by: PHYSICAL THERAPIST

## 2025-09-02 PROCEDURE — 97110 THERAPEUTIC EXERCISES: CPT | Mod: GP | Performed by: PHYSICAL THERAPIST

## 2025-09-02 PROCEDURE — 97161 PT EVAL LOW COMPLEX 20 MIN: CPT | Mod: GP | Performed by: PHYSICAL THERAPIST

## (undated) DEVICE — DRAPE C-ARM 60X42" 1013

## (undated) DEVICE — GLOVE UNDER INDICATOR PI SZ 6.5 LF 41665

## (undated) DEVICE — RESTRAINT WRIST DBL STRAP M9400

## (undated) DEVICE — DRSG TELFA 3X4" 1050

## (undated) DEVICE — SOL ISOPROPYL RUBBING ALCOHOL USP 70% 4OZ HDX-20 I0020

## (undated) DEVICE — PREP CHLORAPREP W/ORANGE TINT 10.5ML 930715

## (undated) DEVICE — SYR 10ML LL W/O NDL 302995

## (undated) DEVICE — SUTURE VICRYL+ 3-0 18 SH/CR UND VCP864

## (undated) DEVICE — SU DERMABOND ADVANCED .7ML DNX12

## (undated) DEVICE — TUBING SUCTION MEDI-VAC 1/4"X20' N620A

## (undated) DEVICE — SOL WATER IRRIG 1000ML BOTTLE 2F7114

## (undated) DEVICE — PLATE GROUNDING ADULT W/CORD 9165L

## (undated) DEVICE — SPONGE KITNER DISSECTING 7102*

## (undated) DEVICE — RX SURGIFLO HEMOSTATIC MATRIX W/THROMBIN 8ML 2994

## (undated) DEVICE — CUFF TOURN 18IN STRL DISP

## (undated) DEVICE — MARKER SURG SKIN 2 TIP STRL SPP99DT2AA

## (undated) DEVICE — Device

## (undated) DEVICE — SPONGE NEURO 1/2X1/2 WECK 200100

## (undated) DEVICE — BLADE KNIFE SURG 11 371111

## (undated) DEVICE — SU ETHILON 3-0 PS-2 18" 1669H

## (undated) DEVICE — BANDAGE ELASTIC VELCRO 3IN REB3013

## (undated) DEVICE — CATH TRAY FOLEY SURESTEP 16FR DRAIN BAG STATOCK A899916

## (undated) DEVICE — SOL NACL 0.9% IRRIG 1000ML BOTTLE 2F7124

## (undated) DEVICE — PREP DYNA-HEX 4% CHG SCRUB 4OZ BOTTLE MDS098710

## (undated) DEVICE — RX SURGIFLO HEMOSTATIC MATRIX 8ML 2991

## (undated) DEVICE — TAPE ADH POROUS 3IN CURITY STD 7046C

## (undated) DEVICE — DRAPE THYROID

## (undated) DEVICE — GOWN LG DISP 9515

## (undated) DEVICE — DRSG KERLIX 4 1/2"X4YDS ROLL 6715

## (undated) DEVICE — ESU ELEC BLADE 2.75" COATED/INSULATED E1455

## (undated) DEVICE — DRSG KERLIX FLUFFS X5

## (undated) DEVICE — DRSG KERLIX 2 1/4"X3YDS ROLL 6720

## (undated) DEVICE — DRSG PRIMAPORE 03 1/8X6" 66000318

## (undated) DEVICE — SU MONOCRYL+ 4-0 18IN PS2 UND MCP496G

## (undated) DEVICE — DRESSING TEGADERM IV 2 3/4 X 3 1/4 1633

## (undated) DEVICE — NEEDLE HYPO 18X1-1/2 SAFETY 305918

## (undated) DEVICE — NEEDLE SPINAL DISP 22GA X 3.5" QUINCKE 333320

## (undated) DEVICE — SU PROLENE 5-0 P-3 18" 8698G

## (undated) DEVICE — PITCHER STERILE 1000ML  SSK9004A

## (undated) DEVICE — DRAIN JACKSON PRATT 07FR ROUND SU130-1320

## (undated) DEVICE — CUSTOM PACK LUMBAR FUSION SNE5BLFHEA

## (undated) DEVICE — ESU PENCIL SMOKE EVAC W/ROCKER SWITCH 0703-047-000

## (undated) DEVICE — DRILL BIT MEDT ELITE 11MM SS 7080510

## (undated) DEVICE — TRAY PREP DRY SKIN SCRUB 067

## (undated) DEVICE — BLADE KNIFE SURG 15 371115

## (undated) DEVICE — DRAIN RESERVOIR 100ML JP 0070740

## (undated) DEVICE — GLOVE BIOGEL PI SZ 8.0 40880

## (undated) DEVICE — A3 SUPPLIES- SEE NURSING INFO PAGE

## (undated) DEVICE — ESU GROUND PAD ADULT REM W/15' CORD E7507DB

## (undated) DEVICE — DRSG XEROFORM 1X8"

## (undated) DEVICE — 1.5MM DRILL BIT

## (undated) DEVICE — DRSG GAUZE 4X4" 3033

## (undated) DEVICE — NEEDLE HYPO 25X1 SAFETY 305916

## (undated) DEVICE — PROTECTOR ARM STANDARD ONE STEP

## (undated) DEVICE — SUCTION MANIFOLD NEPTUNE 2 SYS 1 PORT 702-025-000

## (undated) DEVICE — GLOVE BIOGEL PI ULTRATOUCH G SZ 6.5 42165

## (undated) DEVICE — BNDG KLING 3" 2232

## (undated) DEVICE — DRAPE C-ARMOR 5 SIDED 5523

## (undated) DEVICE — CUSTOM PACK UPPER EXTREMITY SOP5BUEHEC

## (undated) DEVICE — ALCOHOL ISOPROPYL 4 OZ 70% IA7004

## (undated) DEVICE — PREP SCRUB SOL EXIDINE 4% CHG 4OZ 29002-404

## (undated) DEVICE — TOOL DISSECT MIDAS MR8 14CM MATCH HEAD 3MM MR8-14MH30

## (undated) DEVICE — DRSG GAUZE 2X2" 8042

## (undated) DEVICE — ESU CORD BIPOLAR 12' E0512

## (undated) DEVICE — DECANTER VIAL 2006S

## (undated) DEVICE — SU ETHILON 3-0 FS-1 18" 669H

## (undated) RX ORDER — DEXAMETHASONE SODIUM PHOSPHATE 4 MG/ML
INJECTION, SOLUTION INTRA-ARTICULAR; INTRALESIONAL; INTRAMUSCULAR; INTRAVENOUS; SOFT TISSUE
Status: DISPENSED
Start: 2023-11-10

## (undated) RX ORDER — PROPOFOL 10 MG/ML
INJECTION, EMULSION INTRAVENOUS
Status: DISPENSED
Start: 2024-04-17

## (undated) RX ORDER — GLYCOPYRROLATE 0.2 MG/ML
INJECTION, SOLUTION INTRAMUSCULAR; INTRAVENOUS
Status: DISPENSED
Start: 2023-11-10

## (undated) RX ORDER — PROPOFOL 10 MG/ML
INJECTION, EMULSION INTRAVENOUS
Status: DISPENSED
Start: 2023-11-10

## (undated) RX ORDER — PROPOFOL 10 MG/ML
INJECTION, EMULSION INTRAVENOUS
Status: DISPENSED
Start: 2022-06-08

## (undated) RX ORDER — ONDANSETRON 2 MG/ML
INJECTION INTRAMUSCULAR; INTRAVENOUS
Status: DISPENSED
Start: 2022-06-08

## (undated) RX ORDER — FENTANYL CITRATE 50 UG/ML
INJECTION, SOLUTION INTRAMUSCULAR; INTRAVENOUS
Status: DISPENSED
Start: 2024-04-17

## (undated) RX ORDER — DEXAMETHASONE SODIUM PHOSPHATE 4 MG/ML
INJECTION, SOLUTION INTRA-ARTICULAR; INTRALESIONAL; INTRAMUSCULAR; INTRAVENOUS; SOFT TISSUE
Status: DISPENSED
Start: 2022-06-08

## (undated) RX ORDER — LIDOCAINE HYDROCHLORIDE 10 MG/ML
INJECTION, SOLUTION EPIDURAL; INFILTRATION; INTRACAUDAL; PERINEURAL
Status: DISPENSED
Start: 2023-11-10

## (undated) RX ORDER — FENTANYL CITRATE 50 UG/ML
INJECTION, SOLUTION INTRAMUSCULAR; INTRAVENOUS
Status: DISPENSED
Start: 2023-11-10

## (undated) RX ORDER — EPINEPHRINE 1 MG/ML(1)
AMPUL (ML) INJECTION
Status: DISPENSED
Start: 2019-04-24

## (undated) RX ORDER — ONDANSETRON 2 MG/ML
INJECTION INTRAMUSCULAR; INTRAVENOUS
Status: DISPENSED
Start: 2023-11-10

## (undated) RX ORDER — LIDOCAINE HYDROCHLORIDE 10 MG/ML
INJECTION, SOLUTION EPIDURAL; INFILTRATION; INTRACAUDAL; PERINEURAL
Status: DISPENSED
Start: 2019-04-24